# Patient Record
Sex: FEMALE | Race: WHITE | NOT HISPANIC OR LATINO | Employment: OTHER | URBAN - NONMETROPOLITAN AREA
[De-identification: names, ages, dates, MRNs, and addresses within clinical notes are randomized per-mention and may not be internally consistent; named-entity substitution may affect disease eponyms.]

---

## 2017-01-25 DIAGNOSIS — E11.42 DIABETIC PERIPHERAL NEUROPATHY ASSOCIATED WITH TYPE 2 DIABETES MELLITUS (HCC): ICD-10-CM

## 2017-01-25 RX ORDER — GABAPENTIN 600 MG/1
600 TABLET ORAL 3 TIMES DAILY
Qty: 270 TABLET | Refills: 0 | Status: SHIPPED | OUTPATIENT
Start: 2017-01-25 | End: 2017-03-29 | Stop reason: SDUPTHER

## 2017-02-28 DIAGNOSIS — Z79.4 DIABETES MELLITUS TYPE 2, INSULIN DEPENDENT (HCC): Chronic | ICD-10-CM

## 2017-02-28 DIAGNOSIS — E11.9 DIABETES MELLITUS TYPE 2, INSULIN DEPENDENT (HCC): Chronic | ICD-10-CM

## 2017-03-01 RX ORDER — INSULIN GLARGINE 100 [IU]/ML
INJECTION, SOLUTION SUBCUTANEOUS
Qty: 1 PEN | Refills: 3 | Status: SHIPPED | OUTPATIENT
Start: 2017-03-01 | End: 2017-03-29 | Stop reason: SDUPTHER

## 2017-03-16 ENCOUNTER — PROCEDURE VISIT (OUTPATIENT)
Dept: UROLOGY | Facility: CLINIC | Age: 52
End: 2017-03-16

## 2017-03-16 DIAGNOSIS — Z85.51 HISTORY OF BLADDER CANCER: Primary | ICD-10-CM

## 2017-03-16 DIAGNOSIS — N76.2 ATROPHIC VULVITIS: ICD-10-CM

## 2017-03-16 LAB
BILIRUB BLD-MCNC: NEGATIVE MG/DL
CLARITY, POC: CLEAR
COLOR UR: YELLOW
GLUCOSE UR STRIP-MCNC: ABNORMAL MG/DL
KETONES UR QL: NEGATIVE
LEUKOCYTE EST, POC: NEGATIVE
NITRITE UR-MCNC: NEGATIVE MG/ML
PH UR: 6 [PH] (ref 5–8)
PROT UR STRIP-MCNC: NEGATIVE MG/DL
RBC # UR STRIP: NEGATIVE /UL
SP GR UR: 1.01 (ref 1–1.03)
UROBILINOGEN UR QL: NORMAL

## 2017-03-16 PROCEDURE — 81003 URINALYSIS AUTO W/O SCOPE: CPT | Performed by: UROLOGY

## 2017-03-16 PROCEDURE — 52000 CYSTOURETHROSCOPY: CPT | Performed by: UROLOGY

## 2017-03-16 PROCEDURE — 99213 OFFICE O/P EST LOW 20 MIN: CPT | Performed by: UROLOGY

## 2017-03-16 RX ORDER — HYDROCODONE BITARTRATE AND ACETAMINOPHEN 5; 325 MG/1; MG/1
TABLET ORAL EVERY 6 HOURS
COMMUNITY
Start: 2015-12-24 | End: 2017-03-29

## 2017-03-16 RX ORDER — GABAPENTIN 600 MG/1
TABLET ORAL
COMMUNITY
End: 2017-03-29 | Stop reason: SDUPTHER

## 2017-03-16 RX ORDER — ASPIRIN 81 MG/1
TABLET ORAL
COMMUNITY
End: 2017-07-28 | Stop reason: SDUPTHER

## 2017-03-16 RX ORDER — IBUPROFEN 200 MG
TABLET ORAL
COMMUNITY
Start: 2011-04-06 | End: 2017-03-29

## 2017-03-16 RX ORDER — MELOXICAM 7.5 MG/1
TABLET ORAL
COMMUNITY
End: 2017-03-29 | Stop reason: SDUPTHER

## 2017-03-16 RX ORDER — INSULIN GLARGINE 100 [IU]/ML
INJECTION, SOLUTION SUBCUTANEOUS
COMMUNITY
End: 2017-03-29 | Stop reason: SDUPTHER

## 2017-03-16 RX ORDER — CLOPIDOGREL BISULFATE 75 MG/1
TABLET ORAL
COMMUNITY
End: 2017-03-29

## 2017-03-16 RX ORDER — ATORVASTATIN CALCIUM 10 MG/1
TABLET, FILM COATED ORAL
COMMUNITY
Start: 2011-04-06 | End: 2017-03-29 | Stop reason: SDUPTHER

## 2017-03-16 NOTE — PROGRESS NOTES
Chief Complaint  Bladder Cancer (CYSTO)      HPI  Kimberly Chun is a 51 y.o.female who is today for follow-up of her history of bladder cancer that was fortunately low grade and noninvasive.  She returns for her 3 month surveillance cystoscopy asymptomatic with no complaints.  I've Encouraged her to quit smoking and she states she has cut down.  At least she does drink some green tea.    There were no vitals filed for this visit.    Past Medical History  Past Medical History   Diagnosis Date   • Anxiety    • Arthritis    • Bladder cancer 2016   • Cataract    • COPD (chronic obstructive pulmonary disease)    • Fibromyalgia    • GERD (gastroesophageal reflux disease)    • Ovarian cyst    • Recurrent urinary tract infection    • Seasonal allergies    • Statin intolerance    • Stroke    • Type 2 diabetes mellitus        Past Surgical History  Past Surgical History   Procedure Laterality Date   • Hysterectomy     • Tonsillectomy     • Cystoscopy bladder biopsy  2016       Medications  has a current medication list which includes the following prescription(s): atorvastatin, hydrocodone-acetaminophen, ibuprofen, insulin aspart, albuterol, aspirin, clopidogrel, clopidogrel, gabapentin, gabapentin, glucagen hypokit, glucagon, ibuprofen, insulin aspart, insulin glargine, insulin pen needle, lantus solostar, lisinopril, lorazepam, meloxicam, meloxicam, metformin, nitrofurantoin (macrocrystal-monohydrate), omeprazole, oxycodone-acetaminophen, and trazodone.    Allergies  Allergies   Allergen Reactions   • Iodides    • Iodinated Diagnostic Agents        Social History  Social History     Social History   • Marital status: Single     Spouse name: N/A   • Number of children: N/A   • Years of education: N/A     Occupational History   •  Retired     Social History Main Topics   • Smoking status: Current Every Day Smoker     Types: Cigarettes   • Smokeless tobacco: Not on file   • Alcohol use No   • Drug use: No   • Sexual  activity: Defer     Other Topics Concern   • Not on file     Social History Narrative       Family History  Family History   Problem Relation Age of Onset   • Arthritis Mother    • Hypertension Mother    • Heart attack Mother    • Osteoporosis Mother    • Arthritis Father    • Cancer Father    • Diabetes Father    • Hypertension Father    • Stroke Other    • Drug abuse Daughter        Review of Systems  Review of Systems    Physical Exam  Physical Exam   Constitutional: She is oriented to person, place, and time. She appears well-developed and well-nourished.   HENT:   Head: Normocephalic.   Neck: Normal range of motion. Neck supple.   Pulmonary/Chest: Effort normal.   Abdominal: Soft. Bowel sounds are normal.   Genitourinary: Vagina normal.             Musculoskeletal: Normal range of motion.   Neurological: She is alert and oriented to person, place, and time.   Skin: Skin is warm and dry.   Psychiatric: She has a normal mood and affect.       Labs recent and today in the office:  Results for orders placed or performed in visit on 03/16/17   POC Urinalysis Dipstick, Automated   Result Value Ref Range    Color Yellow Yellow, Straw, Dark Yellow, Silke    Clarity, UA Clear Clear    Glucose, UA 2+ (A) Negative, 1000 mg/dL (3+) mg/dL    Bilirubin Negative Negative    Ketones, UA Negative Negative    Specific Gravity  1.015 1.005 - 1.030    Blood, UA Negative Negative    pH, Urine 6.0 5.0 - 8.0    Protein, POC Negative Negative mg/dL    Urobilinogen, UA Normal Normal    Leukocytes Negative Negative    Nitrite, UA Negative Negative        Female cystoscopy:     The patient is prepped and draped in the dorsal lithotomy position in a routine sterile fashion. The vulva and urethral meatus are inspected and found to be normal. The panendoscope is inserted in the bladder which is visualized with the Foroblique and 70 degree lenses and found to be free of foreign bodies and mucosal lesions. Ureteral orifices are normal  location and effluxing clear urine bilaterally.       Assessment & Plan  There are no suspicious changes on today's cystoscopy.  She is encouraged to quit smoking and return in 3 months for surveillance.  She get a yeast infection from Cipro last time so she is given Macrodantin today.

## 2017-03-27 DIAGNOSIS — K21.9 GASTROESOPHAGEAL REFLUX DISEASE WITHOUT ESOPHAGITIS: ICD-10-CM

## 2017-03-27 RX ORDER — OMEPRAZOLE 20 MG/1
20 CAPSULE, DELAYED RELEASE ORAL 2 TIMES DAILY
Qty: 60 CAPSULE | Refills: 0 | Status: SHIPPED | OUTPATIENT
Start: 2017-03-27 | End: 2017-03-29 | Stop reason: SDUPTHER

## 2017-03-29 ENCOUNTER — OFFICE VISIT (OUTPATIENT)
Dept: INTERNAL MEDICINE | Facility: CLINIC | Age: 52
End: 2017-03-29

## 2017-03-29 VITALS
TEMPERATURE: 97.8 F | OXYGEN SATURATION: 97 % | WEIGHT: 176.8 LBS | DIASTOLIC BLOOD PRESSURE: 70 MMHG | HEIGHT: 65 IN | BODY MASS INDEX: 29.46 KG/M2 | HEART RATE: 92 BPM | SYSTOLIC BLOOD PRESSURE: 140 MMHG

## 2017-03-29 DIAGNOSIS — Z79.4 DIABETES MELLITUS TYPE 2, INSULIN DEPENDENT (HCC): Primary | Chronic | ICD-10-CM

## 2017-03-29 DIAGNOSIS — E11.42 DIABETIC PERIPHERAL NEUROPATHY ASSOCIATED WITH TYPE 2 DIABETES MELLITUS (HCC): ICD-10-CM

## 2017-03-29 DIAGNOSIS — K21.9 GASTROESOPHAGEAL REFLUX DISEASE WITHOUT ESOPHAGITIS: ICD-10-CM

## 2017-03-29 DIAGNOSIS — I67.2 CEREBRAL ATHEROSCLEROSIS: ICD-10-CM

## 2017-03-29 DIAGNOSIS — E11.9 DIABETES MELLITUS TYPE 2, INSULIN DEPENDENT (HCC): Primary | Chronic | ICD-10-CM

## 2017-03-29 DIAGNOSIS — F41.9 ANXIETY: ICD-10-CM

## 2017-03-29 LAB — HBA1C MFR BLD: 8.5 %

## 2017-03-29 PROCEDURE — 83036 HEMOGLOBIN GLYCOSYLATED A1C: CPT | Performed by: FAMILY MEDICINE

## 2017-03-29 PROCEDURE — 99213 OFFICE O/P EST LOW 20 MIN: CPT | Performed by: FAMILY MEDICINE

## 2017-03-29 RX ORDER — GABAPENTIN 800 MG/1
800 TABLET ORAL 3 TIMES DAILY
Qty: 90 TABLET | Refills: 3 | Status: SHIPPED | OUTPATIENT
Start: 2017-03-29 | End: 2017-07-28 | Stop reason: SDUPTHER

## 2017-03-29 RX ORDER — GABAPENTIN 800 MG/1
800 TABLET ORAL 3 TIMES DAILY
Qty: 90 TABLET | Refills: 3 | Status: SHIPPED | OUTPATIENT
Start: 2017-03-29 | End: 2017-03-29 | Stop reason: SDUPTHER

## 2017-03-29 RX ORDER — LISINOPRIL 5 MG/1
5 TABLET ORAL DAILY
Qty: 90 TABLET | Refills: 3 | Status: SHIPPED | OUTPATIENT
Start: 2017-03-29 | End: 2017-07-28 | Stop reason: SDUPTHER

## 2017-03-29 RX ORDER — LORAZEPAM 0.5 MG/1
0.5 TABLET ORAL 2 TIMES DAILY PRN
Qty: 30 TABLET | Refills: 2 | Status: SHIPPED | OUTPATIENT
Start: 2017-03-29 | End: 2017-07-28 | Stop reason: SDUPTHER

## 2017-03-29 RX ORDER — MELOXICAM 7.5 MG/1
7.5 TABLET ORAL DAILY
Qty: 90 TABLET | Refills: 3 | Status: SHIPPED | OUTPATIENT
Start: 2017-03-29 | End: 2017-07-28 | Stop reason: SDUPTHER

## 2017-03-29 RX ORDER — CLOPIDOGREL BISULFATE 75 MG/1
75 TABLET ORAL DAILY
Qty: 90 TABLET | Refills: 3 | Status: SHIPPED | OUTPATIENT
Start: 2017-03-29 | End: 2017-07-28 | Stop reason: SDUPTHER

## 2017-03-29 RX ORDER — TRAZODONE HYDROCHLORIDE 50 MG/1
50 TABLET ORAL NIGHTLY PRN
Qty: 90 TABLET | Refills: 3 | Status: SHIPPED | OUTPATIENT
Start: 2017-03-29 | End: 2017-06-27

## 2017-03-29 RX ORDER — OMEPRAZOLE 20 MG/1
20 CAPSULE, DELAYED RELEASE ORAL 2 TIMES DAILY
Qty: 180 CAPSULE | Refills: 2 | Status: SHIPPED | OUTPATIENT
Start: 2017-03-29 | End: 2017-07-28 | Stop reason: SDUPTHER

## 2017-03-29 RX ORDER — ALBUTEROL SULFATE 90 UG/1
2 AEROSOL, METERED RESPIRATORY (INHALATION) EVERY 6 HOURS PRN
Qty: 3 INHALER | Refills: 6 | Status: SHIPPED | OUTPATIENT
Start: 2017-03-29 | End: 2017-11-15 | Stop reason: SDUPTHER

## 2017-03-29 RX ORDER — OMEPRAZOLE 20 MG/1
20 CAPSULE, DELAYED RELEASE ORAL 2 TIMES DAILY
Qty: 60 CAPSULE | Refills: 2 | Status: SHIPPED | OUTPATIENT
Start: 2017-03-29 | End: 2017-03-29 | Stop reason: SDUPTHER

## 2017-03-29 RX ORDER — ATORVASTATIN CALCIUM 10 MG/1
10 TABLET, FILM COATED ORAL NIGHTLY
Qty: 90 TABLET | Refills: 3 | Status: SHIPPED | OUTPATIENT
Start: 2017-03-29 | End: 2017-07-28 | Stop reason: SDUPTHER

## 2017-03-29 NOTE — PROGRESS NOTES
Subjective   Kimberly Chun is a 51 y.o. female.     History of Present Illness   Sugars running about 175. Using medicines as directed. Will be more active as it's warming up outside. Has been trying to eat healthier. Taking Lantus 52 units daily with 10 units of fast acting insulin with meals. Taking metformin bid as prescribed.     The following portions of the patient's history were reviewed and updated as appropriate: allergies, current medications, past family history, past medical history, past social history, past surgical history and problem list.    Review of Systems   Constitutional: Positive for activity change.   Gastrointestinal:        Heartburn   Neurological: Positive for numbness.       Objective   Physical Exam   Constitutional: She is oriented to person, place, and time. Vital signs are normal. She appears well-developed and well-nourished. She is active. She does not have a sickly appearance. She does not appear ill.   Appears stated age. Well groomed. Overweight     HENT:   Head: Normocephalic and atraumatic.   Right Ear: Hearing normal.   Left Ear: Hearing normal.   Nose: Nose normal.   Eyes: EOM and lids are normal. Pupils are equal, round, and reactive to light.   Neck: Neck supple.   Cardiovascular: Normal rate, regular rhythm and normal heart sounds.    Pulmonary/Chest: Effort normal and breath sounds normal. No stridor.   Neurological: She is alert and oriented to person, place, and time. No cranial nerve deficit. Gait normal.   Skin: She is not diaphoretic. No cyanosis. Nails show no clubbing.   Psychiatric: She has a normal mood and affect. Her behavior is normal. Judgment and thought content normal.   Nursing note and vitals reviewed.       Lab Results   Component Value Date    HGBA1C 8.5 03/29/2017     Last A1C 9.1 in December 2016.    Assessment/Plan   Kimberly was seen today for diabetes and med refill.    Diagnoses and all orders for this visit:    Diabetes mellitus type 2,  insulin dependent  -     POC Glycosylated Hemoglobin (Hb A1C)  -     atorvastatin (LIPITOR) 10 MG tablet; Take 1 tablet by mouth Every Night.  -     insulin aspart (NOVOLOG FLEXPEN) 100 UNIT/ML solution pen-injector sc pen; Inject 10 Units under the skin 3 (Three) Times a Day With Meals.  -     Insulin Glargine (LANTUS SOLOSTAR) 100 UNIT/ML injection pen; Inject 55 Units under the skin Every Night.  -     lisinopril (PRINIVIL,ZESTRIL) 5 MG tablet; Take 1 tablet by mouth Daily.  -     metFORMIN (GLUCOPHAGE) 1000 MG tablet; Take 1 tablet by mouth 2 (Two) Times a Day.    Gastroesophageal reflux disease without esophagitis  -     Discontinue: omeprazole (priLOSEC) 20 MG capsule; Take 1 capsule by mouth 2 (Two) Times a Day.  -     omeprazole (priLOSEC) 20 MG capsule; Take 1 capsule by mouth 2 (Two) Times a Day.    Anxiety  -     LORazepam (ATIVAN) 0.5 MG tablet; Take 1 tablet by mouth 2 (Two) Times a Day As Needed for Anxiety.    Diabetic peripheral neuropathy associated with type 2 diabetes mellitus  -     Discontinue: gabapentin (NEURONTIN) 800 MG tablet; Take 1 tablet by mouth 3 (Three) Times a Day.  -     gabapentin (NEURONTIN) 800 MG tablet; Take 1 tablet by mouth 3 (Three) Times a Day.    Cerebral atherosclerosis  -     atorvastatin (LIPITOR) 10 MG tablet; Take 1 tablet by mouth Every Night.  -     clopidogrel (PLAVIX) 75 MG tablet; Take 1 tablet by mouth Daily.    Other orders  -     albuterol (PROAIR HFA) 108 (90 BASE) MCG/ACT inhaler; Inhale 2 puffs Every 6 (Six) Hours As Needed for Wheezing.  -     meloxicam (MOBIC) 7.5 MG tablet; Take 1 tablet by mouth Daily.  -     traZODone (DESYREL) 50 MG tablet; Take 1 tablet by mouth At Night As Needed for Sleep.      Diabetes still not controlled but has shown improvement.  Discussed how to increase dose of insulins based on glucose checks.  Medicines refilled as needed.  Encouraged physical activity, healthy diet, slow weight loss.  Patient has fecal occult blood  testing ×3 at home and I ask her to return at next visit

## 2017-05-09 ENCOUNTER — OFFICE VISIT (OUTPATIENT)
Dept: INTERNAL MEDICINE | Facility: CLINIC | Age: 52
End: 2017-05-09

## 2017-05-09 VITALS
SYSTOLIC BLOOD PRESSURE: 138 MMHG | WEIGHT: 182.4 LBS | HEIGHT: 65 IN | DIASTOLIC BLOOD PRESSURE: 70 MMHG | OXYGEN SATURATION: 96 % | HEART RATE: 87 BPM | TEMPERATURE: 98.7 F | BODY MASS INDEX: 30.39 KG/M2

## 2017-05-09 DIAGNOSIS — R22.1 NECK SWELLING: ICD-10-CM

## 2017-05-09 DIAGNOSIS — R53.82 CHRONIC FATIGUE: Primary | ICD-10-CM

## 2017-05-09 DIAGNOSIS — Z79.4 DIABETES MELLITUS TYPE 2, INSULIN DEPENDENT (HCC): Chronic | ICD-10-CM

## 2017-05-09 DIAGNOSIS — E11.9 DIABETES MELLITUS TYPE 2, INSULIN DEPENDENT (HCC): Chronic | ICD-10-CM

## 2017-05-09 DIAGNOSIS — K21.9 GASTROESOPHAGEAL REFLUX DISEASE, ESOPHAGITIS PRESENCE NOT SPECIFIED: ICD-10-CM

## 2017-05-09 PROCEDURE — 99214 OFFICE O/P EST MOD 30 MIN: CPT | Performed by: FAMILY MEDICINE

## 2017-05-10 LAB
ALBUMIN SERPL-MCNC: 4.1 G/DL (ref 3.5–5)
ALBUMIN/GLOB SERPL: 1.5 G/DL (ref 1–2)
ALP SERPL-CCNC: 82 U/L (ref 38–126)
ALT SERPL-CCNC: 21 U/L (ref 13–69)
AST SERPL-CCNC: 16 U/L (ref 15–46)
BASOPHILS # BLD AUTO: 0.1 10*3/MM3 (ref 0–0.2)
BASOPHILS NFR BLD AUTO: 0.8 % (ref 0–2.5)
BILIRUB SERPL-MCNC: 0.3 MG/DL (ref 0.2–1.3)
BUN SERPL-MCNC: 8 MG/DL (ref 7–20)
BUN/CREAT SERPL: 8.9 (ref 7.1–23.5)
CALCIUM SERPL-MCNC: 10.1 MG/DL (ref 8.4–10.2)
CHLORIDE SERPL-SCNC: 110 MMOL/L (ref 98–107)
CO2 SERPL-SCNC: 23 MMOL/L (ref 26–30)
CREAT SERPL-MCNC: 0.9 MG/DL (ref 0.6–1.3)
EOSINOPHIL # BLD AUTO: 0.61 10*3/MM3 (ref 0–0.7)
EOSINOPHIL NFR BLD AUTO: 4.6 % (ref 0–7)
ERYTHROCYTE [DISTWIDTH] IN BLOOD BY AUTOMATED COUNT: 12.9 % (ref 11.5–14.5)
FOLATE SERPL-MCNC: 11.8 NG/ML
GLOBULIN SER CALC-MCNC: 2.7 GM/DL
GLUCOSE SERPL-MCNC: 53 MG/DL (ref 74–98)
HCT VFR BLD AUTO: 40.6 % (ref 37–47)
HGB BLD-MCNC: 13.3 G/DL (ref 12–16)
IMM GRANULOCYTES # BLD: 0.03 10*3/MM3 (ref 0–0.06)
IMM GRANULOCYTES NFR BLD: 0.2 % (ref 0–0.6)
LYMPHOCYTES # BLD AUTO: 7.1 10*3/MM3 (ref 0.6–3.4)
LYMPHOCYTES NFR BLD AUTO: 53.7 % (ref 10–50)
MCH RBC QN AUTO: 29.2 PG (ref 27–31)
MCHC RBC AUTO-ENTMCNC: 32.8 G/DL (ref 30–37)
MCV RBC AUTO: 89 FL (ref 81–99)
MONOCYTES # BLD AUTO: 0.68 10*3/MM3 (ref 0–0.9)
MONOCYTES NFR BLD AUTO: 5.1 % (ref 0–12)
NEUTROPHILS # BLD AUTO: 4.7 10*3/MM3 (ref 2–6.9)
NEUTROPHILS NFR BLD AUTO: 35.6 % (ref 37–80)
NRBC BLD AUTO-RTO: 0 /100 WBC (ref 0–0)
PLATELET # BLD AUTO: 397 10*3/MM3 (ref 130–400)
POTASSIUM SERPL-SCNC: 4.2 MMOL/L (ref 3.5–5.1)
PROT SERPL-MCNC: 6.8 G/DL (ref 6.3–8.2)
RBC # BLD AUTO: 4.56 10*6/MM3 (ref 4.2–5.4)
SODIUM SERPL-SCNC: 144 MMOL/L (ref 137–145)
T4 FREE SERPL-MCNC: 1.13 NG/DL (ref 0.78–2.19)
TSH SERPL DL<=0.005 MIU/L-ACNC: 1.64 MIU/ML (ref 0.47–4.68)
VIT B12 SERPL-MCNC: 560 PG/ML (ref 239–931)
WBC # BLD AUTO: 13.22 10*3/MM3 (ref 4.8–10.8)

## 2017-05-16 ENCOUNTER — OFFICE VISIT (OUTPATIENT)
Dept: INTERNAL MEDICINE | Facility: CLINIC | Age: 52
End: 2017-05-16

## 2017-05-16 VITALS
BODY MASS INDEX: 29.89 KG/M2 | HEIGHT: 65 IN | DIASTOLIC BLOOD PRESSURE: 82 MMHG | HEART RATE: 72 BPM | TEMPERATURE: 97.7 F | OXYGEN SATURATION: 98 % | WEIGHT: 179.4 LBS | SYSTOLIC BLOOD PRESSURE: 142 MMHG

## 2017-05-16 DIAGNOSIS — J02.9 SORE THROAT: Primary | ICD-10-CM

## 2017-05-16 DIAGNOSIS — J01.90 ACUTE RHINOSINUSITIS: ICD-10-CM

## 2017-05-16 LAB
EXPIRATION DATE: NORMAL
INTERNAL CONTROL: NORMAL
Lab: NORMAL
S PYO AG THROAT QL: NEGATIVE

## 2017-05-16 PROCEDURE — 87880 STREP A ASSAY W/OPTIC: CPT | Performed by: FAMILY MEDICINE

## 2017-05-16 PROCEDURE — 99213 OFFICE O/P EST LOW 20 MIN: CPT | Performed by: FAMILY MEDICINE

## 2017-05-16 RX ORDER — AMOXICILLIN 875 MG/1
875 TABLET, COATED ORAL 2 TIMES DAILY
Qty: 20 TABLET | Refills: 0 | Status: SHIPPED | OUTPATIENT
Start: 2017-05-16 | End: 2017-05-26

## 2017-05-16 RX ORDER — FLUCONAZOLE 150 MG/1
TABLET ORAL
Qty: 2 TABLET | Refills: 0 | Status: SHIPPED | OUTPATIENT
Start: 2017-05-16 | End: 2017-06-27

## 2017-05-19 ENCOUNTER — HOSPITAL ENCOUNTER (OUTPATIENT)
Dept: ULTRASOUND IMAGING | Facility: HOSPITAL | Age: 52
Discharge: HOME | End: 2017-05-19
Attending: FAMILY MEDICINE | Admitting: FAMILY MEDICINE

## 2017-05-19 DIAGNOSIS — R22.1 NECK SWELLING: ICD-10-CM

## 2017-05-19 PROCEDURE — 76536 US EXAM OF HEAD AND NECK: CPT

## 2017-06-13 ENCOUNTER — OFFICE VISIT (OUTPATIENT)
Dept: SURGERY | Facility: CLINIC | Age: 52
End: 2017-06-13

## 2017-06-13 VITALS
TEMPERATURE: 97.5 F | OXYGEN SATURATION: 97 % | WEIGHT: 185.6 LBS | RESPIRATION RATE: 16 BRPM | DIASTOLIC BLOOD PRESSURE: 80 MMHG | HEIGHT: 65 IN | HEART RATE: 87 BPM | BODY MASS INDEX: 30.92 KG/M2 | SYSTOLIC BLOOD PRESSURE: 120 MMHG

## 2017-06-13 DIAGNOSIS — R13.10 DYSPHAGIA, UNSPECIFIED TYPE: ICD-10-CM

## 2017-06-13 DIAGNOSIS — K21.9 GASTROESOPHAGEAL REFLUX DISEASE, ESOPHAGITIS PRESENCE NOT SPECIFIED: Primary | ICD-10-CM

## 2017-06-13 PROCEDURE — 99203 OFFICE O/P NEW LOW 30 MIN: CPT | Performed by: SURGERY

## 2017-06-13 RX ORDER — SODIUM CHLORIDE 0.9 % (FLUSH) 0.9 %
1-10 SYRINGE (ML) INJECTION AS NEEDED
Status: CANCELLED | OUTPATIENT
Start: 2017-06-13

## 2017-06-13 RX ORDER — SODIUM CHLORIDE 9 MG/ML
50 INJECTION, SOLUTION INTRAVENOUS CONTINUOUS
Status: CANCELLED | OUTPATIENT
Start: 2017-06-13

## 2017-06-13 NOTE — PROGRESS NOTES
Subjective   Nazia Chun is a 52 y.o. female.   Chief Complaint   Patient presents with   • Heartburn     History of Present Illness   Ms. Chun is a 53 yo female referred for evaluation of dysphagia and heartburn.  The dysphagia occurs with both solids and liquids Symptoms have been present for approximately a few months. The symptoms are gradually worsening. The dysphagia has been constant and has been progressive.  She complains of frequent heartburn, burning sensation in chest, acid or sour stomach and reflux.  She denies melena, hematochezia, hematemesis, and coffee ground emesis.  This has been associated with belching, chest pain, choking on food, cough, difficulty swallowing, early satiety, heartburn, need to clear throat frequently and smothering when lying flat.  She denies melena, midespigastric pain, nausea, unexpected weight loss and upper abdominal discomfort.  Prior workup has included an u/s of the thyroid gland which was normal.            Past Medical History:   Diagnosis Date   • Anxiety    • Arthritis    • Bladder cancer 2016   • Cataract    • COPD (chronic obstructive pulmonary disease)    • Fibromyalgia    • GERD (gastroesophageal reflux disease)    • Ovarian cyst    • Recurrent urinary tract infection    • Seasonal allergies    • Statin intolerance    • Stroke    • Type 2 diabetes mellitus        Past Surgical History:   Procedure Laterality Date   • CYSTOSCOPY BLADDER BIOPSY  2016   • HYSTERECTOMY     • TONSILLECTOMY             Current Outpatient Prescriptions:   •  albuterol (PROAIR HFA) 108 (90 BASE) MCG/ACT inhaler, Inhale 2 puffs Every 6 (Six) Hours As Needed for Wheezing., Disp: 3 inhaler, Rfl: 6  •  atorvastatin (LIPITOR) 10 MG tablet, Take 1 tablet by mouth Every Night., Disp: 90 tablet, Rfl: 3  •  clopidogrel (PLAVIX) 75 MG tablet, Take 1 tablet by mouth Daily., Disp: 90 tablet, Rfl: 3  •  gabapentin (NEURONTIN) 800 MG tablet, Take 1 tablet by mouth 3 (Three) Times a Day.,  Disp: 90 tablet, Rfl: 3  •  insulin aspart (NOVOLOG FLEXPEN) 100 UNIT/ML solution pen-injector sc pen, Inject 10 Units under the skin 3 (Three) Times a Day With Meals., Disp: 3 pen, Rfl: 5  •  Insulin Glargine (LANTUS SOLOSTAR) 100 UNIT/ML injection pen, Inject 55 Units under the skin Every Night., Disp: 3 pen, Rfl: 5  •  Insulin Pen Needle (BD PEN NEEDLE ANNETTA U/F) 32G X 4 MM misc, 1 Units 4 (Four) Times a Day., Disp: 120 each, Rfl: 11  •  lisinopril (PRINIVIL,ZESTRIL) 5 MG tablet, Take 1 tablet by mouth Daily., Disp: 90 tablet, Rfl: 3  •  LORazepam (ATIVAN) 0.5 MG tablet, Take 1 tablet by mouth 2 (Two) Times a Day As Needed for Anxiety., Disp: 30 tablet, Rfl: 2  •  meloxicam (MOBIC) 7.5 MG tablet, Take 1 tablet by mouth Daily., Disp: 90 tablet, Rfl: 3  •  metFORMIN (GLUCOPHAGE) 1000 MG tablet, Take 1 tablet by mouth 2 (Two) Times a Day., Disp: 180 tablet, Rfl: 3  •  omeprazole (priLOSEC) 20 MG capsule, Take 1 capsule by mouth 2 (Two) Times a Day., Disp: 180 capsule, Rfl: 2  •  traZODone (DESYREL) 50 MG tablet, Take 1 tablet by mouth At Night As Needed for Sleep., Disp: 90 tablet, Rfl: 3  •  aspirin 81 MG EC tablet, Take 81 mg by mouth daily., Disp: , Rfl:   •  fluconazole (DIFLUCAN) 150 MG tablet, TAKE ONE TAB PO X ONCE, CAN REPEAT IN 4 DAYS IF NEEDED, Disp: 2 tablet, Rfl: 0  •  glucagon (GLUCAGON EMERGENCY) 1 MG injection, Inject 1 mg under the skin 1 (one) time as needed for low blood sugar for up to 1 dose., Disp: 1 kit, Rfl: 5      Allergies   Allergen Reactions   • Iodides    • Iodinated Diagnostic Agents        Family History   Problem Relation Age of Onset   • Arthritis Mother    • Hypertension Mother    • Heart attack Mother    • Osteoporosis Mother    • Arthritis Father    • Cancer Father    • Diabetes Father    • Hypertension Father    • Stroke Other    • Drug abuse Daughter          Social History     Social History   • Marital status:      Spouse name: N/A   • Number of children: N/A   • Years  "of education: N/A     Occupational History   •  Retired     Social History Main Topics   • Smoking status: Current Every Day Smoker     Types: Cigarettes   • Smokeless tobacco: Not on file   • Alcohol use No   • Drug use: No   • Sexual activity: Defer     Other Topics Concern   • Not on file     Social History Narrative         Review of Systems   Constitutional: Negative for chills, fever and unexpected weight change.   HENT: Positive for trouble swallowing and voice change. Negative for hearing loss and sore throat.    Eyes: Negative for visual disturbance.   Respiratory: Positive for cough, chest tightness and shortness of breath. Negative for apnea and wheezing.    Cardiovascular: Negative for chest pain, palpitations and leg swelling.   Gastrointestinal: Negative for abdominal distention, abdominal pain, anal bleeding, blood in stool, constipation, diarrhea, nausea, rectal pain and vomiting.        Acid reflux   Endocrine: Positive for polydipsia. Negative for cold intolerance and heat intolerance.   Genitourinary: Negative for difficulty urinating, dysuria and flank pain.   Musculoskeletal: Negative for back pain and gait problem.   Skin: Negative for color change, rash and wound.   Neurological: Negative for dizziness, syncope, speech difficulty, weakness, light-headedness, numbness and headaches.   Hematological: Negative for adenopathy. Does not bruise/bleed easily.   Psychiatric/Behavioral: Negative for confusion. The patient is not nervous/anxious.        Objective    /80  Pulse 87  Temp 97.5 °F (36.4 °C) (Temporal Artery )   Resp 16  Ht 65\" (165.1 cm)  Wt 185 lb 9.6 oz (84.2 kg)  SpO2 97%  BMI 30.89 kg/m2    Physical Exam   Constitutional: She is oriented to person, place, and time. She appears well-developed and well-nourished.   HENT:   Head: Normocephalic and atraumatic.   Eyes: No scleral icterus.   Neck: Neck supple.   Cardiovascular: Regular rhythm.    Pulmonary/Chest: Effort normal. "   Abdominal: Soft. She exhibits no distension. There is no tenderness.   Neurological: She is alert and oriented to person, place, and time.   Skin: Skin is warm and dry.   Psychiatric: She has a normal mood and affect. Her behavior is normal.     Imaging :   Personally reviewed    FINDINGS: Patient history indicates difficulty swallowing, foreign body sensation in neck, fullness in lower neck.      The thyroid gland appears normal. Thyroid lobes are symmetric in size, 4.3 x 1.5 x 1.4 cm on the right and 4.3 x 1.4 x 1.6 cm on the left. Thyroid isthmus measures approximately 3 mm in width. There is no evidence of thyroid mass, cyst or calcification. No regional adenopathy is seen.      IMPRESSION:  Thyroid gland appears sonographically within normal limits.    Assessment/Plan   Nazia was seen today for heartburn.    Diagnoses and all orders for this visit:    Gastroesophageal reflux disease, esophagitis presence not specified  -     Case Request; Standing  -     sodium chloride 0.9 % flush 1-10 mL; Infuse 1-10 mL into a venous catheter As Needed for Line Care.  -     sodium chloride 0.9 % infusion; Infuse 50 mL/hr into a venous catheter Continuous.  -     Case Request    Dysphagia, unspecified type  -     Case Request; Standing  -     sodium chloride 0.9 % flush 1-10 mL; Infuse 1-10 mL into a venous catheter As Needed for Line Care.  -     sodium chloride 0.9 % infusion; Infuse 50 mL/hr into a venous catheter Continuous.  -     Case Request  -     FL Esophagram Complete; Future    Other orders  -     Follow Anesthesia Guidelines / Standing Orders; Future  -     Provide NPO Instructions to Patient  -     Follow Anesthesia Guidelines / Standing Orders; Standing  -     Verify NPO Status; Standing  -     Notify Physician (Specify Parameters); Standing  -     Insert Peripheral IV; Standing  -     Saline Lock & Maintain IV Access; Standing    I have recommended both EGD and barium esophagram for further evaluation of  dysphagia. We discussed EGD for diagnostic purposes..  We discussed the indications for upper endoscopy as well as the risks, benefits and alternatives to this procedure.   The patient was given an opportunity to ask questions.  The patient verbalized understanding of these recommendations and the plan of care. The patient is willing to proceed with endoscopy and has signed informed consent in the office today.  My office will arrange scheduling for the EGD procedure and pre-admission testing.      The patient's JACQUIE report was obtained, reviewed by me and scanned into the medical record.

## 2017-06-20 ENCOUNTER — APPOINTMENT (OUTPATIENT)
Dept: GENERAL RADIOLOGY | Facility: HOSPITAL | Age: 52
End: 2017-06-20
Attending: SURGERY

## 2017-06-20 ENCOUNTER — TELEPHONE (OUTPATIENT)
Dept: SURGERY | Facility: CLINIC | Age: 52
End: 2017-06-20

## 2017-06-26 ENCOUNTER — HOSPITAL ENCOUNTER (OUTPATIENT)
Dept: GENERAL RADIOLOGY | Facility: HOSPITAL | Age: 52
Discharge: HOME | End: 2017-06-26
Attending: SURGERY

## 2017-06-26 DIAGNOSIS — R13.10 DYSPHAGIA, UNSPECIFIED TYPE: ICD-10-CM

## 2017-06-26 PROCEDURE — 74220 X-RAY XM ESOPHAGUS 1CNTRST: CPT

## 2017-06-27 ENCOUNTER — TELEPHONE (OUTPATIENT)
Dept: SURGERY | Facility: CLINIC | Age: 52
End: 2017-06-27

## 2017-06-27 NOTE — TELEPHONE ENCOUNTER
Patient was informed, after I called Dr Lemos's office, that it was fine to have both procedures the same day. She will call Dr Longoria about dosing her insulin.

## 2017-06-27 NOTE — TELEPHONE ENCOUNTER
----- Message from Roseanne Gutierrez MA sent at 6/27/2017  3:34 PM EDT -----  Contact: [phone]  Pt has questions pertaining to diabetes medication. She is scheduled for EGD Monday July 3. Also states she is scheduled for a bladder scope the same afternoon with Dr Lemos at 1:30. Asking for Dr Bey's opinion as to whether she'd be ok to do both procedures same day? Please call. Thanks.

## 2017-07-03 ENCOUNTER — ANESTHESIA EVENT (OUTPATIENT)
Dept: GASTROENTEROLOGY | Facility: HOSPITAL | Age: 52
End: 2017-07-03

## 2017-07-03 ENCOUNTER — ANESTHESIA (OUTPATIENT)
Dept: GASTROENTEROLOGY | Facility: HOSPITAL | Age: 52
End: 2017-07-03

## 2017-07-03 ENCOUNTER — HOSPITAL ENCOUNTER (OUTPATIENT)
Facility: HOSPITAL | Age: 52
Setting detail: HOSPITAL OUTPATIENT SURGERY
Discharge: HOME | End: 2017-07-03
Attending: SURGERY | Admitting: SURGERY

## 2017-07-03 VITALS
BODY MASS INDEX: 29.99 KG/M2 | RESPIRATION RATE: 18 BRPM | HEART RATE: 76 BPM | DIASTOLIC BLOOD PRESSURE: 61 MMHG | WEIGHT: 180 LBS | TEMPERATURE: 97.8 F | SYSTOLIC BLOOD PRESSURE: 125 MMHG | OXYGEN SATURATION: 99 % | HEIGHT: 65 IN

## 2017-07-03 DIAGNOSIS — R13.10 DYSPHAGIA, UNSPECIFIED TYPE: ICD-10-CM

## 2017-07-03 DIAGNOSIS — K21.9 GASTROESOPHAGEAL REFLUX DISEASE, ESOPHAGITIS PRESENCE NOT SPECIFIED: ICD-10-CM

## 2017-07-03 LAB — GLUCOSE BLDC GLUCOMTR-MCNC: 326 MG/DL (ref 70–130)

## 2017-07-03 PROCEDURE — 82962 GLUCOSE BLOOD TEST: CPT

## 2017-07-03 PROCEDURE — 43239 EGD BIOPSY SINGLE/MULTIPLE: CPT | Performed by: SURGERY

## 2017-07-03 PROCEDURE — 88305 TISSUE EXAM BY PATHOLOGIST: CPT | Performed by: SURGERY

## 2017-07-03 PROCEDURE — 25010000002 PROPOFOL 200 MG/20ML EMULSION: Performed by: NURSE ANESTHETIST, CERTIFIED REGISTERED

## 2017-07-03 PROCEDURE — 25010000002 MIDAZOLAM PER 1 MG: Performed by: NURSE ANESTHETIST, CERTIFIED REGISTERED

## 2017-07-03 RX ORDER — MIDAZOLAM HYDROCHLORIDE 1 MG/ML
INJECTION INTRAMUSCULAR; INTRAVENOUS AS NEEDED
Status: DISCONTINUED | OUTPATIENT
Start: 2017-07-03 | End: 2017-07-03 | Stop reason: SURG

## 2017-07-03 RX ORDER — SODIUM CHLORIDE 0.9 % (FLUSH) 0.9 %
1-10 SYRINGE (ML) INJECTION AS NEEDED
Status: DISCONTINUED | OUTPATIENT
Start: 2017-07-03 | End: 2017-07-03 | Stop reason: HOSPADM

## 2017-07-03 RX ORDER — PROPOFOL 10 MG/ML
INJECTION, EMULSION INTRAVENOUS AS NEEDED
Status: DISCONTINUED | OUTPATIENT
Start: 2017-07-03 | End: 2017-07-03 | Stop reason: SURG

## 2017-07-03 RX ORDER — SODIUM CHLORIDE 9 MG/ML
50 INJECTION, SOLUTION INTRAVENOUS CONTINUOUS
Status: DISCONTINUED | OUTPATIENT
Start: 2017-07-03 | End: 2017-07-03 | Stop reason: HOSPADM

## 2017-07-03 RX ADMIN — MIDAZOLAM HYDROCHLORIDE 2 MG: 1 INJECTION, SOLUTION INTRAMUSCULAR; INTRAVENOUS at 09:17

## 2017-07-03 RX ADMIN — PROPOFOL 30 MG: 10 INJECTION, EMULSION INTRAVENOUS at 09:24

## 2017-07-03 RX ADMIN — LIDOCAINE HYDROCHLORIDE 80 MG: 20 INJECTION, SOLUTION INTRAVENOUS at 09:20

## 2017-07-03 RX ADMIN — SODIUM CHLORIDE 50 ML/HR: 9 INJECTION, SOLUTION INTRAVENOUS at 07:56

## 2017-07-03 RX ADMIN — PROPOFOL 50 MG: 10 INJECTION, EMULSION INTRAVENOUS at 09:20

## 2017-07-03 NOTE — ANESTHESIA POSTPROCEDURE EVALUATION
Patient: Nazia Chun    Procedure Summary     Date Anesthesia Start Anesthesia Stop Room / Location    07/03/17 0916 0933 [facility] ENDOSCOPY 1 / [facility] ENDOSCOPY       Procedure Diagnosis Surgeon Provider    ESOPHAGOGASTRODUODENOSCOPY WITH BIOPSY (N/A Esophagus) Gastroesophageal reflux disease, esophagitis presence not specified; Dysphagia, unspecified type  (Gastroesophageal reflux disease, esophagitis presence not specified [K21.9]; Dysphagia, unspecified type [R13.10]) MD Hernando Oseguera CRNA          Anesthesia Type: MAC  Last vitals  /71 (07/03/17 0944)    Temp 97.8 °F (36.6 °C) (07/03/17 0944)    Pulse 79 (07/03/17 0944)   Resp 16 (07/03/17 0944)    SpO2 97 % (07/03/17 0944)      Post Anesthesia Care and Evaluation    Patient location during evaluation: bedside  Patient participation: complete - patient participated  Level of consciousness: awake and alert  Pain management: satisfactory to patient  Airway patency: patent  Anesthetic complications: No anesthetic complications  PONV Status: controlled  Cardiovascular status: acceptable and stable  Respiratory status: acceptable  Hydration status: acceptable

## 2017-07-03 NOTE — ANESTHESIA PREPROCEDURE EVALUATION
Anesthesia Evaluation     Patient summary reviewed and Nursing notes reviewed   no history of anesthetic complications:  NPO Solid Status: > 8 hours  NPO Liquid Status: > 8 hours     Airway   Mallampati: I  TM distance: >3 FB  Neck ROM: full  no difficulty expected  Dental    (+) lower dentures and upper dentures    Pulmonary - normal exam    breath sounds clear to auscultation  (+) a smoker, COPD mild,     ROS comment: 1 ppd for 40 years  Did smoke today  Cardiovascular - normal exam    Rhythm: regular  Rate: normal    (+) hypertension well controlled, PVD,       Neuro/Psych  (+) CVA, numbness, psychiatric history Anxiety and Depression,      ROS Comment: 7 years ago, weakness and memory problems for a while. States no problems now  Diabetic neuropathy  GI/Hepatic/Renal/Endo    (+)  GERD, diabetes mellitus type 2 poorly controlled using insulin,     Musculoskeletal     (+) myalgias,       ROS comment: Fibromyalgia  Abdominal    Substance History - negative use     OB/GYN negative ob/gyn ROS         Other   (+) arthritis   history of cancer remission      Other Comment: Bladder cancer not active now  ROS/Med Hx Other: FSBS 326                                  Anesthesia Plan    ASA 3     MAC   (Pt told that intravenous sedation will be used as the primary anesthetic. Every effort will be made to make sure the patient is comfortable.     The patient was told they may or may not have recall for the procedure.  Will proceed with the plan of care.)  intravenous induction   Anesthetic plan and risks discussed with patient.

## 2017-07-03 NOTE — PLAN OF CARE
Problem: GI Endoscopy (Adult)  Goal: Signs and Symptoms of Listed Potential Problems Will be Absent or Manageable (GI Endoscopy)  Outcome: Ongoing (interventions implemented as appropriate)    07/03/17 6348   GI Endoscopy   Problems Assessed (GI Endoscopy) all   Problems Present (GI Endoscopy) none

## 2017-07-03 NOTE — BRIEF OP NOTE
ESOPHAGOGASTRODUODENOSCOPY WITH BIOPSY  Procedure Note    Nazia Adiel  7/3/2017    Pre-op Diagnosis:   Gastroesophageal reflux disease, esophagitis presence not specified [K21.9]  Dysphagia, unspecified type [R13.10]    Post-op Diagnosis:   Normal exam for dysphagia, mild gastritis, sliding hiatal hernia    Procedure/CPT® Codes: 20620      Procedure(s):  ESOPHAGOGASTRODUODENOSCOPY WITH BIOPSY    Surgeon(s):  Lindy Bey MD    Anesthesia: Monitor Anesthesia Care    Staff:   Circulator: Yuridia Putnam RN  Scrub Person: Anne Owen    Estimated Blood Loss: *No blood loss documented*  Urine Voided: * No values recorded between 7/3/2017  9:15 AM and 7/3/2017  9:27 AM *    Specimens:                  ID Type Source Tests Collected by Time Destination   A : duodenal biopsies Tissue Small Intestine, Duodenum TISSUE EXAM Yuridia Putnam RN 7/3/2017 0926    B : antrum biopsies Tissue Gastric, Antrum TISSUE EXAM Yuridia Putnam RN 7/3/2017 0928            Findings: see above    Complications: none      Lindy Bey MD     Date: 7/3/2017  Time: 9:30 AM

## 2017-07-07 LAB
LAB AP CASE REPORT: NORMAL
Lab: NORMAL
PATH REPORT.FINAL DX SPEC: NORMAL

## 2017-07-12 ENCOUNTER — PROCEDURE VISIT (OUTPATIENT)
Dept: UROLOGY | Facility: CLINIC | Age: 52
End: 2017-07-12

## 2017-07-12 VITALS
HEART RATE: 92 BPM | TEMPERATURE: 98.5 F | DIASTOLIC BLOOD PRESSURE: 85 MMHG | SYSTOLIC BLOOD PRESSURE: 128 MMHG | BODY MASS INDEX: 29.99 KG/M2 | OXYGEN SATURATION: 96 % | HEIGHT: 65 IN | WEIGHT: 180 LBS

## 2017-07-12 DIAGNOSIS — Z72.0 TOBACCO ABUSE: ICD-10-CM

## 2017-07-12 DIAGNOSIS — Z85.51 PERSONAL HISTORY OF BLADDER CANCER: Primary | ICD-10-CM

## 2017-07-12 LAB
BILIRUB BLD-MCNC: NEGATIVE MG/DL
CLARITY, POC: CLEAR
COLOR UR: YELLOW
GLUCOSE UR STRIP-MCNC: NEGATIVE MG/DL
KETONES UR QL: NEGATIVE
LEUKOCYTE EST, POC: NEGATIVE
NITRITE UR-MCNC: NEGATIVE MG/ML
PH UR: 6 [PH] (ref 5–8)
PROT UR STRIP-MCNC: NEGATIVE MG/DL
RBC # UR STRIP: NEGATIVE /UL
SP GR UR: 1.02 (ref 1–1.03)
UROBILINOGEN UR QL: NORMAL

## 2017-07-12 PROCEDURE — 52000 CYSTOURETHROSCOPY: CPT | Performed by: UROLOGY

## 2017-07-12 PROCEDURE — 81003 URINALYSIS AUTO W/O SCOPE: CPT | Performed by: UROLOGY

## 2017-07-12 PROCEDURE — 99213 OFFICE O/P EST LOW 20 MIN: CPT | Performed by: UROLOGY

## 2017-07-12 NOTE — PROGRESS NOTES
Chief Complaint  Bladder Cancer (patient is here for cysto.)      DAJUAN Chun is a 52 y.o.female who returns for follow-up with a history of low-grade noninvasive bladder cancer last seen 1 year ago.  She continues to use Plavix but has noticed no hematuria in her urine is negative today.  She continues to smoke cigarettes which obviously increases her risk for bladder cancer.  She denies other  signs and symptoms.  She is not currently using any estrogens but she is not sexually active and denies dysuria.    Vitals:    07/12/17 1334   BP: 128/85   Pulse: 92   Temp: 98.5 °F (36.9 °C)   SpO2: 96%       Past Medical History  Past Medical History:   Diagnosis Date   • Anxiety    • Anxiety    • Arthritis    • Bladder cancer 2016   • Cataract    • COPD (chronic obstructive pulmonary disease)    • Dysphagia    • Fibromyalgia    • Fracture     RIGHT LEG, RIGHT ANKLE, MULTIPLE TOES   • Full dentures    • GERD (gastroesophageal reflux disease)    • Ovarian cyst    • Recurrent urinary tract infection    • Seasonal allergies    • Statin intolerance    • Stroke     PATIENT REPORTS APPROXIMATELY 7 YEARS AGO. REPORTS HAS WEAKNESS AND MEMORY PROBLEMS INTERMITTENTLY AFTER THIS EPISODE.   • Type 2 diabetes mellitus    • Wears glasses        Past Surgical History  Past Surgical History:   Procedure Laterality Date   • CYSTOSCOPY BLADDER BIOPSY  2016   • ENDOSCOPY N/A 7/3/2017    Procedure: ESOPHAGOGASTRODUODENOSCOPY WITH BIOPSY;  Surgeon: Lindy Bey MD;  Location: [facility];  Service:    • HYSTERECTOMY     • TONSILLECTOMY         Medications  has a current medication list which includes the following prescription(s): albuterol, aspirin, atorvastatin, clopidogrel, gabapentin, glucagon, insulin aspart, insulin glargine, insulin pen needle, lisinopril, lorazepam, meloxicam, metformin, and omeprazole.    Allergies  Allergies   Allergen Reactions   • Iodides    • Iodinated Diagnostic Agents         Social History  Social History     Social History   • Marital status:      Spouse name: N/A   • Number of children: N/A   • Years of education: N/A     Occupational History   •  Retired     Social History Main Topics   • Smoking status: Current Every Day Smoker     Packs/day: 1.00     Years: 40.00     Types: Cigarettes   • Smokeless tobacco: Never Used   • Alcohol use No   • Drug use: No   • Sexual activity: Defer     Other Topics Concern   • Not on file     Social History Narrative       Family History  Family History   Problem Relation Age of Onset   • Arthritis Mother    • Hypertension Mother    • Heart attack Mother    • Osteoporosis Mother    • Arthritis Father    • Cancer Father    • Diabetes Father    • Hypertension Father    • Stroke Other    • Drug abuse Daughter        Review of Systems  Review of Systems    Physical Exam  Physical Exam   Constitutional: She is oriented to person, place, and time. She appears well-developed and well-nourished.   HENT:   Head: Normocephalic and atraumatic.   Eyes: EOM are normal. Pupils are equal, round, and reactive to light.   Neck: Normal range of motion. Neck supple.   Cardiovascular: Normal rate and regular rhythm.    Pulmonary/Chest: Effort normal. No respiratory distress.   Abdominal: Soft. Bowel sounds are normal. She exhibits no distension and no mass. There is no tenderness. No hernia.   Genitourinary: Vagina normal.   Musculoskeletal: Normal range of motion.   Lymphadenopathy:     She has no cervical adenopathy.   Neurological: She is alert and oriented to person, place, and time.   Skin: Skin is warm and dry.   Psychiatric: She has a normal mood and affect. Her behavior is normal.   Vitals reviewed.      Labs recent and today in the office:  Results for orders placed or performed in visit on 07/12/17   POC Urinalysis Dipstick, Automated   Result Value Ref Range    Color Yellow Yellow, Straw, Dark Yellow, Silke    Clarity, UA Clear Clear    Glucose,  UA Negative Negative, 1000 mg/dL (3+) mg/dL    Bilirubin Negative Negative    Ketones, UA Negative Negative    Specific Gravity  1.025 1.005 - 1.030    Blood, UA Negative Negative    pH, Urine 6.0 5.0 - 8.0    Protein, POC Negative Negative mg/dL    Urobilinogen, UA Normal Normal    Leukocytes Negative Negative    Nitrite, UA Negative Negative      Female cystoscopy:     The patient is prepped and draped in the dorsal lithotomy position in a routine sterile fashion. The vulva and urethral meatus are inspected and found to be normal. The panendoscope is inserted in the bladder which is visualized with the Foroblique and 70 degree lenses and found to be free of foreign bodies and mucosal lesions. Ureteral orifices are normal location and effluxing clear urine bilaterally.     Assessment & Plan  There are no signs of abnormality today so she can return in 6 months for routine follow-up.  She is counseled in smoking cessation.

## 2017-07-13 ENCOUNTER — OFFICE VISIT (OUTPATIENT)
Dept: SURGERY | Facility: CLINIC | Age: 52
End: 2017-07-13

## 2017-07-13 VITALS
HEART RATE: 97 BPM | TEMPERATURE: 97.2 F | SYSTOLIC BLOOD PRESSURE: 140 MMHG | WEIGHT: 180 LBS | HEIGHT: 65 IN | BODY MASS INDEX: 29.99 KG/M2 | OXYGEN SATURATION: 98 % | DIASTOLIC BLOOD PRESSURE: 80 MMHG

## 2017-07-13 DIAGNOSIS — K29.70 GASTRITIS DETERMINED BY BIOPSY: ICD-10-CM

## 2017-07-13 DIAGNOSIS — K21.9 GASTROESOPHAGEAL REFLUX DISEASE, ESOPHAGITIS PRESENCE NOT SPECIFIED: Primary | ICD-10-CM

## 2017-07-13 PROCEDURE — 99212 OFFICE O/P EST SF 10 MIN: CPT | Performed by: SURGERY

## 2017-07-28 ENCOUNTER — OFFICE VISIT (OUTPATIENT)
Dept: INTERNAL MEDICINE | Facility: CLINIC | Age: 52
End: 2017-07-28

## 2017-07-28 VITALS
HEART RATE: 80 BPM | DIASTOLIC BLOOD PRESSURE: 72 MMHG | SYSTOLIC BLOOD PRESSURE: 130 MMHG | OXYGEN SATURATION: 95 % | TEMPERATURE: 98.6 F | BODY MASS INDEX: 30.82 KG/M2 | HEIGHT: 65 IN | WEIGHT: 185 LBS

## 2017-07-28 DIAGNOSIS — F41.9 ANXIETY: ICD-10-CM

## 2017-07-28 DIAGNOSIS — K21.9 GASTROESOPHAGEAL REFLUX DISEASE WITHOUT ESOPHAGITIS: ICD-10-CM

## 2017-07-28 DIAGNOSIS — Z79.4 DIABETES MELLITUS TYPE 2, INSULIN DEPENDENT (HCC): Primary | Chronic | ICD-10-CM

## 2017-07-28 DIAGNOSIS — I67.2 CEREBRAL ATHEROSCLEROSIS: ICD-10-CM

## 2017-07-28 DIAGNOSIS — E11.42 DIABETIC PERIPHERAL NEUROPATHY ASSOCIATED WITH TYPE 2 DIABETES MELLITUS (HCC): ICD-10-CM

## 2017-07-28 DIAGNOSIS — E11.9 DIABETES MELLITUS TYPE 2, INSULIN DEPENDENT (HCC): Primary | Chronic | ICD-10-CM

## 2017-07-28 PROCEDURE — 83036 HEMOGLOBIN GLYCOSYLATED A1C: CPT | Performed by: FAMILY MEDICINE

## 2017-07-28 PROCEDURE — 99214 OFFICE O/P EST MOD 30 MIN: CPT | Performed by: FAMILY MEDICINE

## 2017-07-28 RX ORDER — LISINOPRIL 5 MG/1
5 TABLET ORAL DAILY
Qty: 90 TABLET | Refills: 3 | Status: SHIPPED | OUTPATIENT
Start: 2017-07-28 | End: 2017-11-15 | Stop reason: SDUPTHER

## 2017-07-28 RX ORDER — ATORVASTATIN CALCIUM 10 MG/1
10 TABLET, FILM COATED ORAL NIGHTLY
Qty: 90 TABLET | Refills: 3 | Status: SHIPPED | OUTPATIENT
Start: 2017-07-28 | End: 2017-11-15

## 2017-07-28 RX ORDER — MELOXICAM 7.5 MG/1
7.5 TABLET ORAL DAILY PRN
Qty: 90 TABLET | Refills: 3 | Status: SHIPPED | OUTPATIENT
Start: 2017-07-28 | End: 2017-11-15 | Stop reason: SDUPTHER

## 2017-07-28 RX ORDER — OMEPRAZOLE 20 MG/1
20 CAPSULE, DELAYED RELEASE ORAL 2 TIMES DAILY
Qty: 180 CAPSULE | Refills: 2 | Status: SHIPPED | OUTPATIENT
Start: 2017-07-28 | End: 2017-11-03 | Stop reason: SDUPTHER

## 2017-07-28 RX ORDER — LORAZEPAM 0.5 MG/1
0.5 TABLET ORAL 2 TIMES DAILY PRN
Qty: 30 TABLET | Refills: 2 | Status: SHIPPED | OUTPATIENT
Start: 2017-07-28 | End: 2017-11-15 | Stop reason: SDUPTHER

## 2017-07-28 RX ORDER — CLOPIDOGREL BISULFATE 75 MG/1
75 TABLET ORAL DAILY
Qty: 90 TABLET | Refills: 3 | Status: SHIPPED | OUTPATIENT
Start: 2017-07-28 | End: 2017-11-15 | Stop reason: SDUPTHER

## 2017-07-28 RX ORDER — GABAPENTIN 800 MG/1
800 TABLET ORAL 3 TIMES DAILY
Qty: 90 TABLET | Refills: 2 | Status: SHIPPED | OUTPATIENT
Start: 2017-07-28 | End: 2017-11-15 | Stop reason: SDUPTHER

## 2017-07-28 RX ORDER — ASPIRIN 81 MG/1
81 TABLET ORAL DAILY
Qty: 90 TABLET | Refills: 2 | Status: SHIPPED | OUTPATIENT
Start: 2017-07-28 | End: 2017-11-15

## 2017-07-28 NOTE — PROGRESS NOTES
Subjective    Nazia Chun is a 52 y.o. female here for:  Chief Complaint   Patient presents with   • Diabetes     3MO F/U; A1C   • Med Refill     PENDED     Diabetes   She presents for her follow-up diabetic visit. She has type 2 diabetes mellitus. Her disease course has been worsening. Hypoglycemia symptoms include nervousness/anxiousness. Associated symptoms include fatigue and foot paresthesias. There are no hypoglycemic complications. Diabetic complications include a CVA and peripheral neuropathy. Risk factors for coronary artery disease include diabetes mellitus, dyslipidemia, family history, hypertension, obesity, sedentary lifestyle, post-menopausal, stress and tobacco exposure. Current diabetic treatment includes insulin injections and oral agent (monotherapy). She is compliant with treatment most of the time. Her weight is stable. She is following a generally unhealthy diet. When asked about meal planning, she reported none. She has not had a previous visit with a dietitian. She rarely participates in exercise. Her overall blood glucose range is >200 mg/dl. An ACE inhibitor/angiotensin II receptor blocker is being taken. She does not see a podiatrist.Eye exam is current.   Anxiety   Presents for follow-up visit. Symptoms include excessive worry, irritability and nervous/anxious behavior. Symptoms occur most days. The severity of symptoms is causing significant distress.     Compliance with medications: Good, takes as needed. Continues to see benefits with benzo.      PVD: history CVA. On aspirin, statin, ACE-I. No history of MI. Continues to smoke.     The following portions of the patient's history were reviewed and updated as appropriate: allergies, current medications, past family history, past medical history, past social history, past surgical history and problem list.    Review of Systems   Constitutional: Positive for activity change (been busy with doctor visits, feels she's not been able to  eat right due to these), fatigue and irritability. Negative for unexpected weight change.   Respiratory: Positive for cough.    Musculoskeletal: Positive for arthralgias.   Psychiatric/Behavioral: Positive for agitation and dysphoric mood. The patient is nervous/anxious.        Vitals:    07/28/17 1154   BP: 130/72   Pulse: 80   Temp: 98.6 °F (37 °C)   SpO2: 95%       Objective   Physical Exam   Constitutional: She is oriented to person, place, and time. Vital signs are normal. She appears well-developed and well-nourished. She is active. She does not have a sickly appearance. She does not appear ill.   Appears stated age. Well groomed. Mildly obese.     HENT:   Head: Normocephalic and atraumatic. Hair is normal.   Right Ear: Hearing normal.   Left Ear: Hearing normal.   Nose: Nose normal.   Mouth/Throat: Mucous membranes are not dry. She has dentures.   Eyes: EOM and lids are normal. Pupils are equal, round, and reactive to light. No scleral icterus.   Neck: Neck supple.   Cardiovascular: Normal rate, regular rhythm and normal heart sounds.    Pulmonary/Chest: Effort normal and breath sounds normal. No stridor.   Musculoskeletal: She exhibits no deformity.   Neurological: She is alert and oriented to person, place, and time. No cranial nerve deficit. Gait normal.   Skin: Skin is warm. She is not diaphoretic. No cyanosis. No pallor. Nails show no clubbing.   Psychiatric: She has a normal mood and affect. Her speech is normal and behavior is normal. Judgment and thought content normal.   Nursing note and vitals reviewed.       Lab Results   Component Value Date    HGBA1C 8.5 03/29/2017     Lab Results   Component Value Date    HGBA1C 8.9 07/31/2017         Assessment/Plan   Nazia was seen today for diabetes and med refill.    Diagnoses and all orders for this visit:    Diabetes mellitus type 2, insulin dependent  -     POC Glycosylated Hemoglobin (Hb A1C)  -     atorvastatin (LIPITOR) 10 MG tablet; Take 1 tablet  by mouth Every Night.  -     aspirin 81 MG EC tablet; Take 1 tablet by mouth Daily.  -     insulin aspart (NOVOLOG FLEXPEN) 100 UNIT/ML solution pen-injector sc pen; Inject 10 Units under the skin 3 (Three) Times a Day With Meals.  -     Insulin Glargine (LANTUS SOLOSTAR) 100 UNIT/ML injection pen; Inject 55 Units under the skin Daily.  -     Insulin Pen Needle (BD PEN NEEDLE ANNETTA U/F) 32G X 4 MM misc; 1 Units 4 (Four) Times a Day.  -     lisinopril (PRINIVIL,ZESTRIL) 5 MG tablet; Take 1 tablet by mouth Daily.  -     metFORMIN (GLUCOPHAGE) 1000 MG tablet; Take 1 tablet by mouth 2 (Two) Times a Day.    Cerebral atherosclerosis  -     atorvastatin (LIPITOR) 10 MG tablet; Take 1 tablet by mouth Every Night.  -     aspirin 81 MG EC tablet; Take 1 tablet by mouth Daily.  -     clopidogrel (PLAVIX) 75 MG tablet; Take 1 tablet by mouth Daily.    Diabetic peripheral neuropathy associated with type 2 diabetes mellitus  -     gabapentin (NEURONTIN) 800 MG tablet; Take 1 tablet by mouth 3 (Three) Times a Day.    Anxiety  -     LORazepam (ATIVAN) 0.5 MG tablet; Take 1 tablet by mouth 2 (Two) Times a Day As Needed for Anxiety.    Gastroesophageal reflux disease without esophagitis  -     omeprazole (priLOSEC) 20 MG capsule; Take 1 capsule by mouth 2 (Two) Times a Day.    Other orders  -     meloxicam (MOBIC) 7.5 MG tablet; Take 1 tablet by mouth Daily As Needed for Moderate Pain (4-6).    Stressed need for better diabetes control. Provided log book and discussed how to use, bring back to next visit and will also need wellness visit. JACQUIE due in September.         Maria Luz Longoria MD

## 2017-07-31 LAB — HBA1C MFR BLD: 8.9 %

## 2017-11-03 ENCOUNTER — TELEPHONE (OUTPATIENT)
Dept: INTERNAL MEDICINE | Facility: CLINIC | Age: 52
End: 2017-11-03

## 2017-11-03 DIAGNOSIS — K21.9 GASTROESOPHAGEAL REFLUX DISEASE WITHOUT ESOPHAGITIS: ICD-10-CM

## 2017-11-03 RX ORDER — OMEPRAZOLE 20 MG/1
20 CAPSULE, DELAYED RELEASE ORAL 2 TIMES DAILY
Qty: 180 CAPSULE | Refills: 2 | Status: SHIPPED | OUTPATIENT
Start: 2017-11-03 | End: 2017-11-15 | Stop reason: SDUPTHER

## 2017-11-15 ENCOUNTER — OFFICE VISIT (OUTPATIENT)
Dept: INTERNAL MEDICINE | Facility: CLINIC | Age: 52
End: 2017-11-15

## 2017-11-15 ENCOUNTER — RESULTS ENCOUNTER (OUTPATIENT)
Dept: INTERNAL MEDICINE | Facility: CLINIC | Age: 52
End: 2017-11-15

## 2017-11-15 VITALS
OXYGEN SATURATION: 96 % | RESPIRATION RATE: 12 BRPM | HEIGHT: 65 IN | TEMPERATURE: 98.2 F | BODY MASS INDEX: 30.49 KG/M2 | HEART RATE: 83 BPM | DIASTOLIC BLOOD PRESSURE: 72 MMHG | SYSTOLIC BLOOD PRESSURE: 122 MMHG | WEIGHT: 183 LBS

## 2017-11-15 DIAGNOSIS — F41.9 ANXIETY: ICD-10-CM

## 2017-11-15 DIAGNOSIS — E11.9 DIABETES MELLITUS TYPE 2, INSULIN DEPENDENT (HCC): Primary | Chronic | ICD-10-CM

## 2017-11-15 DIAGNOSIS — Z23 NEED FOR VACCINATION: ICD-10-CM

## 2017-11-15 DIAGNOSIS — J44.9 CHRONIC OBSTRUCTIVE PULMONARY DISEASE, UNSPECIFIED COPD TYPE (HCC): ICD-10-CM

## 2017-11-15 DIAGNOSIS — Z12.11 COLON CANCER SCREENING: ICD-10-CM

## 2017-11-15 DIAGNOSIS — M19.91 PRIMARY OSTEOARTHRITIS, UNSPECIFIED SITE: ICD-10-CM

## 2017-11-15 DIAGNOSIS — K21.9 GASTROESOPHAGEAL REFLUX DISEASE WITHOUT ESOPHAGITIS: ICD-10-CM

## 2017-11-15 DIAGNOSIS — E11.42 DIABETIC PERIPHERAL NEUROPATHY ASSOCIATED WITH TYPE 2 DIABETES MELLITUS (HCC): ICD-10-CM

## 2017-11-15 DIAGNOSIS — Z79.4 DIABETES MELLITUS TYPE 2, INSULIN DEPENDENT (HCC): Primary | Chronic | ICD-10-CM

## 2017-11-15 DIAGNOSIS — I67.2 CEREBRAL ATHEROSCLEROSIS: ICD-10-CM

## 2017-11-15 PROCEDURE — 90686 IIV4 VACC NO PRSV 0.5 ML IM: CPT | Performed by: FAMILY MEDICINE

## 2017-11-15 PROCEDURE — 83036 HEMOGLOBIN GLYCOSYLATED A1C: CPT | Performed by: FAMILY MEDICINE

## 2017-11-15 PROCEDURE — 90471 IMMUNIZATION ADMIN: CPT | Performed by: FAMILY MEDICINE

## 2017-11-15 PROCEDURE — 99214 OFFICE O/P EST MOD 30 MIN: CPT | Performed by: FAMILY MEDICINE

## 2017-11-15 RX ORDER — ALBUTEROL SULFATE 90 UG/1
2 AEROSOL, METERED RESPIRATORY (INHALATION) EVERY 6 HOURS PRN
Qty: 3 INHALER | Refills: 6 | Status: SHIPPED | OUTPATIENT
Start: 2017-11-15 | End: 2018-04-05 | Stop reason: SDUPTHER

## 2017-11-15 RX ORDER — GABAPENTIN 800 MG/1
800 TABLET ORAL 3 TIMES DAILY
Qty: 90 TABLET | Refills: 2 | Status: SHIPPED | OUTPATIENT
Start: 2017-11-15 | End: 2018-03-21 | Stop reason: SDUPTHER

## 2017-11-15 RX ORDER — ROSUVASTATIN CALCIUM 10 MG/1
10 TABLET, COATED ORAL DAILY
Qty: 90 TABLET | Refills: 2 | Status: SHIPPED | OUTPATIENT
Start: 2017-11-15 | End: 2018-04-05

## 2017-11-15 RX ORDER — CLOPIDOGREL BISULFATE 75 MG/1
75 TABLET ORAL DAILY
Qty: 90 TABLET | Refills: 3 | Status: SHIPPED | OUTPATIENT
Start: 2017-11-15 | End: 2018-04-05 | Stop reason: SDUPTHER

## 2017-11-15 RX ORDER — LISINOPRIL 5 MG/1
5 TABLET ORAL DAILY
Qty: 90 TABLET | Refills: 3 | Status: SHIPPED | OUTPATIENT
Start: 2017-11-15 | End: 2018-04-05 | Stop reason: SDUPTHER

## 2017-11-15 RX ORDER — ASPIRIN 81 MG/1
81 TABLET ORAL DAILY
Qty: 90 TABLET | Refills: 3 | Status: SHIPPED | OUTPATIENT
Start: 2017-11-15 | End: 2018-04-05 | Stop reason: SDUPTHER

## 2017-11-15 RX ORDER — LORAZEPAM 0.5 MG/1
0.5 TABLET ORAL 2 TIMES DAILY PRN
Qty: 30 TABLET | Refills: 2 | Status: SHIPPED | OUTPATIENT
Start: 2017-11-15 | End: 2018-04-05 | Stop reason: SDUPTHER

## 2017-11-15 RX ORDER — OMEPRAZOLE 20 MG/1
20 CAPSULE, DELAYED RELEASE ORAL 2 TIMES DAILY
Qty: 180 CAPSULE | Refills: 2 | Status: SHIPPED | OUTPATIENT
Start: 2017-11-15 | End: 2018-04-05 | Stop reason: SDUPTHER

## 2017-11-15 RX ORDER — MELOXICAM 7.5 MG/1
7.5 TABLET ORAL DAILY PRN
Qty: 90 TABLET | Refills: 3 | Status: SHIPPED | OUTPATIENT
Start: 2017-11-15 | End: 2018-04-05 | Stop reason: SDUPTHER

## 2017-11-15 RX ORDER — ASPIRIN 81 MG/1
81 TABLET ORAL DAILY
COMMUNITY
End: 2017-11-15 | Stop reason: SDUPTHER

## 2017-11-15 NOTE — PROGRESS NOTES
Subjective    Nazia Chun is a 52 y.o. female here for:  Chief Complaint   Patient presents with   • Diabetes   • Anxiety     Diabetes   She presents for her follow-up diabetic visit. She has type 2 diabetes mellitus. Her disease course has been worsening. Hypoglycemia symptoms include nervousness/anxiousness. Associated symptoms include fatigue and foot paresthesias. Pertinent negatives for diabetes include no foot ulcerations and no weight loss. There are no hypoglycemic complications. Symptoms are stable. Diabetic complications include a CVA and peripheral neuropathy. Risk factors for coronary artery disease include diabetes mellitus, dyslipidemia, family history, hypertension, obesity, sedentary lifestyle, post-menopausal, stress and tobacco exposure. Current diabetic treatment includes insulin injections and oral agent (monotherapy). She is compliant with treatment most of the time. Her weight is stable. She is following a generally unhealthy diet. When asked about meal planning, she reported none. She has not had a previous visit with a dietitian. She rarely participates in exercise. Her overall blood glucose range is >200 mg/dl. An ACE inhibitor/angiotensin II receptor blocker is being taken. She does not see a podiatrist.Eye exam is current.   Anxiety   Presents for follow-up visit. Symptoms include excessive worry, irritability, nervous/anxious behavior and shortness of breath. Symptoms occur most days. The severity of symptoms is causing significant distress.     Compliance with medications: Good, takes as needed. Continues to see benefits with benzo.   COPD   This is a chronic problem. The current episode started more than 1 year ago. The problem occurs daily. The problem has been unchanged. Associated symptoms include fatigue. Treatments tried: Albuterol. The treatment provided significant relief.        The following portions of the patient's history were reviewed and updated as appropriate:  allergies, current medications, past family history, past medical history, past social history, past surgical history and problem list.    Review of Systems   Constitutional: Positive for fatigue and irritability. Negative for activity change and weight loss.   Respiratory: Positive for shortness of breath.    Psychiatric/Behavioral: The patient is nervous/anxious.        Vitals:    11/15/17 1003   BP: 122/72   Pulse: 83   Resp: 12   Temp: 98.2 °F (36.8 °C)   SpO2: 96%       Objective   Physical Exam   Constitutional: She is oriented to person, place, and time. Vital signs are normal. She appears well-developed and well-nourished. She is active. She does not have a sickly appearance. She does not appear ill.   Appears stated age. Well groomed. Mildly obese.   HENT:   Head: Normocephalic and atraumatic. Hair is normal.   Right Ear: Hearing normal.   Left Ear: Hearing normal.   Nose: Nose normal.   Mouth/Throat: Mucous membranes are not dry. She has dentures.   Eyes: EOM and lids are normal. Pupils are equal, round, and reactive to light. No scleral icterus.   Neck: Neck supple.   Cardiovascular: Normal rate, regular rhythm and normal heart sounds.    Pulmonary/Chest: Effort normal and breath sounds normal. No stridor.   Musculoskeletal: She exhibits no deformity.       Neurological Sensory Findings -  Altered sharp/dull right ankle/foot discrimination and altered sharp/dull left ankle/foot discrimination. No right ankle/foot altered proprioception and no left ankle/foot altered proprioception    Vascular Status -  Her exam exhibits right foot vasculature normal. Her exam exhibits no right foot edema. Her exam exhibits left foot vasculature normal. Her exam exhibits no left foot edema.   Skin Integrity  -  Her right foot skin is intact.     Nazia 's left foot skin is intact. .  Neurological: She is alert and oriented to person, place, and time. No cranial nerve deficit. Gait normal.   Skin: Skin is warm. She is not  diaphoretic. No cyanosis. No pallor. Nails show no clubbing.   Psychiatric: She has a normal mood and affect. Her speech is normal and behavior is normal. Judgment and thought content normal.   Nursing note and vitals reviewed.      Lab Results   Component Value Date    HGBA1C 8.9 07/31/2017     Lab Results   Component Value Date    HGBA1C 8.1 11/20/2017         Assessment/Plan   Nazia was seen today for diabetes and anxiety.    Diagnoses and all orders for this visit:    Diabetes mellitus type 2, insulin dependent  -     POC Glycosylated Hemoglobin (Hb A1C)  -     metFORMIN (GLUCOPHAGE) 1000 MG tablet; Take 1 tablet by mouth 2 (Two) Times a Day.  -     lisinopril (PRINIVIL,ZESTRIL) 5 MG tablet; Take 1 tablet by mouth Daily.  -     Insulin Glargine (LANTUS SOLOSTAR) 100 UNIT/ML injection pen; Inject 55 Units under the skin Daily.  -     insulin aspart (NOVOLOG FLEXPEN) 100 UNIT/ML solution pen-injector sc pen; Inject 10 Units under the skin 3 (Three) Times a Day With Meals.  -     aspirin 81 MG EC tablet; Take 1 tablet by mouth Daily.  -     rosuvastatin (CRESTOR) 10 MG tablet; Take 1 tablet by mouth Daily.    Diabetic peripheral neuropathy associated with type 2 diabetes mellitus  -     gabapentin (NEURONTIN) 800 MG tablet; Take 1 tablet by mouth 3 (Three) Times a Day.    Anxiety  -     LORazepam (ATIVAN) 0.5 MG tablet; Take 1 tablet by mouth 2 (Two) Times a Day As Needed for Anxiety.    Gastroesophageal reflux disease without esophagitis  -     omeprazole (priLOSEC) 20 MG capsule; Take 1 capsule by mouth 2 (Two) Times a Day.    Cerebral atherosclerosis  -     clopidogrel (PLAVIX) 75 MG tablet; Take 1 tablet by mouth Daily.  -     rosuvastatin (CRESTOR) 10 MG tablet; Take 1 tablet by mouth Daily.    Chronic obstructive pulmonary disease, unspecified COPD type  -     albuterol (PROAIR HFA) 108 (90 Base) MCG/ACT inhaler; Inhale 2 puffs Every 6 (Six) Hours As Needed for Wheezing.    Primary osteoarthritis,  unspecified site  -     meloxicam (MOBIC) 7.5 MG tablet; Take 1 tablet by mouth Daily As Needed for Moderate Pain .    Need for vaccination  -     Flu Vaccine Quad PF 3YR+ (FLUARIX 3272-1180)    Colon cancer screening  -     Cologuard - Stool, Per Rectum; Future               Maria Luz Longoria MD

## 2017-11-20 LAB — HBA1C MFR BLD: 8.1 %

## 2017-11-30 PROBLEM — K29.70 GASTRITIS DETERMINED BY BIOPSY: Status: ACTIVE | Noted: 2017-11-30

## 2018-03-21 DIAGNOSIS — E11.42 DIABETIC PERIPHERAL NEUROPATHY ASSOCIATED WITH TYPE 2 DIABETES MELLITUS (HCC): ICD-10-CM

## 2018-03-21 RX ORDER — GABAPENTIN 800 MG/1
800 TABLET ORAL 3 TIMES DAILY
Qty: 90 TABLET | Refills: 0 | OUTPATIENT
Start: 2018-03-21 | End: 2018-04-05 | Stop reason: SDUPTHER

## 2018-04-05 ENCOUNTER — OFFICE VISIT (OUTPATIENT)
Dept: INTERNAL MEDICINE | Facility: CLINIC | Age: 53
End: 2018-04-05

## 2018-04-05 VITALS
DIASTOLIC BLOOD PRESSURE: 80 MMHG | BODY MASS INDEX: 30.82 KG/M2 | RESPIRATION RATE: 12 BRPM | HEART RATE: 76 BPM | WEIGHT: 185 LBS | OXYGEN SATURATION: 99 % | TEMPERATURE: 98.7 F | SYSTOLIC BLOOD PRESSURE: 124 MMHG | HEIGHT: 65 IN

## 2018-04-05 DIAGNOSIS — K21.9 GASTROESOPHAGEAL REFLUX DISEASE WITHOUT ESOPHAGITIS: ICD-10-CM

## 2018-04-05 DIAGNOSIS — E11.9 DIABETES MELLITUS TYPE 2, INSULIN DEPENDENT (HCC): Chronic | ICD-10-CM

## 2018-04-05 DIAGNOSIS — Z79.4 DIABETES MELLITUS TYPE 2, INSULIN DEPENDENT (HCC): Chronic | ICD-10-CM

## 2018-04-05 DIAGNOSIS — E66.9 CLASS 1 OBESITY WITH SERIOUS COMORBIDITY AND BODY MASS INDEX (BMI) OF 30.0 TO 30.9 IN ADULT, UNSPECIFIED OBESITY TYPE: ICD-10-CM

## 2018-04-05 DIAGNOSIS — J44.9 CHRONIC OBSTRUCTIVE PULMONARY DISEASE, UNSPECIFIED COPD TYPE (HCC): ICD-10-CM

## 2018-04-05 DIAGNOSIS — E11.42 DIABETIC PERIPHERAL NEUROPATHY ASSOCIATED WITH TYPE 2 DIABETES MELLITUS (HCC): Primary | ICD-10-CM

## 2018-04-05 DIAGNOSIS — I67.2 CEREBRAL ATHEROSCLEROSIS: ICD-10-CM

## 2018-04-05 DIAGNOSIS — M19.91 PRIMARY OSTEOARTHRITIS, UNSPECIFIED SITE: ICD-10-CM

## 2018-04-05 DIAGNOSIS — F41.9 ANXIETY: ICD-10-CM

## 2018-04-05 DIAGNOSIS — C67.9 PRIMARY CANCER OF BLADDER (HCC): ICD-10-CM

## 2018-04-05 LAB
HBA1C MFR BLD: 8.5 %
POC CREATININE URINE: 100
POC MICROALBUMIN URINE: 10

## 2018-04-05 PROCEDURE — 82044 UR ALBUMIN SEMIQUANTITATIVE: CPT | Performed by: FAMILY MEDICINE

## 2018-04-05 PROCEDURE — 99214 OFFICE O/P EST MOD 30 MIN: CPT | Performed by: FAMILY MEDICINE

## 2018-04-05 PROCEDURE — 83036 HEMOGLOBIN GLYCOSYLATED A1C: CPT | Performed by: FAMILY MEDICINE

## 2018-04-05 RX ORDER — MELOXICAM 7.5 MG/1
7.5 TABLET ORAL DAILY PRN
Qty: 90 TABLET | Refills: 3 | Status: SHIPPED | OUTPATIENT
Start: 2018-04-05 | End: 2019-04-09 | Stop reason: SDUPTHER

## 2018-04-05 RX ORDER — LISINOPRIL 5 MG/1
5 TABLET ORAL DAILY
Qty: 90 TABLET | Refills: 3 | Status: SHIPPED | OUTPATIENT
Start: 2018-04-05 | End: 2019-04-09 | Stop reason: SDUPTHER

## 2018-04-05 RX ORDER — ASPIRIN 81 MG/1
81 TABLET ORAL DAILY
Qty: 90 TABLET | Refills: 3 | Status: SHIPPED | OUTPATIENT
Start: 2018-04-05 | End: 2019-04-09 | Stop reason: SDUPTHER

## 2018-04-05 RX ORDER — GABAPENTIN 800 MG/1
800 TABLET ORAL 3 TIMES DAILY
Qty: 90 TABLET | Refills: 2 | Status: SHIPPED | OUTPATIENT
Start: 2018-04-05 | End: 2018-08-07 | Stop reason: SDUPTHER

## 2018-04-05 RX ORDER — LORAZEPAM 0.5 MG/1
0.5 TABLET ORAL 2 TIMES DAILY PRN
Qty: 30 TABLET | Refills: 2 | Status: SHIPPED | OUTPATIENT
Start: 2018-04-05 | End: 2018-08-22 | Stop reason: DRUGHIGH

## 2018-04-05 RX ORDER — ALBUTEROL SULFATE 90 UG/1
2 AEROSOL, METERED RESPIRATORY (INHALATION) EVERY 6 HOURS PRN
Qty: 3 INHALER | Refills: 6 | Status: SHIPPED | OUTPATIENT
Start: 2018-04-05 | End: 2019-04-09 | Stop reason: SDUPTHER

## 2018-04-05 RX ORDER — CLOPIDOGREL BISULFATE 75 MG/1
75 TABLET ORAL DAILY
Qty: 90 TABLET | Refills: 3 | Status: SHIPPED | OUTPATIENT
Start: 2018-04-05 | End: 2019-04-09 | Stop reason: SDUPTHER

## 2018-04-05 RX ORDER — OMEPRAZOLE 20 MG/1
20 CAPSULE, DELAYED RELEASE ORAL 2 TIMES DAILY
Qty: 180 CAPSULE | Refills: 2 | Status: SHIPPED | OUTPATIENT
Start: 2018-04-05 | End: 2019-04-09 | Stop reason: SDUPTHER

## 2018-04-05 NOTE — PATIENT INSTRUCTIONS
Obesity, Adult  Obesity is the condition of having too much total body fat. Being overweight or obese means that your weight is greater than what is considered healthy for your body size. Obesity is determined by a measurement called BMI. BMI is an estimate of body fat and is calculated from height and weight. For adults, a BMI of 30 or higher is considered obese.  Obesity can eventually lead to other health concerns and major illnesses, including:  · Stroke.  · Coronary artery disease (CAD).  · Type 2 diabetes.  · Some types of cancer, including cancers of the colon, breast, uterus, and gallbladder.  · Osteoarthritis.  · High blood pressure (hypertension).  · High cholesterol.  · Sleep apnea.  · Gallbladder stones.  · Infertility problems.  What are the causes?  The main cause of obesity is taking in (consuming) more calories than your body uses for energy. Other factors that contribute to this condition may include:  · Being born with genes that make you more likely to become obese.  · Having a medical condition that causes obesity. These conditions include:  ¨ Hypothyroidism.  ¨ Polycystic ovarian syndrome (PCOS).  ¨ Binge-eating disorder.  ¨ Cushing syndrome.  · Taking certain medicines, such as steroids, antidepressants, and seizure medicines.  · Not being physically active (sedentary lifestyle).  · Living where there are limited places to exercise safely or buy healthy foods.  · Not getting enough sleep.  What increases the risk?  The following factors may increase your risk of this condition:  · Having a family history of obesity.  · Being a woman of -American descent.  · Being a man of  descent.  What are the signs or symptoms?  Having excessive body fat is the main symptom of this condition.  How is this diagnosed?  This condition may be diagnosed based on:  · Your symptoms.  · Your medical history.  · A physical exam. Your health care provider may measure:  ¨ Your BMI. If you are an  adult with a BMI between 25 and less than 30, you are considered overweight. If you are an adult with a BMI of 30 or higher, you are considered obese.  ¨ The distances around your hips and your waist (circumferences). These may be compared to each other to help diagnose your condition.  ¨ Your skinfold thickness. Your health care provider may gently pinch a fold of your skin and measure it.  How is this treated?  Treatment for this condition often includes changing your lifestyle. Treatment may include some or all of the following:  · Dietary changes. Work with your health care provider and a dietitian to set a weight-loss goal that is healthy and reasonable for you. Dietary changes may include eating:  ¨ Smaller portions. A portion size is the amount of a particular food that is healthy for you to eat at one time. This varies from person to person.  ¨ Low-calorie or low-fat options.  ¨ More whole grains, fruits, and vegetables.  · Regular physical activity. This may include aerobic activity (cardio) and strength training.  · Medicine to help you lose weight. Your health care provider may prescribe medicine if you are unable to lose 1 pound a week after 6 weeks of eating more healthily and doing more physical activity.  · Surgery. Surgical options may include gastric banding and gastric bypass. Surgery may be done if:  ¨ Other treatments have not helped to improve your condition.  ¨ You have a BMI of 40 or higher.  ¨ You have life-threatening health problems related to obesity.  Follow these instructions at home:     Eating and drinking     · Follow recommendations from your health care provider about what you eat and drink. Your health care provider may advise you to:  ¨ Limit fast foods, sweets, and processed snack foods.  ¨ Choose low-fat options, such as low-fat milk instead of whole milk.  ¨ Eat 5 or more servings of fruits or vegetables every day.  ¨ Eat at home more often. This gives you more control over  what you eat.  ¨ Choose healthy foods when you eat out.  ¨ Learn what a healthy portion size is.  ¨ Keep low-fat snacks on hand.  ¨ Avoid sugary drinks, such as soda, fruit juice, iced tea sweetened with sugar, and flavored milk.  ¨ Eat a healthy breakfast.  · Drink enough water to keep your urine clear or pale yellow.  · Do not go without eating for long periods of time (do not fast) or follow a fad diet. Fasting and fad diets can be unhealthy and even dangerous.  Physical Activity   · Exercise regularly, as told by your health care provider. Ask your health care provider what types of exercise are safe for you and how often you should exercise.  · Warm up and stretch before being active.  · Cool down and stretch after being active.  · Rest between periods of activity.  Lifestyle   · Limit the time that you spend in front of your TV, computer, or video game system.  · Find ways to reward yourself that do not involve food.  · Limit alcohol intake to no more than 1 drink a day for nonpregnant women and 2 drinks a day for men. One drink equals 12 oz of beer, 5 oz of wine, or 1½ oz of hard liquor.  General instructions   · Keep a weight loss journal to keep track of the food you eat and how much you exercise you get.  · Take over-the-counter and prescription medicines only as told by your health care provider.  · Take vitamins and supplements only as told by your health care provider.  · Consider joining a support group. Your health care provider may be able to recommend a support group.  · Keep all follow-up visits as told by your health care provider. This is important.  Contact a health care provider if:  · You are unable to meet your weight loss goal after 6 weeks of dietary and lifestyle changes.  This information is not intended to replace advice given to you by your health care provider. Make sure you discuss any questions you have with your health care provider.  Document Released: 01/25/2006 Document Revised:  05/22/2017 Document Reviewed: 10/05/2016  [employer] Interactive Patient Education © 2017 [employer]      Exercising to Lose Weight  Exercising can help you to lose weight. In order to lose weight through exercise, you need to do vigorous-intensity exercise. You can tell that you are exercising with vigorous intensity if you are breathing very hard and fast and cannot hold a conversation while exercising.  Moderate-intensity exercise helps to maintain your current weight. You can tell that you are exercising at a moderate level if you have a higher heart rate and faster breathing, but you are still able to hold a conversation.  How often should I exercise?  Choose an activity that you enjoy and set realistic goals. Your health care provider can help you to make an activity plan that works for you. Exercise regularly as directed by your health care provider. This may include:  · Doing resistance training twice each week, such as:  ¨ Push-ups.  ¨ Sit-ups.  ¨ Lifting weights.  ¨ Using resistance bands.  · Doing a given intensity of exercise for a given amount of time. Choose from these options:  ¨ 150 minutes of moderate-intensity exercise every week.  ¨ 75 minutes of vigorous-intensity exercise every week.  ¨ A mix of moderate-intensity and vigorous-intensity exercise every week.  Children, pregnant women, people who are out of shape, people who are overweight, and older adults may need to consult a health care provider for individual recommendations. If you have any sort of medical condition, be sure to consult your health care provider before starting a new exercise program.  What are some activities that can help me to lose weight?  · Walking at a rate of at least 4.5 miles an hour.  · Jogging or running at a rate of 5 miles per hour.  · Biking at a rate of at least 10 miles per hour.  · Lap swimming.  · Roller-skating or in-line skating.  · Cross-country skiing.  · Vigorous competitive sports, such as football,  basketball, and soccer.  · Jumping rope.  · Aerobic dancing.  How can I be more active in my day-to-day activities?  · Use the stairs instead of the elevator.  · Take a walk during your lunch break.  · If you drive, park your car farther away from work or school.  · If you take public transportation, get off one stop early and walk the rest of the way.  · Make all of your phone calls while standing up and walking around.  · Get up, stretch, and walk around every 30 minutes throughout the day.  What guidelines should I follow while exercising?  · Do not exercise so much that you hurt yourself, feel dizzy, or get very short of breath.  · Consult your health care provider prior to starting a new exercise program.  · Wear comfortable clothes and shoes with good support.  · Drink plenty of water while you exercise to prevent dehydration or heat stroke. Body water is lost during exercise and must be replaced.  · Work out until you breathe faster and your heart beats faster.  This information is not intended to replace advice given to you by your health care provider. Make sure you discuss any questions you have with your health care provider.  Document Released: 01/20/2012 Document Revised: 05/25/2017 Document Reviewed: 05/21/2015  [employer] Interactive Patient Education © 2017 [employer]    Serving Sizes  A serving size is a measured amount of food or drink, such as one slice of bread, that has an associated nutrient content. Knowing the serving size of a food or drink can help you determine how much of that food you should consume.  What is the size of one serving?  The size of one healthy serving depends on the food or drink. To determine a serving size, read the food label. If the food or drink does not have a food label, try to find serving size information online. Or, use the following to estimate the size of one adult serving:  Grain   1 slice bread. ½ bagel. ½ cup pasta.  Vegetable   ½ cup cooked or canned  vegetables. 1 cup raw, leafy greens.  Fruit   ½ cup canned fruit. 1 medium fruit. ¼ cup dried fruit.  Meat and Other Protein Sources   1 oz meat, poultry, or fish. ¼ cup cooked beans. 1 egg. ¼ cup nuts or seeds. 1 Tbsp nut butter. ¼ cup tofu or tempeh. 2 Tbsp hummus.  Dairy   An individual container of yogurt (6-8 oz). 1 piece of cheese the size of your thumb (1 oz). 1 cup (8 oz) milk or milk alternative.  Fat   A piece the size of one dice. 1 tsp soft margarine. 1 Tbsp mayonnaise. 1 tsp vegetable oil. 1 Tbsp regular salad dressing. 2 Tbsp low-fat salad dressing.  How many servings should I eat from each food group each day?  The following are the suggested number of servings to try and have every day from each food group. You can also look at your eating throughout the week and aim for meeting these requirements on most days for overall healthy eating.  Grain   6-8 servings. Try to have half of your grains from whole grains, such as whole wheat bread, corn tortillas, oatmeal, brown rice, whole wheat pasta, and bulgur.  Vegetable   At least 2½-3 servings.  Fruit   2 servings.  Meat and Other Protein Foods   5-6 servings. Aim to have lean proteins, such as chicken, turkey, fish, beans, or tofu.  Dairy   3 servings. Choose low-fat or nonfat if you are trying to control your weight.  Fat   2-3 servings.  Is a serving the same thing as a portion?  No. A portion is the actual amount you eat, which may be more than one serving. Knowing the specific serving size of a food and the nutritional information that goes with it can help you make a healthy decision on what size portion to eat.  What are some tips to help me learn healthy serving sizes?  · Check food labels for serving sizes. Many foods that come as a single portion actually contain multiple servings.  · Determine the serving size of foods you commonly eat and figure out how large a portion you usually eat.  · Measure the number of servings that can be held by the  bowls, glasses, cups, and plates you typically use. For example, pour your breakfast cereal into your regular bowl and then pour it into a measuring cup.  · For 1-2 days, measure the serving sizes of all the foods you eat.  · Practice estimating serving sizes and determining how big your portions should be.  This information is not intended to replace advice given to you by your health care provider. Make sure you discuss any questions you have with your health care provider.  Document Released: 09/15/2004 Document Revised: 08/12/2017 Document Reviewed: 03/17/2015  [employer] Interactive Patient Education © 2017 [employer]

## 2018-04-05 NOTE — PROGRESS NOTES
Subjective    Nazia Chun is a 53 y.o. female here for:  Chief Complaint   Patient presents with   • Diabetes   • Anxiety     Diabetes   She presents for her follow-up diabetic visit. She has type 2 diabetes mellitus. Her disease course has been fluctuating. Hypoglycemia symptoms include nervousness/anxiousness (Ativan continues to help, she takes as needed.). Associated symptoms include fatigue. Hypoglycemia complications include nocturnal hypoglycemia and required assistance. Diabetic complications include a CVA and peripheral neuropathy. Risk factors for coronary artery disease include diabetes mellitus, dyslipidemia, family history, obesity, hypertension, stress, tobacco exposure and post-menopausal. Current diabetic treatment includes oral agent (monotherapy) and insulin injections. She is compliant with treatment some of the time (Patient admits she's not taking metformin at night. She is fearful of nocturnal hypoglycemia, and she's the only adult in house with small child.). Her weight is stable. Blood glucose monitoring compliance is inadequate. An ACE inhibitor/angiotensin II receptor blocker is being taken. She does not see a podiatrist.Eye exam is not current (Patient feels vision is not as good as before. Due for eye exam.).   Anxiety   Presents for follow-up visit. Symptoms include nervous/anxious behavior (Ativan continues to help, she takes as needed.). The quality of sleep is good.     Treatment side effects: No known side effects.   COPD   This is a chronic problem. The current episode started more than 1 year ago. Associated symptoms include arthralgias and fatigue. The symptoms are aggravated by smoking. Treatments tried: Albuterol. The treatment provided significant relief.        Feels like there's something in her eyes all the time. There's a bubble in her vision to a degree on the left with certain ways she looks. They also water. History of cataract that has been monitored. Declines  "Angela, plans to call eye doctor after visit.    Due to follow up with urologist, was scheduled for cystoscopy to follow up on bladder cancer, but she cancelled. Continues to smoke.     The following portions of the patient's history were reviewed and updated as appropriate: allergies, current medications, past family history, past medical history, past social history, past surgical history and problem list.    Review of Systems   Constitutional: Positive for fatigue. Negative for activity change.   Musculoskeletal: Positive for arthralgias.   Psychiatric/Behavioral: Positive for stress. The patient is nervous/anxious (Ativan continues to help, she takes as needed.).        Vitals:    04/05/18 1109   BP: 124/80   Pulse: 76   Resp: 12   Temp: 98.7 °F (37.1 °C)   SpO2: 99%   Weight: 83.9 kg (185 lb)   Height: 165.1 cm (65\")         Objective   Physical Exam   Constitutional: She is oriented to person, place, and time. Vital signs are normal. She appears well-developed and well-nourished. She is active.  Non-toxic appearance. She does not appear ill. No distress. She is obese.  HENT:   Head: Normocephalic and atraumatic. Hair is normal.   Right Ear: Hearing and external ear normal.   Left Ear: Hearing and external ear normal.   Nose: Nose normal.   Mouth/Throat: Mucous membranes are not dry.   Eyes: EOM and lids are normal. Pupils are equal, round, and reactive to light. No scleral icterus.   Neck: Neck supple.   Cardiovascular: Normal rate, regular rhythm and normal heart sounds.    No murmur heard.  Pulmonary/Chest: Effort normal and breath sounds normal.   Musculoskeletal: She exhibits no edema or deformity.   Neurological: She is alert and oriented to person, place, and time. She displays no tremor. No cranial nerve deficit. Gait normal.   Skin: Skin is warm and dry. No rash noted. She is not diaphoretic. No cyanosis. Nails show no clubbing.   Psychiatric: Her speech is normal and behavior is normal. Judgment " and thought content normal. Her mood appears anxious. Cognition and memory are normal.   Very talkative, friendly.   Nursing note and vitals reviewed.      Lab Results   Component Value Date    HGBA1C 8.5 04/05/2018         Assessment/Plan     Problem List Items Addressed This Visit        Cardiovascular and Mediastinum    Cerebral atherosclerosis    Overview     History of stroke.         Current Assessment & Plan     Stable. Needs to quit smoking. Continue Plavix. She discontinued statin (did not like taking so many pills). Patient educated on safety statins provide.         Relevant Medications    clopidogrel (PLAVIX) 75 MG tablet       Respiratory    Chronic obstructive pulmonary disease    Current Assessment & Plan     COPD is unchanged.  Continue current medications.  Needs to quit smoking.             Relevant Medications    albuterol (PROAIR HFA) 108 (90 Base) MCG/ACT inhaler       Digestive    Gastroesophageal reflux disease    Current Assessment & Plan     Stable. Continue PPI.         Relevant Medications    omeprazole (priLOSEC) 20 MG capsule       Endocrine    Diabetes mellitus type 2, insulin dependent (Chronic)    Current Assessment & Plan     Diabetes is worsening.   Reminded to get yearly retinal exam.  Take metforimin BID, does not cause hypoglycemia. Monitor sugars, keep log. Bring to next visit.   Diabetes will be reassessed in 3 months.         Relevant Medications    metFORMIN (GLUCOPHAGE) 1000 MG tablet    lisinopril (PRINIVIL,ZESTRIL) 5 MG tablet    aspirin 81 MG EC tablet    Other Relevant Orders    POC Microalbumin (Completed)    POC Glycosylated Hemoglobin (Hb A1C) (Completed)    Diabetic peripheral neuropathy associated with type 2 diabetes mellitus - Primary    Current Assessment & Plan     Diabetes is worsening.   Regular aerobic exercise.  Diabetes will be reassessed in 3 months.         Relevant Medications    metFORMIN (GLUCOPHAGE) 1000 MG tablet    gabapentin (NEURONTIN) 800 MG  tablet    Other Relevant Orders    POC Microalbumin (Completed)    POC Glycosylated Hemoglobin (Hb A1C) (Completed)       Musculoskeletal and Integument    Osteoarthritis    Current Assessment & Plan     Stable. Continue Meloxicam with food, activity encouraged.         Relevant Medications    meloxicam (MOBIC) 7.5 MG tablet       Genitourinary    Primary cancer of bladder       Other    Anxiety    Current Assessment & Plan     · Stable. Continue Ativan.         Relevant Medications    LORazepam (ATIVAN) 0.5 MG tablet      Other Visit Diagnoses     Class 1 obesity with serious comorbidity and body mass index (BMI) of 30.0 to 30.9 in adult, unspecified obesity type              · Discussed the patient's BMI with her. BMI is above normal parameters. Follow-up plan includes:  educational material, exercise counseling and nutrition counseling.  · Stressed importance of urology follow up.  Ready to quit: No  Counseling given: Yes    ·     Return in about 3 months (around 7/5/2018) for Medicare Wellness, Diabetes follow up.    Maria Luz Longoria MD    Please note that portions of this note may have been completed with a voice recognition program. Efforts were made to edit dictation, but occasionally words are mistranscribed.

## 2018-04-08 PROBLEM — E66.9 OBESITY (BMI 30-39.9): Status: ACTIVE | Noted: 2018-04-08

## 2018-04-08 NOTE — ASSESSMENT & PLAN NOTE
Diabetes is worsening.   Reminded to get yearly retinal exam.  Take metforimin BID, does not cause hypoglycemia. Monitor sugars, keep log. Bring to next visit.   Diabetes will be reassessed in 3 months.

## 2018-04-08 NOTE — ASSESSMENT & PLAN NOTE
Stable. Needs to quit smoking. Continue Plavix. She discontinued statin (did not like taking so many pills). Patient educated on safety statins provide.

## 2018-04-08 NOTE — ASSESSMENT & PLAN NOTE
Discussed the patient's BMI with her. BMI is above normal parameters. Follow-up plan includes:  exercise counseling and nutrition counseling.

## 2018-04-18 ENCOUNTER — PROCEDURE VISIT (OUTPATIENT)
Dept: UROLOGY | Facility: CLINIC | Age: 53
End: 2018-04-18

## 2018-04-18 VITALS
BODY MASS INDEX: 30.82 KG/M2 | HEART RATE: 66 BPM | DIASTOLIC BLOOD PRESSURE: 68 MMHG | WEIGHT: 185 LBS | SYSTOLIC BLOOD PRESSURE: 124 MMHG | HEIGHT: 65 IN | TEMPERATURE: 98.4 F | OXYGEN SATURATION: 97 %

## 2018-04-18 DIAGNOSIS — N30.40 IRRADIATION CYSTITIS: Primary | ICD-10-CM

## 2018-04-18 DIAGNOSIS — N32.81 OVERACTIVE BLADDER: ICD-10-CM

## 2018-04-18 DIAGNOSIS — Z85.51 PERSONAL HISTORY OF BLADDER CANCER: Primary | ICD-10-CM

## 2018-04-18 DIAGNOSIS — N76.2 ATROPHIC VULVITIS: ICD-10-CM

## 2018-04-18 LAB
BILIRUB BLD-MCNC: NEGATIVE MG/DL
CLARITY, POC: CLEAR
COLOR UR: YELLOW
GLUCOSE UR STRIP-MCNC: NEGATIVE MG/DL
KETONES UR QL: NEGATIVE
LEUKOCYTE EST, POC: NEGATIVE
NITRITE UR-MCNC: NEGATIVE MG/ML
PH UR: 6 [PH] (ref 5–8)
PROT UR STRIP-MCNC: NEGATIVE MG/DL
RBC # UR STRIP: NEGATIVE /UL
SP GR UR: 1.01 (ref 1–1.03)
UROBILINOGEN UR QL: NORMAL

## 2018-04-18 PROCEDURE — 99213 OFFICE O/P EST LOW 20 MIN: CPT | Performed by: UROLOGY

## 2018-04-18 PROCEDURE — 81003 URINALYSIS AUTO W/O SCOPE: CPT | Performed by: UROLOGY

## 2018-04-18 PROCEDURE — 52000 CYSTOURETHROSCOPY: CPT | Performed by: UROLOGY

## 2018-04-18 RX ORDER — METHENAMINE, SODIUM PHOSPHATE, MONOBASIC, MONOHYDRATE, PHENYL SALICYLATE, METHYLENE BLUE, AND HYOSCYAMINE SULFATE 120; 40.8; 36; 10; .12 MG/1; MG/1; MG/1; MG/1; MG/1
1 CAPSULE ORAL 4 TIMES DAILY
Qty: 40 CAPSULE | Refills: 3 | Status: SHIPPED | OUTPATIENT
Start: 2018-04-18 | End: 2018-08-22

## 2018-04-18 NOTE — PROGRESS NOTES
Chief Complaint  Bladder Cancer (6 month fup history of bladder cancer.)      DAJUAN Chun is a 53 y.o.female who returns today for follow-up with a history of bladder cancer last seen on biopsy July 2016 and it was low-grade and noninvasive at that time.  Recently she has had symptoms of UTI but without signs of infection.  She is years menopausal after hysterectomy and has not taken estrogens.  She does have a history of tobacco use and significant caffeine ingestion.    Vitals:    04/18/18 1501   BP: 124/68   Pulse: 66   Temp: 98.4 °F (36.9 °C)   SpO2: 97%       Past Medical History  Past Medical History:   Diagnosis Date   • Anxiety    • Anxiety    • Arthritis    • Bladder cancer 2016   • Cataract    • COPD (chronic obstructive pulmonary disease)    • Dysphagia    • Fibromyalgia    • Fracture     RIGHT LEG, RIGHT ANKLE, MULTIPLE TOES   • Full dentures    • GERD (gastroesophageal reflux disease)    • Ovarian cyst    • Recurrent urinary tract infection    • Seasonal allergies    • Statin intolerance    • Stroke     PATIENT REPORTS APPROXIMATELY 7 YEARS AGO. REPORTS HAS WEAKNESS AND MEMORY PROBLEMS INTERMITTENTLY AFTER THIS EPISODE.   • Type 2 diabetes mellitus    • Wears glasses        Past Surgical History  Past Surgical History:   Procedure Laterality Date   • CYSTOSCOPY BLADDER BIOPSY  2016   • ENDOSCOPY N/A 7/3/2017    Procedure: ESOPHAGOGASTRODUODENOSCOPY WITH BIOPSY;  Surgeon: Lindy Bey MD;  Location: [facility] ENDOSCOPY;  Service:    • HYSTERECTOMY     • TONSILLECTOMY         Medications  has a current medication list which includes the following prescription(s): albuterol, aspirin, clopidogrel, gabapentin, insulin aspart, insulin glargine, insulin pen needle, lisinopril, lorazepam, meloxicam, metformin, and omeprazole.    Allergies  Allergies   Allergen Reactions   • Iodides    • Iodinated Diagnostic Agents        Social History  Social History     Social History   • Marital  status:      Spouse name: N/A   • Number of children: N/A   • Years of education: N/A     Occupational History   •  Retired     Social History Main Topics   • Smoking status: Current Every Day Smoker     Packs/day: 1.00     Years: 40.00     Types: Cigarettes   • Smokeless tobacco: Never Used   • Alcohol use No   • Drug use: No   • Sexual activity: Defer     Other Topics Concern   • Not on file     Social History Narrative   • No narrative on file       Family History  Family History   Problem Relation Age of Onset   • Arthritis Mother    • Hypertension Mother    • Heart attack Mother    • Osteoporosis Mother    • Arthritis Father    • Cancer Father    • Diabetes Father    • Hypertension Father    • Stroke Other    • Drug abuse Daughter        Review of Systems  Review of Systems    Physical Exam  Physical Exam   Constitutional: She is oriented to person, place, and time. She appears well-developed and well-nourished.   HENT:   Head: Normocephalic.   Eyes: EOM are normal. Pupils are equal, round, and reactive to light.   Neck: Normal range of motion. Neck supple.   Cardiovascular: Normal rate, regular rhythm and normal heart sounds.    Pulmonary/Chest: Effort normal.   Abdominal: Soft. Bowel sounds are normal.   Genitourinary:         Neurological: She is alert and oriented to person, place, and time.   Skin: Skin is warm and dry.   Psychiatric: She has a normal mood and affect.       Labs recent and today in the office:  Results for orders placed or performed in visit on 04/18/18   POC Urinalysis Dipstick, Automated   Result Value Ref Range    Color Yellow Yellow, Straw, Dark Yellow, Silke    Clarity, UA Clear Clear    Glucose, UA Negative Negative, 1000 mg/dL (3+) mg/dL    Bilirubin Negative Negative    Ketones, UA Negative Negative    Specific Gravity  1.010 1.005 - 1.030    Blood, UA Negative Negative    pH, Urine 6.0 5.0 - 8.0    Protein, POC Negative Negative mg/dL    Urobilinogen, UA Normal Normal     Leukocytes Negative Negative    Nitrite, UA Negative Negative      Female cystoscopy:     The patient is prepped and draped in the dorsal lithotomy position in a routine sterile fashion. The vulva and urethral meatus are inspected and found to be normal. The panendoscope is inserted in the bladder which is visualized with the Foroblique and 70 degree lenses and found to be free of foreign bodies and mucosal lesions. Ureteral orifices are normal location and effluxing clear urine bilaterally.  She had less than 2 ounces of postvoid residual but significant atrophic changes with some urethral stenosis.  The bladder is distended to 500 mL drained and on reinspection there are no signs of interstitial cystitis.  Epic exam is negative for masses tenderness and the uterus is surgically absent.      Assessment and plan:    #1 history of bladder cancer: No evidence of recurrence    #2 atrophic vaginitis/urethral stenosis: Premarin vaginal cream as prescribed.    #3 overactive bladder: He is encouraged to back on her caffeine usage but may benefit from Myrbetriq in the future 15 minutes is spent counseling the patient out of 30 minutes in the office regarding the above 3 factors.

## 2018-08-02 ENCOUNTER — TELEPHONE (OUTPATIENT)
Dept: INTERNAL MEDICINE | Facility: CLINIC | Age: 53
End: 2018-08-02

## 2018-08-02 DIAGNOSIS — E11.42 DIABETIC PERIPHERAL NEUROPATHY ASSOCIATED WITH TYPE 2 DIABETES MELLITUS (HCC): ICD-10-CM

## 2018-08-03 NOTE — TELEPHONE ENCOUNTER
Ok to fill?    Updated Colin has been printed and placed on your desk.    Patient's last fill date was 7/9/18

## 2018-08-07 RX ORDER — GABAPENTIN 800 MG/1
800 TABLET ORAL 3 TIMES DAILY
Qty: 90 TABLET | Refills: 1 | Status: SHIPPED | OUTPATIENT
Start: 2018-08-07 | End: 2018-08-22

## 2018-08-22 ENCOUNTER — RESULTS ENCOUNTER (OUTPATIENT)
Dept: INTERNAL MEDICINE | Facility: CLINIC | Age: 53
End: 2018-08-22

## 2018-08-22 ENCOUNTER — OFFICE VISIT (OUTPATIENT)
Dept: INTERNAL MEDICINE | Facility: CLINIC | Age: 53
End: 2018-08-22

## 2018-08-22 VITALS
HEIGHT: 65 IN | TEMPERATURE: 98.6 F | BODY MASS INDEX: 30.2 KG/M2 | HEART RATE: 86 BPM | DIASTOLIC BLOOD PRESSURE: 70 MMHG | WEIGHT: 181.25 LBS | OXYGEN SATURATION: 97 % | SYSTOLIC BLOOD PRESSURE: 125 MMHG

## 2018-08-22 DIAGNOSIS — J44.9 CHRONIC OBSTRUCTIVE PULMONARY DISEASE, UNSPECIFIED COPD TYPE (HCC): ICD-10-CM

## 2018-08-22 DIAGNOSIS — M15.9 PRIMARY OSTEOARTHRITIS INVOLVING MULTIPLE JOINTS: ICD-10-CM

## 2018-08-22 DIAGNOSIS — E11.9 DIABETES MELLITUS TYPE 2, INSULIN DEPENDENT (HCC): ICD-10-CM

## 2018-08-22 DIAGNOSIS — Z72.0 TOBACCO ABUSE: ICD-10-CM

## 2018-08-22 DIAGNOSIS — Z12.11 COLON CANCER SCREENING: ICD-10-CM

## 2018-08-22 DIAGNOSIS — F41.9 ANXIETY: ICD-10-CM

## 2018-08-22 DIAGNOSIS — M54.31 SCIATICA OF RIGHT SIDE: ICD-10-CM

## 2018-08-22 DIAGNOSIS — E11.42 DIABETIC PERIPHERAL NEUROPATHY ASSOCIATED WITH TYPE 2 DIABETES MELLITUS (HCC): ICD-10-CM

## 2018-08-22 DIAGNOSIS — Z79.4 DIABETES MELLITUS TYPE 2, INSULIN DEPENDENT (HCC): ICD-10-CM

## 2018-08-22 DIAGNOSIS — R17 SCLERAL ICTERUS: ICD-10-CM

## 2018-08-22 DIAGNOSIS — K21.9 GASTROESOPHAGEAL REFLUX DISEASE WITHOUT ESOPHAGITIS: ICD-10-CM

## 2018-08-22 DIAGNOSIS — Z00.00 MEDICARE ANNUAL WELLNESS VISIT, SUBSEQUENT: Primary | ICD-10-CM

## 2018-08-22 DIAGNOSIS — C67.9 PRIMARY CANCER OF BLADDER (HCC): ICD-10-CM

## 2018-08-22 LAB — HBA1C MFR BLD: 8.5 %

## 2018-08-22 PROCEDURE — 83036 HEMOGLOBIN GLYCOSYLATED A1C: CPT | Performed by: FAMILY MEDICINE

## 2018-08-22 PROCEDURE — G0439 PPPS, SUBSEQ VISIT: HCPCS | Performed by: FAMILY MEDICINE

## 2018-08-22 RX ORDER — LORAZEPAM 1 MG/1
TABLET ORAL
Refills: 0 | COMMUNITY
Start: 2018-08-11 | End: 2018-12-20 | Stop reason: SDUPTHER

## 2018-08-22 RX ORDER — PREGABALIN 200 MG/1
200 CAPSULE ORAL 2 TIMES DAILY
Qty: 60 CAPSULE | Refills: 1 | Status: SHIPPED | OUTPATIENT
Start: 2018-08-22 | End: 2018-12-20

## 2018-08-22 RX ORDER — PREDNISONE 20 MG/1
40 TABLET ORAL DAILY
Qty: 10 TABLET | Refills: 0 | Status: SHIPPED | OUTPATIENT
Start: 2018-08-22 | End: 2018-08-27

## 2018-08-22 NOTE — PROGRESS NOTES
QUICK REFERENCE INFORMATION:  The ABCs of the Annual Wellness Visit    Subsequent Medicare Wellness Visit    HEALTH RISK ASSESSMENT    1965    Recent Hospitalizations:  No hospitalization(s) within the last year..        Current Medical Providers:  Patient Care Team:  Maria Luz Longoria MD as PCP - General  Maria Luz Longoria MD as PCP - Claims Attributed  Shelley Curran MD as Consulting Physician (Ophthalmology)        Smoking Status:  History   Smoking Status   • Current Every Day Smoker   • Packs/day: 1.00   • Years: 40.00   • Types: Cigarettes   Smokeless Tobacco   • Never Used       Alcohol Consumption:  History   Alcohol Use No       Depression Screen:   PHQ-2/PHQ-9 Depression Screening 8/22/2018   Little interest or pleasure in doing things 0   Feeling down, depressed, or hopeless 0   Total Score 0       Health Habits and Functional and Cognitive Screening:  Functional & Cognitive Status 8/22/2018   Do you have difficulty preparing food and eating? No   Do you have difficulty bathing yourself, getting dressed or grooming yourself? No   Do you have difficulty using the toilet? No   Do you have difficulty moving around from place to place? No   Do you have trouble with steps or getting out of a bed or a chair? No   In the past year have you fallen or experienced a near fall? No   Current Diet Well Balanced Diet   Dental Exam Not up to date   Eye Exam Not up to date   Exercise (times per week) 7 times per week   Current Exercise Activities Include Housecleaning   Do you need help using the phone?  No   Are you deaf or do you have serious difficulty hearing?  No   Do you need help with transportation? No   Do you need help shopping? No   Do you need help preparing meals?  No   Do you need help with housework?  No   Do you need help with laundry? No   Do you need help taking your medications? No   Do you need help managing money? No   Do you ever drive or ride in a car without wearing a seat  belt? No   Have you felt unusual stress, anger or loneliness in the last month? No   Who do you live with? Alone   If you need help, do you have trouble finding someone available to you? No   Have you been bothered in the last four weeks by sexual problems? No   Do you have difficulty concentrating, remembering or making decisions? No           Does the patient have evidence of cognitive impairment? No    Aspirin use counseling: Taking ASA appropriately as indicated      Recent Lab Results:  CMP:  Lab Results   Component Value Date    GLU 53 (L) 05/09/2017    BUN 8 05/09/2017    CREATININE 0.90 05/09/2017    EGFRIFNONA 65 05/09/2017    EGFRIFAFRI 79 05/09/2017    BCR 8.9 05/09/2017     05/09/2017    K 4.2 05/09/2017    CO2 23.0 (L) 05/09/2017    CALCIUM 10.1 05/09/2017    PROTENTOTREF 6.8 05/09/2017    ALBUMIN 4.10 05/09/2017    LABGLOBREF 2.7 05/09/2017    LABIL2 1.5 05/09/2017    BILITOT 0.3 05/09/2017    ALKPHOS 82 05/09/2017    AST 16 05/09/2017    ALT 21 05/09/2017     Lipid Panel:  Lab Results   Component Value Date    CHOL 185 11/16/2016    TRIG 74 11/16/2016    HDL 49 11/16/2016     HbA1c:  Lab Results   Component Value Date    HGBA1C 8.5 08/22/2018       Visual Acuity:  No exam data present    Age-appropriate Screening Schedule:  Refer to the list below for future screening recommendations based on patient's age, sex and/or medical conditions. Orders for these recommended tests are listed in the plan section. The patient has been provided with a written plan.    Health Maintenance   Topic Date Due   • INFLUENZA VACCINE  08/01/2018   • ZOSTER VACCINE (1 of 2) 01/01/2019 (Originally 4/1/2014)   • TDAP/TD VACCINES (1 - Tdap) 01/01/2020 (Originally 4/1/1983)   • DIABETIC FOOT EXAM  11/15/2018   • DIABETIC EYE EXAM  11/15/2018   • FECAL OCCULT BLOOD TEST  11/15/2018   • HEMOGLOBIN A1C  02/22/2019   • URINE MICROALBUMIN  04/05/2019   • MAMMOGRAM  08/22/2020   • COLONOSCOPY  08/22/2028   • PNEUMOCOCCAL  VACCINE (19-64 MEDIUM RISK)  Completed   • PAP SMEAR  Excluded        Subjective   History of Present Illness    Nazia Chun is a 53 y.o. female who presents for an Subsequent Wellness Visit.    Sciatica of right side for almost a month. Has not been moving around as much due to pain. No injuries to lead to this pain. Burning pain. Gabapentin not helping much. She also has diabetic neuropathy which is not well controlled.     Type 2 diabetes mellitus insulin dependent associated with neuropathy. Complicated by tobacco abuse. Previously difficult to control, mostly due to diet compliance.     COPD also complicated by continued tobacco abuse. She is using inhalers as directed.     Anxiety improved with ativan as needed. She's tried coming off this in the past and she became too nervous. 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     History of bladder cancer, followed by urology.    Uses chronic PPI due to GERD.    The following portions of the patient's history were reviewed and updated as appropriate: allergies, current medications, past family history, past medical history, past social history, past surgical history and problem list.    Outpatient Medications Prior to Visit   Medication Sig Dispense Refill   • albuterol (PROAIR HFA) 108 (90 Base) MCG/ACT inhaler Inhale 2 puffs Every 6 (Six) Hours As Needed for Wheezing. 3 inhaler 6   • aspirin 81 MG EC tablet Take 1 tablet by mouth Daily. 90 tablet 3   • clopidogrel (PLAVIX) 75 MG tablet Take 1 tablet by mouth Daily. 90 tablet 3   • insulin aspart (NOVOLOG FLEXPEN) 100 UNIT/ML solution pen-injector sc pen Inject 10 Units under the skin 3 (Three) Times a Day With Meals. 3 pen 5   • Insulin Glargine (LANTUS SOLOSTAR) 100 UNIT/ML injection pen Inject 55 Units under the skin Daily. 9 mL 5   • Insulin Pen Needle (BD PEN NEEDLE ANNETTA U/F) 32G X 4 MM misc 1 Units 4 (Four) Times a Day. 120 each 11   • lisinopril (PRINIVIL,ZESTRIL) 5 MG tablet Take 1 tablet by  mouth Daily. 90 tablet 3   • meloxicam (MOBIC) 7.5 MG tablet Take 1 tablet by mouth Daily As Needed for Moderate Pain . 90 tablet 3   • metFORMIN (GLUCOPHAGE) 1000 MG tablet Take 1 tablet by mouth 2 (Two) Times a Day With Meals. 180 tablet 3   • omeprazole (priLOSEC) 20 MG capsule Take 1 capsule by mouth 2 (Two) Times a Day. 180 capsule 2   • gabapentin (NEURONTIN) 800 MG tablet Take 1 tablet by mouth 3 (Three) Times a Day. 90 tablet 1   • LORazepam (ATIVAN) 0.5 MG tablet Take 1 tablet by mouth 2 (Two) Times a Day As Needed for Anxiety. 30 tablet 2   • uribel (URO-MP) 118 MG capsule capsule Take 1 capsule by mouth 4 (Four) Times a Day. 40 capsule 3     No facility-administered medications prior to visit.        Patient Active Problem List   Diagnosis   • Anxiety   • Cerebral atherosclerosis   • Gastroesophageal reflux disease   • Chronic obstructive pulmonary disease (CMS/HCC)   • Osteoarthritis   • Dyssomnia   • Diabetes mellitus type 2, insulin dependent (CMS/HCC)   • Hematuria   • Primary cancer of bladder (CMS/HCC)   • Tobacco abuse   • Diabetic peripheral neuropathy associated with type 2 diabetes mellitus (CMS/HCC)   • Chronic fatigue   • Neck swelling   • Gastritis determined by biopsy   • Obesity (BMI 30-39.9)       Advance Care Planning:  has NO advance directive - not interested in additional information    Identification of Risk Factors:  Risk factors include: weight , unhealthy diet, cardiovascular risk, inactivity, tobacco use, chronic pain and caretaker stress.    Review of Systems   Constitutional: Positive for activity change.   Respiratory: Positive for cough and shortness of breath.    Gastrointestinal: Negative for abdominal pain.   Musculoskeletal: Positive for arthralgias and back pain.   Neurological: Positive for numbness.   Psychiatric/Behavioral:        Stress   All other systems reviewed and are negative.      Compared to one year ago, the patient feels her physical health is the  "same.  Compared to one year ago, the patient feels her mental health is the same.    Objective     Physical Exam   Constitutional: She is oriented to person, place, and time. Vital signs are normal. She appears well-developed and well-nourished. She is active. She does not have a sickly appearance. She does not appear ill. No distress.   Appears stated age. Well groomed.    HENT:   Head: Normocephalic and atraumatic. Hair is normal.   Right Ear: Hearing normal.   Left Ear: Hearing normal.   Nose: Nose normal.   Eyes: Pupils are equal, round, and reactive to light. EOM and lids are normal. Scleral icterus is present.   Neck: Phonation normal. Neck supple.   Cardiovascular: Normal rate, regular rhythm and normal heart sounds.  Exam reveals no gallop and no friction rub.    No murmur heard.  Pulmonary/Chest: Effort normal and breath sounds normal.   Abdominal: Soft. Bowel sounds are normal.   Musculoskeletal: She exhibits no edema or deformity.   Neurological: She is alert and oriented to person, place, and time. She displays no tremor. No cranial nerve deficit. Gait normal.   Skin: Skin is warm and dry. No rash noted. She is not diaphoretic. No cyanosis. No pallor. Nails show no clubbing.   Psychiatric: She has a normal mood and affect. Her speech is normal and behavior is normal. Judgment and thought content normal. Cognition and memory are normal.   Nursing note and vitals reviewed.      Vitals:    08/22/18 1057   BP: 125/70   Pulse: 86   Temp: 98.6 °F (37 °C)   SpO2: 97%   Weight: 82.2 kg (181 lb 4 oz)   Height: 165.1 cm (65\")   PainSc: 10-Worst pain ever  Comment: Complaints of Sciatic Nerve Pain.   PainLoc: Leg       Patient's Body mass index is 30.16 kg/m². BMI is above normal parameters. Recommendations include: exercise counseling and nutrition counseling.      Assessment/Plan   Patient Self-Management and Personalized Health Advice  The patient has been provided with information about: diet, exercise, weight " management, designing advance directives and mental health concerns and preventive services including:   · Advance directive, Diabetes screening, see lab orders, Nutrition counseling provided, Smoking cessation counseling completed, Shingrix.    Visit Diagnoses:    ICD-10-CM ICD-9-CM   1. Medicare annual wellness visit, subsequent Z00.00 V70.0   2. Scleral icterus R17 782.4   3. Sciatica of right side M54.31 724.3   4. Diabetic peripheral neuropathy associated with type 2 diabetes mellitus (CMS/HCC) E11.42 250.60     357.2   5. Diabetes mellitus type 2, insulin dependent (CMS/HCC) E11.9 250.00    Z79.4 V58.67   6. Chronic obstructive pulmonary disease, unspecified COPD type (CMS/HCC) J44.9 496   7. Anxiety F41.9 300.00   8. Gastroesophageal reflux disease without esophagitis K21.9 530.81   9. Primary osteoarthritis involving multiple joints M15.0 715.09   10. Primary cancer of bladder (CMS/HCC) C67.9 188.9   11. Tobacco abuse Z72.0 305.1   12. Colon cancer screening Z12.11 V76.51       Orders Placed This Encounter   Procedures   • Cologuard - Stool, Per Rectum     Standing Status:   Future     Number of Occurrences:   1     Standing Expiration Date:   8/22/2019   • Comprehensive Metabolic Panel   • Bilirubin, Total & Direct   • Lipid Panel   • Lipase   • POC Glycosylated Hemoglobin (Hb A1C)   • CBC & Differential     Order Specific Question:   Manual Differential     Answer:   No       Outpatient Encounter Prescriptions as of 8/22/2018   Medication Sig Dispense Refill   • albuterol (PROAIR HFA) 108 (90 Base) MCG/ACT inhaler Inhale 2 puffs Every 6 (Six) Hours As Needed for Wheezing. 3 inhaler 6   • aspirin 81 MG EC tablet Take 1 tablet by mouth Daily. 90 tablet 3   • clopidogrel (PLAVIX) 75 MG tablet Take 1 tablet by mouth Daily. 90 tablet 3   • insulin aspart (NOVOLOG FLEXPEN) 100 UNIT/ML solution pen-injector sc pen Inject 10 Units under the skin 3 (Three) Times a Day With Meals. 3 pen 5   • Insulin Glargine  (LANTUS SOLOSTAR) 100 UNIT/ML injection pen Inject 55 Units under the skin Daily. 9 mL 5   • Insulin Pen Needle (BD PEN NEEDLE ANNETTA U/F) 32G X 4 MM misc 1 Units 4 (Four) Times a Day. 120 each 11   • lisinopril (PRINIVIL,ZESTRIL) 5 MG tablet Take 1 tablet by mouth Daily. 90 tablet 3   • LORazepam (ATIVAN) 1 MG tablet TAKE 1/2 TABLET BY MOUTH TWICE DAILY AS NEEDED FOR ANXIETY  0   • meloxicam (MOBIC) 7.5 MG tablet Take 1 tablet by mouth Daily As Needed for Moderate Pain . 90 tablet 3   • metFORMIN (GLUCOPHAGE) 1000 MG tablet Take 1 tablet by mouth 2 (Two) Times a Day With Meals. 180 tablet 3   • omeprazole (priLOSEC) 20 MG capsule Take 1 capsule by mouth 2 (Two) Times a Day. 180 capsule 2   • [DISCONTINUED] gabapentin (NEURONTIN) 800 MG tablet Take 1 tablet by mouth 3 (Three) Times a Day. 90 tablet 1   • [DISCONTINUED] LORazepam (ATIVAN) 0.5 MG tablet Take 1 tablet by mouth 2 (Two) Times a Day As Needed for Anxiety. 30 tablet 2   • [DISCONTINUED] uribel (URO-MP) 118 MG capsule capsule Take 1 capsule by mouth 4 (Four) Times a Day. 40 capsule 3   • predniSONE (DELTASONE) 20 MG tablet Take 2 tablets by mouth Daily for 5 days. 10 tablet 0   • pregabalin (LYRICA) 200 MG capsule Take 1 capsule by mouth 2 (Two) Times a Day. 60 capsule 1     No facility-administered encounter medications on file as of 8/22/2018.        Reviewed use of high risk medication in the elderly: yes  Reviewed for potential of harmful drug interactions in the elderly: yes    Problem List Items Addressed This Visit        Respiratory    Chronic obstructive pulmonary disease (CMS/HCC)    Current Assessment & Plan     COPD is unchanged.  Continue current medications.  Needs to quit smoking, pre-contemplative at this time.             Relevant Medications    predniSONE (DELTASONE) 20 MG tablet       Digestive    Gastroesophageal reflux disease    Current Assessment & Plan     Unchanged, stable. Continue PPI.             Endocrine    Diabetes mellitus type  2, insulin dependent (CMS/[facility]) (Chronic)    Current Assessment & Plan     Diabetes is uncontrolled..   Continue current treatment regimen.  Regular aerobic exercise.  Reminded to get yearly retinal exam.  Diabetes will be reassessed in 3 months.         Relevant Orders    POC Glycosylated Hemoglobin (Hb A1C) (Completed)    Comprehensive Metabolic Panel    Lipid Panel    Diabetic peripheral neuropathy associated with type 2 diabetes mellitus (CMS/[facility])    Current Assessment & Plan     Diabetes is uncontrolled..   Medication changes per orders.  Diabetes will be reassessed in 3 months.            Musculoskeletal and Integument    Osteoarthritis    Current Assessment & Plan     NSAIDs as needed/tolerated. Activity encouraged.            Genitourinary    Primary cancer of bladder (CMS/[facility])    Current Assessment & Plan     Follow up with urology. Needs to stop smoking.            Other    Anxiety    Current Assessment & Plan     Unchanged. Continue ativan as needed.         Tobacco abuse    Current Assessment & Plan     Ready to quit: No  Counseling given: Yes               Other Visit Diagnoses     Medicare annual wellness visit, subsequent    -  Primary    Scleral icterus        Relevant Orders    CBC & Differential    Comprehensive Metabolic Panel    Bilirubin, Total & Direct    Lipase    Sciatica of right side        Relevant Medications    predniSONE (DELTASONE) 20 MG tablet    Colon cancer screening        Relevant Orders    Cologuard - Stool, Per Rectum            Follow Up:  Return in about 3 months (around 11/26/2018) for Diabetes follow up.     An After Visit Summary and PPPS with all of these plans were given to the patient.

## 2018-08-22 NOTE — PATIENT INSTRUCTIONS
Health Maintenance, Female  Adopting a healthy lifestyle and getting preventive care can go a long way to promote health and wellness. Talk with your health care provider about what schedule of regular examinations is right for you. This is a good chance for you to check in with your provider about disease prevention and staying healthy.  In between checkups, there are plenty of things you can do on your own. Experts have done a lot of research about which lifestyle changes and preventive measures are most likely to keep you healthy. Ask your health care provider for more information.  Weight and diet  Eat a healthy diet  · Be sure to include plenty of vegetables, fruits, low-fat dairy products, and lean protein.  · Do not eat a lot of foods high in solid fats, added sugars, or salt.  · Get regular exercise. This is one of the most important things you can do for your health.  ? Most adults should exercise for at least 150 minutes each week. The exercise should increase your heart rate and make you sweat (moderate-intensity exercise).  ? Most adults should also do strengthening exercises at least twice a week. This is in addition to the moderate-intensity exercise.    Maintain a healthy weight  · Body mass index (BMI) is a measurement that can be used to identify possible weight problems. It estimates body fat based on height and weight. Your health care provider can help determine your BMI and help you achieve or maintain a healthy weight.  · For females 20 years of age and older:  ? A BMI below 18.5 is considered underweight.  ? A BMI of 18.5 to 24.9 is normal.  ? A BMI of 25 to 29.9 is considered overweight.  ? A BMI of 30 and above is considered obese.    Watch levels of cholesterol and blood lipids  · You should start having your blood tested for lipids and cholesterol at 20 years of age, then have this test every 5 years.  · You may need to have your cholesterol levels checked more often if:  ? Your lipid or  cholesterol levels are high.  ? You are older than 50 years of age.  ? You are at high risk for heart disease.    Cancer screening  Lung Cancer  · Lung cancer screening is recommended for adults 55-80 years old who are at high risk for lung cancer because of a history of smoking.  · A yearly low-dose CT scan of the lungs is recommended for people who:  ? Currently smoke.  ? Have quit within the past 15 years.  ? Have at least a 30-pack-year history of smoking. A pack year is smoking an average of one pack of cigarettes a day for 1 year.  · Yearly screening should continue until it has been 15 years since you quit.  · Yearly screening should stop if you develop a health problem that would prevent you from having lung cancer treatment.    Breast Cancer  · Practice breast self-awareness. This means understanding how your breasts normally appear and feel.  · It also means doing regular breast self-exams. Let your health care provider know about any changes, no matter how small.  · If you are in your 20s or 30s, you should have a clinical breast exam (CBE) by a health care provider every 1-3 years as part of a regular health exam.  · If you are 40 or older, have a CBE every year. Also consider having a breast X-ray (mammogram) every year.  · If you have a family history of breast cancer, talk to your health care provider about genetic screening.  · If you are at high risk for breast cancer, talk to your health care provider about having an MRI and a mammogram every year.  · Breast cancer gene (BRCA) assessment is recommended for women who have family members with BRCA-related cancers. BRCA-related cancers include:  ? Breast.  ? Ovarian.  ? Tubal.  ? Peritoneal cancers.  · Results of the assessment will determine the need for genetic counseling and BRCA1 and BRCA2 testing.    Cervical Cancer  Your health care provider may recommend that you be screened regularly for cancer of the pelvic organs (ovaries, uterus, and  vagina). This screening involves a pelvic examination, including checking for microscopic changes to the surface of your cervix (Pap test). You may be encouraged to have this screening done every 3 years, beginning at age 21.  · For women ages 30-65, health care providers may recommend pelvic exams and Pap testing every 3 years, or they may recommend the Pap and pelvic exam, combined with testing for human papilloma virus (HPV), every 5 years. Some types of HPV increase your risk of cervical cancer. Testing for HPV may also be done on women of any age with unclear Pap test results.  · Other health care providers may not recommend any screening for nonpregnant women who are considered low risk for pelvic cancer and who do not have symptoms. Ask your health care provider if a screening pelvic exam is right for you.  · If you have had past treatment for cervical cancer or a condition that could lead to cancer, you need Pap tests and screening for cancer for at least 20 years after your treatment. If Pap tests have been discontinued, your risk factors (such as having a new sexual partner) need to be reassessed to determine if screening should resume. Some women have medical problems that increase the chance of getting cervical cancer. In these cases, your health care provider may recommend more frequent screening and Pap tests.    Colorectal Cancer  · This type of cancer can be detected and often prevented.  · Routine colorectal cancer screening usually begins at 50 years of age and continues through 75 years of age.  · Your health care provider may recommend screening at an earlier age if you have risk factors for colon cancer.  · Your health care provider may also recommend using home test kits to check for hidden blood in the stool.  · A small camera at the end of a tube can be used to examine your colon directly (sigmoidoscopy or colonoscopy). This is done to check for the earliest forms of colorectal  cancer.  · Routine screening usually begins at age 50.  · Direct examination of the colon should be repeated every 5-10 years through 75 years of age. However, you may need to be screened more often if early forms of precancerous polyps or small growths are found.    Skin Cancer  · Check your skin from head to toe regularly.  · Tell your health care provider about any new moles or changes in moles, especially if there is a change in a mole's shape or color.  · Also tell your health care provider if you have a mole that is larger than the size of a pencil eraser.  · Always use sunscreen. Apply sunscreen liberally and repeatedly throughout the day.  · Protect yourself by wearing long sleeves, pants, a wide-brimmed hat, and sunglasses whenever you are outside.    Heart disease, diabetes, and high blood pressure  · High blood pressure causes heart disease and increases the risk of stroke. High blood pressure is more likely to develop in:  ? People who have blood pressure in the high end of the normal range (130-139/85-89 mm Hg).  ? People who are overweight or obese.  ? People who are .  · If you are 18-39 years of age, have your blood pressure checked every 3-5 years. If you are 40 years of age or older, have your blood pressure checked every year. You should have your blood pressure measured twice--once when you are at a hospital or clinic, and once when you are not at a hospital or clinic. Record the average of the two measurements. To check your blood pressure when you are not at a hospital or clinic, you can use:  ? An automated blood pressure machine at a pharmacy.  ? A home blood pressure monitor.  · If you are between 55 years and 79 years old, ask your health care provider if you should take aspirin to prevent strokes.  · Have regular diabetes screenings. This involves taking a blood sample to check your fasting blood sugar level.  ? If you are at a normal weight and have a low risk for  diabetes, have this test once every three years after 45 years of age.  ? If you are overweight and have a high risk for diabetes, consider being tested at a younger age or more often.  Preventing infection  Hepatitis B  · If you have a higher risk for hepatitis B, you should be screened for this virus. You are considered at high risk for hepatitis B if:  ? You were born in a country where hepatitis B is common. Ask your health care provider which countries are considered high risk.  ? Your parents were born in a high-risk country, and you have not been immunized against hepatitis B (hepatitis B vaccine).  ? You have HIV or AIDS.  ? You use needles to inject street drugs.  ? You live with someone who has hepatitis B.  ? You have had sex with someone who has hepatitis B.  ? You get hemodialysis treatment.  ? You take certain medicines for conditions, including cancer, organ transplantation, and autoimmune conditions.    Hepatitis C  · Blood testing is recommended for:  ? Everyone born from 1945 through 1965.  ? Anyone with known risk factors for hepatitis C.    Sexually transmitted infections (STIs)  · You should be screened for sexually transmitted infections (STIs) including gonorrhea and chlamydia if:  ? You are sexually active and are younger than 24 years of age.  ? You are older than 24 years of age and your health care provider tells you that you are at risk for this type of infection.  ? Your sexual activity has changed since you were last screened and you are at an increased risk for chlamydia or gonorrhea. Ask your health care provider if you are at risk.  · If you do not have HIV, but are at risk, it may be recommended that you take a prescription medicine daily to prevent HIV infection. This is called pre-exposure prophylaxis (PrEP). You are considered at risk if:  ? You are sexually active and do not regularly use condoms or know the HIV status of your partner(s).  ? You take drugs by injection.  ? You  are sexually active with a partner who has HIV.    Talk with your health care provider about whether you are at high risk of being infected with HIV. If you choose to begin PrEP, you should first be tested for HIV. You should then be tested every 3 months for as long as you are taking PrEP.  Pregnancy  · If you are premenopausal and you may become pregnant, ask your health care provider about preconception counseling.  · If you may become pregnant, take 400 to 800 micrograms (mcg) of folic acid every day.  · If you want to prevent pregnancy, talk to your health care provider about birth control (contraception).  Osteoporosis and menopause  · Osteoporosis is a disease in which the bones lose minerals and strength with aging. This can result in serious bone fractures. Your risk for osteoporosis can be identified using a bone density scan.  · If you are 65 years of age or older, or if you are at risk for osteoporosis and fractures, ask your health care provider if you should be screened.  · Ask your health care provider whether you should take a calcium or vitamin D supplement to lower your risk for osteoporosis.  · Menopause may have certain physical symptoms and risks.  · Hormone replacement therapy may reduce some of these symptoms and risks.  Talk to your health care provider about whether hormone replacement therapy is right for you.  Follow these instructions at home:  · Schedule regular health, dental, and eye exams.  · Stay current with your immunizations.  · Do not use any tobacco products including cigarettes, chewing tobacco, or electronic cigarettes.  · If you are pregnant, do not drink alcohol.  · If you are breastfeeding, limit how much and how often you drink alcohol.  · Limit alcohol intake to no more than 1 drink per day for nonpregnant women. One drink equals 12 ounces of beer, 5 ounces of wine, or 1½ ounces of hard liquor.  · Do not use street drugs.  · Do not share needles.  · Ask your health care  provider for help if you need support or information about quitting drugs.  · Tell your health care provider if you often feel depressed.  · Tell your health care provider if you have ever been abused or do not feel safe at home.  This information is not intended to replace advice given to you by your health care provider. Make sure you discuss any questions you have with your health care provider.  Document Released: 2012 Document Revised: 2017 Document Reviewed: 2016  [employer] Interactive Patient Education © 2018 [employer]    Medicare Wellness  Personal Prevention Plan of Service     Date of Office Visit:  2018  Encounter Provider:  Maria Luz Longoria MD  Place of Service:  [facility]  Patient Name: Nazia Chun  :  1965    As part of the Medicare Wellness portion of your visit today, we are providing you with this personalized preventive plan of services (PPPS). This plan is based upon recommendations of the [place] Preventive Services Task Force (USPSTF) and the Advisory Committee on Immunization Practices (ACIP).    This lists the preventive care services that should be considered, and provides dates of when you are due. Items listed as completed are up-to-date and do not require any further intervention.    Health Maintenance   Topic Date Due   • INFLUENZA VACCINE  2018   • COLOGUARD  2019 (Originally 2016)   • ZOSTER VACCINE (1 of 2) 2019 (Originally 2014)   • TDAP/TD VACCINES (1 - Tdap) 2020 (Originally 1983)   • HEMOGLOBIN A1C  10/05/2018   • DIABETIC FOOT EXAM  11/15/2018   • DIABETIC EYE EXAM  11/15/2018   • FECAL OCCULT BLOOD TEST  11/15/2018   • URINE MICROALBUMIN  2019   • MEDICARE ANNUAL WELLNESS  2019   • MAMMOGRAM  2020   • COLONOSCOPY  2028   • PNEUMOCOCCAL VACCINE (19-64 MEDIUM RISK)  Completed   • HEPATITIS C SCREENING  Completed   • PAP SMEAR  Excluded        Orders Placed This Encounter   Procedures   • Cologuard - Stool, Per Rectum     Standing Status:   Future     Standing Expiration Date:   8/22/2019   • POC Glycosylated Hemoglobin (Hb A1C)       Return in about 3 months (around 11/26/2018) for Diabetes follow up.

## 2018-08-27 NOTE — ASSESSMENT & PLAN NOTE
Diabetes is uncontrolled..   Medication changes per orders.  Diabetes will be reassessed in 3 months.

## 2018-08-27 NOTE — ASSESSMENT & PLAN NOTE
COPD is unchanged.  Continue current medications.  Needs to quit smoking, pre-contemplative at this time.

## 2018-08-27 NOTE — ASSESSMENT & PLAN NOTE
Diabetes is uncontrolled..   Continue current treatment regimen.  Regular aerobic exercise.  Reminded to get yearly retinal exam.  Diabetes will be reassessed in 3 months.

## 2018-11-26 PROBLEM — Z79.4 TYPE 2 DIABETES MELLITUS WITH DIABETIC POLYNEUROPATHY, WITH LONG-TERM CURRENT USE OF INSULIN: Status: ACTIVE | Noted: 2018-11-26

## 2018-11-26 PROBLEM — E11.42 TYPE 2 DIABETES MELLITUS WITH DIABETIC POLYNEUROPATHY, WITH LONG-TERM CURRENT USE OF INSULIN: Status: ACTIVE | Noted: 2018-11-26

## 2018-11-27 ENCOUNTER — TELEPHONE (OUTPATIENT)
Dept: INTERNAL MEDICINE | Facility: CLINIC | Age: 53
End: 2018-11-27

## 2018-11-27 NOTE — TELEPHONE ENCOUNTER
Patient left vm at 9:53am. Returned patient call and left  for her to call back and reschedule appt. Cancelled appt for today since patient states that she could not make appointment due to road conditions.

## 2018-11-28 DIAGNOSIS — Z79.4 DIABETES MELLITUS TYPE 2, INSULIN DEPENDENT (HCC): Chronic | ICD-10-CM

## 2018-11-28 DIAGNOSIS — E11.9 DIABETES MELLITUS TYPE 2, INSULIN DEPENDENT (HCC): Chronic | ICD-10-CM

## 2018-12-11 DIAGNOSIS — E11.42 DIABETIC PERIPHERAL NEUROPATHY ASSOCIATED WITH TYPE 2 DIABETES MELLITUS (HCC): ICD-10-CM

## 2018-12-12 RX ORDER — GABAPENTIN 800 MG/1
TABLET ORAL
Qty: 90 TABLET | Refills: 1 | OUTPATIENT
Start: 2018-12-12

## 2018-12-14 DIAGNOSIS — E11.42 DIABETIC PERIPHERAL NEUROPATHY ASSOCIATED WITH TYPE 2 DIABETES MELLITUS (HCC): ICD-10-CM

## 2018-12-14 RX ORDER — GABAPENTIN 800 MG/1
TABLET ORAL
Qty: 90 TABLET | Refills: 1 | OUTPATIENT
Start: 2018-12-14

## 2018-12-20 ENCOUNTER — OFFICE VISIT (OUTPATIENT)
Dept: INTERNAL MEDICINE | Facility: CLINIC | Age: 53
End: 2018-12-20

## 2018-12-20 VITALS
OXYGEN SATURATION: 97 % | RESPIRATION RATE: 16 BRPM | HEIGHT: 65 IN | SYSTOLIC BLOOD PRESSURE: 140 MMHG | HEART RATE: 93 BPM | DIASTOLIC BLOOD PRESSURE: 88 MMHG | WEIGHT: 180 LBS | TEMPERATURE: 97.8 F | BODY MASS INDEX: 29.99 KG/M2

## 2018-12-20 DIAGNOSIS — E11.65 TYPE 2 DIABETES MELLITUS WITH HYPERGLYCEMIA, WITH LONG-TERM CURRENT USE OF INSULIN (HCC): ICD-10-CM

## 2018-12-20 DIAGNOSIS — E11.42 DIABETIC PERIPHERAL NEUROPATHY ASSOCIATED WITH TYPE 2 DIABETES MELLITUS (HCC): ICD-10-CM

## 2018-12-20 DIAGNOSIS — Z23 NEED FOR INFLUENZA VACCINATION: ICD-10-CM

## 2018-12-20 DIAGNOSIS — Z79.4 TYPE 2 DIABETES MELLITUS WITH HYPERGLYCEMIA, WITH LONG-TERM CURRENT USE OF INSULIN (HCC): ICD-10-CM

## 2018-12-20 DIAGNOSIS — J44.9 CHRONIC OBSTRUCTIVE PULMONARY DISEASE, UNSPECIFIED COPD TYPE (HCC): ICD-10-CM

## 2018-12-20 DIAGNOSIS — Z79.4 TYPE 2 DIABETES MELLITUS WITH DIABETIC POLYNEUROPATHY, WITH LONG-TERM CURRENT USE OF INSULIN (HCC): Primary | ICD-10-CM

## 2018-12-20 DIAGNOSIS — R17 YELLOW EYES: ICD-10-CM

## 2018-12-20 DIAGNOSIS — E11.42 TYPE 2 DIABETES MELLITUS WITH DIABETIC POLYNEUROPATHY, WITH LONG-TERM CURRENT USE OF INSULIN (HCC): Primary | ICD-10-CM

## 2018-12-20 DIAGNOSIS — E66.9 CLASS 1 OBESITY WITH SERIOUS COMORBIDITY AND BODY MASS INDEX (BMI) OF 30.0 TO 30.9 IN ADULT, UNSPECIFIED OBESITY TYPE: ICD-10-CM

## 2018-12-20 DIAGNOSIS — F41.9 ANXIETY: ICD-10-CM

## 2018-12-20 LAB
ALBUMIN SERPL-MCNC: 4.4 G/DL (ref 3.5–5)
ALBUMIN/GLOB SERPL: 1.8 G/DL (ref 1–2)
ALP SERPL-CCNC: 87 U/L (ref 38–126)
ALT SERPL-CCNC: 19 U/L (ref 13–69)
AST SERPL-CCNC: 15 U/L (ref 15–46)
BASOPHILS # BLD AUTO: 0.06 10*3/MM3 (ref 0–0.2)
BASOPHILS NFR BLD AUTO: 0.6 % (ref 0–2.5)
BILIRUB DIRECT SERPL-MCNC: 0.5 MG/DL (ref 0–0.4)
BILIRUB INDIRECT SERPL-MCNC: 0.1 MG/DL
BILIRUB SERPL-MCNC: 0.6 MG/DL (ref 0.2–1.3)
BUN SERPL-MCNC: 9 MG/DL (ref 7–20)
BUN/CREAT SERPL: 12.9 (ref 7.1–23.5)
CALCIUM SERPL-MCNC: 10 MG/DL (ref 8.4–10.2)
CHLORIDE SERPL-SCNC: 108 MMOL/L (ref 98–107)
CHOLEST SERPL-MCNC: 181 MG/DL (ref 0–199)
CO2 SERPL-SCNC: 24 MMOL/L (ref 26–30)
CREAT SERPL-MCNC: 0.7 MG/DL (ref 0.6–1.3)
EOSINOPHIL # BLD AUTO: 0.39 10*3/MM3 (ref 0–0.7)
EOSINOPHIL NFR BLD AUTO: 3.8 % (ref 0–7)
ERYTHROCYTE [DISTWIDTH] IN BLOOD BY AUTOMATED COUNT: 12.9 % (ref 11.5–14.5)
GLOBULIN SER CALC-MCNC: 2.5 GM/DL
GLUCOSE SERPL-MCNC: 127 MG/DL (ref 74–98)
HBA1C MFR BLD: 8.4 %
HCT VFR BLD AUTO: 43.8 % (ref 37–47)
HDLC SERPL-MCNC: 43 MG/DL (ref 40–60)
HGB BLD-MCNC: 14.3 G/DL (ref 12–16)
IMM GRANULOCYTES # BLD: 0.02 10*3/MM3 (ref 0–0.06)
IMM GRANULOCYTES NFR BLD: 0.2 % (ref 0–0.6)
LDLC SERPL CALC-MCNC: 123 MG/DL (ref 0–99)
LIPASE SERPL-CCNC: 23 U/L (ref 23–300)
LYMPHOCYTES # BLD AUTO: 4.12 10*3/MM3 (ref 0.6–3.4)
LYMPHOCYTES NFR BLD AUTO: 39.9 % (ref 10–50)
MCH RBC QN AUTO: 28.7 PG (ref 27–31)
MCHC RBC AUTO-ENTMCNC: 32.6 G/DL (ref 30–37)
MCV RBC AUTO: 88 FL (ref 81–99)
MONOCYTES # BLD AUTO: 0.58 10*3/MM3 (ref 0–0.9)
MONOCYTES NFR BLD AUTO: 5.6 % (ref 0–12)
NEUTROPHILS # BLD AUTO: 5.16 10*3/MM3 (ref 2–6.9)
NEUTROPHILS NFR BLD AUTO: 49.9 % (ref 37–80)
NRBC BLD AUTO-RTO: 0 /100 WBC (ref 0–0)
PLATELET # BLD AUTO: 397 10*3/MM3 (ref 130–400)
POTASSIUM SERPL-SCNC: 4.6 MMOL/L (ref 3.5–5.1)
PROT SERPL-MCNC: 6.9 G/DL (ref 6.3–8.2)
RBC # BLD AUTO: 4.98 10*6/MM3 (ref 4.2–5.4)
SODIUM SERPL-SCNC: 139 MMOL/L (ref 137–145)
TRIGL SERPL-MCNC: 75 MG/DL
VLDLC SERPL CALC-MCNC: 15 MG/DL
WBC # BLD AUTO: 10.33 10*3/MM3 (ref 4.8–10.8)

## 2018-12-20 PROCEDURE — 90674 CCIIV4 VAC NO PRSV 0.5 ML IM: CPT | Performed by: FAMILY MEDICINE

## 2018-12-20 PROCEDURE — 83036 HEMOGLOBIN GLYCOSYLATED A1C: CPT | Performed by: FAMILY MEDICINE

## 2018-12-20 PROCEDURE — 99214 OFFICE O/P EST MOD 30 MIN: CPT | Performed by: FAMILY MEDICINE

## 2018-12-20 PROCEDURE — G0008 ADMIN INFLUENZA VIRUS VAC: HCPCS | Performed by: FAMILY MEDICINE

## 2018-12-20 RX ORDER — LORAZEPAM 1 MG/1
TABLET ORAL
Qty: 60 TABLET | Refills: 2 | Status: SHIPPED | OUTPATIENT
Start: 2018-12-20 | End: 2019-04-09 | Stop reason: SDUPTHER

## 2018-12-20 RX ORDER — GABAPENTIN 800 MG/1
800 TABLET ORAL 3 TIMES DAILY
Qty: 90 TABLET | Refills: 2 | Status: SHIPPED | OUTPATIENT
Start: 2018-12-20 | End: 2019-04-09 | Stop reason: SDUPTHER

## 2018-12-20 RX ORDER — GABAPENTIN 800 MG/1
800 TABLET ORAL 3 TIMES DAILY
COMMUNITY
End: 2018-12-20 | Stop reason: SDUPTHER

## 2018-12-20 NOTE — PROGRESS NOTES
Subjective    Nazia Chun is a 53 y.o. female here for:  Chief Complaint   Patient presents with   • Diabetes     f/u patient states no problems or concerns today    • Heartburn     f/u    • Anxiety     f/u      Diabetes   She presents for her follow-up diabetic visit. She has type 2 diabetes mellitus. Her disease course has been fluctuating. Hypoglycemia symptoms include nervousness/anxiousness (Ativan continues to help, she takes as needed.). Associated symptoms include fatigue. Hypoglycemia complications include nocturnal hypoglycemia and required assistance. Diabetic complications include a CVA and peripheral neuropathy. Risk factors for coronary artery disease include diabetes mellitus, dyslipidemia, family history, obesity, hypertension, stress, tobacco exposure and post-menopausal. Current diabetic treatment includes oral agent (monotherapy) and insulin injections. She is compliant with treatment some of the time (non-compliant with diet). Her weight is stable. Blood glucose monitoring compliance is inadequate. An ACE inhibitor/angiotensin II receptor blocker is being taken. She does not see a podiatrist.Eye exam is not current (Patient feels vision is not as good as before. Due for eye exam.).   Anxiety   Presents for follow-up visit. Symptoms include irritability and nervous/anxious behavior (Ativan continues to help, she takes as needed.). The quality of sleep is good.     Treatment side effects: No known side effects.   COPD   This is a chronic problem. The current episode started more than 1 year ago. Associated symptoms include arthralgias and fatigue. The symptoms are aggravated by smoking. Treatments tried: Albuterol. The treatment provided significant relief.      No history of thyroid cancer in patient nor family.    The following portions of the patient's history were reviewed and updated as appropriate: allergies, current medications, past family history, past medical history, past social  "history, past surgical history and problem list.    Review of Systems   Constitutional: Positive for fatigue and irritability.   Musculoskeletal: Positive for arthralgias.   Psychiatric/Behavioral: The patient is nervous/anxious (Ativan continues to help, she takes as needed.).        Vitals:    12/20/18 1001   BP: 140/88   Pulse: 93   Resp: 16   Temp: 97.8 °F (36.6 °C)   TempSrc: Temporal   SpO2: 97%   Weight: 81.6 kg (180 lb)   Height: 165.1 cm (65\")         Objective   Physical Exam   Constitutional: She is oriented to person, place, and time. Vital signs are normal. She appears well-developed and well-nourished. She is active.  Non-toxic appearance. She does not appear ill. No distress. She is obese.  HENT:   Head: Normocephalic and atraumatic. Hair is normal.   Right Ear: Hearing and external ear normal.   Left Ear: Hearing and external ear normal.   Nose: Nose normal.   Mouth/Throat: Mucous membranes are not dry.   Eyes: EOM and lids are normal. Pupils are equal, round, and reactive to light. No scleral icterus.   Neck: Neck supple.   Cardiovascular: Normal rate, regular rhythm and normal heart sounds.   No murmur heard.  Pulmonary/Chest: Effort normal and breath sounds normal.   Musculoskeletal: She exhibits no edema or deformity.   Neurological: She is alert and oriented to person, place, and time. She displays no tremor. No cranial nerve deficit. Gait normal.   Skin: Skin is warm and dry. No rash noted. She is not diaphoretic. No cyanosis. Nails show no clubbing.   Psychiatric: Her speech is normal and behavior is normal. Judgment and thought content normal. Her mood appears anxious. Cognition and memory are normal.   Very talkative, friendly.   Nursing note and vitals reviewed.      Lab Results   Component Value Date    HGBA1C 8.4 12/20/2018     Last A1C was 8.5 in August 2018.    Assessment/Plan     Problem List Items Addressed This Visit        Respiratory    Chronic obstructive pulmonary disease " (CMS/[facility]ly [facility])    Overview     · Complicated by continued tobacco abuse  · Current therapy: albuterol rescue inhaler            Digestive    Class 1 obesity with serious comorbidity in adult       Endocrine    Diabetic peripheral neuropathy associated with type 2 diabetes mellitus (CMS/[facility])    Relevant Medications    gabapentin (NEURONTIN) 800 MG tablet    Type 2 diabetes mellitus with diabetic polyneuropathy, with long-term current use of insulin (CMS/[facility]ly [facility]) - Primary    Relevant Medications    gabapentin (NEURONTIN) 800 MG tablet    Other Relevant Orders    POC Glycosylated Hemoglobin (Hb A1C) (Completed)    Type 2 diabetes mellitus with hyperglycemia, with long-term current use of insulin (CMS/[facility])    Relevant Orders    POC Glycosylated Hemoglobin (Hb A1C) (Completed)       Other    Anxiety    Relevant Medications    LORazepam (ATIVAN) 1 MG tablet      Other Visit Diagnoses     Yellow eyes        Relevant Orders    Hepatitis Panel, Acute (Completed)    Need for influenza vaccination        Relevant Orders    Flucelvax Quad=>4Years ([id number]) (Completed)          · Patient's Body mass index is 29.95 kg/m². BMI is above normal parameters. Recommendations include: nutrition counseling and pharmacological intervention.  · Last visit she was supposed to have labs drawn due to questionable scleral icterus. She'll have drawn today.  · Sample Trulicity given. Patient to let us know if she tolerates and wishes to continue therapy so that Rx can be sent.    Return in about 3 months (around 3/20/2019) for Diabetes follow up.    Maria Luz Longoria MD

## 2018-12-21 LAB
HAV IGM SERPL QL IA: NEGATIVE
HBV CORE IGM SERPL QL IA: NEGATIVE
HBV SURFACE AG SERPL QL IA: NEGATIVE
HCV AB S/CO SERPL IA: 0.2 S/CO RATIO (ref 0–0.9)

## 2018-12-22 PROBLEM — E11.65 TYPE 2 DIABETES MELLITUS WITH HYPERGLYCEMIA, WITH LONG-TERM CURRENT USE OF INSULIN (HCC): Status: ACTIVE | Noted: 2018-12-22

## 2018-12-22 PROBLEM — Z79.4 TYPE 2 DIABETES MELLITUS WITH HYPERGLYCEMIA, WITH LONG-TERM CURRENT USE OF INSULIN: Status: ACTIVE | Noted: 2018-12-22

## 2018-12-22 PROBLEM — E66.811 CLASS 1 OBESITY WITH SERIOUS COMORBIDITY IN ADULT: Status: ACTIVE | Noted: 2018-04-08

## 2019-01-22 DIAGNOSIS — K21.9 GASTROESOPHAGEAL REFLUX DISEASE WITHOUT ESOPHAGITIS: ICD-10-CM

## 2019-01-22 RX ORDER — OMEPRAZOLE 20 MG/1
CAPSULE, DELAYED RELEASE ORAL
Qty: 180 CAPSULE | Refills: 3 | Status: SHIPPED | OUTPATIENT
Start: 2019-01-22 | End: 2019-04-09

## 2019-02-18 DIAGNOSIS — E11.9 DIABETES MELLITUS TYPE 2, INSULIN DEPENDENT (HCC): Chronic | ICD-10-CM

## 2019-02-18 DIAGNOSIS — Z79.4 DIABETES MELLITUS TYPE 2, INSULIN DEPENDENT (HCC): Chronic | ICD-10-CM

## 2019-02-27 DIAGNOSIS — E11.9 DIABETES MELLITUS TYPE 2, INSULIN DEPENDENT (HCC): Chronic | ICD-10-CM

## 2019-02-27 DIAGNOSIS — Z79.4 DIABETES MELLITUS TYPE 2, INSULIN DEPENDENT (HCC): Chronic | ICD-10-CM

## 2019-04-09 ENCOUNTER — OFFICE VISIT (OUTPATIENT)
Dept: INTERNAL MEDICINE | Facility: CLINIC | Age: 54
End: 2019-04-09

## 2019-04-09 VITALS
SYSTOLIC BLOOD PRESSURE: 124 MMHG | TEMPERATURE: 97.4 F | OXYGEN SATURATION: 97 % | WEIGHT: 183.13 LBS | DIASTOLIC BLOOD PRESSURE: 78 MMHG | BODY MASS INDEX: 30.51 KG/M2 | HEIGHT: 65 IN | RESPIRATION RATE: 16 BRPM | HEART RATE: 80 BPM

## 2019-04-09 DIAGNOSIS — E11.42 DIABETIC PERIPHERAL NEUROPATHY ASSOCIATED WITH TYPE 2 DIABETES MELLITUS (HCC): ICD-10-CM

## 2019-04-09 DIAGNOSIS — Z79.4 TYPE 2 DIABETES MELLITUS WITH DIABETIC POLYNEUROPATHY, WITH LONG-TERM CURRENT USE OF INSULIN (HCC): ICD-10-CM

## 2019-04-09 DIAGNOSIS — K21.9 GASTROESOPHAGEAL REFLUX DISEASE WITHOUT ESOPHAGITIS: ICD-10-CM

## 2019-04-09 DIAGNOSIS — E11.42 TYPE 2 DIABETES MELLITUS WITH DIABETIC POLYNEUROPATHY, WITH LONG-TERM CURRENT USE OF INSULIN (HCC): ICD-10-CM

## 2019-04-09 DIAGNOSIS — F41.9 ANXIETY: ICD-10-CM

## 2019-04-09 DIAGNOSIS — Z79.4 TYPE 2 DIABETES MELLITUS WITH HYPERGLYCEMIA, WITH LONG-TERM CURRENT USE OF INSULIN (HCC): Primary | ICD-10-CM

## 2019-04-09 DIAGNOSIS — E11.9 DIABETES MELLITUS TYPE 2, INSULIN DEPENDENT (HCC): Chronic | ICD-10-CM

## 2019-04-09 DIAGNOSIS — I67.2 CEREBRAL ATHEROSCLEROSIS: ICD-10-CM

## 2019-04-09 DIAGNOSIS — Z79.4 DIABETES MELLITUS TYPE 2, INSULIN DEPENDENT (HCC): Chronic | ICD-10-CM

## 2019-04-09 DIAGNOSIS — E11.65 TYPE 2 DIABETES MELLITUS WITH HYPERGLYCEMIA, WITH LONG-TERM CURRENT USE OF INSULIN (HCC): Primary | ICD-10-CM

## 2019-04-09 DIAGNOSIS — J44.9 CHRONIC OBSTRUCTIVE PULMONARY DISEASE, UNSPECIFIED COPD TYPE (HCC): ICD-10-CM

## 2019-04-09 DIAGNOSIS — M19.91 PRIMARY OSTEOARTHRITIS, UNSPECIFIED SITE: ICD-10-CM

## 2019-04-09 LAB
HBA1C MFR BLD: 8.3 %
POC CREATININE URINE: 100
POC MICROALBUMIN URINE: 10

## 2019-04-09 PROCEDURE — 82044 UR ALBUMIN SEMIQUANTITATIVE: CPT | Performed by: FAMILY MEDICINE

## 2019-04-09 PROCEDURE — 99214 OFFICE O/P EST MOD 30 MIN: CPT | Performed by: FAMILY MEDICINE

## 2019-04-09 PROCEDURE — 83036 HEMOGLOBIN GLYCOSYLATED A1C: CPT | Performed by: FAMILY MEDICINE

## 2019-04-09 RX ORDER — ALBUTEROL SULFATE 90 UG/1
2 AEROSOL, METERED RESPIRATORY (INHALATION) EVERY 6 HOURS PRN
Qty: 3 INHALER | Refills: 6 | Status: SHIPPED | OUTPATIENT
Start: 2019-04-09 | End: 2020-03-16 | Stop reason: SDUPTHER

## 2019-04-09 RX ORDER — OMEPRAZOLE 20 MG/1
20 CAPSULE, DELAYED RELEASE ORAL 2 TIMES DAILY
Qty: 180 CAPSULE | Refills: 3 | Status: SHIPPED | OUTPATIENT
Start: 2019-04-09 | End: 2019-10-09 | Stop reason: SDUPTHER

## 2019-04-09 RX ORDER — LORAZEPAM 1 MG/1
TABLET ORAL
Qty: 60 TABLET | Refills: 2 | Status: SHIPPED | OUTPATIENT
Start: 2019-04-09 | End: 2019-07-09 | Stop reason: SDUPTHER

## 2019-04-09 RX ORDER — ASPIRIN 81 MG/1
81 TABLET ORAL DAILY
Qty: 90 TABLET | Refills: 3 | Status: SHIPPED | OUTPATIENT
Start: 2019-04-09 | End: 2019-10-09 | Stop reason: SDUPTHER

## 2019-04-09 RX ORDER — LISINOPRIL 5 MG/1
5 TABLET ORAL DAILY
Qty: 90 TABLET | Refills: 3 | Status: SHIPPED | OUTPATIENT
Start: 2019-04-09 | End: 2019-10-09 | Stop reason: SDUPTHER

## 2019-04-09 RX ORDER — CETIRIZINE HYDROCHLORIDE 10 MG/1
10 TABLET ORAL DAILY
Qty: 90 TABLET | Refills: 3 | Status: SHIPPED | OUTPATIENT
Start: 2019-04-09 | End: 2019-10-09 | Stop reason: SDUPTHER

## 2019-04-09 RX ORDER — CLOPIDOGREL BISULFATE 75 MG/1
75 TABLET ORAL DAILY
Qty: 90 TABLET | Refills: 3 | Status: SHIPPED | OUTPATIENT
Start: 2019-04-09 | End: 2019-10-09 | Stop reason: SDUPTHER

## 2019-04-09 RX ORDER — GABAPENTIN 800 MG/1
800 TABLET ORAL 3 TIMES DAILY
Qty: 90 TABLET | Refills: 2 | Status: SHIPPED | OUTPATIENT
Start: 2019-04-09 | End: 2019-07-09 | Stop reason: SDUPTHER

## 2019-04-09 RX ORDER — MELOXICAM 7.5 MG/1
7.5 TABLET ORAL DAILY PRN
Qty: 90 TABLET | Refills: 3 | Status: SHIPPED | OUTPATIENT
Start: 2019-04-09 | End: 2019-10-09 | Stop reason: SDUPTHER

## 2019-04-09 NOTE — PROGRESS NOTES
Subjective    Nazia Chun is a 54 y.o. female here for:  Chief Complaint   Patient presents with   • Diabetes     f/u pt states she is doing well overall but is having allergy problems    • Anxiety     f/u        Seasonal allergies.  Not controlled.  Recurrent issue.  She prefers a pill of her nose spray.  Suffering from cough, nasal congestion.  Eyes are itchy.      Diabetes   She presents for her follow-up diabetic visit. She has type 2 diabetes mellitus. Her disease course has been fluctuating. Hypoglycemia symptoms include hunger and nervousness/anxiousness (Ativan continues to help, she takes as needed.). Associated symptoms include fatigue. Hypoglycemia complications include nocturnal hypoglycemia and required assistance. Diabetic complications include a CVA and peripheral neuropathy. Risk factors for coronary artery disease include diabetes mellitus, dyslipidemia, family history, obesity, hypertension, stress, tobacco exposure and post-menopausal. Current diabetic treatment includes oral agent (monotherapy) and insulin injections. She is compliant with treatment some of the time (non-compliant with diet). Her weight is stable. She is following a generally unhealthy diet. When asked about meal planning, she reported none. She participates in exercise intermittently. Blood glucose monitoring compliance is inadequate. An ACE inhibitor/angiotensin II receptor blocker is being taken. She does not see a podiatrist.Eye exam is not current (Patient feels vision is not as good as before. Due for eye exam.).   Anxiety   Presents for follow-up visit. Symptoms include irritability and nervous/anxious behavior (Ativan continues to help, she takes as needed.). The quality of sleep is good.     Treatment side effects: No known side effects.   COPD   This is a chronic problem. The current episode started more than 1 year ago. Associated symptoms include arthralgias and fatigue. The symptoms are aggravated by smoking.  "Treatments tried: Albuterol. The treatment provided significant relief.          The following portions of the patient's history were reviewed and updated as appropriate: allergies, current medications, past family history, past medical history, past social history, past surgical history and problem list.    Review of Systems   Constitutional: Positive for activity change (trying to get out more), fatigue and irritability.   Musculoskeletal: Positive for arthralgias.   Psychiatric/Behavioral: Positive for stress (daughter in assisted, drug use). The patient is nervous/anxious (Ativan continues to help, she takes as needed.).        Vitals:    04/09/19 1313   BP: 124/78   Pulse: 80   Resp: 16   Temp: 97.4 °F (36.3 °C)   TempSrc: Temporal   SpO2: 97%   Weight: 83.1 kg (183 lb 2 oz)   Height: 165.1 cm (65\")         Objective   Physical Exam   Constitutional: She is oriented to person, place, and time. Vital signs are normal. She appears well-developed and well-nourished. She is active.  Non-toxic appearance. She does not have a sickly appearance. She does not appear ill. No distress. She is obese.  HENT:   Head: Normocephalic and atraumatic. Hair is normal.   Right Ear: Hearing and external ear normal.   Left Ear: Hearing and external ear normal.   Nose: Nose normal.   Mouth/Throat: Mucous membranes are not dry. No trismus in the jaw.   Eyes: Conjunctivae, EOM and lids are normal. Pupils are equal, round, and reactive to light. No scleral icterus.   Neck: Phonation normal. Neck supple. No tracheal deviation present.   Cardiovascular: Normal rate, regular rhythm and normal heart sounds.   No murmur heard.  Pulmonary/Chest: Effort normal and breath sounds normal.   Musculoskeletal: She exhibits no edema or deformity.   Neurological: She is alert and oriented to person, place, and time. She displays no tremor. No cranial nerve deficit. She exhibits normal muscle tone. Gait normal.   Skin: Skin is warm. Turgor is normal. No " rash noted. She is not diaphoretic. No cyanosis. No pallor. Nails show no clubbing.   Psychiatric: She has a normal mood and affect. Her speech is normal and behavior is normal. Judgment and thought content normal. Cognition and memory are normal.   Nursing note and vitals reviewed.      Lab Results   Component Value Date    HGBA1C 8.3 04/09/2019    HGBA1C 8.4 12/20/2018    HGBA1C 8.5 08/22/2018       Assessment/Plan     Problem List Items Addressed This Visit        Cardiovascular and Mediastinum    Cerebral atherosclerosis    Overview     History of stroke.         Relevant Medications    clopidogrel (PLAVIX) 75 MG tablet       Respiratory    Chronic obstructive pulmonary disease (CMS/[facility])    Overview     · Complicated by continued tobacco abuse  · Current therapy: albuterol rescue inhaler         Relevant Medications    albuterol sulfate HFA (PROAIR HFA) 108 (90 Base) MCG/ACT inhaler    cetirizine (zyrTEC) 10 MG tablet       Digestive    Gastroesophageal reflux disease    Relevant Medications    omeprazole (priLOSEC) 20 MG capsule       Endocrine    Type 2 diabetes mellitus with hyperglycemia, with long-term current use of insulin (CMS/[facility]) - Primary    Relevant Medications    aspirin 81 MG EC tablet    Dulaglutide (TRULICITY) 0.75 MG/0.5ML solution pen-injector    Insulin Glargine (LANTUS SOLOSTAR) 100 UNIT/ML injection pen    Insulin Pen Needle (BD PEN NEEDLE ANNETTA U/F) 32G X 4 MM misc    lisinopril (PRINIVIL,ZESTRIL) 5 MG tablet    metFORMIN (GLUCOPHAGE) 1000 MG tablet    Dulaglutide (TRULICITY) 0.75 MG/0.5ML solution pen-injector    Other Relevant Orders    POC Glycosylated Hemoglobin (Hb A1C) (Completed)    POC Microalbumin (Completed)    Diabetic peripheral neuropathy associated with type 2 diabetes mellitus (CMS/[facility])    Relevant Medications    Dulaglutide (TRULICITY) 0.75 MG/0.5ML solution pen-injector    gabapentin (NEURONTIN) 800 MG tablet    Insulin Glargine (LANTUS SOLOSTAR) 100 UNIT/ML injection pen     metFORMIN (GLUCOPHAGE) 1000 MG tablet    Dulaglutide (TRULICITY) 0.75 MG/0.5ML solution pen-injector    Type 2 diabetes mellitus with diabetic polyneuropathy, with long-term current use of insulin (CMS/[facility])    Relevant Medications    Dulaglutide (TRULICITY) 0.75 MG/0.5ML solution pen-injector    gabapentin (NEURONTIN) 800 MG tablet    Insulin Glargine (LANTUS SOLOSTAR) 100 UNIT/ML injection pen    Insulin Pen Needle (BD PEN NEEDLE ANNETTA U/F) 32G X 4 MM misc    lisinopril (PRINIVIL,ZESTRIL) 5 MG tablet    metFORMIN (GLUCOPHAGE) 1000 MG tablet    Dulaglutide (TRULICITY) 0.75 MG/0.5ML solution pen-injector    Other Relevant Orders    POC Glycosylated Hemoglobin (Hb A1C) (Completed)    POC Microalbumin (Completed)       Musculoskeletal and Integument    Osteoarthritis    Relevant Medications    meloxicam (MOBIC) 7.5 MG tablet       Other    Anxiety    Relevant Medications    LORazepam (ATIVAN) 1 MG tablet      Other Visit Diagnoses     Diabetes mellitus type 2, insulin dependent (CMS/[facility])  (Chronic)       Relevant Medications    aspirin 81 MG EC tablet    Dulaglutide (TRULICITY) 0.75 MG/0.5ML solution pen-injector    Insulin Glargine (LANTUS SOLOSTAR) 100 UNIT/ML injection pen    metFORMIN (GLUCOPHAGE) 1000 MG tablet    Dulaglutide (TRULICITY) 0.75 MG/0.5ML solution pen-injector        CONTROLLED SUBSTANCE TRACKING 4/5/2018 8/7/2018 8/23/2018 4/9/2019   Blayne Shaw 4/4/2018 8/3/2018 7/5/2018 4/9/2019   Report Number [id number]       · She is going to hold NovoLog and continue long-acting insulin.  She will continue metformin and I have encouraged her to continue Trulicity.  I suspect her hypoglycemic spells are due to not eating and fast acting insulin use. Diabetes mellitus not controlled.    Return in about 3 months (around 7/22/2019) for Diabetes follow up.    Maria Luz Longoria MD

## 2019-05-13 ENCOUNTER — PROCEDURE VISIT (OUTPATIENT)
Dept: UROLOGY | Facility: CLINIC | Age: 54
End: 2019-05-13

## 2019-05-13 DIAGNOSIS — N76.2 ATROPHIC VULVITIS: ICD-10-CM

## 2019-05-13 DIAGNOSIS — Z72.0 TOBACCO ABUSE: ICD-10-CM

## 2019-05-13 DIAGNOSIS — M54.40 LOW BACK PAIN WITH SCIATICA, SCIATICA LATERALITY UNSPECIFIED, UNSPECIFIED BACK PAIN LATERALITY, UNSPECIFIED CHRONICITY: ICD-10-CM

## 2019-05-13 DIAGNOSIS — Z85.51 PERSONAL HISTORY OF BLADDER CANCER: Primary | ICD-10-CM

## 2019-05-13 LAB
BILIRUB BLD-MCNC: NEGATIVE MG/DL
CLARITY, POC: CLEAR
COLOR UR: YELLOW
GLUCOSE UR STRIP-MCNC: NEGATIVE MG/DL
KETONES UR QL: NEGATIVE
LEUKOCYTE EST, POC: NEGATIVE
NITRITE UR-MCNC: NEGATIVE MG/ML
PH UR: 6.5 [PH] (ref 5–8)
PROT UR STRIP-MCNC: NEGATIVE MG/DL
RBC # UR STRIP: NEGATIVE /UL
SP GR UR: 1 (ref 1–1.03)
UROBILINOGEN UR QL: NORMAL

## 2019-05-13 PROCEDURE — 81003 URINALYSIS AUTO W/O SCOPE: CPT | Performed by: UROLOGY

## 2019-05-13 PROCEDURE — 99214 OFFICE O/P EST MOD 30 MIN: CPT | Performed by: UROLOGY

## 2019-05-13 PROCEDURE — 52000 CYSTOURETHROSCOPY: CPT | Performed by: UROLOGY

## 2019-05-13 NOTE — PROGRESS NOTES
Chief Complaint  History of Bladder Cancer/Cysto      HPI  Nazia Chun is a 54 y.o.female who returns today for follow-up with a distant history of bladder cancer in the last known tumor I think in 2016.  This was fortunately low-grade and noninvasive.  She continues to use tobacco occasionally.  She has derived significant relief from Estrace vaginal cream applied topically and is asking for refill.  She is not sexually active.  Her voided urine today is clear and negative for blood.    Totally unrelated she complains of low back pain with sciatica with numbness down her leg since her weed eating [place].  She states she likes to keep it clean for her grandchildren who fish there after she saw some snakes.  She is currently going to a chiropractor and I recommended she try physical therapy.    There were no vitals filed for this visit.    Past Medical History  Past Medical History:   Diagnosis Date   • Anxiety    • Anxiety    • Arthritis    • Bladder cancer (CMS/[facility]) 2016   • Cataract    • COPD (chronic obstructive pulmonary disease) (CMS/[facility])    • Dysphagia    • Fibromyalgia    • Fracture     RIGHT LEG, RIGHT ANKLE, MULTIPLE TOES   • Full dentures    • GERD (gastroesophageal reflux disease)    • Ovarian cyst    • Recurrent urinary tract infection    • Sciatica    • Seasonal allergies    • Statin intolerance    • Stroke (CMS/[facility])     PATIENT REPORTS APPROXIMATELY 7 YEARS AGO. REPORTS HAS WEAKNESS AND MEMORY PROBLEMS INTERMITTENTLY AFTER THIS EPISODE.   • Type 2 diabetes mellitus (CMS/[facility])    • Wears glasses        Past Surgical History  Past Surgical History:   Procedure Laterality Date   • CYSTOSCOPY BLADDER BIOPSY  2016   • ENDOSCOPY N/A 7/3/2017    Procedure: ESOPHAGOGASTRODUODENOSCOPY WITH BIOPSY;  Surgeon: Lindy Bey MD;  Location: [facility] ENDOSCOPY;  Service:    • HYSTERECTOMY     • TONSILLECTOMY         Medications  has a current medication list which includes  the following prescription(s): albuterol sulfate hfa, aspirin, cetirizine, clopidogrel, dulaglutide, dulaglutide, gabapentin, insulin glargine, insulin pen needle, lisinopril, lorazepam, meloxicam, metformin, and omeprazole.    Allergies  Allergies   Allergen Reactions   • Iodides    • Iodinated Diagnostic Agents        Social History  Social History     Socioeconomic History   • Marital status:      Spouse name: Not on file   • Number of children: Not on file   • Years of education: Not on file   • Highest education level: Not on file   Occupational History     Employer: RETIRED   Tobacco Use   • Smoking status: Current Every Day Smoker     Packs/day: 1.00     Years: 40.00     Pack years: 40.00     Types: Cigarettes   • Smokeless tobacco: Never Used   Substance and Sexual Activity   • Alcohol use: No   • Drug use: No   • Sexual activity: Defer       Family History  Family History   Problem Relation Age of Onset   • Arthritis Mother    • Hypertension Mother    • Heart attack Mother    • Osteoporosis Mother    • Arthritis Father    • Cancer Father    • Diabetes Father    • Hypertension Father    • Stroke Other    • Drug abuse Daughter        Review of Systems  Review of Systems    Physical Exam  Physical Exam   Constitutional: She is oriented to person, place, and time. She appears well-developed and well-nourished.   HENT:   Head: Normocephalic.   Eyes: EOM are normal. Pupils are equal, round, and reactive to light.   Neck: Normal range of motion. Neck supple.   Cardiovascular: Normal rate, regular rhythm and normal heart sounds.   Pulmonary/Chest: Effort normal.   Abdominal: Soft. Bowel sounds are normal.   Neurological: She is alert and oriented to person, place, and time.   Skin: Skin is warm and dry.   Psychiatric: She has a normal mood and affect.       Labs recent and today in the office:  Results for orders placed or performed in visit on 05/13/19   POC Urinalysis Dipstick, Automated   Result Value  Ref Range    Color Yellow Yellow, Straw, Dark Yellow, Silke    Clarity, UA Clear Clear    Specific Gravity  1.005 1.005 - 1.030    pH, Urine 6.5 5.0 - 8.0    Leukocytes Negative Negative    Nitrite, UA Negative Negative    Protein, POC Negative Negative mg/dL    Glucose, UA Negative Negative, 1000 mg/dL (3+) mg/dL    Ketones, UA Negative Negative    Urobilinogen, UA Normal Normal    Bilirubin Negative Negative    Blood, UA Negative Negative      Female cystoscopy:     The patient is prepped and draped in the dorsal lithotomy position in a routine sterile fashion. The vulva and urethral meatus are inspected and found to be normal. The panendoscope is inserted in the bladder which is visualized with the Foroblique and 70 degree lenses and found to be free of foreign bodies and mucosal lesions. Ureteral orifices are normal location and effluxing clear urine bilaterally.  She has less than 1 ounce of postvoid residual.  She has a dozen lesions scattered throughout the dome and sidewalls of the bladder that are approximately 5 mm and suggestive of recurrent low-grade bladder cancer.    Assessment & Plan  Bladder cancer: I does not lesions are recurrent in her bladder and with her history I would recommend biopsy and fulguration.  She will continue to withhold her Plavix until this is performed.    Atrophic vaginitis: She requests topical Estrace vaginal cream and this is provided.    Lumbago: Complains of sciatica and low back pain and she is referred to physical therapy.

## 2019-05-17 ENCOUNTER — PROCEDURE VISIT (OUTPATIENT)
Dept: UROLOGY | Facility: CLINIC | Age: 54
End: 2019-05-17

## 2019-05-17 VITALS
HEART RATE: 81 BPM | TEMPERATURE: 97.8 F | HEIGHT: 65 IN | DIASTOLIC BLOOD PRESSURE: 74 MMHG | BODY MASS INDEX: 30.49 KG/M2 | OXYGEN SATURATION: 97 % | SYSTOLIC BLOOD PRESSURE: 136 MMHG | WEIGHT: 183 LBS

## 2019-05-17 DIAGNOSIS — C67.9 MALIGNANT NEOPLASM OF URINARY BLADDER, UNSPECIFIED SITE (HCC): Primary | ICD-10-CM

## 2019-05-17 LAB
BILIRUB BLD-MCNC: NEGATIVE MG/DL
CLARITY, POC: CLEAR
COLOR UR: YELLOW
GLUCOSE UR STRIP-MCNC: NEGATIVE MG/DL
KETONES UR QL: NEGATIVE
LEUKOCYTE EST, POC: NEGATIVE
NITRITE UR-MCNC: NEGATIVE MG/ML
PH UR: 6.5 [PH] (ref 5–8)
PROT UR STRIP-MCNC: NEGATIVE MG/DL
RBC # UR STRIP: NEGATIVE /UL
SP GR UR: 1.01 (ref 1–1.03)
UROBILINOGEN UR QL: NORMAL

## 2019-05-17 PROCEDURE — 81003 URINALYSIS AUTO W/O SCOPE: CPT | Performed by: UROLOGY

## 2019-05-17 PROCEDURE — 52234 CYSTOSCOPY AND TREATMENT: CPT | Performed by: UROLOGY

## 2019-05-17 RX ORDER — NITROFURANTOIN 25; 75 MG/1; MG/1
100 CAPSULE ORAL 2 TIMES DAILY
Qty: 14 CAPSULE | Refills: 0 | Status: SHIPPED | OUTPATIENT
Start: 2019-05-17 | End: 2019-07-09

## 2019-05-17 NOTE — PROGRESS NOTES
Chief Complaint  Cysto/Turbt      HPI  Nazia Chun is a 54 y.o.female who returns today for treatment of dozens of small lesions in her bladder that are about 3 to 5 mm in diameter and appeared to be early recurrences of low-grade bladder cancer.  With her history of bladder cancer its imperative that these undergo biopsy and fulguration which is done today.    There were no vitals filed for this visit.    Past Medical History  Past Medical History:   Diagnosis Date   • Anxiety    • Anxiety    • Arthritis    • Bladder cancer (CMS/[facility]) 2016   • Cataract    • COPD (chronic obstructive pulmonary disease) (CMS/[facility])    • Dysphagia    • Fibromyalgia    • Fracture     RIGHT LEG, RIGHT ANKLE, MULTIPLE TOES   • Full dentures    • GERD (gastroesophageal reflux disease)    • Ovarian cyst    • Recurrent urinary tract infection    • Sciatica    • Seasonal allergies    • Statin intolerance    • Stroke (CMS/[facility])     PATIENT REPORTS APPROXIMATELY 7 YEARS AGO. REPORTS HAS WEAKNESS AND MEMORY PROBLEMS INTERMITTENTLY AFTER THIS EPISODE.   • Type 2 diabetes mellitus (CMS/[facility])    • Wears glasses        Past Surgical History  Past Surgical History:   Procedure Laterality Date   • CYSTOSCOPY BLADDER BIOPSY  2016   • ENDOSCOPY N/A 7/3/2017    Procedure: ESOPHAGOGASTRODUODENOSCOPY WITH BIOPSY;  Surgeon: Lindy Bey MD;  Location: [facility] ENDOSCOPY;  Service:    • HYSTERECTOMY     • TONSILLECTOMY         Medications  has a current medication list which includes the following prescription(s): albuterol sulfate hfa, aspirin, cetirizine, clopidogrel, dulaglutide, dulaglutide, gabapentin, insulin glargine, insulin pen needle, lisinopril, lorazepam, meloxicam, metformin, and omeprazole.    Allergies  Allergies   Allergen Reactions   • Iodides    • Iodinated Diagnostic Agents        Social History  Social History     Socioeconomic History   • Marital status:      Spouse name: Not on file   • Number of children: Not on  file   • Years of education: Not on file   • Highest education level: Not on file   Occupational History     Employer: RETIRED   Tobacco Use   • Smoking status: Current Every Day Smoker     Packs/day: 1.00     Years: 40.00     Pack years: 40.00     Types: Cigarettes   • Smokeless tobacco: Never Used   Substance and Sexual Activity   • Alcohol use: No   • Drug use: No   • Sexual activity: Defer       Family History  Family History   Problem Relation Age of Onset   • Arthritis Mother    • Hypertension Mother    • Heart attack Mother    • Osteoporosis Mother    • Arthritis Father    • Cancer Father    • Diabetes Father    • Hypertension Father    • Stroke Other    • Drug abuse Daughter        Review of Systems  Review of Systems    Physical Exam  Physical Exam    Labs recent and today in the office:  Results for orders placed or performed in visit on 05/17/19   POC Urinalysis Dipstick, Automated   Result Value Ref Range    Color Yellow Yellow, Straw, Dark Yellow, Silke    Clarity, UA Clear Clear    Specific Gravity  1.010 1.005 - 1.030    pH, Urine 6.5 5.0 - 8.0    Leukocytes Negative Negative    Nitrite, UA Negative Negative    Protein, POC Negative Negative mg/dL    Glucose, UA Negative Negative, 1000 mg/dL (3+) mg/dL    Ketones, UA Negative Negative    Urobilinogen, UA Normal Normal    Bilirubin Negative Negative    Blood, UA Negative Negative     After informed consent was obtained the patient was given oral sedation with Halcion or Percocet.  There were placed in the dorsal lithotomy position and the bladder drained with a red rubber catheter.  50 mL of sterile Xylocaine were instilled into the bladder followed by instillation of Xylocaine jelly into the urethra.  After 15 minutes the panendoscope was used to visualize the interior the bladder.  The previously described lesions were noted and underwent biopsy with a grasping forceps.  Subsequently the Bugbee electrode was used to cauterize all visible  abnormalities in the bladder mucosa.  Bladder was drained and the patient was sent home on antibiotics.  She will return next week for path report and follow-up.    Assessment & Plan  History of bladder cancer

## 2019-05-22 DIAGNOSIS — B37.9 YEAST INFECTION: Primary | ICD-10-CM

## 2019-05-22 RX ORDER — FLUCONAZOLE 100 MG/1
200 TABLET ORAL ONCE
Qty: 2 TABLET | Refills: 0 | Status: SHIPPED | OUTPATIENT
Start: 2019-05-22 | End: 2019-05-22

## 2019-05-31 ENCOUNTER — OFFICE VISIT (OUTPATIENT)
Dept: UROLOGY | Facility: CLINIC | Age: 54
End: 2019-05-31

## 2019-05-31 DIAGNOSIS — C67.9 MALIGNANT NEOPLASM OF URINARY BLADDER, UNSPECIFIED SITE (HCC): Primary | ICD-10-CM

## 2019-05-31 LAB
BILIRUB BLD-MCNC: NEGATIVE MG/DL
CLARITY, POC: CLEAR
COLOR UR: YELLOW
GLUCOSE UR STRIP-MCNC: NEGATIVE MG/DL
KETONES UR QL: NEGATIVE
LEUKOCYTE EST, POC: NEGATIVE
NITRITE UR-MCNC: NEGATIVE MG/ML
PH UR: 7.5 [PH] (ref 5–8)
PROT UR STRIP-MCNC: NEGATIVE MG/DL
RBC # UR STRIP: NEGATIVE /UL
SP GR UR: 1.01 (ref 1–1.03)
UROBILINOGEN UR QL: NORMAL

## 2019-05-31 PROCEDURE — 81003 URINALYSIS AUTO W/O SCOPE: CPT | Performed by: UROLOGY

## 2019-05-31 PROCEDURE — 99213 OFFICE O/P EST LOW 20 MIN: CPT | Performed by: UROLOGY

## 2019-05-31 NOTE — PROGRESS NOTES
Chief Complaint  Bladder Cancer (Pathology results)      DAJUAN Chun is a 54 y.o.female who has a history of bladder cancer who was recently found to have a dozen abnormal lesions that underwent biopsy and fulguration.  Fortunately this returned lymphoid hyperplasia and there was no sign of malignancy.  We will therefore cancel my original intention of intravesical chemotherapy for immunoprophylaxis.  I have recommended that she have a repeat cystoscopy in 6 months and she will return at that time.    There were no vitals filed for this visit.    Past Medical History  Past Medical History:   Diagnosis Date   • Anxiety    • Anxiety    • Arthritis    • Bladder cancer (CMS/HCC) 2016   • Cataract    • COPD (chronic obstructive pulmonary disease) (CMS/[facility])    • Dysphagia    • Fibromyalgia    • Fracture     RIGHT LEG, RIGHT ANKLE, MULTIPLE TOES   • Full dentures    • GERD (gastroesophageal reflux disease)    • Ovarian cyst    • Recurrent urinary tract infection    • Sciatica    • Seasonal allergies    • Statin intolerance    • Stroke (CMS/[facility])     PATIENT REPORTS APPROXIMATELY 7 YEARS AGO. REPORTS HAS WEAKNESS AND MEMORY PROBLEMS INTERMITTENTLY AFTER THIS EPISODE.   • Type 2 diabetes mellitus (CMS/[facility])    • Wears glasses        Past Surgical History  Past Surgical History:   Procedure Laterality Date   • CYSTOSCOPY BLADDER BIOPSY  2016   • ENDOSCOPY N/A 7/3/2017    Procedure: ESOPHAGOGASTRODUODENOSCOPY WITH BIOPSY;  Surgeon: Lindy Bey MD;  Location: [facility] ENDOSCOPY;  Service:    • HYSTERECTOMY     • TONSILLECTOMY         Medications  has a current medication list which includes the following prescription(s): albuterol sulfate hfa, aspirin, cetirizine, clopidogrel, dulaglutide, dulaglutide, gabapentin, insulin glargine, insulin pen needle, lisinopril, lorazepam, meloxicam, metformin, nitrofurantoin (macrocrystal-monohydrate), and omeprazole.    Allergies  Allergies   Allergen Reactions   •  Iodides    • Iodinated Diagnostic Agents        Social History  Social History     Socioeconomic History   • Marital status:      Spouse name: Not on file   • Number of children: Not on file   • Years of education: Not on file   • Highest education level: Not on file   Occupational History     Employer: RETIRED   Tobacco Use   • Smoking status: Current Every Day Smoker     Packs/day: 1.00     Years: 40.00     Pack years: 40.00     Types: Cigarettes   • Smokeless tobacco: Never Used   Substance and Sexual Activity   • Alcohol use: No   • Drug use: No   • Sexual activity: Defer       Family History  Family History   Problem Relation Age of Onset   • Arthritis Mother    • Hypertension Mother    • Heart attack Mother    • Osteoporosis Mother    • Arthritis Father    • Cancer Father    • Diabetes Father    • Hypertension Father    • Stroke Other    • Drug abuse Daughter        Review of Systems  Review of Systems   Constitutional: Negative.    Genitourinary: Negative.    All other systems reviewed and are negative.      Physical Exam  Physical Exam    Labs recent and today in the office:  Results for orders placed or performed in visit on 05/31/19   POC Urinalysis Dipstick, Automated   Result Value Ref Range    Color Yellow Yellow, Straw, Dark Yellow, Silke    Clarity, UA Clear Clear    Specific Gravity  1.015 1.005 - 1.030    pH, Urine 7.5 5.0 - 8.0    Leukocytes Negative Negative    Nitrite, UA Negative Negative    Protein, POC Negative Negative mg/dL    Glucose, UA Negative Negative, 1000 mg/dL (3+) mg/dL    Ketones, UA Negative Negative    Urobilinogen, UA Normal Normal    Bilirubin Negative Negative    Blood, UA Negative Negative         Assessment & Plan  History of bladder cancer: Fortunately no evidence of recurrence with the recent lesions being lymphoid hyperplasia.  She will therefore return in 6 months for cystoscopy.

## 2019-07-09 ENCOUNTER — RESULTS ENCOUNTER (OUTPATIENT)
Dept: INTERNAL MEDICINE | Facility: CLINIC | Age: 54
End: 2019-07-09

## 2019-07-09 ENCOUNTER — OFFICE VISIT (OUTPATIENT)
Dept: INTERNAL MEDICINE | Facility: CLINIC | Age: 54
End: 2019-07-09

## 2019-07-09 VITALS
BODY MASS INDEX: 30.37 KG/M2 | DIASTOLIC BLOOD PRESSURE: 76 MMHG | RESPIRATION RATE: 16 BRPM | SYSTOLIC BLOOD PRESSURE: 124 MMHG | HEART RATE: 73 BPM | OXYGEN SATURATION: 98 % | HEIGHT: 65 IN | TEMPERATURE: 98.5 F | WEIGHT: 182.25 LBS

## 2019-07-09 DIAGNOSIS — G89.29 CHRONIC BILATERAL LOW BACK PAIN WITH RIGHT-SIDED SCIATICA: ICD-10-CM

## 2019-07-09 DIAGNOSIS — Z79.4 TYPE 2 DIABETES MELLITUS WITH DIABETIC POLYNEUROPATHY, WITH LONG-TERM CURRENT USE OF INSULIN (HCC): Primary | ICD-10-CM

## 2019-07-09 DIAGNOSIS — F41.9 ANXIETY: ICD-10-CM

## 2019-07-09 DIAGNOSIS — E11.65 TYPE 2 DIABETES MELLITUS WITH HYPERGLYCEMIA, WITH LONG-TERM CURRENT USE OF INSULIN (HCC): ICD-10-CM

## 2019-07-09 DIAGNOSIS — Z53.20 LUNG CANCER SCREENING DECLINED BY PATIENT: ICD-10-CM

## 2019-07-09 DIAGNOSIS — Z12.11 COLON CANCER SCREENING: ICD-10-CM

## 2019-07-09 DIAGNOSIS — M54.41 CHRONIC BILATERAL LOW BACK PAIN WITH RIGHT-SIDED SCIATICA: ICD-10-CM

## 2019-07-09 DIAGNOSIS — E11.42 TYPE 2 DIABETES MELLITUS WITH DIABETIC POLYNEUROPATHY, WITH LONG-TERM CURRENT USE OF INSULIN (HCC): Primary | ICD-10-CM

## 2019-07-09 DIAGNOSIS — Z79.4 TYPE 2 DIABETES MELLITUS WITH HYPERGLYCEMIA, WITH LONG-TERM CURRENT USE OF INSULIN (HCC): ICD-10-CM

## 2019-07-09 DIAGNOSIS — C67.9 PRIMARY CANCER OF BLADDER (HCC): ICD-10-CM

## 2019-07-09 DIAGNOSIS — E11.42 DIABETIC PERIPHERAL NEUROPATHY ASSOCIATED WITH TYPE 2 DIABETES MELLITUS (HCC): ICD-10-CM

## 2019-07-09 LAB — HBA1C MFR BLD: 8.2 %

## 2019-07-09 PROCEDURE — 99214 OFFICE O/P EST MOD 30 MIN: CPT | Performed by: FAMILY MEDICINE

## 2019-07-09 PROCEDURE — 83036 HEMOGLOBIN GLYCOSYLATED A1C: CPT | Performed by: FAMILY MEDICINE

## 2019-07-09 RX ORDER — GABAPENTIN 800 MG/1
800 TABLET ORAL 3 TIMES DAILY
Qty: 90 TABLET | Refills: 2 | Status: SHIPPED | OUTPATIENT
Start: 2019-07-09 | End: 2019-10-09 | Stop reason: SDUPTHER

## 2019-07-09 RX ORDER — PREDNISONE 20 MG/1
TABLET ORAL
Qty: 18 TABLET | Refills: 0 | Status: SHIPPED | OUTPATIENT
Start: 2019-07-09 | End: 2019-10-09

## 2019-07-09 RX ORDER — LORAZEPAM 1 MG/1
TABLET ORAL
Qty: 60 TABLET | Refills: 2 | Status: SHIPPED | OUTPATIENT
Start: 2019-07-09 | End: 2019-10-09 | Stop reason: SDUPTHER

## 2019-07-09 NOTE — PROGRESS NOTES
Subjective    Kimberly Chun is a 54 y.o. female here for:  Chief Complaint   Patient presents with   • Anxiety     3 mo f/u   • Diabetes     3 mo f/u    • Sciatica     pts states that recently she has been having a lot of pain dealing with her sciatic nerve        Low back pain with sciatica shooting down back of right leg. Bladder cancer, s/p treatment, followed by urology. Recent lesions removed were not malignant on pathology. Back pain is chronic and recurrent, seems worse at this time. Interested in PT but difficult with watching her grandchild. Asks for steroid for now to help with pain as it's similar to pain she's had in the past. No known injuries.      Diabetes   She presents for her follow-up diabetic visit. She has type 2 diabetes mellitus. Her disease course has been fluctuating. Hypoglycemia symptoms include hunger and nervousness/anxiousness (Ativan continues to help, she takes as needed.). Hypoglycemia complications include nocturnal hypoglycemia and required assistance. Diabetic complications include a CVA and peripheral neuropathy. Risk factors for coronary artery disease include diabetes mellitus, dyslipidemia, family history, obesity, hypertension, stress, tobacco exposure and post-menopausal. Current diabetic treatments: lantus, metformin, trulicity. She is compliant with treatment some of the time (non-compliant with diet). Her weight is stable. She is following a generally unhealthy diet. When asked about meal planning, she reported none. She participates in exercise intermittently. Blood glucose monitoring compliance is inadequate. An ACE inhibitor/angiotensin II receptor blocker is being taken. She does not see a podiatrist.Eye exam is not current (Patient feels vision is not as good as before. Due for eye exam.).   Anxiety   Presents for follow-up visit. Symptoms include irritability and nervous/anxious behavior (Ativan continues to help, she takes as needed.). The quality of sleep is  "good.     Treatment side effects: No known side effects.          The following portions of the patient's history were reviewed and updated as appropriate: allergies, current medications, past family history, past medical history, past social history, past surgical history and problem list.    Review of Systems   Constitutional: Positive for irritability.   Psychiatric/Behavioral: The patient is nervous/anxious (Ativan continues to help, she takes as needed.).        Vitals:    07/09/19 1311   BP: 124/76   Pulse: 73   Resp: 16   Temp: 98.5 °F (36.9 °C)   TempSrc: Temporal   SpO2: 98%   Weight: 82.7 kg (182 lb 4 oz)   Height: 165.1 cm (65\")         Objective   Physical Exam   Constitutional: She is oriented to person, place, and time. Vital signs are normal. She appears well-developed and well-nourished. She is active.  Non-toxic appearance. She does not have a sickly appearance. She does not appear ill. No distress. She is obese.  HENT:   Head: Normocephalic and atraumatic. Hair is normal.   Right Ear: Hearing and external ear normal.   Left Ear: Hearing and external ear normal.   Nose: Nose normal.   Mouth/Throat: Mucous membranes are not dry. No trismus in the jaw.   Eyes: Conjunctivae, EOM and lids are normal. Pupils are equal, round, and reactive to light. No scleral icterus.   Neck: Phonation normal. Neck supple. No tracheal deviation present.   Cardiovascular: Normal rate, regular rhythm and normal heart sounds.   No murmur heard.  Pulmonary/Chest: Effort normal and breath sounds normal.   Musculoskeletal: She exhibits no edema or deformity.   Neurological: She is alert and oriented to person, place, and time. She displays no tremor. No cranial nerve deficit. She exhibits normal muscle tone. Gait normal.   Skin: Skin is warm. Turgor is normal. No rash noted. She is not diaphoretic. No cyanosis. No pallor. Nails show no clubbing.   Psychiatric: She has a normal mood and affect. Her speech is normal and behavior " is normal. Judgment and thought content normal. Cognition and memory are normal.   Nursing note and vitals reviewed.      Lab Results   Component Value Date    HGBA1C 8.2 07/09/2019    HGBA1C 8.3 04/09/2019    HGBA1C 8.4 12/20/2018       Assessment/Plan     Problem List Items Addressed This Visit        Endocrine    Diabetic peripheral neuropathy associated with type 2 diabetes mellitus (CMS/[facility])    Relevant Medications    Insulin Glargine (LANTUS SOLOSTAR) 100 UNIT/ML injection pen    gabapentin (NEURONTIN) 800 MG tablet    Dulaglutide (TRULICITY) 1.5 MG/0.5ML solution pen-injector    Type 2 diabetes mellitus with diabetic polyneuropathy, with long-term current use of insulin (CMS/[facility]) - Primary    Relevant Medications    Insulin Glargine (LANTUS SOLOSTAR) 100 UNIT/ML injection pen    glucose blood test strip    gabapentin (NEURONTIN) 800 MG tablet    Dulaglutide (TRULICITY) 1.5 MG/0.5ML solution pen-injector    Type 2 diabetes mellitus with hyperglycemia, with long-term current use of insulin (CMS/[facility])    Relevant Medications    Insulin Glargine (LANTUS SOLOSTAR) 100 UNIT/ML injection pen    glucose blood test strip    Dulaglutide (TRULICITY) 1.5 MG/0.5ML solution pen-injector    Other Relevant Orders    POC Glycosylated Hemoglobin (Hb A1C) (Completed)       Genitourinary    Primary cancer of bladder (CMS/[facility])       Other    Anxiety    Relevant Medications    LORazepam (ATIVAN) 1 MG tablet      Other Visit Diagnoses     Chronic bilateral low back pain with right-sided sciatica        Relevant Medications    predniSONE (DELTASONE) 20 MG tablet    Colon cancer screening        Relevant Orders    Cologuard - Stool, Per Rectum    Lung cancer screening declined by patient            CONTROLLED SUBSTANCE TRACKING 4/5/2018 8/7/2018 8/23/2018 4/9/2019 7/9/2019   Blayne Shaw 4/4/2018 8/3/2018 7/5/2018 4/9/2019 7/9/2019   Report Number [id number]       · Monitor sugars given diabetes  mellitus and hyperglycemia but will tx flare of back pain as requested. If pain worsens/changes needs imaging due to cancer diagnosis. She does not wish to do imaging at this time. PT would be ideal due to recurrent issue but hard with her schedule, can discuss further when school resumes.  · Increased Trulicity, watch for nausea. Does not need to use medicines such as Invokana/farxiga/jardiance due to bladder cancer  · Follow up with urology for cancer.     Return in about 3 months (around 10/10/2019) for Medicare Wellness or sooner if needed, Diabetes follow up or sooner if needed.    Maria Luz Longoria MD

## 2019-09-03 DIAGNOSIS — E11.42 TYPE 2 DIABETES MELLITUS WITH DIABETIC POLYNEUROPATHY, WITH LONG-TERM CURRENT USE OF INSULIN (HCC): ICD-10-CM

## 2019-09-03 DIAGNOSIS — Z79.4 TYPE 2 DIABETES MELLITUS WITH DIABETIC POLYNEUROPATHY, WITH LONG-TERM CURRENT USE OF INSULIN (HCC): ICD-10-CM

## 2019-09-03 DIAGNOSIS — E11.65 TYPE 2 DIABETES MELLITUS WITH HYPERGLYCEMIA, WITH LONG-TERM CURRENT USE OF INSULIN (HCC): ICD-10-CM

## 2019-09-03 DIAGNOSIS — Z79.4 TYPE 2 DIABETES MELLITUS WITH HYPERGLYCEMIA, WITH LONG-TERM CURRENT USE OF INSULIN (HCC): ICD-10-CM

## 2019-09-03 NOTE — TELEPHONE ENCOUNTER
Has requested prescription be refilled today if possible, stated she will be completely out tomorrow.

## 2019-09-17 ENCOUNTER — HOSPITAL ENCOUNTER (EMERGENCY)
Facility: HOSPITAL | Age: 54
Discharge: HOME | End: 2019-09-17
Attending: FAMILY MEDICINE
Payer: MEDICARE

## 2019-09-17 VITALS
WEIGHT: 183 LBS | RESPIRATION RATE: 20 BRPM | HEIGHT: 65 IN | BODY MASS INDEX: 30.49 KG/M2 | TEMPERATURE: 98.2 F | DIASTOLIC BLOOD PRESSURE: 73 MMHG | HEART RATE: 79 BPM | SYSTOLIC BLOOD PRESSURE: 136 MMHG | OXYGEN SATURATION: 96 %

## 2019-09-17 DIAGNOSIS — S39.012A STRAIN OF LUMBAR REGION, INITIAL ENCOUNTER: Primary | ICD-10-CM

## 2019-09-17 PROCEDURE — 99282 EMERGENCY DEPT VISIT SF MDM: CPT

## 2019-09-17 RX ORDER — BACLOFEN 10 MG/1
10 TABLET ORAL 3 TIMES DAILY
Qty: 15 TABLET | Refills: 0 | Status: SHIPPED | OUTPATIENT
Start: 2019-09-17 | End: 2022-09-14

## 2019-09-17 RX ORDER — HYDROCODONE BITARTRATE AND ACETAMINOPHEN 10; 325 MG/1; MG/1
1 TABLET ORAL EVERY 6 HOURS PRN
Qty: 16 TABLET | Refills: 0 | Status: SHIPPED | OUTPATIENT
Start: 2019-09-17 | End: 2019-10-17

## 2019-09-17 ASSESSMENT — PAIN DESCRIPTION - PROGRESSION: CLINICAL_PROGRESSION: GRADUALLY WORSENING

## 2019-09-17 ASSESSMENT — PAIN DESCRIPTION - LOCATION: LOCATION: BACK;HIP

## 2019-09-17 ASSESSMENT — PAIN DESCRIPTION - FREQUENCY: FREQUENCY: CONTINUOUS

## 2019-09-17 ASSESSMENT — PAIN DESCRIPTION - DESCRIPTORS: DESCRIPTORS: ACHING;CONSTANT;SHARP

## 2019-09-17 ASSESSMENT — PAIN DESCRIPTION - PAIN TYPE: TYPE: ACUTE PAIN

## 2019-09-17 ASSESSMENT — ENCOUNTER SYMPTOMS: BACK PAIN: 1

## 2019-09-17 ASSESSMENT — PAIN SCALES - GENERAL: PAINLEVEL_OUTOF10: 7

## 2019-09-17 ASSESSMENT — PAIN DESCRIPTION - ORIENTATION: ORIENTATION: LEFT

## 2019-09-17 NOTE — ED PROVIDER NOTES
[address] [place] [address] Court  eMERGENCY dEPARTMENT eNCOUnter      Pt Name: Nalini Allen  MRN: [id number]  [place] 1965  Date of evaluation: 9/17/2019  Provider: Shivani Oneill MD    [address] [place] [address]       Chief Complaint   Patient presents with    Back Pain     pt states moved furniture 3 days ago, no c/o low back pain that radiates down left hip/leg         HISTORY OF PRESENT ILLNESS   (Location/Symptom, Timing/Onset, Context/Setting, Quality, Duration, Modifying Factors, Severity)  Note limiting factors. Nalini Allen is a 53 y.o. female who presents to the emergency department with a four-day history of lower back pain particularly on the left side. Patient states she had been doing a lot of moving furniture and cleaning Saturday morning and the pain has gotten progressively worse over the next 3 days. Has bladder or bowel dysfunction. Patient is diabetic       Nursing Notes were reviewed. REVIEW OF SYSTEMS    (2-9 systems for level 4, 10 or more forlevel 5)     Review of Systems   Genitourinary: Negative for enuresis. Musculoskeletal: Positive for arthralgias, back pain and myalgias. Neurological: Negative for weakness and numbness. Except as noted above the remainder of the review of systems was reviewed and negative.        PAST MEDICAL HISTORY     Past Medical History:   Diagnosis Date    CAD (coronary artery disease)     Cancer ([address]     bladder    Cerebral artery occlusion with [facility] infarction [facility]     Diabetes mellitus type 2     Hyperlipidemia     Hypotension     Neuropathy     TIA (transient ischemic attack)          SURGICAL HISTORY       Past Surgical History:   Procedure Laterality Date    CARPAL TUNNEL RELEASE      both     HYSTERECTOMY      total     TONSILLECTOMY      TUBAL LIGATION           CURRENT MEDICATIONS       Previous Medications    CLOPIDOGREL (PLAVIX) 75 MG TABLET    Take 75 mg by mouth daily    [facility]    Inject

## 2019-09-20 DIAGNOSIS — E11.42 TYPE 2 DIABETES MELLITUS WITH DIABETIC POLYNEUROPATHY, WITH LONG-TERM CURRENT USE OF INSULIN (HCC): ICD-10-CM

## 2019-09-20 DIAGNOSIS — E11.65 TYPE 2 DIABETES MELLITUS WITH HYPERGLYCEMIA, WITH LONG-TERM CURRENT USE OF INSULIN (HCC): ICD-10-CM

## 2019-09-20 DIAGNOSIS — Z79.4 TYPE 2 DIABETES MELLITUS WITH DIABETIC POLYNEUROPATHY, WITH LONG-TERM CURRENT USE OF INSULIN (HCC): ICD-10-CM

## 2019-09-20 DIAGNOSIS — Z79.4 TYPE 2 DIABETES MELLITUS WITH HYPERGLYCEMIA, WITH LONG-TERM CURRENT USE OF INSULIN (HCC): ICD-10-CM

## 2019-10-09 ENCOUNTER — OFFICE VISIT (OUTPATIENT)
Dept: INTERNAL MEDICINE | Facility: CLINIC | Age: 54
End: 2019-10-09

## 2019-10-09 VITALS
TEMPERATURE: 97.2 F | HEIGHT: 65 IN | WEIGHT: 178.13 LBS | SYSTOLIC BLOOD PRESSURE: 118 MMHG | HEART RATE: 84 BPM | OXYGEN SATURATION: 97 % | DIASTOLIC BLOOD PRESSURE: 72 MMHG | BODY MASS INDEX: 29.68 KG/M2 | RESPIRATION RATE: 16 BRPM

## 2019-10-09 DIAGNOSIS — R14.2 BURPING: ICD-10-CM

## 2019-10-09 DIAGNOSIS — Z00.00 MEDICARE ANNUAL WELLNESS VISIT, SUBSEQUENT: Primary | ICD-10-CM

## 2019-10-09 DIAGNOSIS — J44.9 CHRONIC OBSTRUCTIVE PULMONARY DISEASE, UNSPECIFIED COPD TYPE (HCC): ICD-10-CM

## 2019-10-09 DIAGNOSIS — J30.89 ENVIRONMENTAL AND SEASONAL ALLERGIES: ICD-10-CM

## 2019-10-09 DIAGNOSIS — M19.91 PRIMARY OSTEOARTHRITIS, UNSPECIFIED SITE: ICD-10-CM

## 2019-10-09 DIAGNOSIS — C67.9 PRIMARY CANCER OF BLADDER (HCC): ICD-10-CM

## 2019-10-09 DIAGNOSIS — Z23 NEED FOR INFLUENZA VACCINATION: ICD-10-CM

## 2019-10-09 DIAGNOSIS — E11.42 DIABETIC PERIPHERAL NEUROPATHY ASSOCIATED WITH TYPE 2 DIABETES MELLITUS (HCC): ICD-10-CM

## 2019-10-09 DIAGNOSIS — Z79.4 TYPE 2 DIABETES MELLITUS WITH DIABETIC POLYNEUROPATHY, WITH LONG-TERM CURRENT USE OF INSULIN (HCC): ICD-10-CM

## 2019-10-09 DIAGNOSIS — E11.65 TYPE 2 DIABETES MELLITUS WITH HYPERGLYCEMIA, WITH LONG-TERM CURRENT USE OF INSULIN (HCC): ICD-10-CM

## 2019-10-09 DIAGNOSIS — M54.40 CHRONIC MIDLINE LOW BACK PAIN WITH SCIATICA, SCIATICA LATERALITY UNSPECIFIED: ICD-10-CM

## 2019-10-09 DIAGNOSIS — Z72.0 TOBACCO ABUSE: ICD-10-CM

## 2019-10-09 DIAGNOSIS — I67.2 CEREBRAL ATHEROSCLEROSIS: ICD-10-CM

## 2019-10-09 DIAGNOSIS — R10.13 DYSPEPSIA: ICD-10-CM

## 2019-10-09 DIAGNOSIS — R14.0 ABDOMINAL BLOATING: ICD-10-CM

## 2019-10-09 DIAGNOSIS — Z79.4 TYPE 2 DIABETES MELLITUS WITH HYPERGLYCEMIA, WITH LONG-TERM CURRENT USE OF INSULIN (HCC): ICD-10-CM

## 2019-10-09 DIAGNOSIS — R63.0 DECREASE IN APPETITE: ICD-10-CM

## 2019-10-09 DIAGNOSIS — E66.3 OVERWEIGHT: ICD-10-CM

## 2019-10-09 DIAGNOSIS — E11.42 TYPE 2 DIABETES MELLITUS WITH DIABETIC POLYNEUROPATHY, WITH LONG-TERM CURRENT USE OF INSULIN (HCC): ICD-10-CM

## 2019-10-09 DIAGNOSIS — F41.9 ANXIETY: ICD-10-CM

## 2019-10-09 DIAGNOSIS — G89.29 CHRONIC MIDLINE LOW BACK PAIN WITH SCIATICA, SCIATICA LATERALITY UNSPECIFIED: ICD-10-CM

## 2019-10-09 DIAGNOSIS — K21.9 GASTROESOPHAGEAL REFLUX DISEASE WITHOUT ESOPHAGITIS: ICD-10-CM

## 2019-10-09 PROBLEM — R22.1 NECK SWELLING: Status: RESOLVED | Noted: 2017-05-09 | Resolved: 2019-10-09

## 2019-10-09 LAB — HBA1C MFR BLD: 7.2 %

## 2019-10-09 PROCEDURE — G0008 ADMIN INFLUENZA VIRUS VAC: HCPCS | Performed by: FAMILY MEDICINE

## 2019-10-09 PROCEDURE — 90674 CCIIV4 VAC NO PRSV 0.5 ML IM: CPT | Performed by: FAMILY MEDICINE

## 2019-10-09 PROCEDURE — 83036 HEMOGLOBIN GLYCOSYLATED A1C: CPT | Performed by: FAMILY MEDICINE

## 2019-10-09 PROCEDURE — G0439 PPPS, SUBSEQ VISIT: HCPCS | Performed by: FAMILY MEDICINE

## 2019-10-09 RX ORDER — CLOPIDOGREL BISULFATE 75 MG/1
75 TABLET ORAL DAILY
Qty: 90 TABLET | Refills: 3 | Status: SHIPPED | OUTPATIENT
Start: 2019-10-09 | End: 2020-03-16 | Stop reason: SDUPTHER

## 2019-10-09 RX ORDER — BACLOFEN 10 MG/1
10 TABLET ORAL
COMMUNITY
Start: 2019-09-17 | End: 2019-10-09 | Stop reason: SDUPTHER

## 2019-10-09 RX ORDER — BACLOFEN 10 MG/1
10 TABLET ORAL 3 TIMES DAILY
Qty: 270 TABLET | Refills: 3 | Status: SHIPPED | OUTPATIENT
Start: 2019-10-09 | End: 2020-03-16 | Stop reason: SDUPTHER

## 2019-10-09 RX ORDER — GABAPENTIN 800 MG/1
800 TABLET ORAL 3 TIMES DAILY
Qty: 270 TABLET | Refills: 2 | Status: SHIPPED | OUTPATIENT
Start: 2019-10-09 | End: 2020-03-16 | Stop reason: SDUPTHER

## 2019-10-09 RX ORDER — MELOXICAM 7.5 MG/1
7.5 TABLET ORAL DAILY PRN
Qty: 90 TABLET | Refills: 3 | Status: SHIPPED | OUTPATIENT
Start: 2019-10-09 | End: 2020-03-16 | Stop reason: SDUPTHER

## 2019-10-09 RX ORDER — LISINOPRIL 5 MG/1
5 TABLET ORAL DAILY
Qty: 90 TABLET | Refills: 3 | Status: SHIPPED | OUTPATIENT
Start: 2019-10-09 | End: 2020-03-16 | Stop reason: SDUPTHER

## 2019-10-09 RX ORDER — ASPIRIN 81 MG/1
81 TABLET ORAL DAILY
Qty: 90 TABLET | Refills: 3 | Status: SHIPPED | OUTPATIENT
Start: 2019-10-09 | End: 2021-06-19 | Stop reason: SDUPTHER

## 2019-10-09 RX ORDER — OMEPRAZOLE 20 MG/1
20 CAPSULE, DELAYED RELEASE ORAL 2 TIMES DAILY
Qty: 180 CAPSULE | Refills: 3 | Status: SHIPPED | OUTPATIENT
Start: 2019-10-09 | End: 2020-03-16 | Stop reason: SDUPTHER

## 2019-10-09 RX ORDER — LORAZEPAM 1 MG/1
TABLET ORAL
Qty: 180 TABLET | Refills: 1 | Status: SHIPPED | OUTPATIENT
Start: 2019-10-09 | End: 2020-03-16 | Stop reason: SDUPTHER

## 2019-10-09 RX ORDER — CETIRIZINE HYDROCHLORIDE 10 MG/1
10 TABLET ORAL DAILY
Qty: 90 TABLET | Refills: 3 | Status: SHIPPED | OUTPATIENT
Start: 2019-10-09 | End: 2020-03-16 | Stop reason: SDUPTHER

## 2019-10-09 NOTE — PATIENT INSTRUCTIONS
Chronic Obstructive Pulmonary Disease    Chronic obstructive pulmonary disease (COPD) is a long-term (chronic) condition that affects the lungs. COPD is a general term that can be used to describe many different lung problems that cause lung swelling (inflammation) and limit airflow, including chronic bronchitis and emphysema. If you have COPD, your lung function will probably never return to normal. In most cases, it gets worse over time. However, there are steps you can take to slow the progression of the disease and improve your quality of life.  What are the causes?  This condition may be caused by:  · Smoking. This is the most common cause.  · Certain genes passed down through families.  What increases the risk?  The following factors may make you more likely to develop this condition:  · Secondhand smoke from cigarettes, pipes, or cigars.  · Exposure to chemicals and other irritants such as fumes and dust in the work environment.  · Chronic lung conditions or infections.  What are the signs or symptoms?  Symptoms of this condition include:  · Shortness of breath, especially during physical activity.  · Chronic cough with a large amount of thick mucus. Sometimes the cough may not have any mucus (dry cough).  · Wheezing.  · Rapid breaths.  · Gray or bluish discoloration (cyanosis) of the skin, especially in your fingers, toes, or lips.  · Feeling tired (fatigue).  · Weight loss.  · Chest tightness.  · Frequent infections.  · Episodes when breathing symptoms become much worse (exacerbations).  · Swelling in the ankles, feet, or legs. This may occur in later stages of the disease.  How is this diagnosed?  This condition is diagnosed based on:  · Your medical history.  · A physical exam.  You may also have tests, including:  · Lung (pulmonary) function tests. This may include a spirometry test, which measures your ability to exhale properly.  · Chest X-ray.  · CT scan.  · Blood tests.  How is this  treated?  This condition may be treated with:  · Medicines. These may include inhaled rescue medicines to treat acute exacerbations as well as long-term, or maintenance, medicines to prevent flare-ups of COPD.  ? Bronchodilators help treat COPD by dilating the airways to allow increased airflow and make your breathing more comfortable.  ? Steroids can reduce airway inflammation and help prevent exacerbations.  · Smoking cessation. If you smoke, your health care provider may ask you to quit, and may also recommend therapy or replacement products to help you quit.  · Pulmonary rehabilitation. This may involve working with a team of health care providers and specialists, such as respiratory, occupational, and physical therapists.  · Exercise and physical activity. These are beneficial for nearly all people with COPD.  · Nutrition therapy to gain weight, if you are underweight.  · Oxygen. Supplemental oxygen therapy is only helpful if you have a low oxygen level in your blood (hypoxemia).  · Lung surgery or transplant.  · Palliative care. This is to help people with COPD feel comfortable when treatment is no longer working.  Follow these instructions at home:  Medicines  · Take over-the-counter and prescription medicines (inhaled or pills) only as told by your health care provider.  · Talk to your health care provider before taking any cough or allergy medicines. You may need to avoid certain medicines that dry out your airways.  Lifestyle  · If you are a smoker, the most important thing that you can do is to stop smoking. Do not use any products that contain nicotine or tobacco, such as cigarettes and e-cigarettes. If you need help quitting, ask your health care provider. Continuing to smoke will cause the disease to progress faster.  · Avoid exposure to things that irritate your lungs, such as smoke, chemicals, and fumes.  · Stay active, but balance activity with periods of rest. Exercise and physical activity will  help you maintain your ability to do things you want to do.  · Learn and use relaxation techniques to manage stress and to control your breathing.  · Get the right amount of sleep and get quality sleep. Most adults need 7 or more hours per night.  · Eat healthy foods. Eating smaller, more frequent meals and resting before meals may help you maintain your strength.  Controlled breathing  Learn and use controlled breathing techniques as directed by your health care provider. Controlled breathing techniques include:  · Pursed lip breathing. Start by breathing in (inhaling) through your nose for 1 second. Then, purse your lips as if you were going to whistle and breathe out (exhale) through the pursed lips for 2 seconds.  · Diaphragmatic breathing. Start by putting one hand on your abdomen just above your waist. Inhale slowly through your nose. The hand on your abdomen should move out. Then purse your lips and exhale slowly. You should be able to feel the hand on your abdomen moving in as you exhale.  Controlled coughing  Learn and use controlled coughing to clear mucus from your lungs. Controlled coughing is a series of short, progressive coughs. The steps of controlled coughing are:  1. Lean your head slightly forward.  2. Breathe in deeply using diaphragmatic breathing.  3. Try to hold your breath for 3 seconds.  4. Keep your mouth slightly open while coughing twice.  5. Spit any mucus out into a tissue.  6. Rest and repeat the steps once or twice as needed.  General instructions  · Make sure you receive all the vaccines that your health care provider recommends, especially the pneumococcal and influenza vaccines. Preventing infection and hospitalization is very important when you have COPD.  · Use oxygen therapy and pulmonary rehabilitation if directed to by your health care provider. If you require home oxygen therapy, ask your health care provider whether you should purchase a pulse oximeter to measure your oxygen  level at home.  · Work with your health care provider to develop a COPD action plan. This will help you know what steps to take if your condition gets worse.  · Keep other chronic health conditions under control as told by your health care provider.  · Avoid extreme temperature and humidity changes.  · Avoid contact with people who have an illness that spreads from person to person (is contagious), such as viral infections or pneumonia.  · Keep all follow-up visits as told by your health care provider. This is important.  Contact a health care provider if:  · You are coughing up more mucus than usual.  · There is a change in the color or thickness of your mucus.  · Your breathing is more labored than usual.  · Your breathing is faster than usual.  · You have difficulty sleeping.  · You need to use your rescue medicines or inhalers more often than expected.  · You have trouble doing routine activities such as getting dressed or walking around the house.  Get help right away if:  · You have shortness of breath while you are resting.  · You have shortness of breath that prevents you from:  ? Being able to talk.  ? Performing your usual physical activities.  · You have chest pain lasting longer than 5 minutes.  · Your skin color is more blue (cyanotic) than usual.  · You measure low oxygen saturations for longer than 5 minutes with a pulse oximeter.  · You have a fever.  · You feel too tired to breathe normally.  Summary  · Chronic obstructive pulmonary disease (COPD) is a long-term (chronic) condition that affects the lungs.  · Your lung function will probably never return to normal. In most cases, it gets worse over time. However, there are steps you can take to slow the progression of the disease and improve your quality of life.  · Treatment for COPD may include taking medicines, quitting smoking, pulmonary rehabilitation, and changes to diet and exercise. As the disease progresses, you may need oxygen therapy, a  lung transplant, or palliative care.  · To help manage your condition, do not smoke, avoid exposure to things that irritate your lungs, stay up to date on all vaccines, and follow your health care provider's instructions for taking medicines.  This information is not intended to replace advice given to you by your health care provider. Make sure you discuss any questions you have with your health care provider.  Document Released: 2006 Document Revised: 2018 Document Reviewed: 2018  [employer] Interactive Patient Education © 2019 [employer]    For more information:  Quit Now [place]  1-800-QUIT-NOW  https://[email or web address]/en-US/    Medicare Wellness  Personal Prevention Plan of Service     Date of Office Visit:  10/09/2019  Encounter Provider:  Maria Luz Longoria MD  Place of Service:  [facility]  Patient Name: Kimberly Chun  :  1965    As part of the Medicare Wellness portion of your visit today, we are providing you with this personalized preventive plan of services (PPPS). This plan is based upon recommendations of the [place] Preventive Services Task Force (USPSTF) and the Advisory Committee on Immunization Practices (ACIP).    This lists the preventive care services that should be considered, and provides dates of when you are due. Items listed as completed are up-to-date and do not require any further intervention.    Health Maintenance   Topic Date Due   • INFLUENZA VACCINE  2019   • DIABETIC EYE EXAM  2019 (Originally 2019)   • TDAP/TD VACCINES (1 - Tdap) 2020 (Originally 1983)   • ZOSTER VACCINE (1 of 2) 2020 (Originally 2014)   • LUNG CANCER SCREENING  2020 (Originally 2019)   • HEMOGLOBIN A1C  2020   • URINE MICROALBUMIN  2020   • DIABETIC FOOT EXAM  2020   • MAMMOGRAM  2020   • MEDICARE ANNUAL WELLNESS  10/09/2020   • COLOGUARD  2022   • PNEUMOCOCCAL  VACCINE (19-64 MEDIUM RISK)  Completed   • HEPATITIS C SCREENING  Completed   • PAP SMEAR  Discontinued       Orders Placed This Encounter   Procedures   • Flucelvax Quad=>4Years ([id number])   • POC Glycosylated Hemoglobin (Hb A1C)       Return in about 3 months (around 1/13/2020) for Diabetes follow up or sooner if needed.

## 2019-10-09 NOTE — PROGRESS NOTES
The ABCs of the Annual Wellness Visit  Subsequent Medicare Wellness Visit    Chief Complaint   Patient presents with   • Medicare Wellness-subsequent     f/u on DM pt states no matter what she eats she gets nauseous        Subjective   History of Present Illness:  Kimberly Chun is a 54 y.o. female who presents for a Subsequent Medicare Wellness Visit.    Has had for at least for weeks excessive bloating, belching, nausea, and has not been able to eat as much, decreased appetite. Has lost weight which she feels helps with diabetes mellitus. No known gastroparesis. She is on Trulicity, dose was increased prior to her symptoms starting.     Diabetes mellitus historically uncontrolled. She is insulin dependent.     Followed by urology due to bladder cancer, has to have cystoscopy every so often to remove lesions. Tobacco user.     COPD stable on current inhalers. Continues to smoke.     Cvd, history of stroke. On Plavix. Continues to smoke.     Anxiety is chronic and stable on benzo. Daughter has history of drug abuse, she is clean but patient was about to use [employer] law to have her jailed but she was arrested/jailed before she could do so. Patient continues to care for grandchild.    GERD is chronic, she takes chronic PPI and has not been able to go without.    She has chronic back pain associated with sciatica. Gabapentin has helped with this as well as her neuropathy related to diabetes mellitus.    HEALTH RISK ASSESSMENT    Recent Hospitalizations:  No hospitalization(s) within the last year.    Current Medical Providers:  Patient Care Team:  Maria Luz Longoria MD as PCP - General  Maria Luz Longoria MD as PCP - Claims Attributed  Shelley Curran MD as Consulting Physician (Ophthalmology)  Alvaro Lemos MD as Consulting Physician (Urology)    Smoking Status:  Social History     Tobacco Use   Smoking Status Current Every Day Smoker   • Packs/day: 1.00   • Years: 40.00   • Pack years: 40.00    • Types: Cigarettes   Smokeless Tobacco Never Used       Alcohol Consumption:  Social History     Substance and Sexual Activity   Alcohol Use No       Depression Screen:   PHQ-2/PHQ-9 Depression Screening 10/9/2019   Little interest or pleasure in doing things 0   Feeling down, depressed, or hopeless 0   Total Score 0       Fall Risk Screen:  PAULINA Fall Risk Assessment has not been completed.    Health Habits and Functional and Cognitive Screening:  Functional & Cognitive Status 10/9/2019   Do you have difficulty preparing food and eating? Yes   Do you have difficulty bathing yourself, getting dressed or grooming yourself? No   Do you have difficulty using the toilet? No   Do you have difficulty moving around from place to place? Yes   Do you have trouble with steps or getting out of a bed or a chair? Yes   Current Diet Unhealthy Diet   Dental Exam Not up to date   Eye Exam Up to date   Exercise (times per week) 0 times per week   Current Exercise Activities Include None   Do you need help using the phone?  No   Are you deaf or do you have serious difficulty hearing?  No   Do you need help with transportation? Yes   Do you need help shopping? No   Do you need help preparing meals?  No   Do you need help with housework?  No   Do you need help with laundry? No   Do you need help taking your medications? No   Do you need help managing money? No   Do you ever drive or ride in a car without wearing a seat belt? No   Have you felt unusual stress, anger or loneliness in the last month? -   Who do you live with? -   If you need help, do you have trouble finding someone available to you? -   Have you been bothered in the last four weeks by sexual problems? -   Do you have difficulty concentrating, remembering or making decisions? -         Does the patient have evidence of cognitive impairment? No    Asprin use counseling:need to resume plavix and aspirin    Age-appropriate Screening Schedule:  Refer to the list below for  future screening recommendations based on patient's age, sex and/or medical conditions. Orders for these recommended tests are listed in the plan section. The patient has been provided with a written plan.    Health Maintenance   Topic Date Due   • DIABETIC EYE EXAM  11/09/2019 (Originally 9/13/2019)   • TDAP/TD VACCINES (1 - Tdap) 01/01/2020 (Originally 4/1/1984)   • ZOSTER VACCINE (1 of 2) 04/17/2020 (Originally 4/1/2014)   • HEMOGLOBIN A1C  04/09/2020   • URINE MICROALBUMIN  04/09/2020   • DIABETIC FOOT EXAM  07/09/2020   • MAMMOGRAM  08/22/2020   • PNEUMOCOCCAL VACCINE (19-64 MEDIUM RISK)  Completed   • INFLUENZA VACCINE  Completed   • PAP SMEAR  Discontinued          The following portions of the patient's history were reviewed and updated as appropriate: allergies, current medications, past family history, past medical history, past social history, past surgical history and problem list.    Outpatient Medications Prior to Visit   Medication Sig Dispense Refill   • albuterol sulfate HFA (PROAIR HFA) 108 (90 Base) MCG/ACT inhaler Inhale 2 puffs Every 6 (Six) Hours As Needed for Wheezing. 3 inhaler 6   • glucose blood test strip Use to check blood glucose levels 3 times daily 100 each 4   • Insulin Pen Needle (BD PEN NEEDLE ANNETTA U/F) 32G X 4 MM misc 1 Units 4 (Four) Times a Day. 120 each 11   • aspirin 81 MG EC tablet Take 1 tablet by mouth Daily. 90 tablet 3   • baclofen (LIORESAL) 10 MG tablet Take 10 mg by mouth.     • cetirizine (zyrTEC) 10 MG tablet Take 1 tablet by mouth Daily. 90 tablet 3   • clopidogrel (PLAVIX) 75 MG tablet Take 1 tablet by mouth Daily. 90 tablet 3   • Dulaglutide (TRULICITY) 1.5 MG/0.5ML solution pen-injector Inject 1.5 mg under the skin into the appropriate area as directed 1 (One) Time Per Week. 12 pen 3   • gabapentin (NEURONTIN) 800 MG tablet Take 1 tablet by mouth 3 (Three) Times a Day. 90 tablet 2   • Insulin Glargine (LANTUS SOLOSTAR) 100 UNIT/ML injection pen Inject 55 Units  under the skin into the appropriate area as directed Daily. 17 pen 3   • lisinopril (PRINIVIL,ZESTRIL) 5 MG tablet Take 1 tablet by mouth Daily. 90 tablet 3   • LORazepam (ATIVAN) 1 MG tablet TAKE 1/2 TO 1 TAB PO BID PRN ANXIETY 60 tablet 2   • meloxicam (MOBIC) 7.5 MG tablet Take 1 tablet by mouth Daily As Needed for Moderate Pain . 90 tablet 3   • metFORMIN (GLUCOPHAGE) 1000 MG tablet Take 1 tablet by mouth 2 (Two) Times a Day With Meals. 180 tablet 3   • omeprazole (priLOSEC) 20 MG capsule Take 1 capsule by mouth 2 (Two) Times a Day. 180 capsule 3   • predniSONE (DELTASONE) 20 MG tablet 3 TAB PO QDAY X 3 DAYS, 2 TAB PO QDAY X 3 DAYS, 1 TAB PO QDAY X 2 DAYS, 1/2 TAB PO QDAY X 2 DAYS. 18 tablet 0     No facility-administered medications prior to visit.        Patient Active Problem List   Diagnosis   • Anxiety   • Cerebral atherosclerosis   • Gastroesophageal reflux disease   • Chronic obstructive pulmonary disease (CMS/HCC)   • Osteoarthritis   • Dyssomnia   • Type 2 diabetes mellitus with diabetic polyneuropathy, with long-term current use of insulin (CMS/[facility])   • Hematuria   • Primary cancer of bladder (CMS/[facility])   • Tobacco abuse   • Diabetic peripheral neuropathy associated with type 2 diabetes mellitus (CMS/HCC)   • Chronic fatigue   • Gastritis determined by biopsy   • Overweight   • Type 2 diabetes mellitus with diabetic polyneuropathy, with long-term current use of insulin (CMS/HCC)   • Type 2 diabetes mellitus with hyperglycemia, with long-term current use of insulin (CMS/[facility])       Advanced Care Planning:  Patient does not have an advance directive - not interested in additional information    Review of Systems   Constitutional: Positive for appetite change and unexpected weight change.   Respiratory: Negative for shortness of breath.    Cardiovascular: Negative for chest pain.   Gastrointestinal: Positive for abdominal pain and nausea.   Musculoskeletal: Positive for arthralgias and back pain.  "  Psychiatric/Behavioral:        Stress       Compared to one year ago, the patient feels her physical health is better.  Compared to one year ago, the patient feels her mental health is better.    Reviewed chart for potential of high risk medication in the elderly: yes  Reviewed chart for potential of harmful drug interactions in the elderly:yes    Objective         Vitals:    10/09/19 1104   BP: 118/72   Pulse: 84   Resp: 16   Temp: 97.2 °F (36.2 °C)   TempSrc: Temporal   SpO2: 97%   Weight: 80.8 kg (178 lb 2 oz)   Height: 165.1 cm (65\")   PainSc:   7   PainLoc: Back       Body mass index is 29.64 kg/m².  Discussed the patient's BMI with her. The BMI is above average; BMI management plan is completed.    Physical Exam   Constitutional: She is oriented to person, place, and time. Vital signs are normal. She appears well-developed and well-nourished. She is active.  Non-toxic appearance. She does not have a sickly appearance. She does not appear ill. No distress.   Appears stated age. Well groomed.    HENT:   Head: Normocephalic and atraumatic. Hair is normal.   Right Ear: Hearing and external ear normal.   Left Ear: Hearing and external ear normal.   Nose: Nose normal.   Mouth/Throat: Uvula is midline and oropharynx is clear and moist.   Eyes: Conjunctivae, EOM and lids are normal. Pupils are equal, round, and reactive to light. No scleral icterus.   Neck: Phonation normal. Neck supple. No JVD present. No thyromegaly present.   Cardiovascular: Normal rate, regular rhythm and normal heart sounds. Exam reveals no gallop and no friction rub.   No murmur heard.  Pulmonary/Chest: Effort normal and breath sounds normal.   Abdominal: Soft. Bowel sounds are normal. She exhibits no distension and no mass. There is no tenderness. There is negative Juarez's sign.   Musculoskeletal: She exhibits no edema or deformity.        Lumbar back: She exhibits tenderness.   Neurological: She is alert and oriented to person, place, and " time. She displays no tremor. No cranial nerve deficit. She exhibits normal muscle tone. Gait normal.   Skin: Skin is warm. No rash noted. She is not diaphoretic. No cyanosis. Nails show no clubbing.   Psychiatric: She has a normal mood and affect. Her speech is normal and behavior is normal. Judgment and thought content normal. Cognition and memory are normal.   Nursing note and vitals reviewed.      Lab Results   Component Value Date    HGBA1C 7.2 10/09/2019    HGBA1C 8.2 07/09/2019    HGBA1C 8.3 04/09/2019       Lab Results   Component Value Date    HGBA1C 7.2 10/09/2019        Assessment/Plan   Medicare Risks and Personalized Health Plan  CMS Preventative Services Quick Reference  Cardiovascular risk  Diabetic Lab Screening   Immunizations Discussed/Encouraged (specific immunizations; Influenza )    The above risks/problems have been discussed with the patient.  Pertinent information has been shared with the patient in the After Visit Summary.  Follow up plans and orders are seen below in the Assessment/Plan Section.    Diagnoses and all orders for this visit:    1. Medicare annual wellness visit, subsequent (Primary)    2. Type 2 diabetes mellitus with diabetic polyneuropathy, with long-term current use of insulin (CMS/[facility])  -     gabapentin (NEURONTIN) 800 MG tablet; Take 1 tablet by mouth 3 (Three) Times a Day.  Dispense: 270 tablet; Refill: 2  -     Insulin Glargine (LANTUS SOLOSTAR) 100 UNIT/ML injection pen; Inject 55 Units under the skin into the appropriate area as directed Daily.  Dispense: 17 pen; Refill: 3  -     lisinopril (PRINIVIL,ZESTRIL) 5 MG tablet; Take 1 tablet by mouth Daily.  Dispense: 90 tablet; Refill: 3  -     metFORMIN (GLUCOPHAGE) 1000 MG tablet; Take 1 tablet by mouth 2 (Two) Times a Day With Meals.  Dispense: 180 tablet; Refill: 3  -     POC Glycosylated Hemoglobin (Hb A1C)  -     Dulaglutide (TRULICITY) 0.75 MG/0.5ML solution pen-injector; Inject 0.75 mg under the skin into the  appropriate area as directed 1 (One) Time Per Week.  Dispense: 12 pen; Refill: 3    3. Type 2 diabetes mellitus with hyperglycemia, with long-term current use of insulin (CMS/[facility])  -     Insulin Glargine (LANTUS SOLOSTAR) 100 UNIT/ML injection pen; Inject 55 Units under the skin into the appropriate area as directed Daily.  Dispense: 17 pen; Refill: 3  -     lisinopril (PRINIVIL,ZESTRIL) 5 MG tablet; Take 1 tablet by mouth Daily.  Dispense: 90 tablet; Refill: 3  -     metFORMIN (GLUCOPHAGE) 1000 MG tablet; Take 1 tablet by mouth 2 (Two) Times a Day With Meals.  Dispense: 180 tablet; Refill: 3  -     POC Glycosylated Hemoglobin (Hb A1C)  -     Dulaglutide (TRULICITY) 0.75 MG/0.5ML solution pen-injector; Inject 0.75 mg under the skin into the appropriate area as directed 1 (One) Time Per Week.  Dispense: 12 pen; Refill: 3    4. Diabetic peripheral neuropathy associated with type 2 diabetes mellitus (CMS/[facility])  -     gabapentin (NEURONTIN) 800 MG tablet; Take 1 tablet by mouth 3 (Three) Times a Day.  Dispense: 270 tablet; Refill: 2    5. Primary cancer of bladder (CMS/[facility])  Comments:  needs to quit smoking, follow up urology    6. Chronic obstructive pulmonary disease, unspecified COPD type (CMS/[facility])  Comments:  needs to quit smoking, continue inhalers.    7. Cerebral atherosclerosis  -     aspirin 81 MG EC tablet; Take 1 tablet by mouth Daily.  Dispense: 90 tablet; Refill: 3  -     clopidogrel (PLAVIX) 75 MG tablet; Take 1 tablet by mouth Daily.  Dispense: 90 tablet; Refill: 3    8. Anxiety  -     LORazepam (ATIVAN) 1 MG tablet; TAKE 1/2 TO 1 TAB PO BID PRN ANXIETY  Dispense: 180 tablet; Refill: 1    9. Chronic midline low back pain with sciatica, sciatica laterality unspecified  -     baclofen (LIORESAL) 10 MG tablet; Take 1 tablet by mouth 3 (Three) Times a Day.  Dispense: 270 tablet; Refill: 3  -     gabapentin (NEURONTIN) 800 MG tablet; Take 1 tablet by mouth 3 (Three) Times a Day.  Dispense: 270 tablet; Refill:  2  -     meloxicam (MOBIC) 7.5 MG tablet; Take 1 tablet by mouth Daily As Needed for Moderate Pain .  Dispense: 90 tablet; Refill: 3    10. Primary osteoarthritis, unspecified site  -     meloxicam (MOBIC) 7.5 MG tablet; Take 1 tablet by mouth Daily As Needed for Moderate Pain .  Dispense: 90 tablet; Refill: 3    11. Gastroesophageal reflux disease without esophagitis  -     omeprazole (priLOSEC) 20 MG capsule; Take 1 capsule by mouth 2 (Two) Times a Day.  Dispense: 180 capsule; Refill: 3    12. Abdominal bloating    13. Burping    14. Dyspepsia    15. Decrease in appetite    16. Overweight    17. Tobacco abuse    18. Environmental and seasonal allergies  -     cetirizine (zyrTEC) 10 MG tablet; Take 1 tablet by mouth Daily.  Dispense: 90 tablet; Refill: 3    19. Need for influenza vaccination  -     Flucelvax Quad=>4Years ([id number])    Other orders  -     Cancel: Dulaglutide (TRULICITY) 1.5 MG/0.5ML solution pen-injector; Inject 1.5 mg under the skin into the appropriate area as directed 1 (One) Time Per Week.  Dispense: 12 pen; Refill: 3      · Decreased dose on Trulicity as that is likely the cause of her dyspepsia, bloating, loss of appetite, etc. If this fails to resolve the issues need to consider gastric emptying study    CONTROLLED SUBSTANCE TRACKING 4/5/2018 8/7/2018 8/23/2018 4/9/2019 7/9/2019 10/9/2019   Blayne Shaw 4/4/2018 8/3/2018 7/5/2018 4/9/2019 7/9/2019 10/9/2019   Report Number [id number]     Follow Up:  Return in about 3 months (around 1/13/2020) for Diabetes follow up or sooner if needed.     An After Visit Summary and PPPS were given to the patient.

## 2019-12-13 ENCOUNTER — PROCEDURE VISIT (OUTPATIENT)
Dept: UROLOGY | Facility: CLINIC | Age: 54
End: 2019-12-13

## 2019-12-13 DIAGNOSIS — C67.9 MALIGNANT NEOPLASM OF URINARY BLADDER, UNSPECIFIED SITE (HCC): Primary | ICD-10-CM

## 2019-12-13 PROCEDURE — 52000 CYSTOURETHROSCOPY: CPT | Performed by: UROLOGY

## 2019-12-13 PROCEDURE — 81003 URINALYSIS AUTO W/O SCOPE: CPT | Performed by: UROLOGY

## 2019-12-13 RX ORDER — GABAPENTIN 600 MG/1
600 TABLET ORAL
COMMUNITY
End: 2020-05-19 | Stop reason: DRUGHIGH

## 2019-12-13 NOTE — PROGRESS NOTES
Chief Complaint  Bladder Cancer      HPI  Kimberly Chun is a 54 y.o.female who returns today for follow-up with a history of bladder cancer.  Numerous recurrent lesions were noted on cystoscopy 6 months ago but biopsy revealed only lymphoid hyperplasia.    There were no vitals filed for this visit.    Past Medical History  Past Medical History:   Diagnosis Date   • Anxiety    • Anxiety    • Arthritis    • Bladder cancer (CMS/HCC) 2016   • Cataract    • COPD (chronic obstructive pulmonary disease) (CMS/[facility])    • Dysphagia    • Fibromyalgia    • Fracture     RIGHT LEG, RIGHT ANKLE, MULTIPLE TOES   • Full dentures    • GERD (gastroesophageal reflux disease)    • Ovarian cyst    • Recurrent urinary tract infection    • Sciatica    • Seasonal allergies    • Statin intolerance    • Stroke (CMS/[facility])     PATIENT REPORTS APPROXIMATELY 7 YEARS AGO. REPORTS HAS WEAKNESS AND MEMORY PROBLEMS INTERMITTENTLY AFTER THIS EPISODE.   • Type 2 diabetes mellitus (CMS/[facility])    • Wears glasses        Past Surgical History  Past Surgical History:   Procedure Laterality Date   • CYSTOSCOPY BLADDER BIOPSY  2016   • ENDOSCOPY N/A 7/3/2017    Procedure: ESOPHAGOGASTRODUODENOSCOPY WITH BIOPSY;  Surgeon: Lindy Bey MD;  Location: [facility] ENDOSCOPY;  Service:    • HYSTERECTOMY     • TONSILLECTOMY         Medications  has a current medication list which includes the following prescription(s): albuterol sulfate hfa, aspirin, baclofen, cetirizine, clopidogrel, dulaglutide, gabapentin, gabapentin, glucose blood, insulin glargine, insulin pen needle, lisinopril, lorazepam, meloxicam, metformin, and omeprazole.    Allergies  Allergies   Allergen Reactions   • Iodides    • Iodinated Diagnostic Agents        Social History  Social History     Socioeconomic History   • Marital status:      Spouse name: Not on file   • Number of children: Not on file   • Years of education: Not on file   • Highest education level: Not on file    Occupational History     Employer: RETIRED   Tobacco Use   • Smoking status: Current Every Day Smoker     Packs/day: 1.00     Years: 40.00     Pack years: 40.00     Types: Cigarettes   • Smokeless tobacco: Never Used   Substance and Sexual Activity   • Alcohol use: No   • Drug use: No   • Sexual activity: Defer       Family History  Family History   Problem Relation Age of Onset   • Arthritis Mother    • Hypertension Mother    • Heart attack Mother    • Osteoporosis Mother    • Arthritis Father    • Cancer Father    • Diabetes Father    • Hypertension Father    • Stroke Other    • Drug abuse Daughter        Review of Systems  Review of Systems    Physical Exam  Physical Exam    Labs recent and today in the office:  Results for orders placed or performed in visit on 12/13/19   POC Urinalysis Dipstick, Automated   Result Value Ref Range    Color Yellow Yellow, Straw, Dark Yellow, Silke    Clarity, UA Clear Clear    Specific Gravity  1.010 1.005 - 1.030    pH, Urine 6.0 5.0 - 8.0    Leukocytes Negative Negative    Nitrite, UA Negative Negative    Protein, POC Negative Negative mg/dL    Glucose, UA Negative Negative, 1000 mg/dL (3+) mg/dL    Ketones, UA Negative Negative    Urobilinogen, UA Normal Normal    Bilirubin Negative Negative    Blood, UA Negative Negative      Female cystoscopy:     The patient is prepped and draped in the dorsal lithotomy position in a routine sterile fashion. The vulva and urethral meatus are inspected and found to be normal. The panendoscope is inserted in the bladder which is visualized with the Foroblique and 70 degree lenses and found to be free of foreign bodies and mucosal lesions. Ureteral orifices are normal location and effluxing clear urine bilaterally.  She carries about a 2 ounce postvoid residual today.  The bladder has a normal capacity with no signs of stones tumor or interstitial cystitis.    Assessment & Plan  She is encouraged to return on an annual basis.  She is covered  with antibiotics.

## 2020-01-29 DIAGNOSIS — Z79.4 TYPE 2 DIABETES MELLITUS WITH DIABETIC POLYNEUROPATHY, WITH LONG-TERM CURRENT USE OF INSULIN (HCC): ICD-10-CM

## 2020-01-29 DIAGNOSIS — Z79.4 TYPE 2 DIABETES MELLITUS WITH HYPERGLYCEMIA, WITH LONG-TERM CURRENT USE OF INSULIN (HCC): ICD-10-CM

## 2020-01-29 DIAGNOSIS — E11.42 TYPE 2 DIABETES MELLITUS WITH DIABETIC POLYNEUROPATHY, WITH LONG-TERM CURRENT USE OF INSULIN (HCC): ICD-10-CM

## 2020-01-29 DIAGNOSIS — E11.65 TYPE 2 DIABETES MELLITUS WITH HYPERGLYCEMIA, WITH LONG-TERM CURRENT USE OF INSULIN (HCC): ICD-10-CM

## 2020-01-29 RX ORDER — INSULIN GLARGINE 100 [IU]/ML
INJECTION, SOLUTION SUBCUTANEOUS
Qty: 66 ML | Refills: 3 | Status: SHIPPED | OUTPATIENT
Start: 2020-01-29 | End: 2020-03-16 | Stop reason: SDUPTHER

## 2020-03-15 ENCOUNTER — APPOINTMENT (OUTPATIENT)
Dept: GENERAL RADIOLOGY | Facility: HOSPITAL | Age: 55
End: 2020-03-15
Payer: MEDICARE

## 2020-03-15 ENCOUNTER — HOSPITAL ENCOUNTER (EMERGENCY)
Facility: HOSPITAL | Age: 55
Discharge: HOME | End: 2020-03-15
Attending: FAMILY MEDICINE
Payer: MEDICARE

## 2020-03-15 ENCOUNTER — APPOINTMENT (OUTPATIENT)
Dept: CT IMAGING | Facility: HOSPITAL | Age: 55
End: 2020-03-15
Payer: MEDICARE

## 2020-03-15 VITALS
WEIGHT: 180 LBS | HEIGHT: 64 IN | TEMPERATURE: 97.9 F | OXYGEN SATURATION: 95 % | HEART RATE: 91 BPM | BODY MASS INDEX: 30.73 KG/M2 | SYSTOLIC BLOOD PRESSURE: 141 MMHG | RESPIRATION RATE: 16 BRPM | DIASTOLIC BLOOD PRESSURE: 68 MMHG

## 2020-03-15 LAB
RAPID INFLUENZA  B AGN: NEGATIVE
RAPID INFLUENZA A AGN: NEGATIVE

## 2020-03-15 PROCEDURE — 99284 EMERGENCY DEPT VISIT MOD MDM: CPT

## 2020-03-15 PROCEDURE — 70450 CT HEAD/BRAIN W/O DYE: CPT

## 2020-03-15 PROCEDURE — 87804 INFLUENZA ASSAY W/OPTIC: CPT

## 2020-03-15 PROCEDURE — 71045 X-RAY EXAM CHEST 1 VIEW: CPT

## 2020-03-15 RX ORDER — LOPERAMIDE HYDROCHLORIDE 2 MG/1
2 CAPSULE ORAL 4 TIMES DAILY PRN
Qty: 12 CAPSULE | Refills: 0 | Status: SHIPPED | OUTPATIENT
Start: 2020-03-15 | End: 2020-03-25

## 2020-03-15 RX ORDER — FLUCONAZOLE 100 MG/1
100 TABLET ORAL DAILY
Qty: 5 TABLET | Refills: 0 | Status: SHIPPED | OUTPATIENT
Start: 2020-03-15 | End: 2020-03-20

## 2020-03-15 RX ORDER — PREDNISONE 20 MG/1
40 TABLET ORAL DAILY
Qty: 10 TABLET | Refills: 0 | Status: SHIPPED | OUTPATIENT
Start: 2020-03-15 | End: 2020-03-20

## 2020-03-15 RX ORDER — AMOXICILLIN AND CLAVULANATE POTASSIUM 875; 125 MG/1; MG/1
1 TABLET, FILM COATED ORAL 2 TIMES DAILY
Qty: 20 TABLET | Refills: 0 | Status: SHIPPED | OUTPATIENT
Start: 2020-03-15 | End: 2020-03-25

## 2020-03-15 ASSESSMENT — PAIN DESCRIPTION - ONSET: ONSET: GRADUAL

## 2020-03-15 ASSESSMENT — ENCOUNTER SYMPTOMS
SORE THROAT: 1
COUGH: 1
DIARRHEA: 1

## 2020-03-15 ASSESSMENT — PAIN DESCRIPTION - LOCATION: LOCATION: FACE;HEAD

## 2020-03-15 ASSESSMENT — PAIN DESCRIPTION - PROGRESSION: CLINICAL_PROGRESSION: GRADUALLY WORSENING

## 2020-03-15 ASSESSMENT — PAIN SCALES - GENERAL: PAINLEVEL_OUTOF10: 1

## 2020-03-15 ASSESSMENT — PAIN DESCRIPTION - DESCRIPTORS: DESCRIPTORS: ACHING

## 2020-03-15 ASSESSMENT — PAIN DESCRIPTION - FREQUENCY: FREQUENCY: CONTINUOUS

## 2020-03-15 NOTE — ED TRIAGE NOTES
Pt to ED with complaints of fever, cough (blood tinged at times) and sore throat. Pt reports symptoms since last Friday.

## 2020-03-15 NOTE — ED PROVIDER NOTES
[address] [place] [address] Court  eMERGENCY dEPARTMENT eNCOUnter      Pt Name: Lynn Xiao  MRN: [id number]  [place] 1965  Date of evaluation: 3/15/2020  Provider: Kayla Gabriel MD    [address]       Chief Complaint   Patient presents with    Fever    Cough    Pharyngitis    Diarrhea         HISTORY OF PRESENT ILLNESS   (Location/Symptom, Timing/Onset, Context/Setting, Quality, Duration, Modifying Factors, Severity)  Note limiting factors. Lynn Xiao is a 53 y.o. female who presents to the emergency department with a 9 day history of flu-type symptoms including fever, nonproductive cough, sore throat, headache which is worse when she bends over and diarrhea. No vomiting. Nursing Notes were reviewed. REVIEW OF SYSTEMS    (2-9 systems for level 4, 10 or more forlevel 5)     Review of Systems   Constitutional: Positive for fever. HENT: Positive for congestion and sore throat. Respiratory: Positive for cough. Gastrointestinal: Positive for diarrhea. Neurological: Positive for headaches. Except as noted above the remainder of the review of systems was reviewed and negative.        PAST MEDICAL HISTORY     Past Medical History:   Diagnosis Date    CAD (coronary artery disease)     Cancer ([facility]     bladder    Cerebral artery occlusion with cerebral infarction ([facility]     COPD (chronic obstructive pulmonary disease) ([facility]     Diabetes mellitus type 2     Hyperlipidemia     Hypotension     Neuropathy     TIA (transient ischemic attack)          SURGICAL HISTORY       Past Surgical History:   Procedure Laterality Date    CARPAL TUNNEL RELEASE      both     HYSTERECTOMY      total     TONSILLECTOMY      TUBAL LIGATION           CURRENT MEDICATIONS       Previous Medications    BACLOFEN (LIORESAL) 10 MG TABLET    Take 1 tablet by mouth 3 times daily    CLOPIDOGREL (PLAVIX) 75 MG TABLET    Take 75 mg by mouth daily    DULAGLUTIDE ([facility] [place]    Inject BP: (!) 150/76   Pulse: 93   Resp: 16   Temp: 97.9 °F (36.6 °C)   TempSrc: Oral   SpO2: 98%   Weight: 180 lb (81.6 kg)   Height: 5' 4\" (1.626 m)           CRITICALCARE TIME   Total Critical Care time was 0 minutes, excludingseparately reportable procedures. There was a high probabilityof clinically significant/life threatening deterioration in the patient's condition which required my urgent intervention. CONSULTS:  None    PROCEDURES:  None    FINAL IMPRESSION      1. Acute maxillary sinusitis, recurrence not specified    2.  Diarrhea, unspecified type        DISPOSITION/PLAN   DISPOSITION        PATIENT REFERRED TO:  Stefani Rocha MD  [address] [address]  [phone]      As needed      DISCHARGE MEDICATIONS:  New Prescriptions    AMOXICILLIN-CLAVULANATE (AUGMENTIN) 875-125 MG PER TABLET    Take 1 tablet by mouth 2 times daily for 10 days    FLUCONAZOLE (DIFLUCAN) 100 MG TABLET    Take 1 tablet by mouth daily for 5 days    LOPERAMIDE (IMODIUM) 2 MG CAPSULE    Take 1 capsule by mouth 4 times daily as needed for Diarrhea    PREDNISONE (DELTASONE) 20 MG TABLET    Take 2 tablets by mouth daily for 5 days       (Please note that portions ofthis note were completed with a voice recognition program.  Efforts were made to edit the dictations but occasionally words are mis-transcribed.)    Deanna Mike MD(electronically signed)  Attending Emergency Physician          Deanna Mike MD  03/15/20 [id number]

## 2020-03-16 ENCOUNTER — TELEPHONE (OUTPATIENT)
Dept: INTERNAL MEDICINE | Facility: CLINIC | Age: 55
End: 2020-03-16

## 2020-03-16 DIAGNOSIS — I67.2 CEREBRAL ATHEROSCLEROSIS: ICD-10-CM

## 2020-03-16 DIAGNOSIS — Z79.4 TYPE 2 DIABETES MELLITUS WITH HYPERGLYCEMIA, WITH LONG-TERM CURRENT USE OF INSULIN (HCC): ICD-10-CM

## 2020-03-16 DIAGNOSIS — E11.42 TYPE 2 DIABETES MELLITUS WITH DIABETIC POLYNEUROPATHY, WITH LONG-TERM CURRENT USE OF INSULIN (HCC): ICD-10-CM

## 2020-03-16 DIAGNOSIS — E11.65 TYPE 2 DIABETES MELLITUS WITH HYPERGLYCEMIA, WITH LONG-TERM CURRENT USE OF INSULIN (HCC): ICD-10-CM

## 2020-03-16 DIAGNOSIS — J30.89 ENVIRONMENTAL AND SEASONAL ALLERGIES: ICD-10-CM

## 2020-03-16 DIAGNOSIS — K21.9 GASTROESOPHAGEAL REFLUX DISEASE WITHOUT ESOPHAGITIS: ICD-10-CM

## 2020-03-16 DIAGNOSIS — M54.40 CHRONIC MIDLINE LOW BACK PAIN WITH SCIATICA, SCIATICA LATERALITY UNSPECIFIED: ICD-10-CM

## 2020-03-16 DIAGNOSIS — M19.91 PRIMARY OSTEOARTHRITIS, UNSPECIFIED SITE: ICD-10-CM

## 2020-03-16 DIAGNOSIS — G89.29 CHRONIC MIDLINE LOW BACK PAIN WITH SCIATICA, SCIATICA LATERALITY UNSPECIFIED: ICD-10-CM

## 2020-03-16 DIAGNOSIS — F41.9 ANXIETY: ICD-10-CM

## 2020-03-16 DIAGNOSIS — J44.9 CHRONIC OBSTRUCTIVE PULMONARY DISEASE, UNSPECIFIED COPD TYPE (HCC): ICD-10-CM

## 2020-03-16 DIAGNOSIS — E11.42 DIABETIC PERIPHERAL NEUROPATHY ASSOCIATED WITH TYPE 2 DIABETES MELLITUS (HCC): ICD-10-CM

## 2020-03-16 DIAGNOSIS — Z79.4 TYPE 2 DIABETES MELLITUS WITH DIABETIC POLYNEUROPATHY, WITH LONG-TERM CURRENT USE OF INSULIN (HCC): ICD-10-CM

## 2020-03-16 RX ORDER — GABAPENTIN 800 MG/1
800 TABLET ORAL 3 TIMES DAILY
Qty: 270 TABLET | Refills: 2 | Status: SHIPPED | OUTPATIENT
Start: 2020-03-16 | End: 2020-07-21

## 2020-03-16 RX ORDER — ALBUTEROL SULFATE 2.5 MG/3ML
2.5 SOLUTION RESPIRATORY (INHALATION) EVERY 6 HOURS PRN
Qty: 60 VIAL | Refills: 12 | Status: SHIPPED | OUTPATIENT
Start: 2020-03-16 | End: 2021-06-19 | Stop reason: SDUPTHER

## 2020-03-16 RX ORDER — CLOPIDOGREL BISULFATE 75 MG/1
75 TABLET ORAL DAILY
Qty: 90 TABLET | Refills: 3 | Status: SHIPPED | OUTPATIENT
Start: 2020-03-16 | End: 2021-05-11 | Stop reason: SDUPTHER

## 2020-03-16 RX ORDER — ALBUTEROL SULFATE 90 UG/1
2 AEROSOL, METERED RESPIRATORY (INHALATION) EVERY 6 HOURS PRN
Qty: 3 INHALER | Refills: 6 | Status: SHIPPED | OUTPATIENT
Start: 2020-03-16 | End: 2021-04-08 | Stop reason: SDUPTHER

## 2020-03-16 RX ORDER — LORAZEPAM 1 MG/1
TABLET ORAL
Qty: 180 TABLET | Refills: 1 | Status: SHIPPED | OUTPATIENT
Start: 2020-03-16 | End: 2021-08-23 | Stop reason: SDUPTHER

## 2020-03-16 RX ORDER — MELOXICAM 7.5 MG/1
7.5 TABLET ORAL DAILY PRN
Qty: 90 TABLET | Refills: 3 | Status: SHIPPED | OUTPATIENT
Start: 2020-03-16 | End: 2021-05-11

## 2020-03-16 RX ORDER — CETIRIZINE HYDROCHLORIDE 10 MG/1
10 TABLET ORAL DAILY
Qty: 90 TABLET | Refills: 3 | Status: SHIPPED | OUTPATIENT
Start: 2020-03-16 | End: 2021-01-27 | Stop reason: SDUPTHER

## 2020-03-16 RX ORDER — LISINOPRIL 5 MG/1
5 TABLET ORAL DAILY
Qty: 90 TABLET | Refills: 3 | Status: SHIPPED | OUTPATIENT
Start: 2020-03-16 | End: 2021-04-08 | Stop reason: SDUPTHER

## 2020-03-16 RX ORDER — OMEPRAZOLE 20 MG/1
20 CAPSULE, DELAYED RELEASE ORAL 2 TIMES DAILY
Qty: 180 CAPSULE | Refills: 3 | Status: SHIPPED | OUTPATIENT
Start: 2020-03-16 | End: 2020-12-29 | Stop reason: SDUPTHER

## 2020-03-16 RX ORDER — BACLOFEN 10 MG/1
10 TABLET ORAL 3 TIMES DAILY
Qty: 270 TABLET | Refills: 3 | Status: SHIPPED | OUTPATIENT
Start: 2020-03-16 | End: 2020-06-19

## 2020-03-16 NOTE — TELEPHONE ENCOUNTER
"Received notice from care everywhere that patient was seen at [facility] over the weekend.  Reviewed chart, she was complaining of fever, cough, body aches, sore throat, headache.  Per care everywhere had negative flu a and flu B test.  Diagnosed with sinusitis and discharged with antibiotic and steroid.  Chest x-ray did not show any acute changes and CT scan did suggest some sinusitis.    Patient is a high risk patient with COPD, uncontrolled diabetes.  Has a young child at home that she has custody of, approximately 6 years old, she is at home with her now as school is out.  Patient has not had foreign travel and has only been out to local stores but is trying to self isolate.    Discussed symptoms with patient.  She is not feeling better and reports that her breathing is still labored mildly.  She does not want to go back to that ER.  She is using albuterol often along with Robitussin.  Temperature has been 101 with a very severe sore throat and \"extreme headache\".  She has had body aches for which muscle relaxers have helped some.  She admits she has spit up some blood but feels it is coming from her sinuses and from coughing.  Her breathing is \"not better\".  She reports that [facility] told her they had no ability to test her for coronavirus.    At this time I am waiting word on getting patient tested and I will call the patient back.  She reports she does need more albuterol she is running out and she also needs her other medicines refilled.  "

## 2020-03-17 ENCOUNTER — TELEPHONE (OUTPATIENT)
Dept: INTERNAL MEDICINE | Facility: CLINIC | Age: 55
End: 2020-03-17

## 2020-03-17 NOTE — TELEPHONE ENCOUNTER
Spoke with Dawit at [facility] and was told the inhaler is approved for $3.16 but the neb solution needs a PA and he is working on it now and will fax me a paper later today or tomorrow to complete.     Spoke with patient and advised of above from Dawit. She will go  the inhaler now and has some neb solution from before and will use that until the new rx is approved. Apologized for Venessa that the patient was not called back yesterday. Advised the office has been overloaded with calls and problems from the COVID-19 problems. She completely understands and knows we were not ignoring her. Pt states she does feel some better today, no fever, slept better last night, increased energy today, and less headaches. Advised to keep up updated on her condition, finish all antibiotics and we will re-evaluate her once she is finished. Pt understands and will call back if she needs anything.

## 2020-03-17 NOTE — TELEPHONE ENCOUNTER
PT CALLED TO RETURN A CALL TO DR MANCILLA. PT ALSO ASKED ABOUT HER albuterol sulfate HFA (PROAIR HFA) 108 (90 Base) MCG/ACT inhaler.  [facility] WILL NOT REFILL IT. THEY NEED A MEDICAL NECESSARY FORM TO GET IT REFILL.     PT REQUEST A CALL BACK [phone]     PLEASE ADVISE     PHARMACY VERIFIED TIM

## 2020-03-17 NOTE — TELEPHONE ENCOUNTER
Per our discussion, may be an insurance issue only covering one form of albuterol. If she got nebs those are fine.

## 2020-03-27 ENCOUNTER — TELEPHONE (OUTPATIENT)
Dept: INTERNAL MEDICINE | Facility: CLINIC | Age: 55
End: 2020-03-27

## 2020-03-27 NOTE — TELEPHONE ENCOUNTER
She was not treated with us initially. If having shortness of breath as before suggest ER (she was worrisome for coronavirus per previous messages). I am not comfortable putting on an antibiotic without evaluation and per protocol would need to go to either [facility] or ER.

## 2020-03-27 NOTE — TELEPHONE ENCOUNTER
Spoke with patient and advised to go to the ER or [facility] since she is not better. Advised since Dr. Longoria did not originally treat her, she is not comfortable with giving her more antibiotics. Pt states she is better, but not completely. She will go to the ER or [facility].

## 2020-03-27 NOTE — TELEPHONE ENCOUNTER
PT CALLED AND SAID SHE NEEDS ANOTHER ROUND OF ANTIBOTICS AS SHE IS NOT FEELING ANY BETTER; PLEASE ADVISE    TIM: CHERYL LE: [phone]

## 2020-05-19 ENCOUNTER — OFFICE VISIT (OUTPATIENT)
Dept: INTERNAL MEDICINE | Facility: CLINIC | Age: 55
End: 2020-05-19

## 2020-05-19 ENCOUNTER — TELEPHONE (OUTPATIENT)
Dept: INTERNAL MEDICINE | Facility: CLINIC | Age: 55
End: 2020-05-19

## 2020-05-19 DIAGNOSIS — Z79.4 TYPE 2 DIABETES MELLITUS WITH HYPERGLYCEMIA, WITH LONG-TERM CURRENT USE OF INSULIN (HCC): ICD-10-CM

## 2020-05-19 DIAGNOSIS — E11.65 TYPE 2 DIABETES MELLITUS WITH HYPERGLYCEMIA, WITH LONG-TERM CURRENT USE OF INSULIN (HCC): ICD-10-CM

## 2020-05-19 DIAGNOSIS — M54.41 ACUTE BACK PAIN WITH SCIATICA, RIGHT: Primary | ICD-10-CM

## 2020-05-19 PROCEDURE — G2025 DIS SITE TELE SVCS RHC/FQHC: HCPCS | Performed by: FAMILY MEDICINE

## 2020-05-19 RX ORDER — PREDNISONE 20 MG/1
TABLET ORAL
Qty: 18 TABLET | Refills: 0 | Status: SHIPPED | OUTPATIENT
Start: 2020-05-19 | End: 2020-06-03

## 2020-05-19 NOTE — PROGRESS NOTES
"Subjective    Kimberly Chun is a 55 y.o. female with:  Chief Complaint   Patient presents with   • Back Pain     Back pain started about a week ago and has gotten worse. Pain shoots down her right leg now. Trying exercises and stretches but nothing is helping. She had prednisone at her last flare and that is the only thing that will take her pain away.        History per MA reviewed.    History of Present Illness   \"I have messed up my sciatic nerve again.\" Having pain down right buttock, leg, to calf and toes similar to past bouts. Steroid has helped previously and she asks if she cna do this again. Aware it will raise sugars but has Novolog to use for hyperglycemia. Diabetes mellitus improving, notes sugars a lot of times 125-150 range, though has had few 300s and some 50-60s.     The following portions of the patient's history were reviewed and updated as appropriate: allergies, current medications, past family history, past medical history, past social history, past surgical history and problem list.    Review of Systems   Constitutional: Positive for activity change.   Musculoskeletal: Positive for arthralgias.   Psychiatric/Behavioral: Positive for stress.       There were no vitals taken for this visit.      Objective   Neck: Voice is within normal limits  Pulm: Normal work of breathing  Neuro: A&O x 3  Psych: Mood and affect appropriate. Memory within normal limits. Thought processes within normal limits.      Assessment/Plan     Problem List Items Addressed This Visit        Endocrine    Type 2 diabetes mellitus with hyperglycemia, with long-term current use of insulin (CMS/[facility])      Other Visit Diagnoses     Acute back pain with sciatica, right    -  Primary    Relevant Medications    predniSONE (DELTASONE) 20 MG tablet          · This visit has been rescheduled as a phone visit to comply with patient safety concerns in accordance with CDC recommendations. Total time of discussion was 10 " minutes.  · Follow up in office June 19th for diabetes mellitus, A1C.  Monitor sugars, use fast acting insulin as needed for hyperglycemia which may occur due to steroid use.     Maria Luz Longoria MD

## 2020-05-19 NOTE — PROGRESS NOTES
You have chosen to receive care through a telephone visit. Do you consent to use a telephone visit for your medical care today? Yes    On this telephone visit is Dalia NAILS CMA, Dr. Longoria, and Kimberly Chun.

## 2020-05-29 ENCOUNTER — HOSPITAL ENCOUNTER (EMERGENCY)
Facility: HOSPITAL | Age: 55
Discharge: HOME | End: 2020-05-29
Attending: EMERGENCY MEDICINE
Payer: MEDICARE

## 2020-05-29 VITALS
OXYGEN SATURATION: 98 % | WEIGHT: 180 LBS | RESPIRATION RATE: 16 BRPM | DIASTOLIC BLOOD PRESSURE: 69 MMHG | BODY MASS INDEX: 29.99 KG/M2 | TEMPERATURE: 97.7 F | SYSTOLIC BLOOD PRESSURE: 140 MMHG | HEART RATE: 69 BPM | HEIGHT: 65 IN

## 2020-05-29 PROCEDURE — 99282 EMERGENCY DEPT VISIT SF MDM: CPT

## 2020-05-29 PROCEDURE — 6360000002 HC RX W HCPCS: Performed by: EMERGENCY MEDICINE

## 2020-05-29 PROCEDURE — 96372 THER/PROPH/DIAG INJ SC/IM: CPT

## 2020-05-29 RX ORDER — DEXAMETHASONE SODIUM PHOSPHATE 10 MG/ML
8 INJECTION INTRAMUSCULAR; INTRAVENOUS ONCE
Status: COMPLETED | OUTPATIENT
Start: 2020-05-29 | End: 2020-05-29

## 2020-05-29 RX ORDER — DIAZEPAM 5 MG/1
5 TABLET ORAL EVERY 6 HOURS PRN
Qty: 20 TABLET | Refills: 0 | Status: SHIPPED | OUTPATIENT
Start: 2020-05-29 | End: 2020-06-03

## 2020-05-29 RX ORDER — OXYCODONE HYDROCHLORIDE AND ACETAMINOPHEN 5; 325 MG/1; MG/1
1 TABLET ORAL EVERY 6 HOURS PRN
Qty: 12 TABLET | Refills: 0 | Status: SHIPPED | OUTPATIENT
Start: 2020-05-29 | End: 2020-06-01

## 2020-05-29 RX ADMIN — DEXAMETHASONE SODIUM PHOSPHATE 8 MG: 10 INJECTION INTRAMUSCULAR; INTRAVENOUS at 14:00

## 2020-05-29 ASSESSMENT — PAIN DESCRIPTION - FREQUENCY: FREQUENCY: CONTINUOUS

## 2020-05-29 ASSESSMENT — PAIN DESCRIPTION - PROGRESSION: CLINICAL_PROGRESSION: GRADUALLY WORSENING

## 2020-05-29 ASSESSMENT — PAIN SCALES - GENERAL: PAINLEVEL_OUTOF10: 10

## 2020-05-29 ASSESSMENT — PAIN DESCRIPTION - ONSET: ONSET: SUDDEN

## 2020-05-29 ASSESSMENT — PAIN DESCRIPTION - LOCATION: LOCATION: BACK

## 2020-05-29 NOTE — ED NOTES
Discharge instructions reviewed with pt and understanding verbalized, no further needs or concerns at this time. Pt ambulated out of ED without difficulty.       Yanci Nogueira RN  05/29/20 6010

## 2020-05-29 NOTE — ED PROVIDER NOTES
HYSTERECTOMY      total     TONSILLECTOMY      TUBAL LIGATION           CURRENT MEDICATIONS       Previous Medications    BACLOFEN (LIORESAL) 10 MG TABLET    Take 1 tablet by mouth 3 times daily    CLOPIDOGREL (PLAVIX) 75 MG TABLET    Take 75 mg by mouth daily    DULAGLUTIDE (TRULICITY SC)    Inject into the skin once a week Indications: on Fridays    GABAPENTIN (NEURONTIN) 600 MG TABLET    Take 800 mg by mouth 3 times daily. INSULIN ASPART (NOVOLOG FLEXPEN) 100 UNIT/ML INJECTION    Inject  into the skin daily.     INSULIN GLARGINE (LANTUS) 100 UNIT/ML INJECTION    Inject 59 Units into the skin every morning Indications: 56 units     MELOXICAM (MOBIC) 7.5 MG TABLET    Take 7.5 mg by mouth as needed for Pain    METFORMIN (GLUCOPHAGE) 1000 MG TABLET    Take 1,000 mg by mouth 2 times daily (with meals)       ALLERGIES     Iodides    FAMILY HISTORY       Family History   Problem Relation Age of Onset    Coronary Art Dis Mother     Cancer Father         lung    Cancer Sister         ovarian     Stroke Maternal Grandmother     Cancer Paternal Grandmother         breast          SOCIAL HISTORY       Social History     Socioeconomic History    Marital status:      Spouse name: None    Number of children: None    Years of education: None    Highest education level: None   Occupational History    None   Social Needs    Financial resource strain: None    Food insecurity     Worry: None     Inability: None    Transportation needs     Medical: None     Non-medical: None   Tobacco Use    Smoking status: Current Every Day Smoker     Packs/day: 1.00     Years: 30.00     Pack years: 30.00    Smokeless tobacco: Never Used   Substance and Sexual Activity    Alcohol use: No    Drug use: No    Sexual activity: None   Lifestyle    Physical activity     Days per week: None     Minutes per session: None    Stress: None   Relationships    Social connections     Talks on phone: None     Gets together: None DIAGNOSIS/MDM:   Vitals:    Vitals:    05/29/20 1327   BP: (!) 150/73   Pulse: 69   Resp: 18   Temp: 97.7 °F (36.5 °C)   TempSrc: Oral   SpO2: 96%   Weight: 180 lb (81.6 kg)   Height: 5' 5\" (1.651 m)       She drove herself here. Will give steroid injection and prescriptions for home. The patient will follow-up with their PCP in 1-2 days for reevaluation. If the patient or family members have any further concerns or any worsening symptoms they will return to the ED for reevaluation. CONSULTS:  None    PROCEDURES:  Procedures    CRITICAL CARE TIME    Total Critical Care time was 0 minutes, excluding separately reportable procedures. There was a high probability of clinically significant/life threatening deterioration in the patient's condition which required my urgent intervention. FINAL IMPRESSION      1. Sciatica of right side          DISPOSITION/PLAN   DISPOSITION Discharge - Pending Orders Complete 05/29/2020 01:46:33 PM      PATIENT REFERRED TO:  Belinda Ramos MD  [facility] [address]  [phone]    Schedule an appointment as soon as possible for a visit in 2 days  As needed      DISCHARGE MEDICATIONS:  New Prescriptions    DIAZEPAM (VALIUM) 5 MG TABLET    Take 1 tablet by mouth every 6 hours as needed (muscle spasm) for up to 5 days. May take 1 or 2 tabs prn. OXYCODONE-ACETAMINOPHEN (PERCOCET) 5-325 MG PER TABLET    Take 1 tablet by mouth every 6 hours as needed for Pain for up to 3 days. Comment: Please note this report has been produced using speech recognition software and may contain errors related tothat system including errors in grammar, punctuation, and spelling, as well as words and phrases that may be inappropriate. If there are any questions or concerns please feel free to contact the dictating provider forclarification.     Madelaine Kincaid MD  Attending Emergency Physician                  Madelaine Kincaid MD  05/29/20 [phone]

## 2020-06-02 ENCOUNTER — TELEPHONE (OUTPATIENT)
Dept: INTERNAL MEDICINE | Facility: CLINIC | Age: 55
End: 2020-06-02

## 2020-06-02 NOTE — TELEPHONE ENCOUNTER
Dr. Longoria does not have any openings until 6/12/2020. Dr. Longoria suggest she have a visit with Ellyn. Pt is unable to come into the office because she can not put any weight on her legs right now and can not do a video visit.    Scheduled with Ellyn for a telephone visit Wednesday at 145.     If she is worse and feels she can not wait til 145 tomorrow, instructed her to go the ER.

## 2020-06-02 NOTE — TELEPHONE ENCOUNTER
PATIENT CALLED AND STATED SHE WENT TO THE ER FOR HER PAIN. SHE HAD A STEROID SHOT AND ITS NOT HELPING HER.     PATIENT IS WANTING TO KNOW WHAT SHE SHOULD DO AND IS REQUESTING A CALL FROM THE DOCTOR.    PATIENT CALL BACK [phone]      PLEASE ADVISE

## 2020-06-03 ENCOUNTER — OFFICE VISIT (OUTPATIENT)
Dept: INTERNAL MEDICINE | Facility: CLINIC | Age: 55
End: 2020-06-03

## 2020-06-03 DIAGNOSIS — M54.17 LUMBOSACRAL RADICULOPATHY: Primary | ICD-10-CM

## 2020-06-03 DIAGNOSIS — Z72.0 TOBACCO ABUSE: ICD-10-CM

## 2020-06-03 PROCEDURE — G2025 DIS SITE TELE SVCS RHC/FQHC: HCPCS | Performed by: NURSE PRACTITIONER

## 2020-06-03 RX ORDER — TIZANIDINE 2 MG/1
2 TABLET ORAL NIGHTLY PRN
Qty: 20 TABLET | Refills: 0 | Status: SHIPPED | OUTPATIENT
Start: 2020-06-03 | End: 2020-10-28 | Stop reason: ALTCHOICE

## 2020-06-03 RX ORDER — OXYCODONE HYDROCHLORIDE AND ACETAMINOPHEN 5; 325 MG/1; MG/1
TABLET ORAL
COMMUNITY
Start: 2020-05-29 | End: 2020-06-09 | Stop reason: SDUPTHER

## 2020-06-03 RX ORDER — DIAZEPAM 5 MG/1
TABLET ORAL
COMMUNITY
Start: 2020-05-29 | End: 2020-10-28

## 2020-06-03 NOTE — PROGRESS NOTES
You have chosen to receive care through a telephone visit. Do you consent to use a telephone visit for your medical care today? Yes    Active parties: Kobe Mckeon, Kimberly Chun, [facility]     Chief Complaint / Reason:      Chief Complaint   Patient presents with   • Hip Pain     right hip and leg pain x 3 weeks       Subjective     HPI   Unable to do video visit with patient for telephone visit was conducted and patient stated that she has been having worsening back pain after helping push a truck out of the driveway.  At first she states that he thought it was sciatica and did a round of steroids and it helped for the first few days but then progressively got worse and she went to the ER on 5/29/2020 with low back pain radiating down into her leg on the right side and indicated that her feet and toes felt numb. Patient has been taking muscle relaxer and Neurontin and steroids and states that she does not seem to be getting any relief along with rest. She denies chest pain, shortness of breath or heart palpitations.  She states that it is hard to move around or even get out of bed to do anything.  Denies any bladder or bowel incontinence but does feel some pressure even after voiding.  Denies fever or chills.  Denies any recent fall but does have history of arthritis cancer and back pain.  Patient is a current every day smoker.       History taken from: patient    PMH/FH/Social History were reviewed and updated appropriately in the electronic medical record.   Past Medical History:   Diagnosis Date   • Anxiety    • Anxiety    • Arthritis    • Bladder cancer (CMS/[facility]) 2016   • Cataract    • COPD (chronic obstructive pulmonary disease) (CMS/[facility])    • Dysphagia    • Fibromyalgia    • Fracture     RIGHT LEG, RIGHT ANKLE, MULTIPLE TOES   • Full dentures    • GERD (gastroesophageal reflux disease)    • Ovarian cyst    • Recurrent urinary tract infection    • Sciatica    • Seasonal allergies    • Statin  intolerance    • Stroke (CMS/HCC)     PATIENT REPORTS APPROXIMATELY 7 YEARS AGO. REPORTS HAS WEAKNESS AND MEMORY PROBLEMS INTERMITTENTLY AFTER THIS EPISODE.   • Type 2 diabetes mellitus (CMS/HCC)    • Wears glasses      Past Surgical History:   Procedure Laterality Date   • CYSTOSCOPY BLADDER BIOPSY  2016   • ENDOSCOPY N/A 7/3/2017    Procedure: ESOPHAGOGASTRODUODENOSCOPY WITH BIOPSY;  Surgeon: Lindy Bey MD;  [facility]COPY;  Service:    • HYSTERECTOMY     • TONSILLECTOMY       Social History     Socioeconomic History   • Marital status:      Spouse name: Not on file   • Number of children: Not on file   • Years of education: Not on file   • Highest education level: Not on file   Occupational History     Employer: RETIRED   Tobacco Use   • Smoking status: Current Every Day Smoker     Packs/day: 1.00     Years: 40.00     Pack years: 40.00     Types: Cigarettes   • Smokeless tobacco: Never Used   Substance and Sexual Activity   • Alcohol use: No   • Drug use: No   • Sexual activity: Defer     Family History   Problem Relation Age of Onset   • Arthritis Mother    • Hypertension Mother    • Heart attack Mother    • Osteoporosis Mother    • Arthritis Father    • Cancer Father    • Diabetes Father    • Hypertension Father    • Stroke Other    • Drug abuse Daughter        Review of Systems:   Review of Systems   Constitutional: Positive for activity change and fatigue.   Respiratory: Positive for cough.    Cardiovascular: Negative.    Musculoskeletal: Positive for arthralgias, back pain, gait problem and myalgias.   Psychiatric/Behavioral: Positive for sleep disturbance, depressed mood and stress.         All other systems were reviewed and are negative.  Exceptions are noted in the subjective or above.      Objective     Vital Signs  There were no vitals filed for this visit.    There is no height or weight on file to calculate BMI.    Physical Exam  Unable to perform physical exam  due to telephone visit       Results Review:    I reviewed the patient's previous clinical results.       Medication Review:     Current Outpatient Medications:   •  albuterol (PROVENTIL) (2.5 MG/3ML) 0.083% nebulizer solution, Take 2.5 mg by nebulization Every 6 (Six) Hours As Needed for Wheezing or Shortness of Air., Disp: 60 vial, Rfl: 12  •  albuterol sulfate HFA (PROAIR HFA) 108 (90 Base) MCG/ACT inhaler, Inhale 2 puffs Every 6 (Six) Hours As Needed for Wheezing., Disp: 3 inhaler, Rfl: 6  •  aspirin 81 MG EC tablet, Take 1 tablet by mouth Daily., Disp: 90 tablet, Rfl: 3  •  baclofen (LIORESAL) 10 MG tablet, Take 1 tablet by mouth 3 (Three) Times a Day., Disp: 270 tablet, Rfl: 3  •  cetirizine (zyrTEC) 10 MG tablet, Take 1 tablet by mouth Daily., Disp: 90 tablet, Rfl: 3  •  clopidogrel (PLAVIX) 75 MG tablet, Take 1 tablet by mouth Daily., Disp: 90 tablet, Rfl: 3  •  diazePAM (VALIUM) 5 MG tablet, TK 1 T PO Q 6 H PRN FOR MUSCLE SPASMS FOR UP TO 5 DAYS. MAY TK 1 TO 2 T PRN, Disp: , Rfl:   •  Dulaglutide (TRULICITY) 0.75 MG/0.5ML solution pen-injector, Inject 0.75 mg under the skin into the appropriate area as directed 1 (One) Time Per Week., Disp: 12 pen, Rfl: 3  •  gabapentin (NEURONTIN) 800 MG tablet, Take 1 tablet by mouth 3 (Three) Times a Day., Disp: 270 tablet, Rfl: 2  •  glucose blood test strip, Use to check blood glucose levels 3 times daily, Disp: 100 each, Rfl: 4  •  Insulin Glargine (LANTUS SOLOSTAR) 100 UNIT/ML injection pen, Inject 55 Units under the skin into the appropriate area as directed Every Night., Disp: 17 pen, Rfl: 3  •  Insulin Pen Needle (BD PEN NEEDLE ANNETTA U/F) 32G X 4 MM misc, USE TO INJECT INSULIN ONCE A DAY AND TRULICITY ONCE A WEEK, Disp: 100 each, Rfl: 3  •  lisinopril (PRINIVIL,ZESTRIL) 5 MG tablet, Take 1 tablet by mouth Daily., Disp: 90 tablet, Rfl: 3  •  LORazepam (ATIVAN) 1 MG tablet, TAKE 1/2 TO 1 TAB PO BID PRN ANXIETY, Disp: 180 tablet, Rfl: 1  •  meloxicam (MOBIC) 7.5 MG  tablet, Take 1 tablet by mouth Daily As Needed for Moderate Pain ., Disp: 90 tablet, Rfl: 3  •  metFORMIN (GLUCOPHAGE) 1000 MG tablet, Take 1 tablet by mouth 2 (Two) Times a Day With Meals., Disp: 180 tablet, Rfl: 3  •  omeprazole (priLOSEC) 20 MG capsule, Take 1 capsule by mouth 2 (Two) Times a Day., Disp: 180 capsule, Rfl: 3  •  oxyCODONE-acetaminophen (PERCOCET) 5-325 MG per tablet, TK 1 T PO Q 6 H PRN FOR PAIN FOR UP TO 3 DAYS, Disp: , Rfl:     Assessment/Plan   Kimberly was seen today for hip pain.    Diagnoses and all orders for this visit:    Lumbosacral radiculopathy  -     tiZANidine (ZANAFLEX) 2 MG tablet; Take 1 tablet by mouth At Night As Needed for Muscle Spasms.  -     MRI Lumbar Spine Without Contrast    Take medications as prescribed.  Discussed nonpharmacological interventions with patient and recommend placing pillow between legs at night and avoid sitting and standing for long periods of time.    Advised to stay as active as possible, within pain limits. Discussed Cauda equina syndrome.  Instructed to avoid twisting and bending, particularly when lifting.   Use proper body mechanics to prevent further complications.  Return to normal activities as tolerated.  May need physical therapy or neurosurgeon referral if no improvement  Tobacco abuse  Recommend smoking cessation  Return in about 2 weeks (around 6/17/2020), or if symptoms worsen or fail to improve, for Follow up with Dr. Longoria.    HECTOR Schultz  06/03/2020  Time documentation 18 minutes

## 2020-06-09 DIAGNOSIS — M54.17 LUMBOSACRAL RADICULOPATHY: ICD-10-CM

## 2020-06-09 DIAGNOSIS — M54.41 ACUTE BACK PAIN WITH SCIATICA, RIGHT: Primary | ICD-10-CM

## 2020-06-09 RX ORDER — DIAZEPAM 5 MG/1
TABLET ORAL
OUTPATIENT
Start: 2020-06-09

## 2020-06-09 RX ORDER — OXYCODONE HYDROCHLORIDE AND ACETAMINOPHEN 5; 325 MG/1; MG/1
1 TABLET ORAL EVERY 6 HOURS PRN
Qty: 18 TABLET | Refills: 0 | Status: SHIPPED | OUTPATIENT
Start: 2020-06-09 | End: 2020-06-19

## 2020-06-09 NOTE — TELEPHONE ENCOUNTER
PATIENT IS HAVING A LOT OF BACK PAIN, WANTED TO KNOW IF SHE CAN GET REFILLS ON MEDICATIONS SHE HAD GOT AT THE EMERGENCY ROOM:  SHE HAS MRI ON THE 15 TH. SHE SAID THAT SHE WILL NOT TAKE THE ZANAFLEX BECAUSE IT DOES NOT HELP.     OXYCODONE  VALIUM    [facility]

## 2020-06-09 NOTE — TELEPHONE ENCOUNTER
Not safe to be taking the two together, high risk for overdose especially with COPD underlying. I refilled the percocet, not the valium.

## 2020-06-13 ENCOUNTER — HOSPITAL ENCOUNTER (EMERGENCY)
Facility: HOSPITAL | Age: 55
Discharge: HOME | End: 2020-06-13
Attending: EMERGENCY MEDICINE
Payer: MEDICARE

## 2020-06-13 VITALS
HEIGHT: 64 IN | BODY MASS INDEX: 30.73 KG/M2 | OXYGEN SATURATION: 97 % | RESPIRATION RATE: 18 BRPM | WEIGHT: 180 LBS | TEMPERATURE: 98.2 F | HEART RATE: 91 BPM | DIASTOLIC BLOOD PRESSURE: 64 MMHG | SYSTOLIC BLOOD PRESSURE: 136 MMHG

## 2020-06-13 PROCEDURE — 99282 EMERGENCY DEPT VISIT SF MDM: CPT

## 2020-06-13 PROCEDURE — 6360000002 HC RX W HCPCS: Performed by: EMERGENCY MEDICINE

## 2020-06-13 PROCEDURE — 96372 THER/PROPH/DIAG INJ SC/IM: CPT

## 2020-06-13 RX ORDER — OXYCODONE HYDROCHLORIDE AND ACETAMINOPHEN 5; 325 MG/1; MG/1
1 TABLET ORAL EVERY 4 HOURS PRN
COMMUNITY
End: 2020-06-13 | Stop reason: SDUPTHER

## 2020-06-13 RX ORDER — TIZANIDINE 2 MG/1
2 TABLET ORAL EVERY 6 HOURS PRN
COMMUNITY
End: 2022-09-14

## 2020-06-13 RX ORDER — KETOROLAC TROMETHAMINE 30 MG/ML
60 INJECTION, SOLUTION INTRAMUSCULAR; INTRAVENOUS ONCE
Status: COMPLETED | OUTPATIENT
Start: 2020-06-13 | End: 2020-06-13

## 2020-06-13 RX ORDER — OXYCODONE HYDROCHLORIDE AND ACETAMINOPHEN 5; 325 MG/1; MG/1
1 TABLET ORAL EVERY 8 HOURS PRN
Qty: 6 TABLET | Refills: 0 | Status: SHIPPED | OUTPATIENT
Start: 2020-06-13 | End: 2020-06-15

## 2020-06-13 RX ADMIN — KETOROLAC TROMETHAMINE 60 MG: 30 INJECTION, SOLUTION INTRAMUSCULAR at 11:44

## 2020-06-13 ASSESSMENT — ENCOUNTER SYMPTOMS
TROUBLE SWALLOWING: 0
EYE DISCHARGE: 0
RHINORRHEA: 0
WHEEZING: 0
SORE THROAT: 0
BACK PAIN: 1
DIARRHEA: 0
EYE PAIN: 0
SINUS PRESSURE: 0
CHEST TIGHTNESS: 0
SHORTNESS OF BREATH: 0
EYE REDNESS: 0
ABDOMINAL PAIN: 0
NAUSEA: 0
VOMITING: 0
CONSTIPATION: 0
COUGH: 0

## 2020-06-13 ASSESSMENT — PAIN DESCRIPTION - DIRECTION: RADIATING_TOWARDS: RIGHT LEG

## 2020-06-13 ASSESSMENT — PAIN DESCRIPTION - ORIENTATION
ORIENTATION: LOWER
ORIENTATION: LOWER

## 2020-06-13 ASSESSMENT — PAIN DESCRIPTION - LOCATION
LOCATION: BACK
LOCATION: BACK

## 2020-06-13 ASSESSMENT — PAIN DESCRIPTION - DESCRIPTORS
DESCRIPTORS: ACHING
DESCRIPTORS: ACHING

## 2020-06-13 ASSESSMENT — PAIN SCALES - GENERAL
PAINLEVEL_OUTOF10: 10

## 2020-06-13 ASSESSMENT — PAIN DESCRIPTION - PAIN TYPE
TYPE: ACUTE PAIN
TYPE: ACUTE PAIN

## 2020-06-15 ENCOUNTER — HOSPITAL ENCOUNTER (OUTPATIENT)
Dept: MRI IMAGING | Facility: HOSPITAL | Age: 55
Discharge: HOME | End: 2020-06-15
Admitting: NURSE PRACTITIONER

## 2020-06-15 PROCEDURE — 72148 MRI LUMBAR SPINE W/O DYE: CPT

## 2020-06-15 NOTE — PROGRESS NOTES
Please contact patient and let her know that MRI does show broad-based disc bulge with facet arthropathy along with disc extrusion along with severe bilateral neural caruso narrowing.  I recommend patient see neurosurgery.  She does have an appointment with Dr. Longoria and she can discuss it with her if she would like at her appointment.

## 2020-06-19 ENCOUNTER — OFFICE VISIT (OUTPATIENT)
Dept: INTERNAL MEDICINE | Facility: CLINIC | Age: 55
End: 2020-06-19

## 2020-06-19 ENCOUNTER — HOSPITAL ENCOUNTER (OUTPATIENT)
Dept: ULTRASOUND IMAGING | Facility: HOSPITAL | Age: 55
Discharge: HOME | End: 2020-06-19
Admitting: FAMILY MEDICINE

## 2020-06-19 VITALS — SYSTOLIC BLOOD PRESSURE: 135 MMHG | DIASTOLIC BLOOD PRESSURE: 70 MMHG | HEART RATE: 102 BPM | OXYGEN SATURATION: 97 %

## 2020-06-19 DIAGNOSIS — M79.661 RIGHT CALF PAIN: ICD-10-CM

## 2020-06-19 DIAGNOSIS — M43.10 ANTEROLISTHESIS: Primary | ICD-10-CM

## 2020-06-19 DIAGNOSIS — G54.9: ICD-10-CM

## 2020-06-19 PROCEDURE — 99214 OFFICE O/P EST MOD 30 MIN: CPT | Performed by: FAMILY MEDICINE

## 2020-06-19 PROCEDURE — 93971 EXTREMITY STUDY: CPT

## 2020-06-19 RX ORDER — BACLOFEN 20 MG/1
20 TABLET ORAL 3 TIMES DAILY
Qty: 90 TABLET | Refills: 3 | Status: SHIPPED | OUTPATIENT
Start: 2020-06-19 | End: 2020-07-06

## 2020-06-19 RX ORDER — OXYCODONE AND ACETAMINOPHEN 10; 325 MG/1; MG/1
1 TABLET ORAL EVERY 6 HOURS PRN
Qty: 60 TABLET | Refills: 0 | Status: SHIPPED | OUTPATIENT
Start: 2020-06-19 | End: 2020-07-02 | Stop reason: SDUPTHER

## 2020-06-19 NOTE — PROGRESS NOTES
Patient was told Friday evening if she was allowed to go home that there was no clot. Please call to ensure she is aware of neg result. Thank you.

## 2020-06-22 ENCOUNTER — TELEPHONE (OUTPATIENT)
Dept: INTERNAL MEDICINE | Facility: CLINIC | Age: 55
End: 2020-06-22

## 2020-06-22 NOTE — TELEPHONE ENCOUNTER
Called patient to review US results, while on the phone, pt states she is hurting worse today than she was on Friday. She states the pain is so bad she can not stand or walk. Advised I will pass the message along to Dr. Longoria.

## 2020-06-23 NOTE — PROGRESS NOTES
Subjective    Kimberly Chun is a 55 y.o. female here for:  Chief Complaint   Patient presents with   • Back Pain     Pt states she has 3 buldging discs in her back. Pt had an MRI of her back on Monday and is still waiting for her neuosurgery consult.        History per MA reviewed.    History of Present Illness   Severe pain in back now unable to walk due to her pain. In wheelchair from downstairs, crying in room. Has been to ER for her severe pain in back but was not helpful. Taking percocet 5 mg, not helping. Steroids have not helped, ER recently gave a shot. Movement worsens her pain. 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 Also mentions her right leg feels cold, swollen. Has not been checked for clots. She is a smoker, history of bladder cancer. Recently underwent MRI of back and was told something was abnormal.    The following portions of the patient's history were reviewed and updated as appropriate: allergies, current medications, past family history, past medical history, past social history, past surgical history and problem list.    Review of Systems   Constitutional: Positive for activity change. Negative for fever.   HENT: Negative for sore throat.    Respiratory: Negative for shortness of breath.    Musculoskeletal: Positive for arthralgias and back pain.   Neurological: Positive for weakness and numbness.       Visit Vitals  /70   Pulse 102   SpO2 97%         Objective   Physical Exam   Constitutional: She is oriented to person, place, and time. Vital signs are normal. She appears well-developed and well-nourished. She is active.  Non-toxic appearance. She does not appear ill. No distress. Face mask in place.   Sitting in wheelchair, rigid, trying to not move. Crying.  She is obese.  HENT:   Head: Normocephalic and atraumatic.   Right Ear: Hearing normal.   Left Ear: Hearing normal.   Eyes: EOM  are normal. Right eye exhibits no discharge. Left eye exhibits no discharge. No scleral icterus.   Neck: Phonation normal. Neck supple.   Pulmonary/Chest: Effort normal.   Musculoskeletal:   Right calf wrapped in ACE bandage. Pain with palpation of calf. Positive Ruddy. Cool right foot but no cyanosis.   Neurological: She is alert and oriented to person, place, and time. She displays no tremor. No cranial nerve deficit.   Skin: Skin is warm. No rash noted. She is not diaphoretic. No pallor.   Psychiatric: Her speech is normal and behavior is normal. Judgment and thought content normal. Cognition and memory are normal. She exhibits a depressed mood.   Nursing note and vitals reviewed.      Results for orders placed in visit on 06/03/20   MRI Lumbar Spine Without Contrast    Narrative PROCEDURE: MRI LUMBAR SPINE WO CONTRAST-     HISTORY: lumbar pain with pain radiating in leg numbness and tingling hx  of sciatia; M54.17-Radiculopathy, lumbosacral region     TECHNIQUE: Multiplanar multisequence imaging of the lumbar spine was  performed without intravenous contrast.     FINDINGS: There is 6 mm of anterolisthesis of L5 on S1 secondary to a  bilateral pars defect. The remainder of the lumbar vertebral bodies  maintain a normal height, alignment and signal intensity.      At the L4/5 level there is a mild broad-based disc bulge and facet  arthropathy, however there is no significant spinal or neural foraminal  stenosis.     At the L5/S1 level there is a broad-based disc bulge with a right  paracentral disc extrusion which extends superiorly to the mid portions  of the L5 vertebral body.. This contacts and displaces the S1 nerve root  from the lateral recess. There is severe bilateral neural foraminal  narrowing at this level.     The spinal cord terminates at the L1 level. No conus lesions are  present. The paraspinal soft tissues are unremarkable.       Impression 1. Right paracentral disc extrusion at the L5/S1 level  which contacts  and displaces the right S1 nerve root from the lateral recess.  2. Grade 1 anterolisthesis of L5 on S1 secondary to bilateral pars  defect which produces severe bilateral neural foraminal narrowing.     This report was finalized on 6/15/2020 1:40 PM by Gabriela Sepulveda M.D..     Results for orders placed in visit on 06/19/20   US Venous Doppler Lower Extremity Right (duplex)    Narrative FINAL REPORT    TECHNIQUE:  Multiple transverse and longitudinal images were performed of  right the femoral-popliteal deep venous system with augmentation  and compression maneuvers.    CLINICAL HISTORY:  RLE PAIN    FINDINGS:  Right lower extremity duplex ultrasound demonstrates normal flow  in the deep venous system.  There is no abnormal echogenicity to  suggest thrombus.  There is normal compression and augmentation.      Impression No evidence of right DVT.    Authenticated by Edgar Barney MD on 06/19/2020 05:59:28 PM             Assessment/Plan     Problem List Items Addressed This Visit     None      Visit Diagnoses     Anterolisthesis    -  Primary    Relevant Medications    oxyCODONE-acetaminophen (Percocet)  MG per tablet    baclofen (LIORESAL) 20 MG tablet    Other Relevant Orders    Ambulatory Referral to Orthopedic Surgery    Nerve root compression syndrome        Relevant Medications    oxyCODONE-acetaminophen (Percocet)  MG per tablet    baclofen (LIORESAL) 20 MG tablet    Other Relevant Orders    Ambulatory Referral to Orthopedic Surgery    Right calf pain        Relevant Orders    US Venous Doppler Lower Extremity Right (duplex) (Completed)          ·     Maria Luz Longoria MD

## 2020-06-23 NOTE — TELEPHONE ENCOUNTER
No, I was going to send it yesterday, but I am holding for the office note to be completed. If you can get that done for me, I will send it.

## 2020-06-30 ENCOUNTER — TELEPHONE (OUTPATIENT)
Dept: INTERNAL MEDICINE | Facility: CLINIC | Age: 55
End: 2020-06-30

## 2020-07-01 ENCOUNTER — TELEPHONE (OUTPATIENT)
Dept: INTERNAL MEDICINE | Facility: CLINIC | Age: 55
End: 2020-07-01

## 2020-07-01 NOTE — TELEPHONE ENCOUNTER
Please call pt. She has to have clearance for surgery for Dr. Jones. I do not have any open spots for a pre-op visit but I will stay a bit later tomorrow to get this done for her. I put her on my schedule at 9 on Thursday but it's not a 30 min spot. Please have her come at [phone] as I will see her last.     Must have clearance as needs EKG, chest xray, and we have to check her A1C in office.

## 2020-07-01 NOTE — TELEPHONE ENCOUNTER
So this pt called yesterday and I talked to her about this and she is insistant that all that needs to be done is a signature from you. I explained that with pre-op clearance she will need an ekg, labs, chest xray, ect. She states Dr. Jones's office is taking care of that.

## 2020-07-01 NOTE — TELEPHONE ENCOUNTER
Will you call his office to confirm that? If that's the case then I should not have to sign this form that is on my desk. Clearance is me signing off saying I feel this patient is low risk for complications, death during surgery. Without the tests I cannot say that and I will not just sign the form.

## 2020-07-01 NOTE — TELEPHONE ENCOUNTER
Marisela Britt (surgery coordinator at Dr. Jones's office) and she states they will do an EKG (if pt has cardiac issues), labs, and other tests before the surgery. She states the form is just saying the patient is medically cleared for surgery. Advised typically the surgeon will send a form with the tests that need done, we will do them, and fax all the results back to the surgeon and that is how the patient is cleared (as long as everything comes back normal). She states Dr. Longoria can do those tests if she wants but she does not have to, just needs to say the patient is cleared. Advised that it would be hard for Dr. Longoria to sign a form stating the patient was cleared, if we have no proof she is. Advised I will talk to Dr. Longoria and get back to her.

## 2020-07-01 NOTE — TELEPHONE ENCOUNTER
This is not standard for pre-op.     Per surgery center they will do testing prior to surgery, just need me to sign the form.     I've marked cleared on the form (see scanned copy) but made notes that she needs some type of cardiac clearance and labs, especially for diabetes mellitus as it has been uncontrolled. I've also noted without a pre-op visit I cannot provde any further medical opinion regarding operative risks.     We do not have an EKG on file.  Lab Results   Component Value Date    HGBA1C 7.2 10/09/2019    HGBA1C 8.2 07/09/2019    HGBA1C 8.3 04/09/2019     Last A1C was October, was okay but she's been on steroids since that time, she's due for A1C.    I will cancel the appointment tomorrow for her.

## 2020-07-02 DIAGNOSIS — G54.9: ICD-10-CM

## 2020-07-02 DIAGNOSIS — M43.10 ANTEROLISTHESIS: ICD-10-CM

## 2020-07-02 RX ORDER — OXYCODONE AND ACETAMINOPHEN 10; 325 MG/1; MG/1
1 TABLET ORAL EVERY 6 HOURS PRN
Qty: 60 TABLET | Refills: 0 | Status: SHIPPED | OUTPATIENT
Start: 2020-07-02 | End: 2020-08-03 | Stop reason: SDUPTHER

## 2020-07-02 NOTE — TELEPHONE ENCOUNTER
Lorenza from Dr. Jones's office called back this morning and apologized for the information she gave me yesterday. Since the patient is an uncontrolled diabetic patient and has other health issues, a complete pre-op clearance will need to be completed. She apologized again saying she did not look at the patient's medical history before telling me no work up needed to be done. She asked if she should call the patient. Advised yes she needs to call the patient, explain the situation, then tell her to call me and I will get her on the schedule for a full work up.     A few minutes later, the patient called me, I explained the situation to the patient. The patient states she is scared she may be paralyzed from all this. She is in a lot of pain right now and just wants to have her back fixed. Discussed everything that needs to be tested including her A1C. Advised if her A1C is elevated, then Dr. Jones will not do her surgery. Pt is worried about her numbers. Advised to be mind full of what she eats, make sure she is taking her meds as directed, and check her sugars often. I will call her later today with an appt to come in our office and I will ask Dr. Longoria for a refill of her pain med.  (see other telephone note for refill).

## 2020-07-02 NOTE — TELEPHONE ENCOUNTER
Patient is asking for a refill off her pain medicine.  Pharmacy verified and phone number verified.  Thank you

## 2020-07-06 ENCOUNTER — OFFICE VISIT (OUTPATIENT)
Dept: INTERNAL MEDICINE | Facility: CLINIC | Age: 55
End: 2020-07-06

## 2020-07-06 ENCOUNTER — TELEPHONE (OUTPATIENT)
Dept: INTERNAL MEDICINE | Facility: CLINIC | Age: 55
End: 2020-07-06

## 2020-07-06 VITALS
HEIGHT: 65 IN | TEMPERATURE: 98.2 F | HEART RATE: 85 BPM | BODY MASS INDEX: 32.24 KG/M2 | SYSTOLIC BLOOD PRESSURE: 125 MMHG | OXYGEN SATURATION: 97 % | RESPIRATION RATE: 18 BRPM | WEIGHT: 193.5 LBS | DIASTOLIC BLOOD PRESSURE: 55 MMHG

## 2020-07-06 DIAGNOSIS — Z01.818 PRE-OP EVALUATION: Primary | ICD-10-CM

## 2020-07-06 DIAGNOSIS — E11.65 TYPE 2 DIABETES MELLITUS WITH HYPERGLYCEMIA, WITH LONG-TERM CURRENT USE OF INSULIN (HCC): ICD-10-CM

## 2020-07-06 DIAGNOSIS — G54.9: ICD-10-CM

## 2020-07-06 DIAGNOSIS — Z79.4 TYPE 2 DIABETES MELLITUS WITH HYPERGLYCEMIA, WITH LONG-TERM CURRENT USE OF INSULIN (HCC): ICD-10-CM

## 2020-07-06 LAB — HBA1C MFR BLD: 8.6 %

## 2020-07-06 PROCEDURE — 93005 ELECTROCARDIOGRAM TRACING: CPT | Performed by: FAMILY MEDICINE

## 2020-07-06 PROCEDURE — 83036 HEMOGLOBIN GLYCOSYLATED A1C: CPT | Performed by: FAMILY MEDICINE

## 2020-07-06 PROCEDURE — 99214 OFFICE O/P EST MOD 30 MIN: CPT | Performed by: FAMILY MEDICINE

## 2020-07-06 RX ORDER — BLOOD-GLUCOSE METER
1 KIT MISCELLANEOUS 4 TIMES DAILY
Qty: 100 EACH | Refills: 12 | Status: SHIPPED | OUTPATIENT
Start: 2020-07-06 | End: 2023-01-20

## 2020-07-06 RX ORDER — BLOOD SUGAR DIAGNOSTIC
1 STRIP MISCELLANEOUS 4 TIMES DAILY
Qty: 100 EACH | Refills: 12 | Status: SHIPPED | OUTPATIENT
Start: 2020-07-06 | End: 2023-01-20

## 2020-07-06 RX ORDER — BLOOD-GLUCOSE METER
1 KIT MISCELLANEOUS AS NEEDED
Qty: 1 EACH | Refills: 0 | Status: SHIPPED | OUTPATIENT
Start: 2020-07-06

## 2020-07-06 NOTE — PROGRESS NOTES
"Subjective   Kimberly Chun is a 55 y.o. female who presents to the office today for a preoperative consultation at the request of surgeon Dr. Jones who plans on performing discectomy as soon as possible pending clearance This consultation is requested for the specific conditions prompting preoperative evaluation (i.e. because of potential affect on operative risk): diabetes, hypertension. Planned anesthesia: general. The patient has the following known anesthesia issues: none. Patients bleeding risk: no recent abnormal bleeding and no remote history of abnormal bleeding. Patient does not have objections to receiving blood products if needed.    Patient is diabetic. She has been on several rounds of steroids with her back pain and these historically drive her sugars high. She is complaint with insulin use. She has not been using Trulicity .75 due to nausea but she plans to resume. She had tried a higher dose previously (1.5) but nausea and reflux were severe.     The following portions of the patient's history were reviewed and updated as appropriate: allergies, current medications, past family history, past medical history, past social history, past surgical history and problem list.    Review of Systems  A comprehensive review of systems was negative except for: Musculoskeletal: positive for back pain, muscle weakness and myalgias  Behavioral/Psych: positive for stress--taking care of granddaughter, patient's daughter recently  from overdose    Objective   /55   Pulse 85   Temp 98.2 °F (36.8 °C) (Temporal)   Resp 18   Ht 165.1 cm (65\")   Wt 87.8 kg (193 lb 8 oz)   SpO2 97%   BMI 32.20 kg/m²   General appearance: alert, appears stated age, cooperative, no distress and mildly obese  Head: Normocephalic, without obvious abnormality, atraumatic  Eyes: negative findings: lids and lashes normal, conjunctivae and sclerae normal and pupils equal, round, reactive to light and accomodation  Ears: external " ears normal and hearing normal  Nose: external nose normal, no rhinorrhea. wearing face mask most of encounter  Neck: no JVD, supple, symmetrical, trachea midline and thyroid not enlarged, symmetric, no tenderness/mass/nodules  Lungs: clear to auscultation bilaterally and normal work of breathing  Heart: regular rate and rhythm, S1, S2 normal, no murmur, click, rub or gallop  Extremities: extremities normal, atraumatic, no cyanosis or edema  Skin: Skin color, texture, turgor normal. No rashes or lesions  Neurologic: Mental status: Alert, oriented, thought content appropriate  Cranial nerves: normal  Gait: Antalgic    Predictors of intubation difficulty:   Morbid obesity? no   Anatomically abnormal facies? no   Prominent incisors? no   Receding mandible? no   Short, thick neck? no   Neck range of motion: normal    Cardiographics    ECG 12 Lead  Date/Time: 7/6/2020 10:09 AM  Performed by: Maria Luz Longoria MD  Authorized by: Maria Luz Longoria MD   Comparison: not compared with previous ECG   Previous ECG: no previous ECG available  Rhythm: sinus rhythm  Rate: normal  BPM: 80  Conduction: conduction normal  ST Segments: ST segments normal  T Waves: T waves normal  QRS axis: normal  Other: no other findings    Clinical impression: normal ECG              Imaging  Chest x-ray: not ordered--not required per surgery office they may order on their own     Lab Review   Lab Results   Component Value Date    HGBA1C 8.6 07/06/2020    HGBA1C 7.2 10/09/2019    HGBA1C 8.2 07/09/2019         Assessment/Plan   55 y.o. female with planned surgery as above.    Known risk factors for perioperative complications: Diabetes mellitus      Kimberly was seen today for pre-op exam.    Diagnoses and all orders for this visit:    Pre-op evaluation  -     ECG 12 Lead    Nerve root compression syndrome    Type 2 diabetes mellitus with hyperglycemia, with long-term current use of insulin (CMS/[facility])  -     POC Glycosylated Hemoglobin (Hb  A1C)  -     Alcohol Swabs (ALCOHOL PADS) 70 % pads; 1 pad 4 (Four) Times a Day. Check sugars fasting and 2 hours after meals.  -     glucose monitor monitoring kit; 1 each As Needed (diabetes). Check sugars fasting and 2 hours after meals.  -     B-D ULTRA-FINE 33 LANCETS misc; 1 Units 4 (Four) Times a Day. Check sugars fasting and 2 hours after meals.  -     glucose blood test strip; Check sugars fasting and 2 hours after meals (4x a day due to hyperglycemia)    elevated A1C likely due to her multiple rounds of steroids since back injury. Stressed glucose control in order to heal properly from surgery. Log book given, she'll record fasting and 2 hour postprandial glucose levels. She likely has levels under 200 consistently now she's off steroids. Take log to surgeon. Resume Trulicity low dose.    Difficulty with intubation is not anticipated.    Cardiac Risk Estimation: low    Current medications which may produce withdrawal symptoms if withheld perioperatively: opiates (have been using short term due to her severe pain--she was not taking prior to injury)    1. Preoperative workup as follows ECG, hemoglobin A1C per visit today, other studies per surgical team.  2. Change in medication regimen before surgery: none, continue medication regimen including morning of surgery, with sip of water.  3. Prophylaxis for cardiac events with perioperative beta-blockers: should be considered, specific regimen per anesthesia.  4. Invasive hemodynamic monitoring perioperatively: at the discretion of anesthesiologist.  5. Deep vein thrombosis prophylaxis postoperatively:regimen to be chosen by surgical team.  6. Surveillance for postoperative MI with ECG immediately postoperatively and on postoperative days 1 and 2 AND troponin levels 24 hours postoperatively and on day 4 or hospital discharge (whichever comes first): at the discretion of anesthesiologist.

## 2020-07-21 DIAGNOSIS — Z79.4 TYPE 2 DIABETES MELLITUS WITH DIABETIC POLYNEUROPATHY, WITH LONG-TERM CURRENT USE OF INSULIN (HCC): ICD-10-CM

## 2020-07-21 DIAGNOSIS — E11.42 TYPE 2 DIABETES MELLITUS WITH DIABETIC POLYNEUROPATHY, WITH LONG-TERM CURRENT USE OF INSULIN (HCC): ICD-10-CM

## 2020-07-21 DIAGNOSIS — G89.29 CHRONIC MIDLINE LOW BACK PAIN WITH SCIATICA, SCIATICA LATERALITY UNSPECIFIED: ICD-10-CM

## 2020-07-21 DIAGNOSIS — M54.40 CHRONIC MIDLINE LOW BACK PAIN WITH SCIATICA, SCIATICA LATERALITY UNSPECIFIED: ICD-10-CM

## 2020-07-21 DIAGNOSIS — E11.42 DIABETIC PERIPHERAL NEUROPATHY ASSOCIATED WITH TYPE 2 DIABETES MELLITUS (HCC): ICD-10-CM

## 2020-07-21 RX ORDER — GABAPENTIN 800 MG/1
TABLET ORAL
Qty: 270 TABLET | Refills: 2 | Status: SHIPPED | OUTPATIENT
Start: 2020-07-21 | End: 2021-01-28 | Stop reason: SDUPTHER

## 2020-08-03 DIAGNOSIS — M43.10 ANTEROLISTHESIS: ICD-10-CM

## 2020-08-03 DIAGNOSIS — G54.9: ICD-10-CM

## 2020-08-03 RX ORDER — OXYCODONE AND ACETAMINOPHEN 10; 325 MG/1; MG/1
1 TABLET ORAL EVERY 6 HOURS PRN
Qty: 90 TABLET | Refills: 0 | Status: SHIPPED | OUTPATIENT
Start: 2020-08-03 | End: 2020-10-28

## 2020-08-03 NOTE — TELEPHONE ENCOUNTER
DELETE AFTER REVIEWING: Telephone encounter to be sent to the clinical pool.  If patient has less than a 3 day supply left, send the encounter HIGH Priority.    Caller: ChunAlenaKimberly    Relationship: Self    Best call back number: [phone]     Medication needed:   oxyCODONE-acetaminophen (Percocet)  MG per tablet    When do you need the refill by: ASAP    What details did the patient provide when requesting the medication:  PT HAS SURGERY ON 8/25/2020 AND IS REQUEST RX BEFORE THAN.    Does the patient have less than a 3 day supply:  [x] Yes  [] No    What is the patient's preferred pharmacy:      "[id number] [place], [place] - [address] AT [facility] [address]         DELETE AFTER READING TO PATIENT: “Thank you for sharing this information with me. I will send a message to the clinical team. Please allow 48 hours for the clinical staff to follow up on this request.”

## 2020-08-26 ENCOUNTER — TELEPHONE (OUTPATIENT)
Dept: INTERNAL MEDICINE | Facility: CLINIC | Age: 55
End: 2020-08-26

## 2020-08-26 NOTE — TELEPHONE ENCOUNTER
Venessa with [facility] said Pt is being discharged from [facility] today.  Venessa is requesting a call back to confirm PCP and if Dr. Longoria will follow Pt for home health.

## 2020-08-27 ENCOUNTER — TELEPHONE (OUTPATIENT)
Dept: INTERNAL MEDICINE | Facility: CLINIC | Age: 55
End: 2020-08-27

## 2020-08-27 DIAGNOSIS — Z79.4 TYPE 2 DIABETES MELLITUS WITH HYPERGLYCEMIA, WITH LONG-TERM CURRENT USE OF INSULIN: ICD-10-CM

## 2020-08-27 DIAGNOSIS — E11.42 TYPE 2 DIABETES MELLITUS WITH DIABETIC POLYNEUROPATHY, WITH LONG-TERM CURRENT USE OF INSULIN: ICD-10-CM

## 2020-08-27 DIAGNOSIS — E11.65 TYPE 2 DIABETES MELLITUS WITH HYPERGLYCEMIA, WITH LONG-TERM CURRENT USE OF INSULIN: ICD-10-CM

## 2020-08-27 DIAGNOSIS — Z79.4 TYPE 2 DIABETES MELLITUS WITH DIABETIC POLYNEUROPATHY, WITH LONG-TERM CURRENT USE OF INSULIN: ICD-10-CM

## 2020-08-27 NOTE — TELEPHONE ENCOUNTER
PATIENT'S HOME HEALTH NURSE, FERNANDO, IS REQUESTING A CALL BACK TO DISCUSS PATIENT HIGH BLOOD SUGAR AND INSULIN DOSING    FERNANDO HAS REQUESTED A CALL BACK [phone]

## 2020-08-28 NOTE — TELEPHONE ENCOUNTER
Marisela Jiménez and she states Kimberly's BS was 411 yesterday. She states the patient told her she is on a sliding scale of novolog and takes 2 units if her BS is over 200 and can go up to 6 units. Jessy says she did take 6 units of novolog yesterday. Looking at the patient's meds, she is not on Novolog. Jessy did confirm the patient has Levemir, Novolog, and Trulicity at home. Advised I will send a message to Dr. Longoria and call her back.

## 2020-08-28 NOTE — TELEPHONE ENCOUNTER
"Spoke with patient and she states she has been in \"a haze\" since her surgery on Tuesday. She came home Wednesday and is still trying to comprehend what has gone on. She states she was told not to take her diabetes medications or shots on Tuesday, then in the hospital, they did not have any insulin to give her so she is not surprised her blood sugar yesterday was 411. Pt states her sugar level was normal by yesterday afternoon ( 6 units of Novolog given that morning). She takes Lantus 58units qam and has Novolog on hand to use as an emergency after meals if needed (left over from when she on was steroids). Pt states she is not on a sliding scale and not sure where the home health nurse got that from. She thinks her fasting sugar levels qam were either 60 or 98 today. Advised to keep a log of her fasting sugar levels everyday and I will call her early next week to go over those numbers. I will also send this message to Dr. Longoria for further review. Pt understands.   "

## 2020-08-28 NOTE — TELEPHONE ENCOUNTER
I am not sure where the sliding scale came from.     I show that she's on Trulicity, metformin, and Lantus.  Confirm on Lantus 55 units nightly.    Need to know what her sugars have been running fasting, as 55 may not be enough.     Can continue Novolog if her sugars after meals are running >200, prefer she go ahead and do 10 units with the meal to head off the hyperglycemia. I'll send rx in, it is not currently on her medicine list.

## 2020-09-01 ENCOUNTER — TELEPHONE (OUTPATIENT)
Dept: INTERNAL MEDICINE | Facility: CLINIC | Age: 55
End: 2020-09-01

## 2020-09-01 NOTE — TELEPHONE ENCOUNTER
Irma with [facility] requested verbal orders for occupational therapy for the patient. Irma would like to know if she could see the patient 1 time per week for 4 weeks.    Please call and advise. Irma's call back [phone]

## 2020-10-28 ENCOUNTER — OFFICE VISIT (OUTPATIENT)
Dept: INTERNAL MEDICINE | Facility: CLINIC | Age: 55
End: 2020-10-28

## 2020-10-28 VITALS
WEIGHT: 193.25 LBS | SYSTOLIC BLOOD PRESSURE: 125 MMHG | HEIGHT: 65 IN | BODY MASS INDEX: 32.2 KG/M2 | TEMPERATURE: 97.7 F | DIASTOLIC BLOOD PRESSURE: 65 MMHG

## 2020-10-28 DIAGNOSIS — Z79.4 TYPE 2 DIABETES MELLITUS WITH HYPERGLYCEMIA, WITH LONG-TERM CURRENT USE OF INSULIN (HCC): Primary | ICD-10-CM

## 2020-10-28 DIAGNOSIS — E11.40 DIABETIC NEUROPATHY, PAINFUL (HCC): ICD-10-CM

## 2020-10-28 DIAGNOSIS — E11.65 TYPE 2 DIABETES MELLITUS WITH HYPERGLYCEMIA, WITH LONG-TERM CURRENT USE OF INSULIN (HCC): Primary | ICD-10-CM

## 2020-10-28 DIAGNOSIS — E11.42 TYPE 2 DIABETES MELLITUS WITH DIABETIC POLYNEUROPATHY, WITH LONG-TERM CURRENT USE OF INSULIN (HCC): ICD-10-CM

## 2020-10-28 DIAGNOSIS — Z79.4 TYPE 2 DIABETES MELLITUS WITH DIABETIC POLYNEUROPATHY, WITH LONG-TERM CURRENT USE OF INSULIN (HCC): ICD-10-CM

## 2020-10-28 DIAGNOSIS — M43.10 ANTEROLISTHESIS: ICD-10-CM

## 2020-10-28 DIAGNOSIS — Z23 NEED FOR INFLUENZA VACCINATION: ICD-10-CM

## 2020-10-28 LAB — HBA1C MFR BLD: 8.1 %

## 2020-10-28 PROCEDURE — 90471 IMMUNIZATION ADMIN: CPT | Performed by: FAMILY MEDICINE

## 2020-10-28 PROCEDURE — 83036 HEMOGLOBIN GLYCOSYLATED A1C: CPT | Performed by: FAMILY MEDICINE

## 2020-10-28 PROCEDURE — 99214 OFFICE O/P EST MOD 30 MIN: CPT | Performed by: FAMILY MEDICINE

## 2020-10-28 PROCEDURE — 90686 IIV4 VACC NO PRSV 0.5 ML IM: CPT | Performed by: FAMILY MEDICINE

## 2020-10-28 RX ORDER — INSULIN GLARGINE 100 [IU]/ML
60 INJECTION, SOLUTION SUBCUTANEOUS NIGHTLY
Qty: 18 PEN | Refills: 3 | Status: SHIPPED | OUTPATIENT
Start: 2020-10-28 | End: 2020-12-29 | Stop reason: SDUPTHER

## 2020-10-28 RX ORDER — CYCLOBENZAPRINE HCL 10 MG
TABLET ORAL
COMMUNITY
Start: 2020-09-04 | End: 2021-05-11

## 2020-10-28 NOTE — PATIENT INSTRUCTIONS
Mediterranean Diet  A Mediterranean diet refers to food and lifestyle choices that are based on the traditions of countries located on the [place]. This way of eating has been shown to help prevent certain conditions and improve outcomes for people who have chronic diseases, like kidney disease and heart disease.  What are tips for following this plan?  Lifestyle  · Cook and eat meals together with your family, when possible.  · Drink enough fluid to keep your urine clear or pale yellow.  · Be physically active every day. This includes:  ? Aerobic exercise like running or swimming.  ? Leisure activities like gardening, walking, or housework.  · Get 7-8 hours of sleep each night.  · If recommended by your health care provider, drink red wine in moderation. This means 1 glass a day for nonpregnant women and 2 glasses a day for men. A glass of wine equals 5 oz (150 mL).  Reading food labels    · Check the serving size of packaged foods. For foods such as rice and pasta, the serving size refers to the amount of cooked product, not dry.  · Check the total fat in packaged foods. Avoid foods that have saturated fat or trans fats.  · Check the ingredients list for added sugars, such as corn syrup.  Shopping  · At the grocery store, buy most of your food from the areas near the walls of the store. This includes:  ? Fresh fruits and vegetables (produce).  ? Grains, beans, nuts, and seeds. Some of these may be available in unpackaged forms or large amounts (in bulk).  ? Fresh seafood.  ? Poultry and eggs.  ? Low-fat dairy products.  · Buy whole ingredients instead of prepackaged foods.  · Buy fresh fruits and vegetables in-season from local farmers markets.  · Buy frozen fruits and vegetables in resealable bags.  · If you do not have access to quality fresh seafood, buy precooked frozen shrimp or canned fish, such as tuna, salmon, or sardines.  · Buy small amounts of raw or cooked vegetables, salads, or olives from  the deli or salad bar at your store.  · Stock your pantry so you always have certain foods on hand, such as olive oil, canned tuna, canned tomatoes, rice, pasta, and beans.  Cooking  · Cook foods with extra-virgin olive oil instead of using butter or other vegetable oils.  · Have meat as a side dish, and have vegetables or grains as your main dish. This means having meat in small portions or adding small amounts of meat to foods like pasta or stew.  · Use beans or vegetables instead of meat in common dishes like chili or lasagna.  · [place] with different cooking methods. Try roasting or broiling vegetables instead of steaming or sautéeing them.  · Add frozen vegetables to soups, stews, pasta, or rice.  · Add nuts or seeds for added healthy fat at each meal. You can add these to yogurt, salads, or vegetable dishes.  · Marinate fish or vegetables using olive oil, lemon juice, garlic, and fresh herbs.  Meal planning    · Plan to eat 1 vegetarian meal one day each week. Try to work up to 2 vegetarian meals, if possible.  · Eat seafood 2 or more times a week.  · Have healthy snacks readily available, such as:  ? Vegetable sticks with hummus.  ? Greek yogurt.  ? Fruit and nut trail mix.  · Eat balanced meals throughout the week. This includes:  ? Fruit: 2-3 servings a day  ? Vegetables: 4-5 servings a day  ? Low-fat dairy: 2 servings a day  ? Fish, poultry, or lean meat: 1 serving a day  ? Beans and legumes: 2 or more servings a week  ? Nuts and seeds: 1-2 servings a day  ? Whole grains: 6-8 servings a day  ? Extra-virgin olive oil: 3-4 servings a day  · Limit red meat and sweets to only a few servings a month  What are my food choices?  · Mediterranean diet  ? Recommended  § Grains: Whole-grain pasta. Brown rice. Bulgar wheat. Polenta. Couscous. Whole-wheat bread. Oatmeal. Quinoa.  § Vegetables: Artichokes. Beets. Broccoli. Cabbage. Carrots. Eggplant. Green beans. Chard. Kale. Spinach. Onions. Leeks. Peas. Squash.  Tomatoes. Peppers. Radishes.  § Fruits: Apples. Apricots. Avocado. Berries. Bananas. Cherries. Dates. Figs. Grapes. Gary. Melon. Oranges. Peaches. Plums. Pomegranate.  § Meats and other protein foods: Beans. Almonds. Sunflower seeds. Pine nuts. Peanuts. Cod. [place]. Scallops. Shrimp. Tuna. Tilapia. Clams. Oysters. Eggs.  § Dairy: Low-fat milk. Cheese. Greek yogurt.  § Beverages: Water. Red wine. Herbal tea.  § Fats and oils: Extra virgin olive oil. Avocado oil. Grape seed oil.  § Sweets and desserts: Greek yogurt with honey. Baked apples. Poached pears. Trail mix.  § Seasoning and other foods: Basil. Cilantro. Coriander. Cumin. Mint. Parsley. Brandyn. Rosemary. Tarragon. Garlic. Oregano. Thyme. Pepper. Balsalmic vinegar. Tahini. Hummus. Tomato sauce. Olives. Mushrooms.  ? Limit these  § Grains: Prepackaged pasta or rice dishes. Prepackaged cereal with added sugar.  § Vegetables: Deep fried potatoes (french fries).  § Fruits: Fruit canned in syrup.  § Meats and other protein foods: Beef. Pork. Lamb. Poultry with skin. Hot dogs. Moore.  § Dairy: Ice cream. Sour cream. Whole milk.  § Beverages: Juice. Sugar-sweetened soft drinks. Beer. Liquor and spirits.  § Fats and oils: Butter. Canola oil. Vegetable oil. Beef fat (tallow). Lard.  § Sweets and desserts: Cookies. Cakes. Pies. Candy.  § Seasoning and other foods: Mayonnaise. Premade sauces and marinades.  The items listed may not be a complete list. Talk with your dietitian about what dietary choices are right for you.  Summary  · The Mediterranean diet includes both food and lifestyle choices.  · Eat a variety of fresh fruits and vegetables, beans, nuts, seeds, and whole grains.  · Limit the amount of red meat and sweets that you eat.  · Talk with your health care provider about whether it is safe for you to drink red wine in moderation. This means 1 glass a day for nonpregnant women and 2 glasses a day for men. A glass of wine equals 5 oz (150 mL).  This information  is not intended to replace advice given to you by your health care provider. Make sure you discuss any questions you have with your health care provider.  Document Released: 08/10/2017 Document Revised: 08/17/2017 Document Reviewed: 08/10/2017  Elsevier Patient Education © 2020 [employer]

## 2020-10-28 NOTE — PROGRESS NOTES
"Subjective    Kimberly Chun is a 55 y.o. female here for:  Chief Complaint   Patient presents with   • Diabetes       History per MA reviewed.     S/p back surgery  Left sided pain, leg  Physical therapy thinks she has tendonitis  Discussed with Dr. Jones who said to discuss with me  PT taught exercises and they help  Pain better but still there    Diabetes mellitus chronic insulin dependent  Intolerant of Trulicity due to nausea  No history of pancreatitis  Associated with neuropathy, on gabapentin  Fasting sugars are typically under 150 she reports           The following portions of the patient's history were reviewed and updated as appropriate: allergies, current medications, past family history, past medical history, past social history, past surgical history and problem list.    Review of Systems   Constitutional: Negative for fever.   Gastrointestinal: Negative for abdominal pain.   Musculoskeletal: Positive for arthralgias.   Psychiatric/Behavioral: Positive for stress.       Objective   Visit Vitals  /65   Temp 97.7 °F (36.5 °C) (Temporal)   Ht 165.1 cm (65\")   Wt 87.7 kg (193 lb 4 oz)   BMI 32.16 kg/m²       Physical Exam  Vitals signs and nursing note reviewed.   Constitutional:       General: She is not in acute distress.     Appearance: Normal appearance. She is well-developed and well-groomed. She is obese. She is not ill-appearing, toxic-appearing or diaphoretic.      Interventions: Face mask in place.   HENT:      Head: Normocephalic and atraumatic.      Right Ear: Hearing normal.      Left Ear: Hearing normal.   Eyes:      General: Lids are normal. No scleral icterus.     Extraocular Movements: Extraocular movements intact.      Conjunctiva/sclera: Conjunctivae normal.      Pupils: Pupils are equal, round, and reactive to light.   Neck:      Musculoskeletal: Neck supple.      Trachea: Phonation normal.   Pulmonary:      Effort: Pulmonary effort is normal.   Skin:     General: Skin is warm. "      Coloration: Skin is not ashen, cyanotic, jaundiced, pale or sallow.   Neurological:      General: No focal deficit present.      Mental Status: She is alert and oriented to person, place, and time.      Cranial Nerves: No dysarthria.      Motor: Motor function is intact.      Gait: Gait normal.   Psychiatric:         Attention and Perception: Attention and perception normal.         Mood and Affect: Mood and affect normal.         Speech: Speech normal.         Behavior: Behavior normal. Behavior is cooperative.         Thought Content: Thought content normal.         Cognition and Memory: Cognition and memory normal.         Judgment: Judgment normal.         Lab Results   Component Value Date    HGBA1C 8.1 10/28/2020    HGBA1C 8.6 07/06/2020    HGBA1C 7.2 10/09/2019       Assessment/Plan     Problem List Items Addressed This Visit        Endocrine    Diabetic peripheral neuropathy associated with type 2 diabetes mellitus (CMS/HCC)    Relevant Medications    Insulin Glargine (Lantus SoloStar) 100 UNIT/ML injection pen    SITagliptin (Januvia) 50 MG tablet    metFORMIN (GLUCOPHAGE) 1000 MG tablet    Type 2 diabetes mellitus with diabetic polyneuropathy, with long-term current use of insulin (CMS/HCC)    Relevant Medications    Insulin Glargine (Lantus SoloStar) 100 UNIT/ML injection pen    SITagliptin (Januvia) 50 MG tablet    metFORMIN (GLUCOPHAGE) 1000 MG tablet    Type 2 diabetes mellitus with hyperglycemia, with long-term current use of insulin (CMS/HCC) - Primary    Relevant Medications    Insulin Glargine (Lantus SoloStar) 100 UNIT/ML injection pen    SITagliptin (Januvia) 50 MG tablet    metFORMIN (GLUCOPHAGE) 1000 MG tablet    Other Relevant Orders    POC Glycosylated Hemoglobin (Hb A1C) (Completed)      Other Visit Diagnoses     Anterolisthesis        Need for influenza vaccination        Relevant Orders    FluLaval Quad >6 Months (3986-4090) (Completed)          · Follow up with orthopedics regarding  back issues  · Work on diabetes mellitus control--improve diet habits. Continue insulin, metformin, added Januvia.   Return in about 3 months (around 1/31/2021) for Medicare Wellness.  ·        Maria Luz Longoria MD

## 2020-12-28 ENCOUNTER — LAB (OUTPATIENT)
Dept: LAB | Facility: HOSPITAL | Age: 55
End: 2020-12-28

## 2020-12-28 ENCOUNTER — OFFICE VISIT (OUTPATIENT)
Dept: INTERNAL MEDICINE | Facility: CLINIC | Age: 55
End: 2020-12-28

## 2020-12-28 VITALS
HEART RATE: 106 BPM | DIASTOLIC BLOOD PRESSURE: 65 MMHG | SYSTOLIC BLOOD PRESSURE: 122 MMHG | OXYGEN SATURATION: 98 % | TEMPERATURE: 97.7 F

## 2020-12-28 DIAGNOSIS — E11.42 TYPE 2 DIABETES MELLITUS WITH DIABETIC POLYNEUROPATHY, WITH LONG-TERM CURRENT USE OF INSULIN (HCC): ICD-10-CM

## 2020-12-28 DIAGNOSIS — R05.9 COUGH: Primary | ICD-10-CM

## 2020-12-28 DIAGNOSIS — J40 BRONCHITIS: ICD-10-CM

## 2020-12-28 DIAGNOSIS — R68.83 CHILLS: ICD-10-CM

## 2020-12-28 DIAGNOSIS — J44.9 CHRONIC OBSTRUCTIVE PULMONARY DISEASE, UNSPECIFIED COPD TYPE (HCC): ICD-10-CM

## 2020-12-28 DIAGNOSIS — Z79.4 TYPE 2 DIABETES MELLITUS WITH DIABETIC POLYNEUROPATHY, WITH LONG-TERM CURRENT USE OF INSULIN (HCC): ICD-10-CM

## 2020-12-28 PROCEDURE — U0004 COV-19 TEST NON-CDC HGH THRU: HCPCS | Performed by: NURSE PRACTITIONER

## 2020-12-28 PROCEDURE — 99214 OFFICE O/P EST MOD 30 MIN: CPT | Performed by: NURSE PRACTITIONER

## 2020-12-28 RX ORDER — OXYCODONE HYDROCHLORIDE AND ACETAMINOPHEN 5; 325 MG/1; MG/1
1 TABLET ORAL DAILY PRN
COMMUNITY
Start: 2020-12-08 | End: 2021-01-28

## 2020-12-28 RX ORDER — DOXYCYCLINE HYCLATE 100 MG/1
100 CAPSULE ORAL 2 TIMES DAILY
Qty: 20 CAPSULE | Refills: 0 | Status: SHIPPED | OUTPATIENT
Start: 2020-12-28 | End: 2021-01-07

## 2020-12-28 RX ORDER — METHYLPREDNISOLONE 4 MG/1
TABLET ORAL
Qty: 21 EACH | Refills: 0 | Status: SHIPPED | OUTPATIENT
Start: 2020-12-28 | End: 2021-01-28

## 2020-12-29 DIAGNOSIS — E11.42 TYPE 2 DIABETES MELLITUS WITH DIABETIC POLYNEUROPATHY, WITH LONG-TERM CURRENT USE OF INSULIN (HCC): ICD-10-CM

## 2020-12-29 DIAGNOSIS — Z79.4 TYPE 2 DIABETES MELLITUS WITH DIABETIC POLYNEUROPATHY, WITH LONG-TERM CURRENT USE OF INSULIN (HCC): ICD-10-CM

## 2020-12-29 DIAGNOSIS — Z79.4 TYPE 2 DIABETES MELLITUS WITH HYPERGLYCEMIA, WITH LONG-TERM CURRENT USE OF INSULIN (HCC): ICD-10-CM

## 2020-12-29 DIAGNOSIS — E11.65 TYPE 2 DIABETES MELLITUS WITH HYPERGLYCEMIA, WITH LONG-TERM CURRENT USE OF INSULIN (HCC): ICD-10-CM

## 2020-12-29 DIAGNOSIS — K21.9 GASTROESOPHAGEAL REFLUX DISEASE WITHOUT ESOPHAGITIS: ICD-10-CM

## 2020-12-29 LAB — SARS-COV-2 RNA RESP QL NAA+PROBE: NOT DETECTED

## 2020-12-29 RX ORDER — INSULIN GLARGINE 100 [IU]/ML
60 INJECTION, SOLUTION SUBCUTANEOUS NIGHTLY
Qty: 18 PEN | Refills: 0 | Status: SHIPPED | OUTPATIENT
Start: 2020-12-29 | End: 2021-01-27 | Stop reason: SDUPTHER

## 2020-12-29 RX ORDER — OMEPRAZOLE 20 MG/1
20 CAPSULE, DELAYED RELEASE ORAL 2 TIMES DAILY
Qty: 180 CAPSULE | Refills: 0 | Status: SHIPPED | OUTPATIENT
Start: 2020-12-29 | End: 2021-04-08 | Stop reason: SDUPTHER

## 2020-12-29 NOTE — TELEPHONE ENCOUNTER
Caller: ChunKimberly    Relationship: Self    Best call back number: [phone]    Medication needed:   Requested Prescriptions     Pending Prescriptions Disp Refills   • omeprazole (priLOSEC) 20 MG capsule 180 capsule 3     Sig: Take 1 capsule by mouth 2 (Two) Times a Day.   • Insulin Glargine (Lantus SoloStar) 100 UNIT/ML injection pen 18 pen 3     Sig: Inject 60 Units under the skin into the appropriate area as directed Every Night.       When do you need the refill by: ASAP    What details did the patient provide when requesting the medication:     Does the patient have less than a 3 day supply:  [x] Yes  [] No    What is the patient's preferred pharmacy: [place] [address] - [phone] [facility] [phone]

## 2021-01-27 DIAGNOSIS — Z79.4 TYPE 2 DIABETES MELLITUS WITH HYPERGLYCEMIA, WITH LONG-TERM CURRENT USE OF INSULIN (HCC): ICD-10-CM

## 2021-01-27 DIAGNOSIS — Z79.4 TYPE 2 DIABETES MELLITUS WITH DIABETIC POLYNEUROPATHY, WITH LONG-TERM CURRENT USE OF INSULIN (HCC): ICD-10-CM

## 2021-01-27 DIAGNOSIS — E11.65 TYPE 2 DIABETES MELLITUS WITH HYPERGLYCEMIA, WITH LONG-TERM CURRENT USE OF INSULIN (HCC): ICD-10-CM

## 2021-01-27 DIAGNOSIS — J30.89 ENVIRONMENTAL AND SEASONAL ALLERGIES: ICD-10-CM

## 2021-01-27 DIAGNOSIS — E11.42 TYPE 2 DIABETES MELLITUS WITH DIABETIC POLYNEUROPATHY, WITH LONG-TERM CURRENT USE OF INSULIN (HCC): ICD-10-CM

## 2021-01-27 RX ORDER — INSULIN GLARGINE 100 [IU]/ML
60 INJECTION, SOLUTION SUBCUTANEOUS NIGHTLY
Qty: 18 PEN | Refills: 0 | Status: CANCELLED | OUTPATIENT
Start: 2021-01-27

## 2021-01-27 RX ORDER — CETIRIZINE HYDROCHLORIDE 10 MG/1
10 TABLET ORAL DAILY
Qty: 90 TABLET | Refills: 3 | Status: CANCELLED | OUTPATIENT
Start: 2021-01-27

## 2021-01-27 NOTE — TELEPHONE ENCOUNTER
Caller: Kimberly Chun    Relationship: Self    Best call back number: [phone]    Medication needed:   Requested Prescriptions     Pending Prescriptions Disp Refills   • Insulin Glargine (Lantus SoloStar) 100 UNIT/ML injection pen 18 pen 0     Sig: Inject 60 Units under the skin into the appropriate area as directed Every Night.   • cetirizine (zyrTEC) 10 MG tablet 90 tablet 3     Sig: Take 1 tablet by mouth Daily.       When do you need the refill by: ASAP    What details did the patient provide when requesting the medication: PATIENT STATES THAT SHE HAS LESS THAN A THREE DAY SUPPLY.    Does the patient have less than a 3 day supply:  [x] Yes  [] No    What is the patient's preferred pharmacy: [address] - [phone] [facility] [phone]

## 2021-01-28 ENCOUNTER — OFFICE VISIT (OUTPATIENT)
Dept: INTERNAL MEDICINE | Facility: CLINIC | Age: 56
End: 2021-01-28

## 2021-01-28 VITALS — BODY MASS INDEX: 32.82 KG/M2 | HEIGHT: 65 IN | WEIGHT: 197 LBS

## 2021-01-28 DIAGNOSIS — Z12.2 ENCOUNTER FOR SCREENING FOR LUNG CANCER: ICD-10-CM

## 2021-01-28 DIAGNOSIS — J44.9 CHRONIC OBSTRUCTIVE PULMONARY DISEASE, UNSPECIFIED COPD TYPE (HCC): ICD-10-CM

## 2021-01-28 DIAGNOSIS — Z79.4 TYPE 2 DIABETES MELLITUS WITH HYPERGLYCEMIA, WITH LONG-TERM CURRENT USE OF INSULIN (HCC): ICD-10-CM

## 2021-01-28 DIAGNOSIS — E11.65 TYPE 2 DIABETES MELLITUS WITH HYPERGLYCEMIA, WITH LONG-TERM CURRENT USE OF INSULIN (HCC): ICD-10-CM

## 2021-01-28 DIAGNOSIS — Z79.4 TYPE 2 DIABETES MELLITUS WITH DIABETIC POLYNEUROPATHY, WITH LONG-TERM CURRENT USE OF INSULIN (HCC): ICD-10-CM

## 2021-01-28 DIAGNOSIS — E11.42 DIABETIC PERIPHERAL NEUROPATHY ASSOCIATED WITH TYPE 2 DIABETES MELLITUS (HCC): ICD-10-CM

## 2021-01-28 DIAGNOSIS — R63.5 WEIGHT GAIN: ICD-10-CM

## 2021-01-28 DIAGNOSIS — K21.9 GASTROESOPHAGEAL REFLUX DISEASE WITHOUT ESOPHAGITIS: ICD-10-CM

## 2021-01-28 DIAGNOSIS — Z00.00 MEDICARE ANNUAL WELLNESS VISIT, SUBSEQUENT: Primary | ICD-10-CM

## 2021-01-28 DIAGNOSIS — C67.9 PRIMARY CANCER OF BLADDER (HCC): ICD-10-CM

## 2021-01-28 DIAGNOSIS — G89.29 CHRONIC MIDLINE LOW BACK PAIN WITH SCIATICA, SCIATICA LATERALITY UNSPECIFIED: ICD-10-CM

## 2021-01-28 DIAGNOSIS — E11.42 TYPE 2 DIABETES MELLITUS WITH DIABETIC POLYNEUROPATHY, WITH LONG-TERM CURRENT USE OF INSULIN (HCC): ICD-10-CM

## 2021-01-28 DIAGNOSIS — Z79.899 ENCOUNTER FOR MONITORING LONG-TERM PROTON PUMP INHIBITOR THERAPY: ICD-10-CM

## 2021-01-28 DIAGNOSIS — Z51.81 ENCOUNTER FOR MONITORING LONG-TERM PROTON PUMP INHIBITOR THERAPY: ICD-10-CM

## 2021-01-28 DIAGNOSIS — M54.40 CHRONIC MIDLINE LOW BACK PAIN WITH SCIATICA, SCIATICA LATERALITY UNSPECIFIED: ICD-10-CM

## 2021-01-28 DIAGNOSIS — Z87.891 PERSONAL HISTORY OF TOBACCO USE, PRESENTING HAZARDS TO HEALTH: ICD-10-CM

## 2021-01-28 PROCEDURE — G0439 PPPS, SUBSEQ VISIT: HCPCS | Performed by: FAMILY MEDICINE

## 2021-01-28 RX ORDER — GABAPENTIN 800 MG/1
800 TABLET ORAL 3 TIMES DAILY
Qty: 270 TABLET | Refills: 2 | Status: SHIPPED | OUTPATIENT
Start: 2021-01-28 | End: 2021-04-08 | Stop reason: SDUPTHER

## 2021-01-28 RX ORDER — INSULIN GLARGINE 100 [IU]/ML
60 INJECTION, SOLUTION SUBCUTANEOUS NIGHTLY
Qty: 18 PEN | Refills: 3 | Status: SHIPPED | OUTPATIENT
Start: 2021-01-28 | End: 2021-04-08

## 2021-01-28 NOTE — PROGRESS NOTES
The ABCs of the Annual Wellness Visit  Subsequent Medicare Wellness Visit    No chief complaint on file.      Subjective   History of Present Illness:  Kimberly Chun is a 55 y.o. female who presents for a Subsequent Medicare Wellness Visit.    HEALTH RISK ASSESSMENT    Recent Hospitalizations:  Recently treated at the following:  Other: [facility]    Current Medical Providers:  Patient Care Team:  Maria Luz Longoria MD as PCP - Shelley Tariq MD as Consulting Physician (Ophthalmology)  Alvaro Lemos MD as Consulting Physician (Urology)  Evert Jones MD (Orthopedic Surgery)    Smoking Status:  Social History     Tobacco Use   Smoking Status Current Every Day Smoker   • Packs/day: 1.00   • Years: 40.00   • Pack years: 40.00   • Types: Cigarettes   Smokeless Tobacco Never Used       Alcohol Consumption:  Social History     Substance and Sexual Activity   Alcohol Use No       Depression Screen:   PHQ-2/PHQ-9 Depression Screening 1/28/2021   Little interest or pleasure in doing things 0   Feeling down, depressed, or hopeless 1   Total Score 1       Fall Risk Screen:  ANNAADI Fall Risk Assessment has not been completed.    Health Habits and Functional and Cognitive Screening:  Functional & Cognitive Status 1/28/2021   Do you have difficulty preparing food and eating? No   Do you have difficulty bathing yourself, getting dressed or grooming yourself? No   Do you have difficulty using the toilet? No   Do you have difficulty moving around from place to place? Yes   Do you have trouble with steps or getting out of a bed or a chair? No   Current Diet Well Balanced Diet   Dental Exam Not up to date        Dental Exam Comment -   Eye Exam Not up to date        Eye Exam Comment -   Exercise (times per week) 0 times per week   Current Exercise Activities Include None   Do you need help using the phone?  No   Are you deaf or do you have serious difficulty hearing?  No   Do you need help with  "transportation? No   Do you need help shopping? No   Do you need help preparing meals?  No   Do you need help with housework?  Yes   Do you need help with laundry? Yes   Do you need help taking your medications? No   Do you need help managing money? No   Do you ever drive or ride in a car without wearing a seat belt? No   Have you felt unusual stress, anger or loneliness in the last month? Yes   Who do you live with? Alone   If you need help, do you have trouble finding someone available to you? No   Have you been bothered in the last four weeks by sexual problems? No   Do you have difficulty concentrating, remembering or making decisions? No         Does the patient have evidence of cognitive impairment? {Yes/No w/ pre-defaulted No:86704::\"No\"}    Asprin use counseling:{Aspirin :84311}    Age-appropriate Screening Schedule:  Refer to the list below for future screening recommendations based on patient's age, sex and/or medical conditions. Orders for these recommended tests are listed in the plan section. The patient has been provided with a written plan.    Health Maintenance   Topic Date Due   • ZOSTER VACCINE (1 of 2) 04/01/2014   • DIABETIC EYE EXAM  09/13/2019   • URINE MICROALBUMIN  04/09/2020   • DIABETIC FOOT EXAM  07/09/2020   • MAMMOGRAM  08/22/2020   • HEMOGLOBIN A1C  04/28/2021   • TDAP/TD VACCINES (2 - Td) 02/05/2025   • INFLUENZA VACCINE  Completed   • PAP SMEAR  Discontinued          The following portions of the patient's history were reviewed and updated as appropriate: {history reviewed:20406::\"allergies\",\"current medications\",\"past family history\",\"past medical history\",\"past social history\",\"past surgical history\",\"problem list\"}.    Outpatient Medications Prior to Visit   Medication Sig Dispense Refill   • albuterol (PROVENTIL) (2.5 MG/3ML) 0.083% nebulizer solution Take 2.5 mg by nebulization Every 6 (Six) Hours As Needed for Wheezing or Shortness of Air. 60 vial 12   • albuterol sulfate " HFA (PROAIR HFA) 108 (90 Base) MCG/ACT inhaler Inhale 2 puffs Every 6 (Six) Hours As Needed for Wheezing. 3 inhaler 6   • Alcohol Swabs (ALCOHOL PADS) 70 % pads 1 pad 4 (Four) Times a Day. Check sugars fasting and 2 hours after meals. 100 each 12   • aspirin 81 MG EC tablet Take 1 tablet by mouth Daily. 90 tablet 3   • B-D ULTRA-FINE 33 LANCETS misc 1 Units 4 (Four) Times a Day. Check sugars fasting and 2 hours after meals. 100 each 12   • Breo Ellipta 200-25 MCG/INH inhaler INL 1 PUFF PO QD     • cetirizine (zyrTEC) 10 MG tablet Take 1 tablet by mouth Daily. Indications: allergies 90 tablet 3   • clopidogrel (PLAVIX) 75 MG tablet Take 1 tablet by mouth Daily. 90 tablet 3   • cyclobenzaprine (FLEXERIL) 10 MG tablet TK 1 T PO Q 8 H PRF MSP     • gabapentin (NEURONTIN) 800 MG tablet TAKE 1 TABLET BY MOUTH THREE TIMES A DAY. 270 tablet 2   • glucose blood test strip Check sugars fasting and 2 hours after meals (4x a day due to hyperglycemia) 100 each 12   • glucose monitor monitoring kit 1 each As Needed (diabetes). Check sugars fasting and 2 hours after meals. 1 each 0   • Insulin Glargine (Lantus SoloStar) 100 UNIT/ML injection pen Inject 60 Units under the skin into the appropriate area as directed Every Night. 18 pen 3   • Insulin Lispro (HumaLOG KwikPen) 200 UNIT/ML solution pen-injector Inject 10 Units under the skin into the appropriate area as directed 3 (Three) Times a Day With Meals. 13.5 mL 3   • Insulin Pen Needle (BD Pen Needle Ursula U/F) 32G X 4 MM misc USE TO INJECT INSULIN 4x DAY AND TRULICITY ONCE A WEEK 200 each 3   • lisinopril (PRINIVIL,ZESTRIL) 5 MG tablet Take 1 tablet by mouth Daily. 90 tablet 3   • LORazepam (ATIVAN) 1 MG tablet TAKE 1/2 TO 1 TAB PO BID PRN ANXIETY 180 tablet 1   • metFORMIN (GLUCOPHAGE) 1000 MG tablet Take 1 tablet by mouth 2 (Two) Times a Day With Meals. 180 tablet 3   • omeprazole (priLOSEC) 20 MG capsule Take 1 capsule by mouth 2 (Two) Times a Day. 180 capsule 0   •  "SITagliptin (Januvia) 50 MG tablet Take 1 tablet by mouth Daily. Indications: Type 2 Diabetes 90 tablet 3   • meloxicam (MOBIC) 7.5 MG tablet Take 1 tablet by mouth Daily As Needed for Moderate Pain . 90 tablet 3   • methylPREDNISolone (MEDROL) 4 MG dose pack Take as directed on package instructions. 21 each 0   • oxyCODONE-acetaminophen (PERCOCET) 5-325 MG per tablet Take 1 tablet by mouth Daily As Needed.       No facility-administered medications prior to visit.        Patient Active Problem List   Diagnosis   • Anxiety   • Cerebral atherosclerosis   • Gastroesophageal reflux disease   • Chronic obstructive pulmonary disease (CMS/HCC)   • Osteoarthritis   • Dyssomnia   • Type 2 diabetes mellitus with diabetic polyneuropathy, with long-term current use of insulin (CMS/[facility])   • Hematuria   • Primary cancer of bladder (CMS/[facility])   • Tobacco abuse   • Diabetic peripheral neuropathy associated with type 2 diabetes mellitus (CMS/HCC)   • Chronic fatigue   • Gastritis determined by biopsy   • Overweight   • Type 2 diabetes mellitus with diabetic polyneuropathy, with long-term current use of insulin (CMS/[facility])   • Type 2 diabetes mellitus with hyperglycemia, with long-term current use of insulin (CMS/[facility])       Advanced Care Planning:  ACP discussion was declined by the patient. Patient does not have an advance directive, declines further assistance.    Review of Systems    Compared to one year ago, the patient feels her physical health is {better worse same:89509}.  Compared to one year ago, the patient feels her mental health is {better worse same:71075}.    Reviewed chart for potential of high risk medication in the elderly: {Response;Yes/No/NA:[id number]::\"yes\"}  Reviewed chart for potential of harmful drug interactions in the elderly:{Response;Yes/No/NA:[id number]::\"yes\"}    Objective       There were no vitals filed for this visit.    There is no height or weight on file to calculate BMI.  Discussed the patient's BMI " with her. The BMI {[facility]58903}.    Physical Exam          Assessment/Plan   Medicare Risks and Personalized Health Plan  CMS Preventative Services Quick Reference  {Medicare Wellness Risk Factors and Personalized Health Plan:65792}    The above risks/problems have been discussed with the patient.  Pertinent information has been shared with the patient in the After Visit Summary.  Follow up plans and orders are seen below in the Assessment/Plan Section.    Diagnoses and all orders for this visit:    1. Type 2 diabetes mellitus with hyperglycemia, with long-term current use of insulin (CMS/[facility])  -     Insulin Glargine (Lantus SoloStar) 100 UNIT/ML injection pen; Inject 60 Units under the skin into the appropriate area as directed Every Night.  Dispense: 18 pen; Refill: 3  -     SITagliptin (Januvia) 50 MG tablet; Take 1 tablet by mouth Daily. Indications: Type 2 Diabetes  Dispense: 90 tablet; Refill: 3  -     metFORMIN (GLUCOPHAGE) 1000 MG tablet; Take 1 tablet by mouth 2 (Two) Times a Day With Meals.  Dispense: 180 tablet; Refill: 3    2. Type 2 diabetes mellitus with diabetic polyneuropathy, with long-term current use of insulin (CMS/[facility])  -     Insulin Glargine (Lantus SoloStar) 100 UNIT/ML injection pen; Inject 60 Units under the skin into the appropriate area as directed Every Night.  Dispense: 18 pen; Refill: 3  -     SITagliptin (Januvia) 50 MG tablet; Take 1 tablet by mouth Daily. Indications: Type 2 Diabetes  Dispense: 90 tablet; Refill: 3  -     metFORMIN (GLUCOPHAGE) 1000 MG tablet; Take 1 tablet by mouth 2 (Two) Times a Day With Meals.  Dispense: 180 tablet; Refill: 3  -     gabapentin (NEURONTIN) 800 MG tablet; Take 1 tablet by mouth 3 (Three) Times a Day.  Dispense: 270 tablet; Refill: 2    3. Diabetic peripheral neuropathy associated with type 2 diabetes mellitus (CMS/[facility])  -     gabapentin (NEURONTIN) 800 MG tablet; Take 1 tablet by mouth 3 (Three) Times a Day.  Dispense: 270  tablet; Refill: 2    4. Chronic midline low back pain with sciatica, sciatica laterality unspecified  -     gabapentin (NEURONTIN) 800 MG tablet; Take 1 tablet by mouth 3 (Three) Times a Day.  Dispense: 270 tablet; Refill: 2      Follow Up:  No follow-ups on file.     An After Visit Summary and PPPS were given to the patient.

## 2021-01-28 NOTE — TELEPHONE ENCOUNTER
Caller: [facility] [address] [facility] - [phone] [place] [phone] FX    Relationship: Pharmacy    Best call back number: [phone]    Medication needed:   Requested Prescriptions     Pending Prescriptions Disp Refills   • Insulin Glargine (Lantus SoloStar) 100 UNIT/ML injection pen 18 pen 3     Sig: Inject 60 Units under the skin into the appropriate area as directed Every Night.       When do you need the refill by: 01/28/2021    What details did the patient provide when requesting the medication:PHARMACY CALLED AND STATED THE PATIENT IS COMPLETELY OUT OF MEDICATION     Does the patient have less than a 3 day supply:  [x] Yes  [] No    What is the patient's preferred pharmacy: [place], [place] - [address] [facility] - [phone] [place] [phone] FX

## 2021-01-28 NOTE — PROGRESS NOTES
The ABCs of the Annual Wellness Visit  Subsequent Medicare Wellness Visit    Chief Complaint   Patient presents with   • Medicare Wellness-subsequent       Subjective   History of Present Illness:  Kimberly Chun is a 55 y.o. female who presents for a Subsequent Medicare Wellness Visit.    HEALTH RISK ASSESSMENT    Recent Hospitalizations:  No hospitalization(s) within the last year.    Current Medical Providers:  Patient Care Team:  Maria Luz Longoria MD as PCP - General  Shelley Curran MD as Consulting Physician (Ophthalmology)  Alvaro Lemos MD as Consulting Physician (Urology)  Evert Jones MD (Orthopedic Surgery)    Smoking Status:  Social History     Tobacco Use   Smoking Status Current Every Day Smoker   • Packs/day: 1.00   • Years: 40.00   • Pack years: 40.00   • Types: Cigarettes   Smokeless Tobacco Never Used       Alcohol Consumption:  Social History     Substance and Sexual Activity   Alcohol Use No       Depression Screen:   PHQ-2/PHQ-9 Depression Screening 1/28/2021   Little interest or pleasure in doing things 0   Feeling down, depressed, or hopeless 1   Total Score 1       Fall Risk Screen:  ANNAADI Fall Risk Assessment has not been completed.    Health Habits and Functional and Cognitive Screening:  Functional & Cognitive Status 1/28/2021   Do you have difficulty preparing food and eating? No   Do you have difficulty bathing yourself, getting dressed or grooming yourself? No   Do you have difficulty using the toilet? No   Do you have difficulty moving around from place to place? Yes   Do you have trouble with steps or getting out of a bed or a chair? No   Current Diet Well Balanced Diet   Dental Exam Not up to date        Dental Exam Comment -   Eye Exam Not up to date        Eye Exam Comment -   Exercise (times per week) 0 times per week   Current Exercise Activities Include None   Do you need help using the phone?  No   Are you deaf or do you have serious difficulty  hearing?  No   Do you need help with transportation? No   Do you need help shopping? No   Do you need help preparing meals?  No   Do you need help with housework?  Yes   Do you need help with laundry? Yes   Do you need help taking your medications? No   Do you need help managing money? No   Do you ever drive or ride in a car without wearing a seat belt? No   Have you felt unusual stress, anger or loneliness in the last month? Yes   Who do you live with? Alone   If you need help, do you have trouble finding someone available to you? No   Have you been bothered in the last four weeks by sexual problems? No   Do you have difficulty concentrating, remembering or making decisions? No         Does the patient have evidence of cognitive impairment? No    Asprin use counseling:Taking ASA appropriately as indicated    Age-appropriate Screening Schedule:  Refer to the list below for future screening recommendations based on patient's age, sex and/or medical conditions. Orders for these recommended tests are listed in the plan section. The patient has been provided with a written plan.    Health Maintenance   Topic Date Due   • DIABETIC FOOT EXAM  02/12/2021 (Originally 7/9/2020)   • URINE MICROALBUMIN  02/15/2021 (Originally 4/9/2020)   • DIABETIC EYE EXAM  03/18/2021 (Originally 9/13/2019)   • MAMMOGRAM  01/28/2022 (Originally 8/22/2020)   • ZOSTER VACCINE (1 of 2) 02/06/2022 (Originally 4/1/2014)   • HEMOGLOBIN A1C  04/28/2021   • TDAP/TD VACCINES (2 - Td) 02/05/2025   • INFLUENZA VACCINE  Completed   • PAP SMEAR  Discontinued          The following portions of the patient's history were reviewed and updated as appropriate: allergies, current medications, past family history, past medical history, past social history, past surgical history and problem list.    Outpatient Medications Prior to Visit   Medication Sig Dispense Refill   • albuterol (PROVENTIL) (2.5 MG/3ML) 0.083% nebulizer solution Take 2.5 mg by nebulization  Every 6 (Six) Hours As Needed for Wheezing or Shortness of Air. 60 vial 12   • albuterol sulfate HFA (PROAIR HFA) 108 (90 Base) MCG/ACT inhaler Inhale 2 puffs Every 6 (Six) Hours As Needed for Wheezing. 3 inhaler 6   • Alcohol Swabs (ALCOHOL PADS) 70 % pads 1 pad 4 (Four) Times a Day. Check sugars fasting and 2 hours after meals. 100 each 12   • aspirin 81 MG EC tablet Take 1 tablet by mouth Daily. 90 tablet 3   • B-D ULTRA-FINE 33 LANCETS misc 1 Units 4 (Four) Times a Day. Check sugars fasting and 2 hours after meals. 100 each 12   • Breo Ellipta 200-25 MCG/INH inhaler INL 1 PUFF PO QD     • cetirizine (zyrTEC) 10 MG tablet Take 1 tablet by mouth Daily. Indications: allergies 90 tablet 3   • clopidogrel (PLAVIX) 75 MG tablet Take 1 tablet by mouth Daily. 90 tablet 3   • cyclobenzaprine (FLEXERIL) 10 MG tablet TK 1 T PO Q 8 H PRF MSP     • glucose blood test strip Check sugars fasting and 2 hours after meals (4x a day due to hyperglycemia) 100 each 12   • glucose monitor monitoring kit 1 each As Needed (diabetes). Check sugars fasting and 2 hours after meals. 1 each 0   • Insulin Lispro (HumaLOG KwikPen) 200 UNIT/ML solution pen-injector Inject 10 Units under the skin into the appropriate area as directed 3 (Three) Times a Day With Meals. 13.5 mL 3   • Insulin Pen Needle (BD Pen Needle Ursula U/F) 32G X 4 MM misc USE TO INJECT INSULIN 4x DAY AND TRULICITY ONCE A WEEK 200 each 3   • lisinopril (PRINIVIL,ZESTRIL) 5 MG tablet Take 1 tablet by mouth Daily. 90 tablet 3   • LORazepam (ATIVAN) 1 MG tablet TAKE 1/2 TO 1 TAB PO BID PRN ANXIETY 180 tablet 1   • omeprazole (priLOSEC) 20 MG capsule Take 1 capsule by mouth 2 (Two) Times a Day. 180 capsule 0   • gabapentin (NEURONTIN) 800 MG tablet TAKE 1 TABLET BY MOUTH THREE TIMES A DAY. 270 tablet 2   • Insulin Glargine (Lantus SoloStar) 100 UNIT/ML injection pen Inject 60 Units under the skin into the appropriate area as directed Every Night. 18 pen 3   • metFORMIN (GLUCOPHAGE)  1000 MG tablet Take 1 tablet by mouth 2 (Two) Times a Day With Meals. 180 tablet 3   • SITagliptin (Januvia) 50 MG tablet Take 1 tablet by mouth Daily. Indications: Type 2 Diabetes 90 tablet 3   • meloxicam (MOBIC) 7.5 MG tablet Take 1 tablet by mouth Daily As Needed for Moderate Pain . 90 tablet 3   • methylPREDNISolone (MEDROL) 4 MG dose pack Take as directed on package instructions. 21 each 0   • oxyCODONE-acetaminophen (PERCOCET) 5-325 MG per tablet Take 1 tablet by mouth Daily As Needed.       No facility-administered medications prior to visit.        Patient Active Problem List   Diagnosis   • Anxiety   • Cerebral atherosclerosis   • Gastroesophageal reflux disease   • Chronic obstructive pulmonary disease (CMS/HCC)   • Osteoarthritis   • Dyssomnia   • Type 2 diabetes mellitus with diabetic polyneuropathy, with long-term current use of insulin (CMS/HCC)   • Hematuria   • Primary cancer of bladder (CMS/HCC)   • Tobacco abuse   • Diabetic peripheral neuropathy associated with type 2 diabetes mellitus (CMS/HCC)   • Chronic fatigue   • Gastritis determined by biopsy   • Overweight   • Type 2 diabetes mellitus with diabetic polyneuropathy, with long-term current use of insulin (CMS/HCC)   • Type 2 diabetes mellitus with hyperglycemia, with long-term current use of insulin (CMS/[facility])       Advanced Care Planning:  ACP discussion was held with the patient during this visit. Patient does not have an advance directive, declines further assistance.    Review of Systems   Constitutional: Negative for fever.   Musculoskeletal: Positive for arthralgias.   Psychiatric/Behavioral:        Stress       Compared to one year ago, the patient feels her physical health is worse.  Compared to one year ago, the patient feels her mental health is the same.    Reviewed chart for potential of high risk medication in the elderly: yes  Reviewed chart for potential of harmful drug interactions in the elderly:yes    Objective        "  Vitals:    01/28/21 1107   Weight: 89.4 kg (197 lb)  Comment: per pt   Height: 165.1 cm (65\")   PainSc:   1   PainLoc: Head       Body mass index is 32.78 kg/m².  Discussed the patient's BMI with her. The BMI is above average; BMI management plan is completed.    Physical Exam  Nursing note reviewed.   HENT:      Head: Normocephalic and atraumatic.      Right Ear: Hearing normal.      Left Ear: Hearing normal.   Neurological:      Mental Status: She is alert and oriented to person, place, and time.   Psychiatric:         Attention and Perception: Attention and perception normal.         Mood and Affect: Mood and affect normal.               Assessment/Plan   Medicare Risks and Personalized Health Plan  CMS Preventative Services Quick Reference  Advance Directive Discussion  Cardiovascular risk  Dementia/Memory   Depression/Dysphoria  Diabetic Lab Screening   Fall Risk  Immunizations Discussed/Encouraged (specific immunizations; Shingrix and COVID-19 )  Inadequate Social Support, Isolation, Loneliness, Lack of Transportation, Financial Difficulties, or Caregiver Stress   Inactivity/Sedentary  Lung Cancer Risk  Obesity/Overweight   Polypharmacy    The above risks/problems have been discussed with the patient.  Pertinent information has been shared with the patient in the After Visit Summary.  Follow up plans and orders are seen below in the Assessment/Plan Section.    Diagnoses and all orders for this visit:    1. Medicare annual wellness visit, subsequent (Primary)    2. Type 2 diabetes mellitus with hyperglycemia, with long-term current use of insulin (CMS/[facility])  -     Insulin Glargine (Lantus SoloStar) 100 UNIT/ML injection pen; Inject 60 Units under the skin into the appropriate area as directed Every Night.  Dispense: 18 pen; Refill: 3  -     SITagliptin (Januvia) 50 MG tablet; Take 1 tablet by mouth Daily. Indications: Type 2 Diabetes  Dispense: 90 tablet; Refill: 3  -     metFORMIN (GLUCOPHAGE) 1000 MG tablet; " Take 1 tablet by mouth 2 (Two) Times a Day With Meals. Indications: Type 2 Diabetes  Dispense: 180 tablet; Refill: 3  -     Ambulatory Referral to Optometry  -     Lipid Panel  -     Hemoglobin A1c  -     Ambulatory Referral to Nutrition Services    3. Type 2 diabetes mellitus with diabetic polyneuropathy, with long-term current use of insulin (CMS/[facility])  -     Insulin Glargine (Lantus SoloStar) 100 UNIT/ML injection pen; Inject 60 Units under the skin into the appropriate area as directed Every Night.  Dispense: 18 pen; Refill: 3  -     SITagliptin (Januvia) 50 MG tablet; Take 1 tablet by mouth Daily. Indications: Type 2 Diabetes  Dispense: 90 tablet; Refill: 3  -     metFORMIN (GLUCOPHAGE) 1000 MG tablet; Take 1 tablet by mouth 2 (Two) Times a Day With Meals. Indications: Type 2 Diabetes  Dispense: 180 tablet; Refill: 3  -     gabapentin (NEURONTIN) 800 MG tablet; Take 1 tablet by mouth 3 (Three) Times a Day. Indications: Neuropathic Pain  Dispense: 270 tablet; Refill: 2  -     Ambulatory Referral to Optometry  -     Lipid Panel  -     Hemoglobin A1c  -     Ambulatory Referral to Nutrition Services    4. Diabetic peripheral neuropathy associated with type 2 diabetes mellitus (CMS/[facility])  -     gabapentin (NEURONTIN) 800 MG tablet; Take 1 tablet by mouth 3 (Three) Times a Day. Indications: Neuropathic Pain  Dispense: 270 tablet; Refill: 2  -     CBC (No Diff)  -     Comprehensive Metabolic Panel  -     Vitamin B12  -     Folate    5. Chronic obstructive pulmonary disease, unspecified COPD type (CMS/[facility])  Comments:  continue inhalers, smoking cessation    6. Primary cancer of bladder (CMS/[facility])  Comments:  smoking cessation, follow up with urology    7. Chronic midline low back pain with sciatica, sciatica laterality unspecified  -     gabapentin (NEURONTIN) 800 MG tablet; Take 1 tablet by mouth 3 (Three) Times a Day. Indications: Neuropathic Pain  Dispense: 270 tablet; Refill: 2    8. Gastroesophageal reflux disease  without esophagitis  -     CBC (No Diff)  -     Comprehensive Metabolic Panel  -     Vitamin B12  -     Folate  -     Magnesium    9. Encounter for monitoring long-term proton pump inhibitor therapy  -     CBC (No Diff)  -     Comprehensive Metabolic Panel  -     Vitamin B12  -     Folate  -     Magnesium    10. Weight gain  -     Comprehensive Metabolic Panel  -     TSH+Free T4    11. Personal history of tobacco use, presenting hazards to health  -     CT Chest Low Dose Wo Cancer Screening    12. Encounter for screening for lung cancer  -     CT Chest Low Dose Wo Cancer Screening      Follow Up:  Return in about 4 months (around 5/28/2021) for Diabetes follow up.     An After Visit Summary and PPPS were given to the patient.

## 2021-01-28 NOTE — PATIENT INSTRUCTIONS
Chronic Obstructive Pulmonary Disease    Chronic obstructive pulmonary disease (COPD) is a long-term (chronic) condition that affects the lungs. COPD is a general term that can be used to describe many different lung problems that cause lung swelling (inflammation) and limit airflow, including chronic bronchitis and emphysema. If you have COPD, your lung function will probably never return to normal. In most cases, it gets worse over time. However, there are steps you can take to slow the progression of the disease and improve your quality of life.  What are the causes?  This condition may be caused by:  · Smoking. This is the most common cause.  · Certain genes passed down through families.  What increases the risk?  The following factors may make you more likely to develop this condition:  · Secondhand smoke from cigarettes, pipes, or cigars.  · Exposure to chemicals and other irritants such as fumes and dust in the work environment.  · Chronic lung conditions or infections.  What are the signs or symptoms?  Symptoms of this condition include:  · Shortness of breath, especially during physical activity.  · Chronic cough with a large amount of thick mucus. Sometimes the cough may not have any mucus (dry cough).  · Wheezing.  · Rapid breaths.  · Gray or bluish discoloration (cyanosis) of the skin, especially in your fingers, toes, or lips.  · Feeling tired (fatigue).  · Weight loss.  · Chest tightness.  · Frequent infections.  · Episodes when breathing symptoms become much worse (exacerbations).  · Swelling in the ankles, feet, or legs. This may occur in later stages of the disease.  How is this diagnosed?  This condition is diagnosed based on:  · Your medical history.  · A physical exam.  You may also have tests, including:  · Lung (pulmonary) function tests. This may include a spirometry test, which measures your ability to exhale properly.  · Chest X-ray.  · CT scan.  · Blood tests.  How is this  treated?  This condition may be treated with:  · Medicines. These may include inhaled rescue medicines to treat acute exacerbations as well as long-term, or maintenance, medicines to prevent flare-ups of COPD.  ? Bronchodilators help treat COPD by dilating the airways to allow increased airflow and make your breathing more comfortable.  ? Steroids can reduce airway inflammation and help prevent exacerbations.  · Smoking cessation. If you smoke, your health care provider may ask you to quit, and may also recommend therapy or replacement products to help you quit.  · Pulmonary rehabilitation. This may involve working with a team of health care providers and specialists, such as respiratory, occupational, and physical therapists.  · Exercise and physical activity. These are beneficial for nearly all people with COPD.  · Nutrition therapy to gain weight, if you are underweight.  · Oxygen. Supplemental oxygen therapy is only helpful if you have a low oxygen level in your blood (hypoxemia).  · Lung surgery or transplant.  · Palliative care. This is to help people with COPD feel comfortable when treatment is no longer working.  Follow these instructions at home:  Medicines  · Take over-the-counter and prescription medicines (inhaled or pills) only as told by your health care provider.  · Talk to your health care provider before taking any cough or allergy medicines. You may need to avoid certain medicines that dry out your airways.  Lifestyle  · If you are a smoker, the most important thing that you can do is to stop smoking. Do not use any products that contain nicotine or tobacco, such as cigarettes and e-cigarettes. If you need help quitting, ask your health care provider. Continuing to smoke will cause the disease to progress faster.  · Avoid exposure to things that irritate your lungs, such as smoke, chemicals, and fumes.  · Stay active, but balance activity with periods of rest. Exercise and physical activity will  help you maintain your ability to do things you want to do.  · Learn and use relaxation techniques to manage stress and to control your breathing.  · Get the right amount of sleep and get quality sleep. Most adults need 7 or more hours per night.  · Eat healthy foods. Eating smaller, more frequent meals and resting before meals may help you maintain your strength.  Controlled breathing  Learn and use controlled breathing techniques as directed by your health care provider. Controlled breathing techniques include:  · Pursed lip breathing. Start by breathing in (inhaling) through your nose for 1 second. Then, purse your lips as if you were going to whistle and breathe out (exhale) through the pursed lips for 2 seconds.  · Diaphragmatic breathing. Start by putting one hand on your abdomen just above your waist. Inhale slowly through your nose. The hand on your abdomen should move out. Then purse your lips and exhale slowly. You should be able to feel the hand on your abdomen moving in as you exhale.  Controlled coughing  Learn and use controlled coughing to clear mucus from your lungs. Controlled coughing is a series of short, progressive coughs. The steps of controlled coughing are:  1. Lean your head slightly forward.  2. Breathe in deeply using diaphragmatic breathing.  3. Try to hold your breath for 3 seconds.  4. Keep your mouth slightly open while coughing twice.  5. Spit any mucus out into a tissue.  6. Rest and repeat the steps once or twice as needed.  General instructions  · Make sure you receive all the vaccines that your health care provider recommends, especially the pneumococcal and influenza vaccines. Preventing infection and hospitalization is very important when you have COPD.  · Use oxygen therapy and pulmonary rehabilitation if directed to by your health care provider. If you require home oxygen therapy, ask your health care provider whether you should purchase a pulse oximeter to measure your oxygen  level at home.  · Work with your health care provider to develop a COPD action plan. This will help you know what steps to take if your condition gets worse.  · Keep other chronic health conditions under control as told by your health care provider.  · Avoid extreme temperature and humidity changes.  · Avoid contact with people who have an illness that spreads from person to person (is contagious), such as viral infections or pneumonia.  · Keep all follow-up visits as told by your health care provider. This is important.  Contact a health care provider if:  · You are coughing up more mucus than usual.  · There is a change in the color or thickness of your mucus.  · Your breathing is more labored than usual.  · Your breathing is faster than usual.  · You have difficulty sleeping.  · You need to use your rescue medicines or inhalers more often than expected.  · You have trouble doing routine activities such as getting dressed or walking around the house.  Get help right away if:  · You have shortness of breath while you are resting.  · You have shortness of breath that prevents you from:  ? Being able to talk.  ? Performing your usual physical activities.  · You have chest pain lasting longer than 5 minutes.  · Your skin color is more blue (cyanotic) than usual.  · You measure low oxygen saturations for longer than 5 minutes with a pulse oximeter.  · You have a fever.  · You feel too tired to breathe normally.  Summary  · Chronic obstructive pulmonary disease (COPD) is a long-term (chronic) condition that affects the lungs.  · Your lung function will probably never return to normal. In most cases, it gets worse over time. However, there are steps you can take to slow the progression of the disease and improve your quality of life.  · Treatment for COPD may include taking medicines, quitting smoking, pulmonary rehabilitation, and changes to diet and exercise. As the disease progresses, you may need oxygen therapy, a  lung transplant, or palliative care.  · To help manage your condition, do not smoke, avoid exposure to things that irritate your lungs, stay up to date on all vaccines, and follow your health care provider's instructions for taking medicines.  This information is not intended to replace advice given to you by your health care provider. Make sure you discuss any questions you have with your health care provider.  Document Revised: 2018 Document Reviewed: 2018  [employer] Patient Education ©  [employer]    IF YOU SMOKE OR USE TOBACCO PLEASE READ THE FOLLOWING:  Why is smoking bad for me?  Smoking increases the risk of heart disease, lung disease, vascular disease, stroke, and cancer. If you smoke, STOP!    For more information:  Quit Now [facility]  1-800-QUIT-NOW  https://[email or web address]/en-US/      Medicare Wellness  Personal Prevention Plan of Service     Date of Office Visit:  2021  Encounter Provider:  Maria Luz Longoria MD  Place of Service:  [facility]  Patient Name: Kimberly Chun  :  1965    As part of the Medicare Wellness portion of your visit today, we are providing you with this personalized preventive plan of services (PPPS). This plan is based upon recommendations of the [place] Preventive Services Task Force (USPSTF) and the Advisory Committee on Immunization Practices (ACIP).    This lists the preventive care services that should be considered, and provides dates of when you are due. Items listed as completed are up-to-date and do not require any further intervention.    Health Maintenance   Topic Date Due   • LUNG CANCER SCREENING  2019   • DIABETIC FOOT EXAM  2021 (Originally 2020)   • URINE MICROALBUMIN  02/15/2021 (Originally 2020)   • DIABETIC EYE EXAM  2021 (Originally 2019)   • MAMMOGRAM  2022 (Originally 2020)   • ZOSTER VACCINE (1 of 2) 2022 (Originally 2014)   •  HEMOGLOBIN A1C  04/28/2021   • ANNUAL WELLNESS VISIT  01/28/2022   • COLOGUARD  09/04/2022   • TDAP/TD VACCINES (2 - Td) 02/05/2025   • HEPATITIS C SCREENING  Completed   • Pneumococcal Vaccine 0-64  Completed   • INFLUENZA VACCINE  Completed   • MENINGOCOCCAL VACCINE  Aged Out   • Hepatitis B  Discontinued   • PAP SMEAR  Discontinued       Orders Placed This Encounter   Procedures   • CT Chest Low Dose Wo Cancer Screening     Order Specific Question:   The patient is age 55-77:     Answer:   55     Order Specific Question:   The patient is a current smoker?     Answer:   Yes     Order Specific Question:   The patient has a smoking history of 30 pack-years or greater:     Answer:   Yes     Order Specific Question:   Actual pack - year smoking history (number):     Answer:   45     Order Specific Question:   Has the Patient had a Chest CT scan within the past 12 months?     Answer:   No     Order Specific Question:   Does the patient have any clinical signs/symptoms of lung cancer?     Answer:   No     Order Specific Question:   The patient was engaged in shared decision-making for this test:     Answer:   Yes   • CBC (No Diff)   • Comprehensive Metabolic Panel   • Lipid Panel     Order Specific Question:   [facility] Has the patient fasted?     Answer:   Yes   • Hemoglobin A1c   • Vitamin B12   • Folate   • TSH+Free T4   • Magnesium   • Ambulatory Referral to Optometry     Referral Priority:   Routine     Referral Type:   Consultation     Referral Reason:   Specialty Services Required     Requested Specialty:   Optometry     Number of Visits Requested:   1   • Ambulatory Referral to Nutrition Services     Referral Priority:   Routine     Referral Type:   Health Education     Referral Reason:   Specialty Services Required     Number of Visits Requested:   1       Return in about 4 months (around 5/28/2021) for Diabetes follow up.          Please review the decision aid used during our discussion regarding the Low dose  lung cancer screening visit today.

## 2021-02-04 ENCOUNTER — APPOINTMENT (OUTPATIENT)
Dept: LAB | Facility: HOSPITAL | Age: 56
End: 2021-02-04

## 2021-02-04 ENCOUNTER — HOSPITAL ENCOUNTER (OUTPATIENT)
Dept: CT IMAGING | Facility: HOSPITAL | Age: 56
Discharge: HOME | End: 2021-02-04

## 2021-02-04 ENCOUNTER — LAB (OUTPATIENT)
Dept: LAB | Facility: HOSPITAL | Age: 56
End: 2021-02-04

## 2021-02-04 LAB
ALBUMIN SERPL-MCNC: 4.1 G/DL (ref 3.5–5.2)
ALBUMIN/GLOB SERPL: 1.8 G/DL
ALP SERPL-CCNC: 95 U/L (ref 39–117)
ALT SERPL W P-5'-P-CCNC: 12 U/L (ref 1–33)
ANION GAP SERPL CALCULATED.3IONS-SCNC: 13.2 MMOL/L (ref 5–15)
AST SERPL-CCNC: 16 U/L (ref 1–32)
BILIRUB SERPL-MCNC: 0.2 MG/DL (ref 0–1.2)
BUN SERPL-MCNC: 10 MG/DL (ref 6–20)
BUN/CREAT SERPL: 12 (ref 7–25)
CALCIUM SPEC-SCNC: 9.6 MG/DL (ref 8.6–10.5)
CHLORIDE SERPL-SCNC: 103 MMOL/L (ref 98–107)
CHOLEST SERPL-MCNC: 175 MG/DL (ref 0–200)
CO2 SERPL-SCNC: 24.8 MMOL/L (ref 22–29)
CREAT SERPL-MCNC: 0.83 MG/DL (ref 0.57–1)
DEPRECATED RDW RBC AUTO: 41.2 FL (ref 37–54)
ERYTHROCYTE [DISTWIDTH] IN BLOOD BY AUTOMATED COUNT: 13.3 % (ref 12.3–15.4)
FOLATE SERPL-MCNC: 7.38 NG/ML (ref 4.78–24.2)
GFR SERPL CREATININE-BSD FRML MDRD: 71 ML/MIN/1.73
GLOBULIN UR ELPH-MCNC: 2.3 GM/DL
GLUCOSE SERPL-MCNC: 209 MG/DL (ref 65–99)
HBA1C MFR BLD: 9.6 % (ref 4.8–5.6)
HCT VFR BLD AUTO: 42.3 % (ref 34–46.6)
HDLC SERPL-MCNC: 46 MG/DL (ref 40–60)
HGB BLD-MCNC: 13.8 G/DL (ref 12–15.9)
LDLC SERPL CALC-MCNC: 112 MG/DL (ref 0–100)
LDLC/HDLC SERPL: 2.41 {RATIO}
MAGNESIUM SERPL-MCNC: 1.6 MG/DL (ref 1.6–2.6)
MCH RBC QN AUTO: 28.3 PG (ref 26.6–33)
MCHC RBC AUTO-ENTMCNC: 32.6 G/DL (ref 31.5–35.7)
MCV RBC AUTO: 86.7 FL (ref 79–97)
PLATELET # BLD AUTO: 425 10*3/MM3 (ref 140–450)
PMV BLD AUTO: 10.2 FL (ref 6–12)
POTASSIUM SERPL-SCNC: 4.7 MMOL/L (ref 3.5–5.2)
PROT SERPL-MCNC: 6.4 G/DL (ref 6–8.5)
RBC # BLD AUTO: 4.88 10*6/MM3 (ref 3.77–5.28)
SODIUM SERPL-SCNC: 141 MMOL/L (ref 136–145)
T4 FREE SERPL-MCNC: 1.19 NG/DL (ref 0.93–1.7)
TRIGL SERPL-MCNC: 91 MG/DL (ref 0–150)
TSH SERPL DL<=0.05 MIU/L-ACNC: 1.55 UIU/ML (ref 0.27–4.2)
VIT B12 BLD-MCNC: 541 PG/ML (ref 211–946)
VLDLC SERPL-MCNC: 17 MG/DL (ref 5–40)
WBC # BLD AUTO: 11.68 10*3/MM3 (ref 3.4–10.8)

## 2021-02-04 PROCEDURE — 84439 ASSAY OF FREE THYROXINE: CPT | Performed by: FAMILY MEDICINE

## 2021-02-04 PROCEDURE — 83735 ASSAY OF MAGNESIUM: CPT | Performed by: FAMILY MEDICINE

## 2021-02-04 PROCEDURE — 84443 ASSAY THYROID STIM HORMONE: CPT | Performed by: FAMILY MEDICINE

## 2021-02-04 PROCEDURE — 36415 COLL VENOUS BLD VENIPUNCTURE: CPT | Performed by: FAMILY MEDICINE

## 2021-02-04 PROCEDURE — 80053 COMPREHEN METABOLIC PANEL: CPT | Performed by: FAMILY MEDICINE

## 2021-02-04 PROCEDURE — 80061 LIPID PANEL: CPT | Performed by: FAMILY MEDICINE

## 2021-02-04 PROCEDURE — 83036 HEMOGLOBIN GLYCOSYLATED A1C: CPT | Performed by: FAMILY MEDICINE

## 2021-02-04 PROCEDURE — 82607 VITAMIN B-12: CPT | Performed by: FAMILY MEDICINE

## 2021-02-04 PROCEDURE — 85027 COMPLETE CBC AUTOMATED: CPT | Performed by: FAMILY MEDICINE

## 2021-02-04 PROCEDURE — 71271 CT THORAX LUNG CANCER SCR C-: CPT

## 2021-02-04 PROCEDURE — 82746 ASSAY OF FOLIC ACID SERUM: CPT | Performed by: FAMILY MEDICINE

## 2021-02-08 ENCOUNTER — TELEPHONE (OUTPATIENT)
Dept: INTERNAL MEDICINE | Facility: CLINIC | Age: 56
End: 2021-02-08

## 2021-02-08 DIAGNOSIS — Z79.4 TYPE 2 DIABETES MELLITUS WITH HYPERGLYCEMIA, WITH LONG-TERM CURRENT USE OF INSULIN (HCC): ICD-10-CM

## 2021-02-08 DIAGNOSIS — E11.65 TYPE 2 DIABETES MELLITUS WITH HYPERGLYCEMIA, WITH LONG-TERM CURRENT USE OF INSULIN (HCC): ICD-10-CM

## 2021-02-08 DIAGNOSIS — Z79.4 TYPE 2 DIABETES MELLITUS WITH DIABETIC POLYNEUROPATHY, WITH LONG-TERM CURRENT USE OF INSULIN (HCC): ICD-10-CM

## 2021-02-08 DIAGNOSIS — E11.42 TYPE 2 DIABETES MELLITUS WITH DIABETIC POLYNEUROPATHY, WITH LONG-TERM CURRENT USE OF INSULIN (HCC): ICD-10-CM

## 2021-02-08 NOTE — TELEPHONE ENCOUNTER
Caller: Kimberly Chun    Relationship: Self    Best call back number: [phone]    Caller requesting test results: SELF    What test was performed: LAB    When was the test performed: 02/04/21    Where was the test performed: HOSPITAL  Additional notes: WOULD LIKE TO GET RESULTS

## 2021-02-10 ENCOUNTER — TELEPHONE (OUTPATIENT)
Dept: INTERNAL MEDICINE | Facility: CLINIC | Age: 56
End: 2021-02-10

## 2021-02-10 NOTE — TELEPHONE ENCOUNTER
Followed up with patient on unread MyChart message. Information was given and understood. Two week follow up appointment was scheduled.

## 2021-02-24 ENCOUNTER — HOSPITAL ENCOUNTER (OUTPATIENT)
Dept: NUTRITION | Facility: HOSPITAL | Age: 56
Discharge: HOME | End: 2021-02-24

## 2021-02-24 VITALS — WEIGHT: 193 LBS | BODY MASS INDEX: 32.12 KG/M2

## 2021-02-24 NOTE — PROGRESS NOTES
"Adult Outpatient Nutrition  Assessment/PES    Patient Name:  Kimberly Chun  YOB: 1965  MRN: [id number]    Assessment Date:  2/24/2021    Comments:    RD spoke with pt over the phone for Telehealth appointment regarding Type 2 Diabetes. Due to contact limitations, unable to obtain signature. Pt given copies of forms, has acknowledged and agreed. Pt states she has had DM since she was 19. She states she has had a lot of diabetes education and would like a refresher course. Her current A1c is 9.6 and she checks her blood sugar \"anytime she feels weird\". She continues to gain weight instead of losing. She reports a back injury last year requiring surgery. Due to the injury, pt could not exercise. Pt states she wants to lose 50 pounds and lower her A1c. RD and pt discussed a consistent carbohydrate diet in order to stabilize her blood sugar. RD went over foods considered carbohydrates and how they raise blood glucose levels. RD encouraged pt to eat 3 meals per day to prevent hypoglycemia. RD encouraged pt to check her blood sugar more often, particular in the morning, before she eats, and after she eats. RD recommended pt increase her physical activity levels in order to improve glucose tolerance. RD and pt dicussed low-impact exercise ideas related to her back injury. 3 new goals were set:   1. Eat 3 times a day to prevent low blood sugar.   2. Exercise 10-15 minutes every day.   3. Drink water with every meal.     RD to mail educational materials, meal plan, snack ideas, and exercise examples. RD encouraged pt to reach out with any questions or concerns prior to her follow up appointment on March 30 at 2:30PM. RD available PRN.     General Info     Row Name 02/24/21 1419       Today's Session    Person(s) attending today's session  Patient       General Information    How Well Do You Speak English?  very well    Are you able to read and write English?  Yes          Anthropometrics     Row Name " 02/24/21 1419          Anthropometrics    Height  --     Weight  87.5 kg (193 lb)        Ideal Body Weight (IBW)    Ideal Body Weight (IBW) (kg)  --     % Ideal Body Weight  --        Body Mass Index (BMI)    BMI (kg/m2)  --         Nutritional Info/Activity     Row Name 02/24/21 1419       Physical Activity    Are you currently involved in an activity/exercise program?   No          Estimated/Assessed Needs     Row Name 02/24/21 1419          Calculation Measurements    Height  --        Estimated/Assessed Needs    Additional Documentation  Calorie Requirements (Group)        Calorie Requirements    Estimated Calorie Requirement Comment  [id number]                 Problem/Interventions:  Problem 1     Row Name 02/24/21 1420          Nutrition Diagnoses Problem 1    Problem 1  Impaired Nutrient Utilization     Etiology (related to)  Medical Diagnosis     Endocrine  DM2     Signs/Symptoms (evidenced by)  Report/Observation     Reported/Observed By  MD                 Intervention Goal     Row Name 02/24/21 1420          Intervention Goal    General  Meet nutritional needs for age/condition     Weight  Appropriate weight loss           Nutrition Prescription     Row Name 02/24/21 1421          Nutrition Prescription PO    PO Prescription  Begin/change diet     Begin/Change Diet to  Regular     Common Modifiers  Consistent Carbohydrate     New PO Prescription Ordered?  No, recommended         Education/Evaluation     Row Name 02/24/21 1421          Education    Education  Provided education regarding     Provided education regarding  Nutrition related factor;Medical diagnosis;Key food habit change;Nutrition for age;Diet rationale;Avoidance of associated complications           Electronically signed by:  Apoorva Ramachandran RD  02/24/21 14:38 EST

## 2021-03-16 ENCOUNTER — PROCEDURE VISIT (OUTPATIENT)
Dept: UROLOGY | Facility: CLINIC | Age: 56
End: 2021-03-16

## 2021-03-16 DIAGNOSIS — Z85.51 HISTORY OF BLADDER CANCER: Primary | ICD-10-CM

## 2021-03-16 LAB
BILIRUB BLD-MCNC: NEGATIVE MG/DL
CLARITY, POC: CLEAR
COLOR UR: YELLOW
GLUCOSE UR STRIP-MCNC: NEGATIVE MG/DL
KETONES UR QL: NEGATIVE
LEUKOCYTE EST, POC: NEGATIVE
NITRITE UR-MCNC: NEGATIVE MG/ML
PH UR: 6 [PH] (ref 5–8)
PROT UR STRIP-MCNC: NEGATIVE MG/DL
RBC # UR STRIP: NEGATIVE /UL
SP GR UR: 1 (ref 1–1.03)
UROBILINOGEN UR QL: NORMAL

## 2021-03-16 PROCEDURE — 52265 CYSTOSCOPY AND TREATMENT: CPT | Performed by: UROLOGY

## 2021-03-16 PROCEDURE — 81003 URINALYSIS AUTO W/O SCOPE: CPT | Performed by: UROLOGY

## 2021-03-16 NOTE — PROGRESS NOTES
Chief Complaint  Bladder Cancer (Cysto)      HPI  Kimberly Chun is a 55 y.o.female who returns today for an annual checkup with a past history of a low-grade noninvasive bladder tumor.  She has had no recurrences in 4 years although she did have some lesions biopsied that returned negative for malignancy.  She continues to do well and has had no recurrence of hematuria.  She does recount some problem with frequency and nocturia soft suggested checking her for interstitial cystitis.    There were no vitals filed for this visit.    Past Medical History  Past Medical History:   Diagnosis Date   • Anxiety    • Anxiety    • Arthritis    • Bladder cancer (CMS/[facility]) 2016   • Cataract    • COPD (chronic obstructive pulmonary disease) (CMS/[facility])    • Dysphagia    • Fibromyalgia    • Fracture     RIGHT LEG, RIGHT ANKLE, MULTIPLE TOES   • Full dentures    • GERD (gastroesophageal reflux disease)    • Ovarian cyst    • Recurrent urinary tract infection    • Sciatica    • Seasonal allergies    • Statin intolerance    • Stroke (CMS/[facility])     PATIENT REPORTS APPROXIMATELY 7 YEARS AGO. REPORTS HAS WEAKNESS AND MEMORY PROBLEMS INTERMITTENTLY AFTER THIS EPISODE.   • Type 2 diabetes mellitus (CMS/[facility])    • Wears glasses        Past Surgical History  Past Surgical History:   Procedure Laterality Date   • BACK SURGERY  08/25/2020   • CYSTOSCOPY BLADDER BIOPSY  2016   • ENDOSCOPY N/A 7/3/2017    Procedure: ESOPHAGOGASTRODUODENOSCOPY WITH BIOPSY;  Surgeon: Lindy Bey MD;  Location: [facility] ENDOSCOPY;  Service:    • HYSTERECTOMY     • TONSILLECTOMY         Medications  has a current medication list which includes the following prescription(s): albuterol, albuterol sulfate hfa, alcohol pads, aspirin, b-d ultra-fine 33 lancets, breo ellipta, cetirizine, clopidogrel, cyclobenzaprine, gabapentin, glucose blood, glucose monitor, lantus solostar, lantus solostar, insulin lispro, insulin pen needle, lisinopril, lorazepam,  meloxicam, metformin, omeprazole, and sitagliptin.    Allergies  Allergies   Allergen Reactions   • Iodides    • Iodinated Diagnostic Agents        Social History  Social History     Socioeconomic History   • Marital status:      Spouse name: Not on file   • Number of children: Not on file   • Years of education: Not on file   • Highest education level: Not on file   Tobacco Use   • Smoking status: Current Every Day Smoker     Packs/day: 1.00     Years: 40.00     Pack years: 40.00     Types: Cigarettes   • Smokeless tobacco: Never Used   Substance and Sexual Activity   • Alcohol use: No   • Drug use: No   • Sexual activity: Defer       Family History  Family History   Problem Relation Age of Onset   • Arthritis Mother    • Hypertension Mother    • Heart attack Mother    • Osteoporosis Mother    • Arthritis Father    • Cancer Father    • Diabetes Father    • Hypertension Father    • Stroke Other    • Drug abuse Daughter        Review of Systems  Review of Systems    Physical Exam  Physical Exam    Labs recent and today in the office:  Results for orders placed or performed in visit on 03/16/21   POC Urinalysis Dipstick, Automated    Specimen: Urine   Result Value Ref Range    Color Yellow Yellow, Straw, Dark Yellow, Silke    Clarity, UA Clear Clear    Specific Gravity  1.005 1.005 - 1.030    pH, Urine 6.0 5.0 - 8.0    Leukocytes Negative Negative    Nitrite, UA Negative Negative    Protein, POC Negative Negative mg/dL    Glucose, UA Negative Negative, 1000 mg/dL (3+) mg/dL    Ketones, UA Negative Negative    Urobilinogen, UA Normal Normal    Bilirubin Negative Negative    Blood, UA Negative Negative      Female cystoscopy:     The patient is prepped and draped in the dorsal lithotomy position in a routine sterile fashion. The vulva and urethral meatus are inspected and found to be normal. The panendoscope is inserted in the bladder which is visualized with the Foroblique and 70 degree lenses and found to be  free of foreign bodies and mucosal lesions. Ureteral orifices are normal location and effluxing clear urine bilaterally.  The bladder is distended to 500 mL drained and on reinspection there were no signs of interstitial cystitis.    Assessment & Plan  History of bladder cancer: Cystoscopy looks terrific today with no signs of interstitial cystitis or recurrent tumors.  It is been years since she had one so she probably does not need additional surveillance but she will return in a year at her request.

## 2021-03-30 ENCOUNTER — HOSPITAL ENCOUNTER (OUTPATIENT)
Dept: NUTRITION | Facility: HOSPITAL | Age: 56
Discharge: HOME | End: 2021-03-30

## 2021-03-30 NOTE — PROGRESS NOTES
Nutrition Services    Patient Name:  Kimberly Chun  YOB: 1965  MRN: [id number]  Admit Date:  3/30/2021    RD spoke with pt over the phone for Telehealth appointment regarding Type 2 Diabetes. Due to contact limitations, unable to obtain signature. Pt given copies of forms, has acknowledged and agreed. Pt states she has been moving more and her diet has been little better. She believes her blood sugars have been decent however she could not give specifics. She states she kept a food/blood sugar diary for few days but lost it. She reports an increase in energy and states she has been feeling overall better. Previously set goals were discussed:   1. Eat 3 times a day to prevent low blood sugar. Pt states this is getting better.   2. Exercise 10-15 minutes every day. Pt reports she does not do this everyday but she has increased her walking. She believes she exercises 3x weekly. She also recently bought an exercise ball and 5 pound weights.   3. Drink water with every meal. Pt states she drinks ~three 16.9 oz water bottles daily. She has been flavoring her water with SF packets. Goal is met.     RD reviewed a consistent carbohydrate diet in order to stabilize her blood sugar. RD went over foods considered carbohydrates and how they raise blood glucose levels. RD encouraged pt to eat 3 meals per day to prevent hypoglycemia and hyperglycemia. RD stressed the importance of physical activity in order to improve glucose tolerance and for weight management. RD encouraged eat a bedtime snack that consists of a carbohydrates and protein. Pt states she eats [employer] at night. 3 new goals were set:   1. Eat 3 times a day to prevent low blood sugar.   2. Exercise 10-15 minutes every day.  3. Eat a bedtime snack that consists of a carbohydrates and protein.    Pt with RD contact information. FRANTZ encouraged pt to reach out with any questions or concerns prior to her follow up appointment on April 29 at 10:30 AM. FRANTZ  available PRN.     Electronically signed by:  Apoorva Ramachandran RD  03/30/21 15:55 EDT

## 2021-04-08 ENCOUNTER — OFFICE VISIT (OUTPATIENT)
Dept: INTERNAL MEDICINE | Facility: CLINIC | Age: 56
End: 2021-04-08

## 2021-04-08 VITALS
HEART RATE: 85 BPM | HEIGHT: 65 IN | TEMPERATURE: 97.3 F | BODY MASS INDEX: 32.99 KG/M2 | SYSTOLIC BLOOD PRESSURE: 119 MMHG | WEIGHT: 198 LBS | DIASTOLIC BLOOD PRESSURE: 77 MMHG | OXYGEN SATURATION: 98 %

## 2021-04-08 DIAGNOSIS — J44.9 CHRONIC OBSTRUCTIVE PULMONARY DISEASE, UNSPECIFIED COPD TYPE (HCC): ICD-10-CM

## 2021-04-08 DIAGNOSIS — Z79.4 TYPE 2 DIABETES MELLITUS WITH HYPERGLYCEMIA, WITH LONG-TERM CURRENT USE OF INSULIN (HCC): Primary | ICD-10-CM

## 2021-04-08 DIAGNOSIS — Z79.4 TYPE 2 DIABETES MELLITUS WITH DIABETIC POLYNEUROPATHY, WITH LONG-TERM CURRENT USE OF INSULIN (HCC): ICD-10-CM

## 2021-04-08 DIAGNOSIS — E11.42 TYPE 2 DIABETES MELLITUS WITH DIABETIC POLYNEUROPATHY, WITH LONG-TERM CURRENT USE OF INSULIN (HCC): ICD-10-CM

## 2021-04-08 DIAGNOSIS — Z51.81 MEDICATION MONITORING ENCOUNTER: ICD-10-CM

## 2021-04-08 DIAGNOSIS — E66.09 CLASS 1 OBESITY DUE TO EXCESS CALORIES WITH SERIOUS COMORBIDITY AND BODY MASS INDEX (BMI) OF 32.0 TO 32.9 IN ADULT: ICD-10-CM

## 2021-04-08 DIAGNOSIS — M54.40 CHRONIC MIDLINE LOW BACK PAIN WITH SCIATICA, SCIATICA LATERALITY UNSPECIFIED: ICD-10-CM

## 2021-04-08 DIAGNOSIS — E11.42 DIABETIC PERIPHERAL NEUROPATHY ASSOCIATED WITH TYPE 2 DIABETES MELLITUS (HCC): ICD-10-CM

## 2021-04-08 DIAGNOSIS — G89.29 CHRONIC MIDLINE LOW BACK PAIN WITH SCIATICA, SCIATICA LATERALITY UNSPECIFIED: ICD-10-CM

## 2021-04-08 DIAGNOSIS — E11.65 TYPE 2 DIABETES MELLITUS WITH HYPERGLYCEMIA, WITH LONG-TERM CURRENT USE OF INSULIN (HCC): Primary | ICD-10-CM

## 2021-04-08 DIAGNOSIS — K21.9 GASTROESOPHAGEAL REFLUX DISEASE WITHOUT ESOPHAGITIS: ICD-10-CM

## 2021-04-08 LAB
POC CREATININE URINE: NORMAL
POC MICROALBUMIN URINE: NORMAL

## 2021-04-08 PROCEDURE — 82044 UR ALBUMIN SEMIQUANTITATIVE: CPT | Performed by: FAMILY MEDICINE

## 2021-04-08 PROCEDURE — 99214 OFFICE O/P EST MOD 30 MIN: CPT | Performed by: FAMILY MEDICINE

## 2021-04-08 RX ORDER — OMEPRAZOLE 20 MG/1
20 CAPSULE, DELAYED RELEASE ORAL 2 TIMES DAILY
Qty: 180 CAPSULE | Refills: 3 | Status: SHIPPED | OUTPATIENT
Start: 2021-04-08 | End: 2022-01-12 | Stop reason: SDUPTHER

## 2021-04-08 RX ORDER — LISINOPRIL 5 MG/1
5 TABLET ORAL DAILY
Qty: 90 TABLET | Refills: 3 | Status: SHIPPED | OUTPATIENT
Start: 2021-04-08 | End: 2022-01-12 | Stop reason: SDUPTHER

## 2021-04-08 RX ORDER — INSULIN LISPRO 200 [IU]/ML
15 INJECTION, SOLUTION SUBCUTANEOUS
Qty: 21 ML | Refills: 3 | Status: SHIPPED | OUTPATIENT
Start: 2021-04-08 | End: 2022-01-12 | Stop reason: SDUPTHER

## 2021-04-08 RX ORDER — INSULIN GLARGINE 100 [IU]/ML
65 INJECTION, SOLUTION SUBCUTANEOUS NIGHTLY
Qty: 20 PEN | Refills: 3 | Status: SHIPPED | OUTPATIENT
Start: 2021-04-08 | End: 2021-06-19

## 2021-04-08 RX ORDER — FLASH GLUCOSE SENSOR
1 KIT MISCELLANEOUS ONCE
Qty: 1 EACH | Refills: 0 | Status: SHIPPED | OUTPATIENT
Start: 2021-04-08 | End: 2021-04-08

## 2021-04-08 RX ORDER — ALBUTEROL SULFATE 90 UG/1
2 AEROSOL, METERED RESPIRATORY (INHALATION) EVERY 6 HOURS PRN
Qty: 54 G | Refills: 3 | Status: SHIPPED | OUTPATIENT
Start: 2021-04-08 | End: 2022-01-12 | Stop reason: SDUPTHER

## 2021-04-08 RX ORDER — GABAPENTIN 800 MG/1
800 TABLET ORAL 3 TIMES DAILY
Qty: 21 TABLET | Refills: 0 | Status: SHIPPED | OUTPATIENT
Start: 2021-04-08 | End: 2021-05-11

## 2021-04-08 RX ORDER — FLASH GLUCOSE SENSOR
1 KIT MISCELLANEOUS
Qty: 2 EACH | Refills: 6 | Status: SHIPPED | OUTPATIENT
Start: 2021-04-08 | End: 2021-05-20 | Stop reason: CLARIF

## 2021-04-08 RX ORDER — GABAPENTIN 800 MG/1
800 TABLET ORAL 3 TIMES DAILY
Qty: 270 TABLET | Refills: 2 | Status: SHIPPED | OUTPATIENT
Start: 2021-04-08 | End: 2021-05-20 | Stop reason: SDUPTHER

## 2021-04-08 NOTE — PROGRESS NOTES
"Subjective    Kimberly Chun is a 56 y.o. female here for:  Chief Complaint   Patient presents with   • Diabetes     gabapentin refill-nerve pain, diabetes       History per MA reviewed.    Patient in today to follow-up on diabetes.  She is going to start using [facility] for her medicines and needs some meds sent in.  She is worried she will have gabapentin in time and she would like a small supply sent to local pharmacy.    She admits to struggling with her diet.  She has met with dietitian.  She is trying to be more active.  She admits to snacks before bed but she has had some morning hypoglycemia which was very scary for her.  She eats partially due to this but also because she just wants to stop.  She thinks her long-acting insulin should go up to 62 units but she notes fasting sugars sometimes down to 100 when she gets up.  She feels Januvia has been helpful.  She is very frustrated with her weight.         The following portions of the patient's history were reviewed and updated as appropriate: allergies, current medications, past family history, past medical history, past social history, past surgical history and problem list.    Review of Systems   Constitutional: Positive for fatigue and unexpected weight gain. Negative for fever.   Musculoskeletal: Positive for arthralgias and back pain.   Psychiatric/Behavioral: Positive for stress.       Visit Vitals  /77   Pulse 85   Temp 97.3 °F (36.3 °C)   Ht 165.1 cm (65\")   Wt 89.8 kg (198 lb)   SpO2 98%   BMI 32.95 kg/m²         Objective   Physical Exam  Vitals and nursing note reviewed.   Constitutional:       General: She is not in acute distress.     Appearance: Normal appearance. She is well-developed and well-groomed. She is obese. She is not ill-appearing, toxic-appearing or diaphoretic.      Interventions: Face mask in place.   HENT:      Head: Normocephalic and atraumatic.      Right Ear: Hearing normal.      Left Ear: Hearing normal. "   Eyes:      General: Lids are normal. No scleral icterus.     Extraocular Movements: Extraocular movements intact.   Neck:      Trachea: Phonation normal.   Cardiovascular:      Rate and Rhythm: Normal rate and regular rhythm.   Pulmonary:      Effort: Pulmonary effort is normal.   Skin:     Coloration: Skin is not cyanotic, jaundiced or pale.   Neurological:      General: No focal deficit present.      Mental Status: She is alert and oriented to person, place, and time.      Motor: Motor function is intact.   Psychiatric:         Attention and Perception: Attention and perception normal.         Mood and Affect: Mood and affect normal.         Speech: Speech normal.         Behavior: Behavior normal. Behavior is cooperative.         Thought Content: Thought content normal.         Cognition and Memory: Cognition and memory normal.         Judgment: Judgment normal.         For medical decision making review of the following was required:  Lab Results   Component Value Date    HGBA1C 9.60 (H) 02/04/2021    HGBA1C 8.1 10/28/2020    HGBA1C 8.6 07/06/2020     Office Visit on 04/08/2021   Component Date Value Ref Range Status   • Microalbumin, Urine 04/08/2021 10 mg/L   Final   • Creatinine, Urine 04/08/2021 10 mg/dL   Final         Assessment/Plan     Problem List Items Addressed This Visit        Endocrine and Metabolic    Type 2 diabetes mellitus with diabetic polyneuropathy, with long-term current use of insulin (CMS/[facility])    Relevant Medications    gabapentin (NEURONTIN) 800 MG tablet    Insulin Glargine (Lantus SoloStar) 100 UNIT/ML injection pen    lisinopril (PRINIVIL,ZESTRIL) 5 MG tablet    Insulin Lispro (HumaLOG KwikPen) 200 UNIT/ML solution pen-injector    Continuous Blood Gluc  (FreeStyle Andree [place]) device    Continuous Blood Gluc Sensor (FreeStyle Andree Sensor System)    SITagliptin (JANUVIA) 100 MG tablet    Type 2 diabetes mellitus with hyperglycemia, with long-term current use of insulin  (CMS/[facility]) - Primary    Relevant Medications    Insulin Glargine (Lantus SoloStar) 100 UNIT/ML injection pen    lisinopril (PRINIVIL,ZESTRIL) 5 MG tablet    Insulin Lispro (HumaLOG KwikPen) 200 UNIT/ML solution pen-injector    Continuous Blood Gluc  (FreeStyle Andree [place]) device    Continuous Blood Gluc Sensor (FreeStyle Andree Sensor System)    SITagliptin (JANUVIA) 100 MG tablet    Other Relevant Orders    POCT microalbumin (Completed)       Gastrointestinal Abdominal     Gastroesophageal reflux disease    Relevant Medications    omeprazole (priLOSEC) 20 MG capsule       Musculoskeletal and Injuries    Chronic midline low back pain with sciatica    Relevant Medications    gabapentin (NEURONTIN) 800 MG tablet       Neuro    Diabetic peripheral neuropathy associated with type 2 diabetes mellitus (CMS/[facility])    Relevant Medications    gabapentin (NEURONTIN) 800 MG tablet    Insulin Glargine (Lantus SoloStar) 100 UNIT/ML injection pen    Insulin Lispro (HumaLOG KwikPen) 200 UNIT/ML solution pen-injector    SITagliptin (JANUVIA) 100 MG tablet    gabapentin (Neurontin) 800 MG tablet       Pulmonary and Pneumonias    Chronic obstructive pulmonary disease (CMS/[facility])    Overview     · Complicated by continued tobacco abuse  · Current therapy: albuterol rescue inhaler         Relevant Medications    albuterol sulfate HFA (ProAir HFA) 108 (90 Base) MCG/ACT inhaler      Other Visit Diagnoses     Class 1 obesity due to excess calories with serious comorbidity and body mass index (BMI) of 32.0 to 32.9 in adult        Medication monitoring encounter        Relevant Orders    Compliance Drug Analysis, Ur - Urine, Clean Catch          · JACQUIE reviewed and appropriate.    · Patient's Body mass index is 32.95 kg/m². BMI is above normal parameters. Recommendations include: exercise counseling and nutrition counseling.   · Discussed basal and bolus insulin.  If long-acting basal insulin is getting morning glucose levels  approximately 100 she does not need to go up further.  Continue to watch morning fasting sugars, goal is to be under 150, dose on prescription increased to allow for titration up if needed.  Discussed mealtime bolus insulin, to give dose with meal and check glucose level 2 hours postprandial.  Goal again to be 150 or under.  She needs to titrate up on these doses as discussed following 2 hour postprandial glucose trends.  Continue oral agents.  Work on exercise.  Improved diet, healthier choices, lower carb intake.    Return in about 4 weeks (around 5/6/2021) for Diabetes follow up.     Maria Luz Longoria MD

## 2021-04-08 NOTE — PATIENT INSTRUCTIONS
Mediterranean Diet  A Mediterranean diet refers to food and lifestyle choices that are based on the traditions of countries located on the [place]. This way of eating has been shown to help prevent certain conditions and improve outcomes for people who have chronic diseases, like kidney disease and heart disease.  What are tips for following this plan?  Lifestyle  · Cook and eat meals together with your family, when possible.  · Drink enough fluid to keep your urine clear or pale yellow.  · Be physically active every day. This includes:  ? Aerobic exercise like running or swimming.  ? Leisure activities like gardening, walking, or housework.  · Get 7-8 hours of sleep each night.  · If recommended by your health care provider, drink red wine in moderation. This means 1 glass a day for nonpregnant women and 2 glasses a day for men. A glass of wine equals 5 oz (150 mL).  Reading food labels    · Check the serving size of packaged foods. For foods such as rice and pasta, the serving size refers to the amount of cooked product, not dry.  · Check the total fat in packaged foods. Avoid foods that have saturated fat or trans fats.  · Check the ingredients list for added sugars, such as corn syrup.  Shopping  · At the grocery store, buy most of your food from the areas near the walls of the store. This includes:  ? Fresh fruits and vegetables (produce).  ? Grains, beans, nuts, and seeds. Some of these may be available in unpackaged forms or large amounts (in bulk).  ? Fresh seafood.  ? Poultry and eggs.  ? Low-fat dairy products.  · Buy whole ingredients instead of prepackaged foods.  · Buy fresh fruits and vegetables in-season from local farmers markets.  · Buy frozen fruits and vegetables in resealable bags.  · If you do not have access to quality fresh seafood, buy precooked frozen shrimp or canned fish, such as tuna, salmon, or sardines.  · Buy small amounts of raw or cooked vegetables, salads, or olives from  the deli or salad bar at your store.  · Stock your pantry so you always have certain foods on hand, such as olive oil, canned tuna, canned tomatoes, rice, pasta, and beans.  Cooking  · Cook foods with extra-virgin olive oil instead of using butter or other vegetable oils.  · Have meat as a side dish, and have vegetables or grains as your main dish. This means having meat in small portions or adding small amounts of meat to foods like pasta or stew.  · Use beans or vegetables instead of meat in common dishes like chili or lasagna.  · [place] with different cooking methods. Try roasting or broiling vegetables instead of steaming or sautéeing them.  · Add frozen vegetables to soups, stews, pasta, or rice.  · Add nuts or seeds for added healthy fat at each meal. You can add these to yogurt, salads, or vegetable dishes.  · Marinate fish or vegetables using olive oil, lemon juice, garlic, and fresh herbs.  Meal planning    · Plan to eat 1 vegetarian meal one day each week. Try to work up to 2 vegetarian meals, if possible.  · Eat seafood 2 or more times a week.  · Have healthy snacks readily available, such as:  ? Vegetable sticks with hummus.  ? Greek yogurt.  ? Fruit and nut trail mix.  · Eat balanced meals throughout the week. This includes:  ? Fruit: 2-3 servings a day  ? Vegetables: 4-5 servings a day  ? Low-fat dairy: 2 servings a day  ? Fish, poultry, or lean meat: 1 serving a day  ? Beans and legumes: 2 or more servings a week  ? Nuts and seeds: 1-2 servings a day  ? Whole grains: 6-8 servings a day  ? Extra-virgin olive oil: 3-4 servings a day  · Limit red meat and sweets to only a few servings a month  What are my food choices?  · Mediterranean diet  ? Recommended  § Grains: Whole-grain pasta. Brown rice. Bulgar wheat. Polenta. Couscous. Whole-wheat bread. Oatmeal. Quinoa.  § Vegetables: Artichokes. Beets. Broccoli. Cabbage. Carrots. Eggplant. Green beans. Chard. Kale. Spinach. Onions. Leeks. Peas. Squash.  Tomatoes. Peppers. Radishes.  § Fruits: Apples. Apricots. Avocado. Berries. Bananas. Cherries. Dates. Figs. Grapes. [place]. Melon. Oranges. Peaches. Plums. Pomegranate.  § Meats and other protein foods: Beans. Almonds. Sunflower seeds. Pine nuts. Peanuts. Cod. [place]. Scallops. Shrimp. Tuna. Tilapia. Clams. Oysters. Eggs.  § Dairy: Low-fat milk. Cheese. Greek yogurt.  § Beverages: Water. Red wine. Herbal tea.  § Fats and oils: Extra virgin olive oil. Avocado oil. Grape seed oil.  § Sweets and desserts: Greek yogurt with honey. Baked apples. Poached pears. Trail mix.  § Seasoning and other foods: Basil. Cilantro. Coriander. Cumin. Mint. Parsley. Brandyn. Rosemary. Tarragon. Garlic. Oregano. Thyme. Pepper. Balsalmic vinegar. Tahini. Hummus. Tomato sauce. Olives. Mushrooms.  ? Limit these  § Grains: Prepackaged pasta or rice dishes. Prepackaged cereal with added sugar.  § Vegetables: Deep fried potatoes (french fries).  § Fruits: Fruit canned in syrup.  § Meats and other protein foods: Beef. Pork. Lamb. Poultry with skin. Hot dogs. [place].  § Dairy: Ice cream. Sour cream. Whole milk.  § Beverages: Juice. Sugar-sweetened soft drinks. Beer. Liquor and spirits.  § Fats and oils: Butter. Canola oil. Vegetable oil. Beef fat (tallow). Lard.  § Sweets and desserts: Cookies. Cakes. Pies. Candy.  § Seasoning and other foods: Mayonnaise. Premade sauces and marinades.  The items listed may not be a complete list. Talk with your dietitian about what dietary choices are right for you.  Summary  · The Mediterranean diet includes both food and lifestyle choices.  · Eat a variety of fresh fruits and vegetables, beans, nuts, seeds, and whole grains.  · Limit the amount of red meat and sweets that you eat.  · Talk with your health care provider about whether it is safe for you to drink red wine in moderation. This means 1 glass a day for nonpregnant women and 2 glasses a day for men. A glass of wine equals 5 oz (150 mL).  This information  is not intended to replace advice given to you by your health care provider. Make sure you discuss any questions you have with your health care provider.  Document Revised: 08/17/2017 Document Reviewed: 08/10/2017  Elsevier Patient Education © 2020 Elsevier Inc.

## 2021-04-09 DIAGNOSIS — Z79.4 TYPE 2 DIABETES MELLITUS WITH DIABETIC POLYNEUROPATHY, WITH LONG-TERM CURRENT USE OF INSULIN (HCC): ICD-10-CM

## 2021-04-09 DIAGNOSIS — E11.42 TYPE 2 DIABETES MELLITUS WITH DIABETIC POLYNEUROPATHY, WITH LONG-TERM CURRENT USE OF INSULIN (HCC): ICD-10-CM

## 2021-04-09 DIAGNOSIS — Z79.4 TYPE 2 DIABETES MELLITUS WITH HYPERGLYCEMIA, WITH LONG-TERM CURRENT USE OF INSULIN (HCC): ICD-10-CM

## 2021-04-09 DIAGNOSIS — E11.65 TYPE 2 DIABETES MELLITUS WITH HYPERGLYCEMIA, WITH LONG-TERM CURRENT USE OF INSULIN (HCC): ICD-10-CM

## 2021-04-12 LAB — DRUGS UR: NORMAL

## 2021-04-29 ENCOUNTER — DOCUMENTATION (OUTPATIENT)
Dept: NUTRITION | Facility: HOSPITAL | Age: 56
End: 2021-04-29

## 2021-04-29 NOTE — PROGRESS NOTES
Nutrition Services    Patient Name:  Kimberly Chun  YOB: 1965  MRN: [id number]  Admit Date:  (Not on file)    Pt was no-show for follow up appointment on 4/29 at 10:30 AM. RD left VM for pt to reschedule. RD to mail follow up letter, will wait 2 weeks for response.     Electronically signed by:  Apoorva Ramachandran RD  04/29/21 11:58 EDT

## 2021-05-06 ENCOUNTER — HOSPITAL ENCOUNTER (EMERGENCY)
Facility: HOSPITAL | Age: 56
Discharge: HOME | End: 2021-05-06
Attending: EMERGENCY MEDICINE | Admitting: EMERGENCY MEDICINE

## 2021-05-06 ENCOUNTER — APPOINTMENT (OUTPATIENT)
Dept: CT IMAGING | Facility: HOSPITAL | Age: 56
End: 2021-05-06

## 2021-05-06 ENCOUNTER — APPOINTMENT (OUTPATIENT)
Dept: GENERAL RADIOLOGY | Facility: HOSPITAL | Age: 56
End: 2021-05-06

## 2021-05-06 VITALS
BODY MASS INDEX: 32.92 KG/M2 | OXYGEN SATURATION: 96 % | HEART RATE: 92 BPM | TEMPERATURE: 97.9 F | WEIGHT: 197.6 LBS | HEIGHT: 65 IN | DIASTOLIC BLOOD PRESSURE: 65 MMHG | RESPIRATION RATE: 16 BRPM | SYSTOLIC BLOOD PRESSURE: 131 MMHG

## 2021-05-06 DIAGNOSIS — R51.9 ACUTE NONINTRACTABLE HEADACHE, UNSPECIFIED HEADACHE TYPE: ICD-10-CM

## 2021-05-06 DIAGNOSIS — N30.90 CYSTITIS: Primary | ICD-10-CM

## 2021-05-06 DIAGNOSIS — R53.83 FATIGUE, UNSPECIFIED TYPE: ICD-10-CM

## 2021-05-06 LAB
ALBUMIN SERPL-MCNC: 3.5 G/DL (ref 3.5–5.2)
ALBUMIN/GLOB SERPL: 1.3 G/DL
ALP SERPL-CCNC: 134 U/L (ref 39–117)
ALT SERPL W P-5'-P-CCNC: 11 U/L (ref 1–33)
ANION GAP SERPL CALCULATED.3IONS-SCNC: 13.8 MMOL/L (ref 5–15)
AST SERPL-CCNC: 15 U/L (ref 1–32)
BACTERIA UR QL AUTO: ABNORMAL /HPF
BASOPHILS # BLD AUTO: 0.04 10*3/MM3 (ref 0–0.2)
BASOPHILS NFR BLD AUTO: 0.3 % (ref 0–1.5)
BILIRUB SERPL-MCNC: 0.4 MG/DL (ref 0–1.2)
BILIRUB UR QL STRIP: NEGATIVE
BUN SERPL-MCNC: 12 MG/DL (ref 6–20)
BUN/CREAT SERPL: 15 (ref 7–25)
CALCIUM SPEC-SCNC: 8.8 MG/DL (ref 8.6–10.5)
CHLORIDE SERPL-SCNC: 98 MMOL/L (ref 98–107)
CLARITY UR: ABNORMAL
CO2 SERPL-SCNC: 21.2 MMOL/L (ref 22–29)
COLOR UR: YELLOW
CREAT SERPL-MCNC: 0.8 MG/DL (ref 0.57–1)
DEPRECATED RDW RBC AUTO: 40.8 FL (ref 37–54)
EOSINOPHIL # BLD AUTO: 0.05 10*3/MM3 (ref 0–0.4)
EOSINOPHIL NFR BLD AUTO: 0.4 % (ref 0.3–6.2)
ERYTHROCYTE [DISTWIDTH] IN BLOOD BY AUTOMATED COUNT: 13.1 % (ref 12.3–15.4)
FLUAV RNA RESP QL NAA+PROBE: NOT DETECTED
FLUBV RNA RESP QL NAA+PROBE: NOT DETECTED
GFR SERPL CREATININE-BSD FRML MDRD: 74 ML/MIN/1.73
GLOBULIN UR ELPH-MCNC: 2.8 GM/DL
GLUCOSE SERPL-MCNC: 216 MG/DL (ref 65–99)
GLUCOSE UR STRIP-MCNC: ABNORMAL MG/DL
HCT VFR BLD AUTO: 37.3 % (ref 34–46.6)
HGB BLD-MCNC: 12.2 G/DL (ref 12–15.9)
HGB UR QL STRIP.AUTO: ABNORMAL
HYALINE CASTS UR QL AUTO: ABNORMAL /LPF
IMM GRANULOCYTES # BLD AUTO: 0.05 10*3/MM3 (ref 0–0.05)
IMM GRANULOCYTES NFR BLD AUTO: 0.4 % (ref 0–0.5)
KETONES UR QL STRIP: ABNORMAL
LEUKOCYTE ESTERASE UR QL STRIP.AUTO: ABNORMAL
LIPASE SERPL-CCNC: 9 U/L (ref 13–60)
LYMPHOCYTES # BLD AUTO: 2.85 10*3/MM3 (ref 0.7–3.1)
LYMPHOCYTES NFR BLD AUTO: 23.6 % (ref 19.6–45.3)
MCH RBC QN AUTO: 27.9 PG (ref 26.6–33)
MCHC RBC AUTO-ENTMCNC: 32.7 G/DL (ref 31.5–35.7)
MCV RBC AUTO: 85.4 FL (ref 79–97)
MONOCYTES # BLD AUTO: 1.23 10*3/MM3 (ref 0.1–0.9)
MONOCYTES NFR BLD AUTO: 10.2 % (ref 5–12)
NEUTROPHILS NFR BLD AUTO: 65.1 % (ref 42.7–76)
NEUTROPHILS NFR BLD AUTO: 7.88 10*3/MM3 (ref 1.7–7)
NITRITE UR QL STRIP: NEGATIVE
NRBC BLD AUTO-RTO: 0 /100 WBC (ref 0–0.2)
NT-PROBNP SERPL-MCNC: 1274 PG/ML (ref 0–900)
PH UR STRIP.AUTO: 5.5 [PH] (ref 5–8)
PLATELET # BLD AUTO: 216 10*3/MM3 (ref 140–450)
PMV BLD AUTO: 10.2 FL (ref 6–12)
POTASSIUM SERPL-SCNC: 3.6 MMOL/L (ref 3.5–5.2)
PROT SERPL-MCNC: 6.3 G/DL (ref 6–8.5)
PROT UR QL STRIP: ABNORMAL
RBC # BLD AUTO: 4.37 10*6/MM3 (ref 3.77–5.28)
RBC # UR: ABNORMAL /HPF
REF LAB TEST METHOD: ABNORMAL
SARS-COV-2 RNA RESP QL NAA+PROBE: NOT DETECTED
SODIUM SERPL-SCNC: 133 MMOL/L (ref 136–145)
SP GR UR STRIP: 1.01 (ref 1–1.03)
SQUAMOUS #/AREA URNS HPF: ABNORMAL /HPF
TRANS CELLS #/AREA URNS HPF: ABNORMAL /HPF
TROPONIN T SERPL-MCNC: <0.01 NG/ML (ref 0–0.03)
UROBILINOGEN UR QL STRIP: ABNORMAL
WBC # BLD AUTO: 12.1 10*3/MM3 (ref 3.4–10.8)
WBC UR QL AUTO: ABNORMAL /HPF

## 2021-05-06 PROCEDURE — 85025 COMPLETE CBC W/AUTO DIFF WBC: CPT | Performed by: PHYSICIAN ASSISTANT

## 2021-05-06 PROCEDURE — 87086 URINE CULTURE/COLONY COUNT: CPT | Performed by: PHYSICIAN ASSISTANT

## 2021-05-06 PROCEDURE — 70450 CT HEAD/BRAIN W/O DYE: CPT

## 2021-05-06 PROCEDURE — 83690 ASSAY OF LIPASE: CPT | Performed by: PHYSICIAN ASSISTANT

## 2021-05-06 PROCEDURE — 93005 ELECTROCARDIOGRAM TRACING: CPT | Performed by: PHYSICIAN ASSISTANT

## 2021-05-06 PROCEDURE — 87088 URINE BACTERIA CULTURE: CPT | Performed by: PHYSICIAN ASSISTANT

## 2021-05-06 PROCEDURE — 84484 ASSAY OF TROPONIN QUANT: CPT | Performed by: PHYSICIAN ASSISTANT

## 2021-05-06 PROCEDURE — 71045 X-RAY EXAM CHEST 1 VIEW: CPT

## 2021-05-06 PROCEDURE — 83880 ASSAY OF NATRIURETIC PEPTIDE: CPT | Performed by: PHYSICIAN ASSISTANT

## 2021-05-06 PROCEDURE — 80053 COMPREHEN METABOLIC PANEL: CPT | Performed by: PHYSICIAN ASSISTANT

## 2021-05-06 PROCEDURE — 87636 SARSCOV2 & INF A&B AMP PRB: CPT | Performed by: PHYSICIAN ASSISTANT

## 2021-05-06 PROCEDURE — 63710000001 ONDANSETRON ODT 4 MG TABLET DISPERSIBLE: Performed by: EMERGENCY MEDICINE

## 2021-05-06 PROCEDURE — 87186 SC STD MICRODIL/AGAR DIL: CPT | Performed by: PHYSICIAN ASSISTANT

## 2021-05-06 PROCEDURE — 81001 URINALYSIS AUTO W/SCOPE: CPT | Performed by: PHYSICIAN ASSISTANT

## 2021-05-06 PROCEDURE — 99283 EMERGENCY DEPT VISIT LOW MDM: CPT

## 2021-05-06 RX ORDER — SODIUM CHLORIDE 0.9 % (FLUSH) 0.9 %
10 SYRINGE (ML) INJECTION AS NEEDED
Status: DISCONTINUED | OUTPATIENT
Start: 2021-05-06 | End: 2021-05-06 | Stop reason: HOSPADM

## 2021-05-06 RX ORDER — ONDANSETRON 4 MG/1
4 TABLET, ORALLY DISINTEGRATING ORAL EVERY 6 HOURS PRN
Qty: 8 TABLET | Refills: 0 | Status: SHIPPED | OUTPATIENT
Start: 2021-05-06 | End: 2021-05-08

## 2021-05-06 RX ORDER — CEFUROXIME AXETIL 500 MG/1
500 TABLET ORAL 2 TIMES DAILY
Qty: 20 TABLET | Refills: 0 | Status: SHIPPED | OUTPATIENT
Start: 2021-05-06 | End: 2021-05-16

## 2021-05-06 RX ORDER — ONDANSETRON 4 MG/1
4 TABLET, ORALLY DISINTEGRATING ORAL ONCE
Status: COMPLETED | OUTPATIENT
Start: 2021-05-06 | End: 2021-05-06

## 2021-05-06 RX ORDER — CEFUROXIME AXETIL 250 MG/1
500 TABLET ORAL ONCE
Status: COMPLETED | OUTPATIENT
Start: 2021-05-06 | End: 2021-05-06

## 2021-05-06 RX ADMIN — ONDANSETRON 4 MG: 4 TABLET, ORALLY DISINTEGRATING ORAL at 17:42

## 2021-05-06 RX ADMIN — SODIUM CHLORIDE 1000 ML: 9 INJECTION, SOLUTION INTRAVENOUS at 15:49

## 2021-05-06 RX ADMIN — CEFUROXIME AXETIL 500 MG: 250 TABLET ORAL at 17:42

## 2021-05-07 ENCOUNTER — EPISODE CHANGES (OUTPATIENT)
Dept: CASE MANAGEMENT | Facility: OTHER | Age: 56
End: 2021-05-07

## 2021-05-08 LAB — BACTERIA SPEC AEROBE CULT: ABNORMAL

## 2021-05-10 ENCOUNTER — TELEPHONE (OUTPATIENT)
Dept: INTERNAL MEDICINE | Facility: CLINIC | Age: 56
End: 2021-05-10

## 2021-05-10 NOTE — TELEPHONE ENCOUNTER
Caller: Kimberly Chun    Relationship to patient: Self    Best call back number: [phone]    New or established patient?  [] New  [x] Established    Date of discharge: 05/06/2021    Facility discharged from:     Diagnosis/Symptoms: STOMACH VIRUS    Length of stay (If applicable): N/A    Specialty Only: Did you see a [facility] provider?    [] Yes  [x] No  If so, who?N/A    PATIENT PCP CHARO FAITH WAS NOT AVAILABLE WITHIN SEVEN DAY LIMIT, HAD TO SCHEDULE WITH BARRETT YOUNG.

## 2021-05-11 ENCOUNTER — OFFICE VISIT (OUTPATIENT)
Dept: INTERNAL MEDICINE | Facility: CLINIC | Age: 56
End: 2021-05-11

## 2021-05-11 VITALS
HEART RATE: 86 BPM | WEIGHT: 203.4 LBS | SYSTOLIC BLOOD PRESSURE: 140 MMHG | BODY MASS INDEX: 33.89 KG/M2 | DIASTOLIC BLOOD PRESSURE: 83 MMHG | HEIGHT: 65 IN | OXYGEN SATURATION: 98 % | TEMPERATURE: 98 F

## 2021-05-11 DIAGNOSIS — I67.2 CEREBRAL ATHEROSCLEROSIS: ICD-10-CM

## 2021-05-11 DIAGNOSIS — I10 ESSENTIAL HYPERTENSION: ICD-10-CM

## 2021-05-11 DIAGNOSIS — J30.89 ENVIRONMENTAL AND SEASONAL ALLERGIES: ICD-10-CM

## 2021-05-11 DIAGNOSIS — N39.0 URINARY TRACT INFECTION WITHOUT HEMATURIA, SITE UNSPECIFIED: ICD-10-CM

## 2021-05-11 DIAGNOSIS — E11.65 TYPE 2 DIABETES MELLITUS WITH HYPERGLYCEMIA, WITH LONG-TERM CURRENT USE OF INSULIN (HCC): Primary | ICD-10-CM

## 2021-05-11 DIAGNOSIS — Z79.4 TYPE 2 DIABETES MELLITUS WITH HYPERGLYCEMIA, WITH LONG-TERM CURRENT USE OF INSULIN (HCC): Primary | ICD-10-CM

## 2021-05-11 PROCEDURE — 99214 OFFICE O/P EST MOD 30 MIN: CPT | Performed by: NURSE PRACTITIONER

## 2021-05-11 RX ORDER — CLOPIDOGREL BISULFATE 75 MG/1
75 TABLET ORAL DAILY
Qty: 90 TABLET | Refills: 3 | Status: SHIPPED | OUTPATIENT
Start: 2021-05-11 | End: 2022-01-12 | Stop reason: SDUPTHER

## 2021-05-11 RX ORDER — LEVOCETIRIZINE DIHYDROCHLORIDE 5 MG/1
5 TABLET, FILM COATED ORAL EVERY EVENING
Qty: 90 TABLET | Refills: 3 | Status: SHIPPED | OUTPATIENT
Start: 2021-05-11 | End: 2022-07-20 | Stop reason: SDUPTHER

## 2021-05-13 ENCOUNTER — DOCUMENTATION (OUTPATIENT)
Dept: NUTRITION | Facility: HOSPITAL | Age: 56
End: 2021-05-13

## 2021-05-13 NOTE — PROGRESS NOTES
Nutrition Services    Patient Name:  Kimberly Chun  YOB: 1965  MRN: [id number]  Admit Date:  (Not on file)    No response from follow-up letter; RD to close.     Electronically signed by:  Apoorva Ramachandran RD  05/13/21 13:24 EDT

## 2021-05-20 ENCOUNTER — OFFICE VISIT (OUTPATIENT)
Dept: INTERNAL MEDICINE | Facility: CLINIC | Age: 56
End: 2021-05-20

## 2021-05-20 VITALS
BODY MASS INDEX: 33.36 KG/M2 | HEART RATE: 89 BPM | OXYGEN SATURATION: 97 % | TEMPERATURE: 97.5 F | RESPIRATION RATE: 18 BRPM | WEIGHT: 200.25 LBS | HEIGHT: 65 IN | SYSTOLIC BLOOD PRESSURE: 139 MMHG | DIASTOLIC BLOOD PRESSURE: 78 MMHG

## 2021-05-20 DIAGNOSIS — E11.42 DIABETIC PERIPHERAL NEUROPATHY ASSOCIATED WITH TYPE 2 DIABETES MELLITUS (HCC): ICD-10-CM

## 2021-05-20 DIAGNOSIS — Z79.4 TYPE 2 DIABETES MELLITUS WITH HYPERGLYCEMIA, WITH LONG-TERM CURRENT USE OF INSULIN (HCC): Primary | ICD-10-CM

## 2021-05-20 DIAGNOSIS — Z91.14 NONCOMPLIANCE WITH DIET AND MEDICATION REGIMEN: ICD-10-CM

## 2021-05-20 DIAGNOSIS — E11.42 TYPE 2 DIABETES MELLITUS WITH DIABETIC POLYNEUROPATHY, WITH LONG-TERM CURRENT USE OF INSULIN (HCC): ICD-10-CM

## 2021-05-20 DIAGNOSIS — M54.40 CHRONIC MIDLINE LOW BACK PAIN WITH SCIATICA, SCIATICA LATERALITY UNSPECIFIED: ICD-10-CM

## 2021-05-20 DIAGNOSIS — R40.4 TRANSIENT ALTERATION OF AWARENESS: ICD-10-CM

## 2021-05-20 DIAGNOSIS — Z79.4 TYPE 2 DIABETES MELLITUS WITH DIABETIC POLYNEUROPATHY, WITH LONG-TERM CURRENT USE OF INSULIN (HCC): ICD-10-CM

## 2021-05-20 DIAGNOSIS — N39.0 COMPLICATED UTI (URINARY TRACT INFECTION): ICD-10-CM

## 2021-05-20 DIAGNOSIS — G89.29 CHRONIC MIDLINE LOW BACK PAIN WITH SCIATICA, SCIATICA LATERALITY UNSPECIFIED: ICD-10-CM

## 2021-05-20 DIAGNOSIS — Z91.119 NONCOMPLIANCE WITH DIET AND MEDICATION REGIMEN: ICD-10-CM

## 2021-05-20 DIAGNOSIS — E11.65 TYPE 2 DIABETES MELLITUS WITH HYPERGLYCEMIA, WITH LONG-TERM CURRENT USE OF INSULIN (HCC): Primary | ICD-10-CM

## 2021-05-20 PROBLEM — E66.3 OVERWEIGHT: Status: RESOLVED | Noted: 2018-04-08 | Resolved: 2021-05-20

## 2021-05-20 LAB
BILIRUB BLD-MCNC: NEGATIVE MG/DL
CLARITY, POC: CLEAR
COLOR UR: YELLOW
GLUCOSE UR STRIP-MCNC: NEGATIVE MG/DL
HBA1C MFR BLD: 8 %
KETONES UR QL: NEGATIVE
LEUKOCYTE EST, POC: NEGATIVE
NITRITE UR-MCNC: NEGATIVE MG/ML
PH UR: 6 [PH] (ref 5–8)
PROT UR STRIP-MCNC: NEGATIVE MG/DL
RBC # UR STRIP: NEGATIVE /UL
SP GR UR: 1.01 (ref 1–1.03)
UROBILINOGEN UR QL: NORMAL

## 2021-05-20 PROCEDURE — 99214 OFFICE O/P EST MOD 30 MIN: CPT | Performed by: FAMILY MEDICINE

## 2021-05-20 PROCEDURE — 81003 URINALYSIS AUTO W/O SCOPE: CPT | Performed by: FAMILY MEDICINE

## 2021-05-20 PROCEDURE — 83036 HEMOGLOBIN GLYCOSYLATED A1C: CPT | Performed by: FAMILY MEDICINE

## 2021-05-20 RX ORDER — PROCHLORPERAZINE 25 MG/1
1 SUPPOSITORY RECTAL ONCE
Qty: 1 EACH | Refills: 0 | Status: SHIPPED | OUTPATIENT
Start: 2021-05-20 | End: 2021-05-20

## 2021-05-20 RX ORDER — PROCHLORPERAZINE 25 MG/1
1 SUPPOSITORY RECTAL CONTINUOUS
Qty: 1 EACH | Refills: 0 | Status: SHIPPED | OUTPATIENT
Start: 2021-05-20 | End: 2021-06-19 | Stop reason: CLARIF

## 2021-05-20 RX ORDER — GABAPENTIN 800 MG/1
800 TABLET ORAL 3 TIMES DAILY
Qty: 270 TABLET | Refills: 2 | Status: SHIPPED | OUTPATIENT
Start: 2021-05-20 | End: 2022-01-12 | Stop reason: SDUPTHER

## 2021-05-20 RX ORDER — PROCHLORPERAZINE 25 MG/1
SUPPOSITORY RECTAL
Qty: 3 EACH | Refills: 11 | Status: SHIPPED | OUTPATIENT
Start: 2021-05-20 | End: 2021-06-19 | Stop reason: CLARIF

## 2021-05-20 NOTE — PROGRESS NOTES
"Subjective    Kimberly Chun is a 56 y.o. female here for:  Chief Complaint   Patient presents with   • Diabetes     Admits her glucose readings have been really high lately. However, she was very sick with a bad UTI and stomach bug for a few weeks.        History per MA reviewed.    Sugars remain high, but \"in my defense\" she reports being sick in last month with urinary tract infection and stomach bug. At one point she felt body was shutting down, went to ER next day. At one point notes talking with sister on phone and wasn't making sense. Was so sick and weak one morning she couldn't get up to take Rosa to school.    Having stomach pain, back pain, not sure \"that was enough antibiotics.\"          The following portions of the patient's history were reviewed and updated as appropriate: allergies, current medications, past family history, past medical history, past social history, past surgical history and problem list.    Review of Systems   Constitutional: Positive for activity change and fatigue.   Musculoskeletal: Positive for arthralgias and back pain.   Neurological: Positive for weakness and confusion.       Visit Vitals  /78   Pulse 89   Temp 97.5 °F (36.4 °C) (Temporal)   Resp 18   Ht 165.1 cm (65\")   Wt 90.8 kg (200 lb 4 oz)   SpO2 97%   BMI 33.32 kg/m²         Objective   Physical Exam  Vitals and nursing note reviewed.   Constitutional:       General: She is not in acute distress.     Appearance: Normal appearance. She is well-developed and well-groomed. She is obese. She is not ill-appearing, toxic-appearing or diaphoretic.      Interventions: Face mask in place.   HENT:      Head: Normocephalic and atraumatic.      Right Ear: Hearing normal.      Left Ear: Hearing normal.   Eyes:      General: Lids are normal. No scleral icterus.     Extraocular Movements: Extraocular movements intact.   Neck:      Trachea: Phonation normal.   Pulmonary:      Effort: Pulmonary effort is normal. "   Musculoskeletal:      Cervical back: Neck supple.   Skin:     Coloration: Skin is not jaundiced or pale.   Neurological:      General: No focal deficit present.      Mental Status: She is alert and oriented to person, place, and time.   Psychiatric:         Attention and Perception: Attention and perception normal.         Mood and Affect: Mood and affect normal.         Speech: Speech normal.         Behavior: Behavior normal. Behavior is cooperative.         Thought Content: Thought content normal.         Cognition and Memory: Cognition and memory normal.         Judgment: Judgment normal.         For medical decision making review of the following was required:  Lab Results   Component Value Date    HGBA1C 8.0 05/20/2021    HGBA1C 9.60 (H) 02/04/2021    HGBA1C 8.1 10/28/2020   reviewed glucose levels back to fall 2016 with patient, in that time she's only had one A1C in 7 range. Rest have been 8 and above.    Component      Latest Ref Rng & Units 5/6/2021   Glucose      65 - 99 mg/dL 216 (H)   BUN      6 - 20 mg/dL 12   Creatinine      0.57 - 1.00 mg/dL 0.80   Sodium      136 - 145 mmol/L 133 (L)   Potassium      3.5 - 5.2 mmol/L 3.6   Chloride      98 - 107 mmol/L 98   CO2      22.0 - 29.0 mmol/L 21.2 (L)   Calcium      8.6 - 10.5 mg/dL 8.8   Total Protein      6.0 - 8.5 g/dL 6.3   Albumin      3.50 - 5.20 g/dL 3.50   ALT (SGPT)      1 - 33 U/L 11   AST (SGOT)      1 - 32 U/L 15   Alkaline Phosphatase      39 - 117 U/L 134 (H)   Total Bilirubin      0.0 - 1.2 mg/dL 0.4   eGFR Non African Am      >60 mL/min/1.73 74   Globulin      gm/dL 2.8   A/G Ratio      g/dL 1.3   BUN/Creatinine Ratio      7.0 - 25.0 15.0   Anion Gap      5.0 - 15.0 mmol/L 13.8     5/6/21:  ED Provider Notes by Dionne De Anda PA-C (05/06/2021 15:13)  New Prescriptions          cefuroxime 500 MG tablet  Commonly known as: CEFTIN  Take 1 tablet by mouth 2 (Two) Times a Day for 10 days.       Urine Culture >100,000 CFU/mL Escherichia  coliAbnormal           Resulting Agency: [facility]   Susceptibility     Escherichia coli     SUJEY     Ampicillin <=2 ug/ml Susceptible     Ampicillin + Sulbactam <=2 ug/ml Susceptible     Cefazolin <=4 ug/ml Susceptible     Cefepime <=1 ug/ml Susceptible     Ceftazidime <=1 ug/ml Susceptible     Ceftriaxone <=1 ug/ml Susceptible     Gentamicin <=1 ug/ml Susceptible     Levofloxacin <=0.12 ug/ml Susceptible     Nitrofurantoin 32 ug/ml Susceptible     Piperacillin + Tazobactam <=4 ug/ml Susceptible     Tetracycline <=1 ug/ml Susceptible     Trimethoprim + Sulfamethoxazole <=20 ug/ml Susceptible         Office Visit on 05/20/2021   Component Date Value Ref Range Status   • Hemoglobin A1C 05/20/2021 8.0  % Final   • Color 05/20/2021 Yellow  Yellow, Straw, Dark Yellow, Silke Final   • Clarity, UA 05/20/2021 Clear  Clear Final   • Specific Gravity  05/20/2021 1.010  1.005 - 1.030 Final   • pH, Urine 05/20/2021 6.0  5.0 - 8.0 Final   • Leukocytes 05/20/2021 Negative  Negative Final   • Nitrite, UA 05/20/2021 Negative  Negative Final   • Protein, POC 05/20/2021 Negative  Negative mg/dL Final   • Glucose, UA 05/20/2021 Negative  Negative, 1000 mg/dL (3+) mg/dL Final   • Ketones, UA 05/20/2021 Negative  Negative Final   • Urobilinogen, UA 05/20/2021 Normal  Normal Final   • Bilirubin 05/20/2021 Negative  Negative Final   • Blood, UA 05/20/2021 Negative  Negative Final         Assessment/Plan     Problem List Items Addressed This Visit        Endocrine and Metabolic    Type 2 diabetes mellitus with diabetic polyneuropathy, with long-term current use of insulin (CMS/[facility])    Relevant Medications    gabapentin (NEURONTIN) 800 MG tablet    Continuous Blood Gluc Transmit (Dexcom G6 Transmitter) misc    Continuous Blood Gluc Sensor (Dexcom G6 Sensor)    Continuous Blood Gluc  (Dexcom G6 ) device    Type 2 diabetes mellitus with hyperglycemia, with long-term current use of insulin (CMS/HCC) - Primary    Relevant  Medications    Continuous Blood Gluc Transmit (Dexcom G6 Transmitter) misc    Continuous Blood Gluc Sensor (Dexcom G6 Sensor)    Continuous Blood Gluc  (Dexcom G6 ) device    Other Relevant Orders    POC Glycosylated Hemoglobin (Hb A1C) (Completed)       Musculoskeletal and Injuries    Chronic midline low back pain with sciatica    Relevant Medications    gabapentin (NEURONTIN) 800 MG tablet       Neuro    Diabetic peripheral neuropathy associated with type 2 diabetes mellitus (CMS/HCC)    Relevant Medications    gabapentin (NEURONTIN) 800 MG tablet      Other Visit Diagnoses     Complicated UTI (urinary tract infection)        UA good today, no further abx at this time, sending for culture.    Relevant Orders    POC Urinalysis Dipstick, Automated (Completed)    Urine Culture - , Urine, Clean Catch    Transient alteration of awareness        if happens again needs to be evaluated in ER, ideally family member should call 911 if change noted    Noncompliance with diet and medication regimen        stressed need to get dm under control or possible discharge due to non-compliance          · Printed form for glucose log, bring back to next appointment. Discussed need to have fasting and 2 hour postprandial sugars under 150. Goal A1C under 7. Has only had one A1C in 7 range in last five years approx. Must work on compliance, life expectancy decreased for uncontrolled diabetic.   · JACQUIE reviewed and appropriate.      Return in about 1 week (around 5/27/2021) for Diabetes follow up.     Maria Luz Longoria MD

## 2021-05-23 LAB
BACTERIA UR CULT: ABNORMAL
OTHER ANTIBIOTIC SUSC ISLT: ABNORMAL

## 2021-05-24 ENCOUNTER — TELEPHONE (OUTPATIENT)
Dept: INTERNAL MEDICINE | Facility: CLINIC | Age: 56
End: 2021-05-24

## 2021-05-24 RX ORDER — CEFUROXIME AXETIL 500 MG/1
500 TABLET ORAL 2 TIMES DAILY
Qty: 14 TABLET | Refills: 0 | Status: SHIPPED | OUTPATIENT
Start: 2021-05-24 | End: 2021-05-31

## 2021-05-24 NOTE — TELEPHONE ENCOUNTER
Patient called back stating the last time she was on antibiotics she was advised to wait until she finished before getting her COVID vaccine. She is schedule to get her 2nd dose today and wants to know if she should wait.

## 2021-05-27 ENCOUNTER — OFFICE VISIT (OUTPATIENT)
Dept: INTERNAL MEDICINE | Facility: CLINIC | Age: 56
End: 2021-05-27

## 2021-05-27 VITALS
WEIGHT: 200.25 LBS | HEIGHT: 65 IN | HEART RATE: 77 BPM | OXYGEN SATURATION: 95 % | BODY MASS INDEX: 33.36 KG/M2 | SYSTOLIC BLOOD PRESSURE: 135 MMHG | TEMPERATURE: 97.5 F | RESPIRATION RATE: 18 BRPM | DIASTOLIC BLOOD PRESSURE: 65 MMHG

## 2021-05-27 DIAGNOSIS — E11.42 TYPE 2 DIABETES MELLITUS WITH DIABETIC POLYNEUROPATHY, WITH LONG-TERM CURRENT USE OF INSULIN (HCC): ICD-10-CM

## 2021-05-27 DIAGNOSIS — Z79.4 TYPE 2 DIABETES MELLITUS WITH DIABETIC POLYNEUROPATHY, WITH LONG-TERM CURRENT USE OF INSULIN (HCC): ICD-10-CM

## 2021-05-27 DIAGNOSIS — E11.65 TYPE 2 DIABETES MELLITUS WITH HYPERGLYCEMIA, WITH LONG-TERM CURRENT USE OF INSULIN (HCC): Primary | ICD-10-CM

## 2021-05-27 DIAGNOSIS — Z79.4 TYPE 2 DIABETES MELLITUS WITH HYPERGLYCEMIA, WITH LONG-TERM CURRENT USE OF INSULIN (HCC): Primary | ICD-10-CM

## 2021-05-27 PROCEDURE — 99213 OFFICE O/P EST LOW 20 MIN: CPT | Performed by: FAMILY MEDICINE

## 2021-05-27 NOTE — PROGRESS NOTES
"Subjective    Kimberly Chun is a 56 y.o. female here for:  Chief Complaint   Patient presents with   • Diabetes       History per MA reviewed.    Has log of sugars  Keeps drink or something sugary with her in case of hypoglycemia  Numbers trending down, she's not used to levels they're reaching           The following portions of the patient's history were reviewed and updated as appropriate: allergies, current medications, past family history, past medical history, past social history, past surgical history and problem list.    Review of Systems   Constitutional: Positive for fatigue. Negative for fever.   Musculoskeletal: Positive for arthralgias.   Psychiatric/Behavioral: Positive for stress.       Visit Vitals  /65   Pulse 77   Temp 97.5 °F (36.4 °C)   Resp 18   Ht 165.1 cm (65\")   Wt 90.8 kg (200 lb 4 oz)   SpO2 95%   BMI 33.32 kg/m²         Objective   Physical Exam  Vitals and nursing note reviewed.   Constitutional:       General: She is not in acute distress.     Appearance: Normal appearance. She is well-developed and well-groomed. She is obese. She is not ill-appearing, toxic-appearing or diaphoretic.      Interventions: Face mask in place.   HENT:      Head: Normocephalic and atraumatic.      Right Ear: Hearing normal.      Left Ear: Hearing normal.   Eyes:      General: Lids are normal. No scleral icterus.     Extraocular Movements: Extraocular movements intact.   Neck:      Trachea: Phonation normal.   Pulmonary:      Effort: Pulmonary effort is normal.   Musculoskeletal:      Cervical back: Neck supple.   Skin:     Coloration: Skin is not jaundiced or pale.   Neurological:      General: No focal deficit present.      Mental Status: She is alert and oriented to person, place, and time.      Motor: Motor function is intact.   Psychiatric:         Attention and Perception: Attention and perception normal.         Mood and Affect: Mood and affect normal.         Speech: Speech normal.         " Behavior: Behavior normal. Behavior is cooperative.         Thought Content: Thought content normal.         Cognition and Memory: Cognition and memory normal.         Judgment: Judgment normal.         For medical decision making review of the following was required:  Lab Results   Component Value Date    HGBA1C 8.0 05/20/2021    HGBA1C 9.60 (H) 02/04/2021    HGBA1C 8.1 10/28/2020         Assessment/Plan     Problem List Items Addressed This Visit        Endocrine and Metabolic    Type 2 diabetes mellitus with diabetic polyneuropathy, with long-term current use of insulin (CMS/[facility])    Type 2 diabetes mellitus with hyperglycemia, with long-term current use of insulin (CMS/[facility]) - Primary          · Worked with patient to titrate insulin doses. Written on log and educated further on how to titrate insulin herself. Work on diabetic diet. Encouragement given.     Return in about 3 weeks (around 6/17/2021) for Diabetes follow up.     Maria Luz Longoria MD

## 2021-06-18 ENCOUNTER — OFFICE VISIT (OUTPATIENT)
Dept: INTERNAL MEDICINE | Facility: CLINIC | Age: 56
End: 2021-06-18

## 2021-06-18 VITALS
BODY MASS INDEX: 33.32 KG/M2 | HEIGHT: 65 IN | DIASTOLIC BLOOD PRESSURE: 59 MMHG | HEART RATE: 82 BPM | WEIGHT: 200 LBS | RESPIRATION RATE: 18 BRPM | OXYGEN SATURATION: 99 % | SYSTOLIC BLOOD PRESSURE: 103 MMHG | TEMPERATURE: 97.8 F

## 2021-06-18 DIAGNOSIS — Z91.14 NONCOMPLIANCE WITH DIET AND MEDICATION REGIMEN: ICD-10-CM

## 2021-06-18 DIAGNOSIS — I67.2 CEREBRAL ATHEROSCLEROSIS: ICD-10-CM

## 2021-06-18 DIAGNOSIS — M25.511 CHRONIC RIGHT SHOULDER PAIN: ICD-10-CM

## 2021-06-18 DIAGNOSIS — E11.42 TYPE 2 DIABETES MELLITUS WITH DIABETIC POLYNEUROPATHY, WITH LONG-TERM CURRENT USE OF INSULIN (HCC): ICD-10-CM

## 2021-06-18 DIAGNOSIS — J44.9 CHRONIC OBSTRUCTIVE PULMONARY DISEASE, UNSPECIFIED COPD TYPE (HCC): ICD-10-CM

## 2021-06-18 DIAGNOSIS — E11.42 DIABETIC PERIPHERAL NEUROPATHY ASSOCIATED WITH TYPE 2 DIABETES MELLITUS (HCC): ICD-10-CM

## 2021-06-18 DIAGNOSIS — Z91.119 NONCOMPLIANCE WITH DIET AND MEDICATION REGIMEN: ICD-10-CM

## 2021-06-18 DIAGNOSIS — Z79.4 TYPE 2 DIABETES MELLITUS WITH HYPERGLYCEMIA, WITH LONG-TERM CURRENT USE OF INSULIN (HCC): Primary | ICD-10-CM

## 2021-06-18 DIAGNOSIS — E11.65 TYPE 2 DIABETES MELLITUS WITH HYPERGLYCEMIA, WITH LONG-TERM CURRENT USE OF INSULIN (HCC): Primary | ICD-10-CM

## 2021-06-18 DIAGNOSIS — G89.29 CHRONIC RIGHT SHOULDER PAIN: ICD-10-CM

## 2021-06-18 DIAGNOSIS — Z79.4 TYPE 2 DIABETES MELLITUS WITH DIABETIC POLYNEUROPATHY, WITH LONG-TERM CURRENT USE OF INSULIN (HCC): ICD-10-CM

## 2021-06-18 PROCEDURE — 99214 OFFICE O/P EST MOD 30 MIN: CPT | Performed by: FAMILY MEDICINE

## 2021-06-18 RX ORDER — OXYCODONE HYDROCHLORIDE AND ACETAMINOPHEN 5; 325 MG/1; MG/1
1 TABLET ORAL 2 TIMES DAILY PRN
Qty: 60 TABLET | Refills: 0 | Status: SHIPPED | OUTPATIENT
Start: 2021-06-18 | End: 2022-01-12 | Stop reason: SDUPTHER

## 2021-06-18 NOTE — PROGRESS NOTES
"Subjective    Kimberly Chun is a 56 y.o. female here for:  Chief Complaint   Patient presents with   • Diabetes     Pt has brought a log of her sugar readings with her today. She has a lot of lower readings and states she does not feel good when her sugar gets that low. When she know she is going to mow her yard, she will eat a lot of carbs to prepare for that, then her sugar goes up to 377 and she can not mow because she feels bad. Pt states it is too hard to regulate her diabetes at her age, she feels it was easier when she was younger. Pt also thinks her pancrease is not working at all.        History per MA reviewed.    Sugars improving but she's not used to some of the low levels. Has had some down to 50-60s, one or two down to 40s. Still having some over 200 on occasion. Checking sugars very often through day, usually 4x a day. Fingers sore from checking so much.          The following portions of the patient's history were reviewed and updated as appropriate: allergies, current medications, past family history, past medical history, past social history, past surgical history and problem list.    Review of Systems   Constitutional: Negative for fever.   Musculoskeletal: Positive for arthralgias (shoulder--very bothersome at times with working in yard).   Psychiatric/Behavioral: Positive for stress.       Visit Vitals  /59   Pulse 82   Temp 97.8 °F (36.6 °C) (Temporal)   Resp 18   Ht 165.1 cm (65\")   Wt 90.7 kg (200 lb)   SpO2 99%   BMI 33.28 kg/m²         Objective   Physical Exam  Vitals and nursing note reviewed.   Constitutional:       General: She is not in acute distress.     Appearance: Normal appearance. She is well-developed and well-groomed. She is not ill-appearing, toxic-appearing or diaphoretic.      Interventions: Face mask in place.   HENT:      Head: Normocephalic and atraumatic.      Right Ear: Hearing normal.      Left Ear: Hearing normal.   Eyes:      General: Lids are normal. No " scleral icterus.     Extraocular Movements: Extraocular movements intact.   Neck:      Trachea: Phonation normal.   Pulmonary:      Effort: Pulmonary effort is normal.   Musculoskeletal:      Cervical back: Neck supple.   Skin:     Coloration: Skin is not jaundiced or pale.   Neurological:      General: No focal deficit present.      Mental Status: She is alert and oriented to person, place, and time.      Motor: Motor function is intact.   Psychiatric:         Attention and Perception: Attention and perception normal.         Mood and Affect: Mood and affect normal.         Speech: Speech normal.         Behavior: Behavior normal. Behavior is cooperative.         Thought Content: Thought content normal.         Cognition and Memory: Cognition and memory normal.         Judgment: Judgment normal.         For medical decision making review of the following was required:  Lab Results   Component Value Date    HGBA1C 8.0 05/20/2021    HGBA1C 9.60 (H) 02/04/2021    HGBA1C 8.1 10/28/2020     Reviewed patient's glucose log.    Assessment/Plan     Problem List Items Addressed This Visit        Endocrine and Metabolic    Type 2 diabetes mellitus with diabetic polyneuropathy, with long-term current use of insulin (CMS/[facility])    Relevant Medications    Insulin Glargine (Lantus SoloStar) 100 UNIT/ML injection pen    Type 2 diabetes mellitus with hyperglycemia, with long-term current use of insulin (CMS/HCC) - Primary    Relevant Medications    Continuous Blood Gluc  (FreeStyle Andree [place]) device    Continuous Blood Gluc Sensor (FreeStyle Andree Sensor System)    Insulin Glargine (Lantus SoloStar) 100 UNIT/ML injection pen    Other Relevant Orders    Ambulatory Referral to Nutrition Services       Neuro    Cerebral atherosclerosis    Overview     History of stroke.         Relevant Medications    aspirin 81 MG EC tablet    Diabetic peripheral neuropathy associated with type 2 diabetes mellitus (CMS/HCC)    Relevant  Medications    Continuous Blood Gluc  (FreeStyle Andree [place]) device    Continuous Blood Gluc Sensor (FreeStyle Andree Sensor System)    Insulin Glargine (Lantus SoloStar) 100 UNIT/ML injection pen       Pulmonary and Pneumonias    Chronic obstructive pulmonary disease (CMS/HCC)    Overview     · Complicated by continued tobacco abuse  · Current therapy: albuterol rescue inhaler         Relevant Medications    albuterol (PROVENTIL) (2.5 MG/3ML) 0.083% nebulizer solution      Other Visit Diagnoses     Chronic right shoulder pain        use narcotic sparingly, deanna reviewed and appropriate    Relevant Medications    oxyCODONE-acetaminophen (Percocet) 5-325 MG per tablet    Noncompliance with diet and medication regimen        Patient could benefit from diabetes education on diet choices.     Relevant Orders    Ambulatory Referral to Nutrition Services          · Reviewed glucose log. Cut back on basal insulin from 62 units to 60. This should help with the hypoglycemia she has throughout the day. May need to cut back on mealtime insulin 1-2 units if continues to have hypoglycemia. I expect her next A1C, next visit, to be close to our 7 goal if not under.    Return in about 2 months (around 8/23/2021) for Diabetes follow up.     Maria Luz Longoria MD

## 2021-06-19 RX ORDER — INSULIN GLARGINE 100 [IU]/ML
60 INJECTION, SOLUTION SUBCUTANEOUS NIGHTLY
Qty: 20 PEN | Refills: 3 | Status: SHIPPED | OUTPATIENT
Start: 2021-06-19 | End: 2022-01-12 | Stop reason: SDUPTHER

## 2021-06-19 RX ORDER — FLASH GLUCOSE SENSOR
1 KIT MISCELLANEOUS ONCE
Qty: 1 EACH | Refills: 0 | Status: SHIPPED | OUTPATIENT
Start: 2021-06-19 | End: 2021-06-19

## 2021-06-19 RX ORDER — ALBUTEROL SULFATE 2.5 MG/3ML
2.5 SOLUTION RESPIRATORY (INHALATION) EVERY 6 HOURS PRN
Qty: 180 EACH | Refills: 3 | Status: SHIPPED | OUTPATIENT
Start: 2021-06-19 | End: 2022-01-12 | Stop reason: SDUPTHER

## 2021-06-19 RX ORDER — ASPIRIN 81 MG/1
81 TABLET ORAL DAILY
Qty: 90 TABLET | Refills: 3 | Status: SHIPPED | OUTPATIENT
Start: 2021-06-19

## 2021-06-19 RX ORDER — FLASH GLUCOSE SENSOR
1 KIT MISCELLANEOUS
Qty: 2 EACH | Refills: 6 | Status: SHIPPED | OUTPATIENT
Start: 2021-06-19 | End: 2021-06-22 | Stop reason: CLARIF

## 2021-06-22 ENCOUNTER — PRIOR AUTHORIZATION (OUTPATIENT)
Dept: INTERNAL MEDICINE | Facility: CLINIC | Age: 56
End: 2021-06-22

## 2021-06-22 DIAGNOSIS — E11.65 TYPE 2 DIABETES MELLITUS WITH HYPERGLYCEMIA, WITH LONG-TERM CURRENT USE OF INSULIN (HCC): Primary | ICD-10-CM

## 2021-06-22 DIAGNOSIS — E11.42 DIABETIC PERIPHERAL NEUROPATHY ASSOCIATED WITH TYPE 2 DIABETES MELLITUS (HCC): ICD-10-CM

## 2021-06-22 DIAGNOSIS — E11.42 TYPE 2 DIABETES MELLITUS WITH DIABETIC POLYNEUROPATHY, WITH LONG-TERM CURRENT USE OF INSULIN (HCC): ICD-10-CM

## 2021-06-22 DIAGNOSIS — Z79.4 TYPE 2 DIABETES MELLITUS WITH HYPERGLYCEMIA, WITH LONG-TERM CURRENT USE OF INSULIN (HCC): Primary | ICD-10-CM

## 2021-06-22 DIAGNOSIS — Z79.4 TYPE 2 DIABETES MELLITUS WITH DIABETIC POLYNEUROPATHY, WITH LONG-TERM CURRENT USE OF INSULIN (HCC): ICD-10-CM

## 2021-06-22 RX ORDER — PROCHLORPERAZINE 25 MG/1
SUPPOSITORY RECTAL
Qty: 3 EACH | Refills: 11 | Status: SHIPPED | OUTPATIENT
Start: 2021-06-22 | End: 2022-04-18

## 2021-06-22 RX ORDER — PROCHLORPERAZINE 25 MG/1
1 SUPPOSITORY RECTAL ONCE
Qty: 1 EACH | Refills: 0 | Status: SHIPPED | OUTPATIENT
Start: 2021-06-22 | End: 2021-06-22

## 2021-06-22 RX ORDER — PROCHLORPERAZINE 25 MG/1
1 SUPPOSITORY RECTAL CONTINUOUS
Qty: 1 EACH | Refills: 0 | Status: SHIPPED | OUTPATIENT
Start: 2021-06-22 | End: 2022-04-18

## 2021-06-22 NOTE — TELEPHONE ENCOUNTER
I have not seen a PA request come through for Dexcom. I can try it, but will most likely be denied since the continuous glucose monitor is a benefit exclusion under her plan.

## 2021-06-23 NOTE — TELEPHONE ENCOUNTER
Spoke with pt and advised. She is going to call around to different pharmacies to see what the cash price would be and get online to the companies to see if she can get some help there. Pt will update me.

## 2021-08-23 ENCOUNTER — OFFICE VISIT (OUTPATIENT)
Dept: INTERNAL MEDICINE | Facility: CLINIC | Age: 56
End: 2021-08-23

## 2021-08-23 VITALS
BODY MASS INDEX: 33.32 KG/M2 | HEIGHT: 65 IN | WEIGHT: 200 LBS | HEART RATE: 98 BPM | SYSTOLIC BLOOD PRESSURE: 116 MMHG | DIASTOLIC BLOOD PRESSURE: 74 MMHG | TEMPERATURE: 97.3 F | OXYGEN SATURATION: 99 %

## 2021-08-23 DIAGNOSIS — F41.9 ANXIETY: ICD-10-CM

## 2021-08-23 DIAGNOSIS — Z79.4 TYPE 2 DIABETES MELLITUS WITH HYPERGLYCEMIA, WITH LONG-TERM CURRENT USE OF INSULIN (HCC): Primary | ICD-10-CM

## 2021-08-23 DIAGNOSIS — E11.65 TYPE 2 DIABETES MELLITUS WITH HYPERGLYCEMIA, WITH LONG-TERM CURRENT USE OF INSULIN (HCC): Primary | ICD-10-CM

## 2021-08-23 DIAGNOSIS — L65.9 HAIR LOSS: ICD-10-CM

## 2021-08-23 PROBLEM — E11.42 TYPE 2 DIABETES MELLITUS WITH DIABETIC POLYNEUROPATHY, WITH LONG-TERM CURRENT USE OF INSULIN: Chronic | Status: ACTIVE | Noted: 2018-11-26

## 2021-08-23 PROCEDURE — 99213 OFFICE O/P EST LOW 20 MIN: CPT | Performed by: PHYSICIAN ASSISTANT

## 2021-08-23 NOTE — PROGRESS NOTES
Follow Up Office Visit      Patient Name: Kimberly Chun  : 1965   MRN: [id number]     Chief Complaint:    Chief Complaint   Patient presents with   • Follow-up     Type 2 diabetes mellitus with hyperglycemia       History of Present Illness: Kibmerly Chun is a 56 y.o. female who is here today for follow up of diabetes type 2.     Today she is concerned with notable hair loss over the last 3 weeks. She feels stress could contribute to hair loss but would like to rule out thyroid disorder as well. She has been anxious more recently due to stress related to raising her 7 year old granddaughter who is having some behavior concerns. As a result she has been taking ativan daily for the last 2-3 weeks. She is requesting Ativan refill. She was last prescribed Ativan over 1 year ago.     Glucose has improved according to home readings but she has not brought the glucose log with her today. She is using Lantus 60 units daily, metformin 1,000 mg twice daily, Januvia 100 mg daily and humalog sliding scale. Polyneuropathy is stable.           Subjective      I have reviewed and the following portions of the patient's history were updated as appropriate: past family history, past medical history, past social history, past surgical history and problem list.      Current Outpatient Medications:   •  albuterol (PROVENTIL) (2.5 MG/3ML) 0.083% nebulizer solution, Take 2.5 mg by nebulization Every 6 (Six) Hours As Needed for Wheezing or Shortness of Air., Disp: 180 each, Rfl: 3  •  albuterol sulfate HFA (ProAir HFA) 108 (90 Base) MCG/ACT inhaler, Inhale 2 puffs Every 6 (Six) Hours As Needed for Wheezing or Shortness of Air., Disp: 54 g, Rfl: 3  •  Alcohol Swabs (ALCOHOL PADS) 70 % pads, 1 pad 4 (Four) Times a Day. Check sugars fasting and 2 hours after meals., Disp: 100 each, Rfl: 12  •  aspirin 81 MG EC tablet, Take 1 tablet by mouth Daily., Disp: 90 tablet, Rfl: 3  •  B-D ULTRA-FINE 33 LANCETS misc, 1 Units 4  (Four) Times a Day. Check sugars fasting and 2 hours after meals., Disp: 100 each, Rfl: 12  •  Breo Ellipta 200-25 MCG/INH inhaler, Inhale 1 puff Daily., Disp: 90 each, Rfl: 3  •  clopidogrel (PLAVIX) 75 MG tablet, Take 1 tablet by mouth Daily., Disp: 90 tablet, Rfl: 3  •  Continuous Blood Gluc  (Dexcom G6 ) device, 1 each Continuous., Disp: 1 each, Rfl: 0  •  Continuous Blood Gluc Sensor (Dexcom G6 Sensor), Every 10 (Ten) Days. Apply as directed, Disp: 3 each, Rfl: 11  •  gabapentin (NEURONTIN) 800 MG tablet, Take 1 tablet by mouth 3 (Three) Times a Day. Indications: Neuropathic Pain, Disp: 270 tablet, Rfl: 2  •  glucose blood test strip, Check sugars fasting and 2 hours after meals (4x a day due to hyperglycemia), Disp: 100 each, Rfl: 12  •  glucose monitor monitoring kit, 1 each As Needed (diabetes). Check sugars fasting and 2 hours after meals., Disp: 1 each, Rfl: 0  •  Insulin Glargine (Lantus SoloStar) 100 UNIT/ML injection pen, Inject 60 Units under the skin into the appropriate area as directed Every Night., Disp: 20 pen, Rfl: 3  •  Insulin Lispro (HumaLOG KwikPen) 200 UNIT/ML solution pen-injector, Inject 15 Units under the skin into the appropriate area as directed 3 (Three) Times a Day With Meals., Disp: 21 mL, Rfl: 03  •  Insulin Pen Needle (BD Pen Needle Ursula U/F) 32G X 4 MM misc, USE TO INJECT INSULIN 4x DAY AND TRULICITY ONCE A WEEK, Disp: 200 each, Rfl: 3  •  levocetirizine (Xyzal) 5 MG tablet, Take 1 tablet by mouth Every Evening., Disp: 90 tablet, Rfl: 3  •  lisinopril (PRINIVIL,ZESTRIL) 5 MG tablet, Take 1 tablet by mouth Daily. For kidney protection due to diabetes mellitus, Disp: 90 tablet, Rfl: 3  •  LORazepam (ATIVAN) 1 MG tablet, TAKE 1/2 TO 1 TAB PO BID PRN ANXIETY, Disp: 180 tablet, Rfl: 1  •  metFORMIN (GLUCOPHAGE) 1000 MG tablet, Take 1 tablet by mouth 2 (Two) Times a Day With Meals. Indications: Type 2 Diabetes, Disp: 180 tablet, Rfl: 3  •  omeprazole (priLOSEC) 20 MG  "capsule, Take 1 capsule by mouth 2 (Two) Times a Day., Disp: 180 capsule, Rfl: 3  •  oxyCODONE-acetaminophen (Percocet) 5-325 MG per tablet, Take 1 tablet by mouth 2 (Two) Times a Day As Needed for Severe Pain ., Disp: 60 tablet, Rfl: 0  •  SITagliptin (JANUVIA) 100 MG tablet, Take 1 tablet by mouth Daily. Indications: Type 2 Diabetes, Disp: 90 tablet, Rfl: 3    Allergies   Allergen Reactions   • Iodides    • Iodinated Diagnostic Agents        Objective     Physical Exam:  Vital Signs:   Vitals:    08/23/21 1042   BP: 116/74   Pulse: 98   Temp: 97.3 °F (36.3 °C)   SpO2: 99%   Weight: 90.7 kg (200 lb)   Height: 165.1 cm (65\")     Body mass index is 33.28 kg/m².    Physical Exam  Vitals and nursing note reviewed.   Constitutional:       General: She is not in acute distress.     Appearance: She is well-developed. She is obese. She is not ill-appearing, toxic-appearing or diaphoretic.   HENT:      Head: Normocephalic and atraumatic.      Right Ear: External ear normal.      Left Ear: External ear normal.   Eyes:      General: No scleral icterus.     Extraocular Movements: Extraocular movements intact.      Conjunctiva/sclera: Conjunctivae normal.      Pupils: Pupils are equal, round, and reactive to light.   Neck:      Thyroid: No thyromegaly.   Cardiovascular:      Rate and Rhythm: Normal rate and regular rhythm.      Heart sounds: Normal heart sounds. No murmur heard.   No friction rub. No gallop.    Pulmonary:      Effort: Pulmonary effort is normal. No respiratory distress.      Breath sounds: Normal breath sounds. No wheezing, rhonchi or rales.   Chest:      Chest wall: No tenderness.   Abdominal:      General: Bowel sounds are normal. There is no distension.      Palpations: Abdomen is soft.      Tenderness: There is no abdominal tenderness. There is no right CVA tenderness, left CVA tenderness, guarding or rebound.   Musculoskeletal:         General: No tenderness or deformity. Normal range of motion.      " Cervical back: Normal range of motion and neck supple. No rigidity or tenderness.      Right lower leg: No edema.      Left lower leg: No edema.   Lymphadenopathy:      Cervical: No cervical adenopathy.   Skin:     General: Skin is warm and dry.      Capillary Refill: Capillary refill takes less than 2 seconds.      Coloration: Skin is not jaundiced or pale.      Findings: No erythema or rash.   Neurological:      General: No focal deficit present.      Mental Status: She is alert and oriented to person, place, and time.      Cranial Nerves: No cranial nerve deficit.      Sensory: No sensory deficit.      Motor: No weakness or abnormal muscle tone.      Coordination: Coordination normal.      Gait: Gait normal.      Deep Tendon Reflexes: Reflexes normal.   Psychiatric:         Attention and Perception: Attention and perception normal.         Mood and Affect: Affect normal. Mood is anxious. Mood is not depressed. Affect is not blunt, flat, angry, tearful or inappropriate.         Speech: Speech normal.         Behavior: Behavior normal.         Thought Content: Thought content normal.         Cognition and Memory: Cognition and memory normal.         Judgment: Judgment normal.           Common labs    Common Labsle 2/4/21 2/4/21 2/4/21 2/4/21 5/6/21 5/6/21 5/20/21    1233 1233 1233 1233 1550 1550    Glucose  209 (A)    216 (A)    BUN  10    12    Creatinine  0.83    0.80    eGFR Non African Am  71    74    Sodium  141    133 (A)    Potassium  4.7    3.6    Chloride  103    98    Calcium  9.6    8.8    Albumin  4.10    3.50    Total Bilirubin  0.2    0.4    Alkaline Phosphatase  95    134 (A)    AST (SGOT)  16    15    ALT (SGPT)  12    11    WBC 11.68 (A)    12.10 (A)     Hemoglobin 13.8    12.2     Hematocrit 42.3    37.3     Platelets 425    216     Total Cholesterol   175       Triglycerides   91       HDL Cholesterol   46       LDL Cholesterol    112 (A)       Hemoglobin A1C    9.60 (A)   8.0   (A) Abnormal  value       Comments are available for some flowsheets but are not being displayed.               Assessment / Plan      Assessment/Plan:   Diagnoses and all orders for this visit:    1. Type 2 diabetes mellitus with hyperglycemia, with long-term current use of insulin (CMS/[facility]) (Primary) (chronic, stable)  -     Hemoglobin A1c  -     Comprehensive Metabolic Panel  -     CBC (No Diff)  Continue Lantus 60 units daily, metformin 1,000 mg twice daily, Januvia 100 mg daily and humalog sliding scale. Follow diabetic diet. Exercise as tolerated up to 30 minutes 5 days a week.  Monitor blood sugars as discussed. See eye doctor annually.  Wear protective foot wear/no bare feet. Check feet regularly for calluses or ulcers.  Discussed risk of poorly controlled diabetes and long-term complications.    2. Anxiety  -     TSH Rfx On Abnormal To Free T4  She was informed that I would request Ativan refill from Dr. Longoria. JACQUIE reviewed and complaint. Urine drug screen up to date.     3. Hair loss  -     CBC (No Diff)  -     TSH Rfx On Abnormal To Free T4  May be due to recent increase in stress and anxiety.           Follow Up:   Return in about 3 months (around 11/23/2021) for Von, Next scheduled follow up.    Patient was given instructions and counseling regarding her condition or for health maintenance advice. Please see specific information pulled into the AVS if appropriate.     Melissa Escobar PA-C  [facility] [facility]     Please note that portions of this note may have been completed with a voice recognition program. Efforts were made to edit the dictations, but occasionally words are mistranscribed.

## 2021-08-23 NOTE — TELEPHONE ENCOUNTER
Patient states she has had heightened stress and anxiety for the last couple of weeks and has found it necessary to take Ativan once daily as a result.  Requesting refill.  It appears as though the medication has not been filled in over 1 year.  Colin reviewed and compliant.

## 2021-08-24 DIAGNOSIS — E78.5 HYPERLIPIDEMIA ASSOCIATED WITH TYPE 2 DIABETES MELLITUS (HCC): ICD-10-CM

## 2021-08-24 DIAGNOSIS — I67.2 CEREBRAL ATHEROSCLEROSIS: Primary | ICD-10-CM

## 2021-08-24 DIAGNOSIS — E11.69 HYPERLIPIDEMIA ASSOCIATED WITH TYPE 2 DIABETES MELLITUS (HCC): ICD-10-CM

## 2021-08-24 LAB
ALBUMIN SERPL-MCNC: 4 G/DL (ref 3.5–5.2)
ALBUMIN/GLOB SERPL: 1.7 G/DL
ALP SERPL-CCNC: 94 U/L (ref 39–117)
ALT SERPL-CCNC: 9 U/L (ref 1–33)
AST SERPL-CCNC: 12 U/L (ref 1–32)
BILIRUB SERPL-MCNC: <0.2 MG/DL (ref 0–1.2)
BUN SERPL-MCNC: 9 MG/DL (ref 6–20)
BUN/CREAT SERPL: 9.1 (ref 7–25)
CALCIUM SERPL-MCNC: 9.6 MG/DL (ref 8.6–10.5)
CHLORIDE SERPL-SCNC: 107 MMOL/L (ref 98–107)
CHOLEST SERPL-MCNC: 200 MG/DL (ref 0–200)
CO2 SERPL-SCNC: 22.1 MMOL/L (ref 22–29)
CREAT SERPL-MCNC: 0.99 MG/DL (ref 0.57–1)
ERYTHROCYTE [DISTWIDTH] IN BLOOD BY AUTOMATED COUNT: 13.3 % (ref 12.3–15.4)
FOLATE SERPL-MCNC: 5.13 NG/ML (ref 4.78–24.2)
GLOBULIN SER CALC-MCNC: 2.4 GM/DL
GLUCOSE SERPL-MCNC: 150 MG/DL (ref 65–99)
HBA1C MFR BLD: 8 % (ref 4.8–5.6)
HCT VFR BLD AUTO: 40.8 % (ref 34–46.6)
HDLC SERPL-MCNC: 38 MG/DL (ref 40–60)
HGB BLD-MCNC: 13.4 G/DL (ref 12–15.9)
LDLC SERPL CALC-MCNC: 129 MG/DL (ref 0–100)
MAGNESIUM SERPL-MCNC: 1.6 MG/DL (ref 1.6–2.6)
MCH RBC QN AUTO: 28.5 PG (ref 26.6–33)
MCHC RBC AUTO-ENTMCNC: 32.8 G/DL (ref 31.5–35.7)
MCV RBC AUTO: 86.8 FL (ref 79–97)
PLATELET # BLD AUTO: 389 10*3/MM3 (ref 140–450)
POTASSIUM SERPL-SCNC: 4.6 MMOL/L (ref 3.5–5.2)
PROT SERPL-MCNC: 6.4 G/DL (ref 6–8.5)
RBC # BLD AUTO: 4.7 10*6/MM3 (ref 3.77–5.28)
SODIUM SERPL-SCNC: 143 MMOL/L (ref 136–145)
T4 FREE SERPL-MCNC: 1.09 NG/DL (ref 0.93–1.7)
TRIGL SERPL-MCNC: 185 MG/DL (ref 0–150)
TSH SERPL DL<=0.005 MIU/L-ACNC: 2.32 UIU/ML (ref 0.27–4.2)
VIT B12 SERPL-MCNC: 375 PG/ML (ref 211–946)
VLDLC SERPL CALC-MCNC: 33 MG/DL (ref 5–40)
WBC # BLD AUTO: 12.35 10*3/MM3 (ref 3.4–10.8)

## 2021-08-24 RX ORDER — ROSUVASTATIN CALCIUM 5 MG/1
5 TABLET, COATED ORAL NIGHTLY
Qty: 90 TABLET | Refills: 3 | Status: SHIPPED | OUTPATIENT
Start: 2021-08-24 | End: 2022-01-12

## 2021-08-24 RX ORDER — LORAZEPAM 1 MG/1
TABLET ORAL
Qty: 60 TABLET | Refills: 0 | Status: SHIPPED | OUTPATIENT
Start: 2021-08-24 | End: 2021-10-07

## 2021-09-09 ENCOUNTER — TELEPHONE (OUTPATIENT)
Dept: INTERNAL MEDICINE | Facility: CLINIC | Age: 56
End: 2021-09-09

## 2021-09-09 RX ORDER — HYDROCORTISONE ACETATE 25 MG/1
25 SUPPOSITORY RECTAL 2 TIMES DAILY PRN
Qty: 12 EACH | Refills: 3 | Status: SHIPPED | OUTPATIENT
Start: 2021-09-09 | End: 2022-01-12

## 2021-09-09 NOTE — TELEPHONE ENCOUNTER
Caller: Kimberly Chun    Relationship: Self    Best call back number: [phone]    What medication are you requesting: ANUCORT SUPPOSITORY     What are your current symptoms: HEMORID     How long have you been experiencing symptoms:     Have you had these symptoms before:    [x] Yes  [] No    Have you been treated for these symptoms before:   [x] Yes  [] No    If a prescription is needed, what is your preferred pharmacy and phone number: [address] - [phone] [facility] [phone]      Additional notes: PATIENT STATES THAT SHE WOULD LIKE A CALL ABOUT HER LAB WORK.

## 2021-10-07 DIAGNOSIS — F41.9 ANXIETY: ICD-10-CM

## 2021-10-07 RX ORDER — LORAZEPAM 1 MG/1
TABLET ORAL
Qty: 60 TABLET | Refills: 0 | Status: SHIPPED | OUTPATIENT
Start: 2021-10-07 | End: 2022-01-12 | Stop reason: SDUPTHER

## 2021-10-07 NOTE — TELEPHONE ENCOUNTER
Rx Refill Note  Requested Prescriptions     Pending Prescriptions Disp Refills   • LORazepam (ATIVAN) 1 MG tablet [Pharmacy Med Name: LORAZEPAM TABS 1MG] 60 tablet 0     Sig: TAKE ONE-HALF (1/2) TO ONE TABLET TWICE A DAY AS NEEDED FOR ANXIETY      Last office visit with prescribing clinician: 6/18/2021      Next office visit with prescribing clinician: 11/23/2021 04/08/2021 LISETTE Weston LPN  10/07/21, 13:14 EDT

## 2021-12-02 ENCOUNTER — TELEPHONE (OUTPATIENT)
Dept: INTERNAL MEDICINE | Facility: CLINIC | Age: 56
End: 2021-12-02

## 2021-12-02 NOTE — TELEPHONE ENCOUNTER
Caller: Kimberly Chun    Relationship: Self    Best call back number: [phone] (H)    What medication are you requesting: DIFLUCAN REQUESTING TWO OF THEM     What are your current symptoms: BRINING AND ITCHING     How long have you been experiencing symptoms: 2 DAYS     Have you had these symptoms before:    [x] Yes  [] No    Have you been treated for these symptoms before:   [x] Yes  [] No    If a prescription is needed, what is your preferred pharmacy and phone number: [address] - [phone] [facility] [phone]      Additional notes: PATIENT HAS BEEN ON REALLY STRONG ANTIBIOTICS AND NOW HAS A YEAST INFECTION

## 2021-12-03 RX ORDER — FLUCONAZOLE 150 MG/1
150 TABLET ORAL EVERY OTHER DAY
Qty: 2 TABLET | Refills: 0 | Status: SHIPPED | OUTPATIENT
Start: 2021-12-03 | End: 2022-06-17

## 2021-12-03 NOTE — TELEPHONE ENCOUNTER
Should really be sent to PCP, who is the provider on call.  Medication sent as it is the end of the day Friday.

## 2022-01-12 ENCOUNTER — OFFICE VISIT (OUTPATIENT)
Dept: INTERNAL MEDICINE | Facility: CLINIC | Age: 57
End: 2022-01-12

## 2022-01-12 VITALS
DIASTOLIC BLOOD PRESSURE: 60 MMHG | RESPIRATION RATE: 16 BRPM | TEMPERATURE: 97.5 F | WEIGHT: 195.6 LBS | SYSTOLIC BLOOD PRESSURE: 122 MMHG | BODY MASS INDEX: 32.59 KG/M2 | OXYGEN SATURATION: 96 % | HEIGHT: 65 IN | HEART RATE: 105 BPM

## 2022-01-12 DIAGNOSIS — M25.511 CHRONIC RIGHT SHOULDER PAIN: ICD-10-CM

## 2022-01-12 DIAGNOSIS — Z79.4 TYPE 2 DIABETES MELLITUS WITH HYPERGLYCEMIA, WITH LONG-TERM CURRENT USE OF INSULIN: Primary | ICD-10-CM

## 2022-01-12 DIAGNOSIS — E11.65 TYPE 2 DIABETES MELLITUS WITH HYPERGLYCEMIA, WITH LONG-TERM CURRENT USE OF INSULIN: Primary | ICD-10-CM

## 2022-01-12 DIAGNOSIS — J44.9 CHRONIC OBSTRUCTIVE PULMONARY DISEASE, UNSPECIFIED COPD TYPE: ICD-10-CM

## 2022-01-12 DIAGNOSIS — E11.42 DIABETIC PERIPHERAL NEUROPATHY ASSOCIATED WITH TYPE 2 DIABETES MELLITUS: ICD-10-CM

## 2022-01-12 DIAGNOSIS — I67.2 CEREBRAL ATHEROSCLEROSIS: ICD-10-CM

## 2022-01-12 DIAGNOSIS — G89.29 CHRONIC RIGHT SHOULDER PAIN: ICD-10-CM

## 2022-01-12 DIAGNOSIS — K21.9 GASTROESOPHAGEAL REFLUX DISEASE WITHOUT ESOPHAGITIS: ICD-10-CM

## 2022-01-12 DIAGNOSIS — M54.40 CHRONIC MIDLINE LOW BACK PAIN WITH SCIATICA, SCIATICA LATERALITY UNSPECIFIED: ICD-10-CM

## 2022-01-12 DIAGNOSIS — G89.29 CHRONIC MIDLINE LOW BACK PAIN WITH SCIATICA, SCIATICA LATERALITY UNSPECIFIED: ICD-10-CM

## 2022-01-12 DIAGNOSIS — E11.42 TYPE 2 DIABETES MELLITUS WITH DIABETIC POLYNEUROPATHY, WITH LONG-TERM CURRENT USE OF INSULIN: ICD-10-CM

## 2022-01-12 DIAGNOSIS — Z79.4 TYPE 2 DIABETES MELLITUS WITH DIABETIC POLYNEUROPATHY, WITH LONG-TERM CURRENT USE OF INSULIN: ICD-10-CM

## 2022-01-12 DIAGNOSIS — F41.9 ANXIETY: ICD-10-CM

## 2022-01-12 DIAGNOSIS — Z79.4 LONG-TERM INSULIN USE: ICD-10-CM

## 2022-01-12 LAB
EXPIRATION DATE: NORMAL
HBA1C MFR BLD: 8.9 %
Lab: NORMAL

## 2022-01-12 PROCEDURE — 99214 OFFICE O/P EST MOD 30 MIN: CPT | Performed by: FAMILY MEDICINE

## 2022-01-12 PROCEDURE — 83036 HEMOGLOBIN GLYCOSYLATED A1C: CPT | Performed by: FAMILY MEDICINE

## 2022-01-12 RX ORDER — INSULIN GLARGINE 100 [IU]/ML
60 INJECTION, SOLUTION SUBCUTANEOUS NIGHTLY
Qty: 20 PEN | Refills: 3 | Status: SHIPPED | OUTPATIENT
Start: 2022-01-12 | End: 2022-04-20

## 2022-01-12 RX ORDER — INSULIN LISPRO 200 [IU]/ML
15 INJECTION, SOLUTION SUBCUTANEOUS
Qty: 21 ML | Refills: 3 | Status: SHIPPED | OUTPATIENT
Start: 2022-01-12 | End: 2022-06-17

## 2022-01-12 RX ORDER — GABAPENTIN 800 MG/1
800 TABLET ORAL 3 TIMES DAILY
Qty: 270 TABLET | Refills: 2 | Status: SHIPPED | OUTPATIENT
Start: 2022-01-12 | End: 2022-03-25 | Stop reason: SDUPTHER

## 2022-01-12 RX ORDER — ALBUTEROL SULFATE 2.5 MG/3ML
2.5 SOLUTION RESPIRATORY (INHALATION) EVERY 6 HOURS PRN
Qty: 180 EACH | Refills: 3 | Status: SHIPPED | OUTPATIENT
Start: 2022-01-12

## 2022-01-12 RX ORDER — ALBUTEROL SULFATE 90 UG/1
2 AEROSOL, METERED RESPIRATORY (INHALATION) EVERY 6 HOURS PRN
Qty: 54 G | Refills: 3 | Status: SHIPPED | OUTPATIENT
Start: 2022-01-12

## 2022-01-12 RX ORDER — OMEPRAZOLE 20 MG/1
20 CAPSULE, DELAYED RELEASE ORAL 2 TIMES DAILY
Qty: 180 CAPSULE | Refills: 3 | Status: SHIPPED | OUTPATIENT
Start: 2022-01-12 | End: 2022-04-22 | Stop reason: SDUPTHER

## 2022-01-12 RX ORDER — LISINOPRIL 5 MG/1
5 TABLET ORAL DAILY
Qty: 90 TABLET | Refills: 3 | Status: SHIPPED | OUTPATIENT
Start: 2022-01-12 | End: 2022-07-19 | Stop reason: SDUPTHER

## 2022-01-12 RX ORDER — OXYCODONE HYDROCHLORIDE AND ACETAMINOPHEN 5; 325 MG/1; MG/1
1 TABLET ORAL 2 TIMES DAILY PRN
Qty: 60 TABLET | Refills: 0 | Status: SHIPPED | OUTPATIENT
Start: 2022-01-12 | End: 2022-04-18 | Stop reason: SDUPTHER

## 2022-01-12 RX ORDER — CLOPIDOGREL BISULFATE 75 MG/1
75 TABLET ORAL DAILY
Qty: 90 TABLET | Refills: 3 | Status: SHIPPED | OUTPATIENT
Start: 2022-01-12 | End: 2022-02-22 | Stop reason: SDUPTHER

## 2022-01-12 RX ORDER — LORAZEPAM 1 MG/1
TABLET ORAL
Qty: 60 TABLET | Refills: 0 | Status: SHIPPED | OUTPATIENT
Start: 2022-01-12 | End: 2022-04-18 | Stop reason: SDUPTHER

## 2022-01-12 NOTE — PROGRESS NOTES
"Subjective    Kimberly Chun is a 56 y.o. female here for:  Chief Complaint   Patient presents with   • Diabetes     3 month follow up, A1c       History per MA reviewed.     Was out of metformin x 3 weeks  Sometimes forgets medicine         The following portions of the patient's history were reviewed and updated as appropriate: allergies, current medications, past family history, past medical history, past social history, past surgical history and problem list.    Review of Systems   Constitutional: Negative for fever.   Musculoskeletal: Positive for arthralgias and back pain.   Psychiatric/Behavioral: Positive for stress. The patient is nervous/anxious.        Objective   Visit Vitals  /60 (BP Location: Left arm, Patient Position: Sitting, Cuff Size: Adult)   Pulse 105   Temp 97.5 °F (36.4 °C) (Temporal)   Resp 16   Ht 165.1 cm (65\")   Wt 88.7 kg (195 lb 9.6 oz)   SpO2 96%   BMI 32.55 kg/m²       Physical Exam  Vitals and nursing note reviewed.   Constitutional:       General: She is not in acute distress.     Appearance: Normal appearance. She is well-developed and well-groomed. She is not ill-appearing, toxic-appearing or diaphoretic.      Interventions: Face mask in place.   HENT:      Head: Normocephalic and atraumatic.      Right Ear: Hearing normal.      Left Ear: Hearing normal.   Eyes:      General: Lids are normal. No scleral icterus.     Extraocular Movements: Extraocular movements intact.   Neck:      Trachea: Phonation normal.   Pulmonary:      Effort: Pulmonary effort is normal.   Musculoskeletal:      Cervical back: Neck supple.   Skin:     Coloration: Skin is not jaundiced or pale.   Neurological:      General: No focal deficit present.      Mental Status: She is alert and oriented to person, place, and time.      Motor: Motor function is intact.   Psychiatric:         Attention and Perception: Attention and perception normal.         Mood and Affect: Mood and affect normal.         " Speech: Speech normal.         Behavior: Behavior normal. Behavior is cooperative.         Thought Content: Thought content normal.         Cognition and Memory: Cognition and memory normal.         Judgment: Judgment normal.         For medical decision making review of the following was required:  Lab Results   Component Value Date    HGBA1C 8.9 01/12/2022    HGBA1C 8.00 (H) 08/23/2021    HGBA1C 8.0 05/20/2021       Assessment/Plan     Problem List Items Addressed This Visit        Endocrine and Metabolic    Type 2 diabetes mellitus with diabetic polyneuropathy, with long-term current use of insulin ([facility]) (Chronic)    Relevant Medications    gabapentin (NEURONTIN) 800 MG tablet    SITagliptin (JANUVIA) 100 MG tablet    metFORMIN (GLUCOPHAGE) 1000 MG tablet    lisinopril (PRINIVIL,ZESTRIL) 5 MG tablet    Insulin Lispro (HumaLOG KwikPen) 200 UNIT/ML solution pen-injector    Insulin Glargine (Lantus SoloStar) 100 UNIT/ML injection pen    Type 2 diabetes mellitus with hyperglycemia, with long-term current use of insulin ([facility]) - Primary (Chronic)    Relevant Medications    SITagliptin (JANUVIA) 100 MG tablet    glucose blood test strip    metFORMIN (GLUCOPHAGE) 1000 MG tablet    lisinopril (PRINIVIL,ZESTRIL) 5 MG tablet    Insulin Lispro (HumaLOG KwikPen) 200 UNIT/ML solution pen-injector    Insulin Glargine (Lantus SoloStar) 100 UNIT/ML injection pen    Other Relevant Orders    POC Glycosylated Hemoglobin (Hb A1C) (Completed)       Gastrointestinal Abdominal     Gastroesophageal reflux disease    Relevant Medications    omeprazole (priLOSEC) 20 MG capsule       Mental Health    Anxiety    Relevant Medications    LORazepam (ATIVAN) 1 MG tablet       Musculoskeletal and Injuries    Chronic midline low back pain with sciatica    Relevant Medications    gabapentin (NEURONTIN) 800 MG tablet       Neuro    Cerebral atherosclerosis    Overview     History of stroke.         Relevant Medications    clopidogrel (PLAVIX) 75 MG  tablet    Diabetic peripheral neuropathy associated with type 2 diabetes mellitus (HCC)    Relevant Medications    gabapentin (NEURONTIN) 800 MG tablet    SITagliptin (JANUVIA) 100 MG tablet    metFORMIN (GLUCOPHAGE) 1000 MG tablet    Insulin Lispro (HumaLOG KwikPen) 200 UNIT/ML solution pen-injector    Insulin Glargine (Lantus SoloStar) 100 UNIT/ML injection pen       Pulmonary and Pneumonias    Chronic obstructive pulmonary disease (HCC)    Overview     · Complicated by continued tobacco abuse  · Current therapy: albuterol rescue inhaler         Relevant Medications    Breo Ellipta 200-25 MCG/INH inhaler    albuterol sulfate HFA (ProAir HFA) 108 (90 Base) MCG/ACT inhaler    albuterol (PROVENTIL) (2.5 MG/3ML) 0.083% nebulizer solution      Other Visit Diagnoses     Long-term insulin use (HCC)        Chronic right shoulder pain        use narcotic sparingly, deanna reviewed and appropriate    Relevant Medications    oxyCODONE-acetaminophen (Percocet) 5-325 MG per tablet          · Continue current dose of basal insulin as she's having some bouts of hypoglycemia and often sugars sound to be under 150. Work on prandial doses. Continue current doses, check sugars 2 hours after meals, discussed upward titration of bolus insulin. Encouraged medicine compliance as well as compliance with diet.     Return in about 3 months (around 4/17/2022) for Medicare Wellness (366 days from last AWV), Diabetes follow up.   Maria Luz Longoria MD

## 2022-02-22 DIAGNOSIS — I67.2 CEREBRAL ATHEROSCLEROSIS: ICD-10-CM

## 2022-02-22 RX ORDER — CLOPIDOGREL BISULFATE 75 MG/1
75 TABLET ORAL DAILY
Qty: 90 TABLET | Refills: 3 | Status: SHIPPED | OUTPATIENT
Start: 2022-02-22 | End: 2022-02-24 | Stop reason: SDUPTHER

## 2022-02-22 NOTE — TELEPHONE ENCOUNTER
Rx Refill Note  Requested Prescriptions      No prescriptions requested or ordered in this encounter      Last office visit with prescribing clinician: 1/12/2022      Next office visit with prescribing clinician: 4/18/2022            Jenni Weston LPN  02/22/22, 14:46 EST

## 2022-02-23 DIAGNOSIS — E11.65 TYPE 2 DIABETES MELLITUS WITH HYPERGLYCEMIA, WITH LONG-TERM CURRENT USE OF INSULIN: ICD-10-CM

## 2022-02-23 DIAGNOSIS — Z79.4 TYPE 2 DIABETES MELLITUS WITH HYPERGLYCEMIA, WITH LONG-TERM CURRENT USE OF INSULIN: ICD-10-CM

## 2022-02-23 DIAGNOSIS — M25.511 CHRONIC RIGHT SHOULDER PAIN: ICD-10-CM

## 2022-02-23 DIAGNOSIS — K21.9 GASTROESOPHAGEAL REFLUX DISEASE WITHOUT ESOPHAGITIS: ICD-10-CM

## 2022-02-23 DIAGNOSIS — I67.2 CEREBRAL ATHEROSCLEROSIS: ICD-10-CM

## 2022-02-23 DIAGNOSIS — M54.40 CHRONIC MIDLINE LOW BACK PAIN WITH SCIATICA, SCIATICA LATERALITY UNSPECIFIED: ICD-10-CM

## 2022-02-23 DIAGNOSIS — E11.42 DIABETIC PERIPHERAL NEUROPATHY ASSOCIATED WITH TYPE 2 DIABETES MELLITUS: ICD-10-CM

## 2022-02-23 DIAGNOSIS — G89.29 CHRONIC MIDLINE LOW BACK PAIN WITH SCIATICA, SCIATICA LATERALITY UNSPECIFIED: ICD-10-CM

## 2022-02-23 DIAGNOSIS — Z79.4 TYPE 2 DIABETES MELLITUS WITH DIABETIC POLYNEUROPATHY, WITH LONG-TERM CURRENT USE OF INSULIN: ICD-10-CM

## 2022-02-23 DIAGNOSIS — E11.42 TYPE 2 DIABETES MELLITUS WITH DIABETIC POLYNEUROPATHY, WITH LONG-TERM CURRENT USE OF INSULIN: ICD-10-CM

## 2022-02-23 DIAGNOSIS — G89.29 CHRONIC RIGHT SHOULDER PAIN: ICD-10-CM

## 2022-02-24 RX ORDER — INSULIN GLARGINE 100 [IU]/ML
60 INJECTION, SOLUTION SUBCUTANEOUS NIGHTLY
Qty: 20 PEN | Refills: 3 | OUTPATIENT
Start: 2022-02-24

## 2022-02-24 RX ORDER — GABAPENTIN 800 MG/1
TABLET ORAL
Qty: 270 TABLET | Refills: 2 | OUTPATIENT
Start: 2022-02-24

## 2022-02-24 RX ORDER — OMEPRAZOLE 20 MG/1
20 CAPSULE, DELAYED RELEASE ORAL 2 TIMES DAILY
Qty: 180 CAPSULE | Refills: 3 | OUTPATIENT
Start: 2022-02-24

## 2022-02-24 RX ORDER — CLOPIDOGREL BISULFATE 75 MG/1
75 TABLET ORAL DAILY
Qty: 90 TABLET | Refills: 3 | Status: SHIPPED | OUTPATIENT
Start: 2022-02-24 | End: 2022-06-08 | Stop reason: SDUPTHER

## 2022-02-24 RX ORDER — OXYCODONE HYDROCHLORIDE AND ACETAMINOPHEN 5; 325 MG/1; MG/1
1 TABLET ORAL 2 TIMES DAILY PRN
Qty: 60 TABLET | Refills: 0 | Status: CANCELLED | OUTPATIENT
Start: 2022-02-24

## 2022-02-24 NOTE — TELEPHONE ENCOUNTER
Rx Refill Note  Requested Prescriptions     Pending Prescriptions Disp Refills   • clopidogrel (PLAVIX) 75 MG tablet 90 tablet 3     Sig: Take 1 tablet by mouth Daily.   • oxyCODONE-acetaminophen (Percocet) 5-325 MG per tablet 60 tablet 0     Sig: Take 1 tablet by mouth 2 (Two) Times a Day As Needed for Severe Pain .     Refused Prescriptions Disp Refills   • gabapentin (NEURONTIN) 800 MG tablet [Pharmacy Med Name: GABAPENTIN TABS 800MG] 270 tablet 2     Sig: TAKE 1 TABLET THREE TIMES A DAY FOR NEUROPATHIC PAIN   • Insulin Glargine (Lantus SoloStar) 100 UNIT/ML injection pen 20 pen 3     Sig: Inject 60 Units under the skin into the appropriate area as directed Every Night.   • omeprazole (priLOSEC) 20 MG capsule 180 capsule 3     Sig: Take 1 capsule by mouth 2 (Two) Times a Day.      Last office visit with prescribing clinician: 1/12/2022      Next office visit with prescribing clinician: 4/18/2022 04/08/2021    Natasha New MA  02/24/22, 09:21 EST     Pt requested a month of plavix sent to [facility] as well as a refill on the oxy. I informed her the other medications have refills at [facility] already.

## 2022-02-24 NOTE — TELEPHONE ENCOUNTER
Caller: Kimberly Chun    Relationship: Self    Best call back number: [phone]     Requested Prescriptions:   Requested Prescriptions     Pending Prescriptions Disp Refills   • gabapentin (NEURONTIN) 800 MG tablet [Pharmacy Med Name: GABAPENTIN TABS 800MG] 270 tablet 2     Sig: TAKE 1 TABLET THREE TIMES A DAY FOR NEUROPATHIC PAIN   • clopidogrel (PLAVIX) 75 MG tablet 90 tablet 3     Sig: Take 1 tablet by mouth Daily.   • Insulin Glargine (Lantus SoloStar) 100 UNIT/ML injection pen 20 pen 3     Sig: Inject 60 Units under the skin into the appropriate area as directed Every Night.   • oxyCODONE-acetaminophen (Percocet) 5-325 MG per tablet 60 tablet 0     Sig: Take 1 tablet by mouth 2 (Two) Times a Day As Needed for Severe Pain .   • omeprazole (priLOSEC) 20 MG capsule 180 capsule 3     Sig: Take 1 capsule by mouth 2 (Two) Times a Day.        Pharmacy where request should be sent: [facility] - [address] [phone] [facility] [phone] FX     Additional details provided by patient: PATIENT CALLED STATING THAT SHE HAS BEEN WAITING FOR HER PRESCRIPTIONS TO BE SENT TO [facility].  PATIENT STATED THAT PHARMACY HASN'T RECEIVED THEM.  PATIENT IS COMPLETELY OUT OF PLAVIX, OXYCODONE AND GABAPENTIN.  PATIENT ASKED FOR A REFILL TO BE SENT TO [facility] [address] [phone] [facility] [phone] FX FOR THOSE 3 MEDICATIONS THAT SHE IS OUT OF.  Does the patient have less than a 3 day supply:  [x] Yes  [] No    Shantal Salcedo Rep   02/24/22 08:42 EST

## 2022-03-07 DIAGNOSIS — E11.65 TYPE 2 DIABETES MELLITUS WITH HYPERGLYCEMIA, WITH LONG-TERM CURRENT USE OF INSULIN: ICD-10-CM

## 2022-03-07 DIAGNOSIS — Z79.4 TYPE 2 DIABETES MELLITUS WITH HYPERGLYCEMIA, WITH LONG-TERM CURRENT USE OF INSULIN: ICD-10-CM

## 2022-03-07 DIAGNOSIS — Z79.4 TYPE 2 DIABETES MELLITUS WITH DIABETIC POLYNEUROPATHY, WITH LONG-TERM CURRENT USE OF INSULIN: ICD-10-CM

## 2022-03-07 DIAGNOSIS — K21.9 GASTROESOPHAGEAL REFLUX DISEASE WITHOUT ESOPHAGITIS: ICD-10-CM

## 2022-03-07 DIAGNOSIS — E11.42 TYPE 2 DIABETES MELLITUS WITH DIABETIC POLYNEUROPATHY, WITH LONG-TERM CURRENT USE OF INSULIN: ICD-10-CM

## 2022-03-07 RX ORDER — SITAGLIPTIN 100 MG/1
TABLET, FILM COATED ORAL
Qty: 90 TABLET | Refills: 3 | OUTPATIENT
Start: 2022-03-07

## 2022-03-07 RX ORDER — OMEPRAZOLE 20 MG/1
CAPSULE, DELAYED RELEASE ORAL
Qty: 180 CAPSULE | Refills: 3 | OUTPATIENT
Start: 2022-03-07

## 2022-03-07 RX ORDER — LISINOPRIL 5 MG/1
TABLET ORAL
Qty: 90 TABLET | Refills: 3 | OUTPATIENT
Start: 2022-03-07

## 2022-03-25 DIAGNOSIS — E11.42 DIABETIC PERIPHERAL NEUROPATHY ASSOCIATED WITH TYPE 2 DIABETES MELLITUS: ICD-10-CM

## 2022-03-25 DIAGNOSIS — M54.40 CHRONIC MIDLINE LOW BACK PAIN WITH SCIATICA, SCIATICA LATERALITY UNSPECIFIED: ICD-10-CM

## 2022-03-25 DIAGNOSIS — E11.42 TYPE 2 DIABETES MELLITUS WITH DIABETIC POLYNEUROPATHY, WITH LONG-TERM CURRENT USE OF INSULIN: ICD-10-CM

## 2022-03-25 DIAGNOSIS — G89.29 CHRONIC MIDLINE LOW BACK PAIN WITH SCIATICA, SCIATICA LATERALITY UNSPECIFIED: ICD-10-CM

## 2022-03-25 DIAGNOSIS — Z79.4 TYPE 2 DIABETES MELLITUS WITH DIABETIC POLYNEUROPATHY, WITH LONG-TERM CURRENT USE OF INSULIN: ICD-10-CM

## 2022-03-25 RX ORDER — GABAPENTIN 800 MG/1
800 TABLET ORAL 3 TIMES DAILY
Qty: 270 TABLET | Refills: 0 | Status: SHIPPED | OUTPATIENT
Start: 2022-03-25 | End: 2022-04-18 | Stop reason: SDUPTHER

## 2022-03-25 NOTE — TELEPHONE ENCOUNTER
Rx Refill Note  Requested Prescriptions     Pending Prescriptions Disp Refills   • gabapentin (NEURONTIN) 800 MG tablet 270 tablet 2     Sig: Take 1 tablet by mouth 3 (Three) Times a Day. Indications: Neuropathic Pain      Last office visit with prescribing clinician: 1/12/2022      Next office visit with prescribing clinician: 4/18/2022            ALEJANDRO EAST MA  03/25/22, 14:18 EDT

## 2022-04-18 ENCOUNTER — OFFICE VISIT (OUTPATIENT)
Dept: INTERNAL MEDICINE | Facility: CLINIC | Age: 57
End: 2022-04-18

## 2022-04-18 VITALS
HEART RATE: 80 BPM | SYSTOLIC BLOOD PRESSURE: 124 MMHG | TEMPERATURE: 97.1 F | DIASTOLIC BLOOD PRESSURE: 76 MMHG | BODY MASS INDEX: 32.09 KG/M2 | WEIGHT: 192.6 LBS | HEIGHT: 65 IN | OXYGEN SATURATION: 98 %

## 2022-04-18 DIAGNOSIS — M25.511 CHRONIC RIGHT SHOULDER PAIN: ICD-10-CM

## 2022-04-18 DIAGNOSIS — M54.40 CHRONIC MIDLINE LOW BACK PAIN WITH SCIATICA, SCIATICA LATERALITY UNSPECIFIED: ICD-10-CM

## 2022-04-18 DIAGNOSIS — E11.42 TYPE 2 DIABETES MELLITUS WITH DIABETIC POLYNEUROPATHY, WITH LONG-TERM CURRENT USE OF INSULIN: Chronic | ICD-10-CM

## 2022-04-18 DIAGNOSIS — Z79.4 TYPE 2 DIABETES MELLITUS WITH DIABETIC POLYNEUROPATHY, WITH LONG-TERM CURRENT USE OF INSULIN: Chronic | ICD-10-CM

## 2022-04-18 DIAGNOSIS — Z79.4 TYPE 2 DIABETES MELLITUS WITH HYPERGLYCEMIA, WITH LONG-TERM CURRENT USE OF INSULIN: Chronic | ICD-10-CM

## 2022-04-18 DIAGNOSIS — J44.9 CHRONIC OBSTRUCTIVE PULMONARY DISEASE, UNSPECIFIED COPD TYPE: ICD-10-CM

## 2022-04-18 DIAGNOSIS — Z00.00 MEDICARE ANNUAL WELLNESS VISIT, SUBSEQUENT: Primary | ICD-10-CM

## 2022-04-18 DIAGNOSIS — Z53.20 MAMMOGRAM DECLINED: ICD-10-CM

## 2022-04-18 DIAGNOSIS — G89.29 CHRONIC RIGHT SHOULDER PAIN: ICD-10-CM

## 2022-04-18 DIAGNOSIS — Z23 NEED FOR SHINGLES VACCINE: ICD-10-CM

## 2022-04-18 DIAGNOSIS — F41.9 ANXIETY: ICD-10-CM

## 2022-04-18 DIAGNOSIS — G89.29 CHRONIC MIDLINE LOW BACK PAIN WITH SCIATICA, SCIATICA LATERALITY UNSPECIFIED: ICD-10-CM

## 2022-04-18 DIAGNOSIS — Z87.891 PERSONAL HISTORY OF TOBACCO USE, PRESENTING HAZARDS TO HEALTH: ICD-10-CM

## 2022-04-18 DIAGNOSIS — C67.9: ICD-10-CM

## 2022-04-18 DIAGNOSIS — E11.42 DIABETIC PERIPHERAL NEUROPATHY ASSOCIATED WITH TYPE 2 DIABETES MELLITUS: ICD-10-CM

## 2022-04-18 DIAGNOSIS — E11.65 TYPE 2 DIABETES MELLITUS WITH HYPERGLYCEMIA, WITH LONG-TERM CURRENT USE OF INSULIN: Chronic | ICD-10-CM

## 2022-04-18 LAB
EXPIRATION DATE: NORMAL
HBA1C MFR BLD: 8.1 %
Lab: NORMAL

## 2022-04-18 PROCEDURE — G0439 PPPS, SUBSEQ VISIT: HCPCS | Performed by: FAMILY MEDICINE

## 2022-04-18 PROCEDURE — 1170F FXNL STATUS ASSESSED: CPT | Performed by: FAMILY MEDICINE

## 2022-04-18 PROCEDURE — 99213 OFFICE O/P EST LOW 20 MIN: CPT | Performed by: FAMILY MEDICINE

## 2022-04-18 PROCEDURE — 1125F AMNT PAIN NOTED PAIN PRSNT: CPT | Performed by: FAMILY MEDICINE

## 2022-04-18 PROCEDURE — 3052F HG A1C>EQUAL 8.0%<EQUAL 9.0%: CPT | Performed by: FAMILY MEDICINE

## 2022-04-18 PROCEDURE — 83036 HEMOGLOBIN GLYCOSYLATED A1C: CPT | Performed by: FAMILY MEDICINE

## 2022-04-18 PROCEDURE — 1159F MED LIST DOCD IN RCRD: CPT | Performed by: FAMILY MEDICINE

## 2022-04-18 RX ORDER — OXYCODONE HYDROCHLORIDE AND ACETAMINOPHEN 5; 325 MG/1; MG/1
1 TABLET ORAL 2 TIMES DAILY PRN
Qty: 60 TABLET | Refills: 0 | Status: SHIPPED | OUTPATIENT
Start: 2022-04-18 | End: 2022-07-20 | Stop reason: SDUPTHER

## 2022-04-18 RX ORDER — FLASH GLUCOSE SENSOR
1 KIT MISCELLANEOUS
Qty: 2 EACH | Refills: 6 | Status: CANCELLED | OUTPATIENT
Start: 2022-04-18

## 2022-04-18 RX ORDER — CYCLOBENZAPRINE HCL 10 MG
10 TABLET ORAL AS NEEDED
COMMUNITY
End: 2022-04-18 | Stop reason: SDUPTHER

## 2022-04-18 RX ORDER — PROCHLORPERAZINE 25 MG/1
SUPPOSITORY RECTAL
Qty: 9 EACH | Refills: 4 | Status: SHIPPED | OUTPATIENT
Start: 2022-04-18 | End: 2022-10-20

## 2022-04-18 RX ORDER — LORAZEPAM 1 MG/1
TABLET ORAL
Qty: 60 TABLET | Refills: 0 | Status: SHIPPED | OUTPATIENT
Start: 2022-04-18 | End: 2022-10-20 | Stop reason: SDUPTHER

## 2022-04-18 RX ORDER — GABAPENTIN 800 MG/1
800 TABLET ORAL 3 TIMES DAILY
Qty: 270 TABLET | Refills: 0 | Status: SHIPPED | OUTPATIENT
Start: 2022-04-18 | End: 2022-05-19 | Stop reason: SDUPTHER

## 2022-04-18 RX ORDER — FLASH GLUCOSE SENSOR
1 KIT MISCELLANEOUS ONCE
Qty: 1 EACH | Refills: 0 | Status: CANCELLED | OUTPATIENT
Start: 2022-04-18 | End: 2022-04-18

## 2022-04-18 RX ORDER — PROCHLORPERAZINE 25 MG/1
SUPPOSITORY RECTAL
Qty: 3 EACH | Refills: 11 | Status: SHIPPED | OUTPATIENT
Start: 2022-04-18 | End: 2022-04-18 | Stop reason: SDUPTHER

## 2022-04-18 RX ORDER — ZOSTER VACCINE RECOMBINANT, ADJUVANTED 50 MCG/0.5
0.5 KIT INTRAMUSCULAR ONCE
Qty: 1 EACH | Refills: 1 | Status: SHIPPED | OUTPATIENT
Start: 2022-04-18 | End: 2022-04-18

## 2022-04-18 RX ORDER — CYCLOBENZAPRINE HCL 10 MG
10 TABLET ORAL 3 TIMES DAILY PRN
Qty: 270 TABLET | Refills: 3 | Status: SHIPPED | OUTPATIENT
Start: 2022-04-18 | End: 2022-07-20 | Stop reason: SDUPTHER

## 2022-04-18 NOTE — PATIENT INSTRUCTIONS
Chronic Obstructive Pulmonary Disease    Chronic obstructive pulmonary disease (COPD) is a long-term (chronic) condition that affects the lungs. COPD is a general term that can be used to describe many different lung problems that cause lung swelling (inflammation) and limit airflow, including chronic bronchitis and emphysema. If you have COPD, your lung function will probably never return to normal. In most cases, it gets worse over time. However, there are steps you can take to slow the progression of the disease and improve your quality of life.  What are the causes?  This condition may be caused by:  · Smoking. This is the most common cause.  · Certain genes passed down through families.  What increases the risk?  The following factors may make you more likely to develop this condition:  · Secondhand smoke from cigarettes, pipes, or cigars.  · Exposure to chemicals and other irritants such as fumes and dust in the work environment.  · Chronic lung conditions or infections.  What are the signs or symptoms?  Symptoms of this condition include:  · Shortness of breath, especially during physical activity.  · Chronic cough with a large amount of thick mucus. Sometimes the cough may not have any mucus (dry cough).  · Wheezing.  · Rapid breaths.  · Gray or bluish discoloration (cyanosis) of the skin, especially in your fingers, toes, or lips.  · Feeling tired (fatigue).  · Weight loss.  · Chest tightness.  · Frequent infections.  · Episodes when breathing symptoms become much worse (exacerbations).  · Swelling in the ankles, feet, or legs. This may occur in later stages of the disease.  How is this diagnosed?  This condition is diagnosed based on:  · Your medical history.  · A physical exam.  You may also have tests, including:  · Lung (pulmonary) function tests. This may include a spirometry test, which measures your ability to exhale properly.  · Chest X-ray.  · CT scan.  · Blood tests.  How is this  treated?  This condition may be treated with:  · Medicines. These may include inhaled rescue medicines to treat acute exacerbations as well as long-term, or maintenance, medicines to prevent flare-ups of COPD.  ? Bronchodilators help treat COPD by dilating the airways to allow increased airflow and make your breathing more comfortable.  ? Steroids can reduce airway inflammation and help prevent exacerbations.  · Smoking cessation. If you smoke, your health care provider may ask you to quit, and may also recommend therapy or replacement products to help you quit.  · Pulmonary rehabilitation. This may involve working with a team of health care providers and specialists, such as respiratory, occupational, and physical therapists.  · Exercise and physical activity. These are beneficial for nearly all people with COPD.  · Nutrition therapy to gain weight, if you are underweight.  · Oxygen. Supplemental oxygen therapy is only helpful if you have a low oxygen level in your blood (hypoxemia).  · Lung surgery or transplant.  · Palliative care. This is to help people with COPD feel comfortable when treatment is no longer working.  Follow these instructions at home:  Medicines  · Take over-the-counter and prescription medicines (inhaled or pills) only as told by your health care provider.  · Talk to your health care provider before taking any cough or allergy medicines. You may need to avoid certain medicines that dry out your airways.  Lifestyle  · If you are a smoker, the most important thing that you can do is to stop smoking. Do not use any products that contain nicotine or tobacco, such as cigarettes and e-cigarettes. If you need help quitting, ask your health care provider. Continuing to smoke will cause the disease to progress faster.  · Avoid exposure to things that irritate your lungs, such as smoke, chemicals, and fumes.  · Stay active, but balance activity with periods of rest. Exercise and physical activity will  help you maintain your ability to do things you want to do.  · Learn and use relaxation techniques to manage stress and to control your breathing.  · Get the right amount of sleep and get quality sleep. Most adults need 7 or more hours per night.  · Eat healthy foods. Eating smaller, more frequent meals and resting before meals may help you maintain your strength.  Controlled breathing  Learn and use controlled breathing techniques as directed by your health care provider. Controlled breathing techniques include:  · Pursed lip breathing. Start by breathing in (inhaling) through your nose for 1 second. Then, purse your lips as if you were going to whistle and breathe out (exhale) through the pursed lips for 2 seconds.  · Diaphragmatic breathing. Start by putting one hand on your abdomen just above your waist. Inhale slowly through your nose. The hand on your abdomen should move out. Then purse your lips and exhale slowly. You should be able to feel the hand on your abdomen moving in as you exhale.  Controlled coughing  Learn and use controlled coughing to clear mucus from your lungs. Controlled coughing is a series of short, progressive coughs. The steps of controlled coughing are:  1. Lean your head slightly forward.  2. Breathe in deeply using diaphragmatic breathing.  3. Try to hold your breath for 3 seconds.  4. Keep your mouth slightly open while coughing twice.  5. Spit any mucus out into a tissue.  6. Rest and repeat the steps once or twice as needed.  General instructions  · Make sure you receive all the vaccines that your health care provider recommends, especially the pneumococcal and influenza vaccines. Preventing infection and hospitalization is very important when you have COPD.  · Use oxygen therapy and pulmonary rehabilitation if directed to by your health care provider. If you require home oxygen therapy, ask your health care provider whether you should purchase a pulse oximeter to measure your oxygen  level at home.  · Work with your health care provider to develop a COPD action plan. This will help you know what steps to take if your condition gets worse.  · Keep other chronic health conditions under control as told by your health care provider.  · Avoid extreme temperature and humidity changes.  · Avoid contact with people who have an illness that spreads from person to person (is contagious), such as viral infections or pneumonia.  · Keep all follow-up visits as told by your health care provider. This is important.  Contact a health care provider if:  · You are coughing up more mucus than usual.  · There is a change in the color or thickness of your mucus.  · Your breathing is more labored than usual.  · Your breathing is faster than usual.  · You have difficulty sleeping.  · You need to use your rescue medicines or inhalers more often than expected.  · You have trouble doing routine activities such as getting dressed or walking around the house.  Get help right away if:  · You have shortness of breath while you are resting.  · You have shortness of breath that prevents you from:  ? Being able to talk.  ? Performing your usual physical activities.  · You have chest pain lasting longer than 5 minutes.  · Your skin color is more blue (cyanotic) than usual.  · You measure low oxygen saturations for longer than 5 minutes with a pulse oximeter.  · You have a fever.  · You feel too tired to breathe normally.  Summary  · Chronic obstructive pulmonary disease (COPD) is a long-term (chronic) condition that affects the lungs.  · Your lung function will probably never return to normal. In most cases, it gets worse over time. However, there are steps you can take to slow the progression of the disease and improve your quality of life.  · Treatment for COPD may include taking medicines, quitting smoking, pulmonary rehabilitation, and changes to diet and exercise. As the disease progresses, you may need oxygen therapy, a  lung transplant, or palliative care.  · To help manage your condition, do not smoke, avoid exposure to things that irritate your lungs, stay up to date on all vaccines, and follow your health care provider's instructions for taking medicines.  This information is not intended to replace advice given to you by your health care provider. Make sure you discuss any questions you have with your health care provider.  Document Revised: 11/30/2018 Document Reviewed: 01/22/2018  [employer] Patient Education © 2021 [employer]    IF YOU SMOKE OR USE TOBACCO PLEASE READ THE FOLLOWING:  Why is smoking bad for me?  Smoking increases the risk of heart disease, lung disease, vascular disease, stroke, and cancer. If you smoke, STOP!    For more information:  Quit Now [facility]  1-800-QUIT-NOW  [email or web address]://[email or web address]/en-[email or web address]/

## 2022-04-18 NOTE — PROGRESS NOTES
The ABCs of the Annual Wellness Visit  Subsequent Medicare Wellness Visit    Chief Complaint   Patient presents with   • Medicare Wellness-subsequent      Subjective    History of Present Illness:  Kimberly Chun is a 57 y.o. female who presents for a Subsequent Medicare Wellness Visit.    The following portions of the patient's history were reviewed and   updated as appropriate: allergies, current medications, past family history, past medical history, past social history, past surgical history and problem list.    Compared to one year ago, the patient feels her physical   health is better.    Compared to one year ago, the patient feels her mental   health is better.    Recent Hospitalizations:  She was not admitted to the hospital during the last year.       Current Medical Providers:  Patient Care Team:  Maria Luz Longoria MD as PCP - General (Family Medicine)  Shelley Curran MD as Consulting Physician (Ophthalmology)  Alvaro Lemos MD as Consulting Physician (Urology)  Evert Jones MD (Orthopedic Surgery)    Outpatient Medications Prior to Visit   Medication Sig Dispense Refill   • albuterol (PROVENTIL) (2.5 MG/3ML) 0.083% nebulizer solution Take 2.5 mg by nebulization Every 6 (Six) Hours As Needed for Wheezing or Shortness of Air. 180 each 3   • albuterol sulfate HFA (ProAir HFA) 108 (90 Base) MCG/ACT inhaler Inhale 2 puffs Every 6 (Six) Hours As Needed for Wheezing or Shortness of Air. 54 g 3   • Alcohol Swabs (ALCOHOL PADS) 70 % pads 1 pad 4 (Four) Times a Day. Check sugars fasting and 2 hours after meals. 100 each 12   • aspirin 81 MG EC tablet Take 1 tablet by mouth Daily. 90 tablet 3   • B-D ULTRA-FINE 33 LANCETS misc 1 Units 4 (Four) Times a Day. Check sugars fasting and 2 hours after meals. 100 each 12   • Breo Ellipta 200-25 MCG/INH inhaler Inhale 1 puff Daily. 90 each 3   • clopidogrel (PLAVIX) 75 MG tablet Take 1 tablet by mouth Daily. 90 tablet 3   • glucose blood test  strip Check sugars fasting and 2 hours after meals (4x a day due to hyperglycemia) 100 each 12   • glucose monitor monitoring kit 1 each As Needed (diabetes). Check sugars fasting and 2 hours after meals. 1 each 0   • Insulin Glargine (Lantus SoloStar) 100 UNIT/ML injection pen Inject 60 Units under the skin into the appropriate area as directed Every Night. 20 pen 3   • Insulin Lispro (HumaLOG KwikPen) 200 UNIT/ML solution pen-injector Inject 15 Units under the skin into the appropriate area as directed 3 (Three) Times a Day With Meals. 21 mL 03   • Insulin Pen Needle (BD Pen Needle Ursula U/F) 32G X 4 MM misc USE TO INJECT INSULIN 4x DAY AND TRULICITY ONCE A WEEK 200 each 3   • levocetirizine (Xyzal) 5 MG tablet Take 1 tablet by mouth Every Evening. 90 tablet 3   • lisinopril (PRINIVIL,ZESTRIL) 5 MG tablet Take 1 tablet by mouth Daily. For kidney protection due to diabetes mellitus 90 tablet 3   • metFORMIN (GLUCOPHAGE) 1000 MG tablet Take 1 tablet by mouth 2 (Two) Times a Day With Meals. Indications: Type 2 Diabetes 180 tablet 3   • omeprazole (priLOSEC) 20 MG capsule Take 1 capsule by mouth 2 (Two) Times a Day. 180 capsule 3   • SITagliptin (JANUVIA) 100 MG tablet Take 1 tablet by mouth Daily. Indications: Type 2 Diabetes 90 tablet 3   • Continuous Blood Gluc  (Dexcom G6 ) device 1 each Continuous. 1 each 0   • Continuous Blood Gluc Sensor (Dexcom G6 Sensor) Every 10 (Ten) Days. Apply as directed 3 each 11   • cyclobenzaprine (FLEXERIL) 10 MG tablet Take 10 mg by mouth As Needed for Muscle Spasms.     • gabapentin (NEURONTIN) 800 MG tablet Take 1 tablet by mouth 3 (Three) Times a Day. Indications: Neuropathic Pain 270 tablet 0   • LORazepam (ATIVAN) 1 MG tablet TAKE ONE-HALF (1/2) TO ONE TABLET TWICE A DAY AS NEEDED FOR ANXIETY 60 tablet 0   • oxyCODONE-acetaminophen (Percocet) 5-325 MG per tablet Take 1 tablet by mouth 2 (Two) Times a Day As Needed for Severe Pain . 60 tablet 0   • fluconazole  (Diflucan) 150 MG tablet Take 1 tablet by mouth Every Other Day. 2 tablet 0     No facility-administered medications prior to visit.       Opioid medication/s are on active medication list.  and I have evaluated her active treatment plan and pain score trends (see table).  Vitals:    04/18/22 1055   PainSc:   6   PainLoc: Shoulder  Comment: Back     I have reviewed the chart for potential of high risk medication and harmful drug interactions in the elderly.            Aspirin is on active medication list. Aspirin use is indicated based on review of current medical condition/s. Pros and cons of this therapy have been discussed today. Benefits of this medication outweigh potential harm.  Patient has been encouraged to continue taking this medication.  .      Patient Active Problem List   Diagnosis   • Anxiety   • Cerebral atherosclerosis   • Gastroesophageal reflux disease   • Chronic obstructive pulmonary disease (HCC)   • Osteoarthritis   • Dyssomnia   • Hematuria   • Primary cancer of bladder (HCC)   • Tobacco abuse   • Diabetic peripheral neuropathy associated with type 2 diabetes mellitus (HCC)   • Chronic fatigue   • Gastritis determined by biopsy   • Type 2 diabetes mellitus with diabetic polyneuropathy, with long-term current use of insulin (HCC)   • Type 2 diabetes mellitus with hyperglycemia, with long-term current use of insulin (HCC)   • Chronic midline low back pain with sciatica     Advance Care Planning  Advance Directive is not on file.  ACP discussion was held with the patient during this visit. Patient does not have an advance directive, declines further assistance.    Review of Systems   Constitutional: Negative for fever.   Musculoskeletal: Positive for arthralgias and back pain.        Objective    Vitals:    04/18/22 1055   BP: 124/76   BP Location: Left arm   Patient Position: Sitting   Cuff Size: Adult   Pulse: 80   Temp: 97.1 °F (36.2 °C)   TempSrc: Infrared   SpO2: 98%   Weight: 87.4 kg (192  "lb 9.6 oz)   Height: 165.1 cm (65\")   PainSc:   6   PainLoc: Shoulder  Comment: Back     BMI Readings from Last 1 Encounters:   04/18/22 32.05 kg/m²   BMI is above normal parameters. Recommendations include: educational material    Does the patient have evidence of cognitive impairment? No    Physical Exam  Vitals and nursing note reviewed.   Constitutional:       General: She is not in acute distress.     Appearance: Normal appearance. She is well-developed and well-groomed. She is obese. She is not ill-appearing, toxic-appearing or diaphoretic.      Interventions: Face mask in place.   HENT:      Head: Normocephalic and atraumatic.      Right Ear: Hearing normal.      Left Ear: Hearing normal.   Eyes:      General: Lids are normal. No scleral icterus.     Extraocular Movements: Extraocular movements intact.   Neck:      Trachea: Phonation normal.   Cardiovascular:      Rate and Rhythm: Normal rate and regular rhythm.      Pulses:           Posterior tibial pulses are 2+ on the right side and 2+ on the left side.      Heart sounds: Normal heart sounds.   Pulmonary:      Effort: Pulmonary effort is normal.   Musculoskeletal:      Cervical back: Neck supple.      Right foot: No deformity, Charcot foot or foot drop.      Left foot: No deformity, Charcot foot or foot drop.   Feet:      Right foot:      Protective Sensation: 1 site tested. 1 site sensed.     Skin integrity: Skin integrity normal.      Left foot:      Protective Sensation: 1 site tested. 1 site sensed.     Skin integrity: Skin integrity normal.      Comments: Diabetic Foot Exam Performed and Monofilament Test Performed    Skin:     Coloration: Skin is not jaundiced or pale.   Neurological:      General: No focal deficit present.      Mental Status: She is alert and oriented to person, place, and time.      Motor: Motor function is intact.   Psychiatric:         Attention and Perception: Attention and perception normal.         Mood and Affect: Mood and " affect normal.         Speech: Speech normal.         Behavior: Behavior normal. Behavior is cooperative.         Thought Content: Thought content normal.         Cognition and Memory: Cognition and memory normal.         Judgment: Judgment normal.       Lab Results   Component Value Date    HGBA1C 8.1 04/18/2022            HEALTH RISK ASSESSMENT    Smoking Status:  Social History     Tobacco Use   Smoking Status Current Every Day Smoker   • Packs/day: 1.00   • Years: 40.00   • Pack years: 40.00   • Types: Cigarettes   Smokeless Tobacco Never Used     Alcohol Consumption:  Social History     Substance and Sexual Activity   Alcohol Use No     Fall Risk Screen:    [facility]    Depression Screening:  PHQ-2/PHQ-9 Depression Screening 4/18/2022   Retired Total Score -   Little Interest or Pleasure in Doing Things 0-->not at all   Feeling Down, Depressed or Hopeless 0-->not at all   PHQ-9: Brief Depression Severity Measure Score 0       Health Habits and Functional and Cognitive Screening:  Functional & Cognitive Status 4/18/2022   Do you have difficulty preparing food and eating? No   Do you have difficulty bathing yourself, getting dressed or grooming yourself? No   Do you have difficulty using the toilet? No   Do you have difficulty moving around from place to place? No   Do you have trouble with steps or getting out of a bed or a chair? No   Current Diet Limited Junk Food   Dental Exam Up to date        Dental Exam Comment Has dentures   Eye Exam Up to date        Eye Exam Comment -   Exercise (times per week) 1 times per week   Current Exercises Include Walking   Current Exercise Activities Include -   Do you need help using the phone?  No   Are you deaf or do you have serious difficulty hearing?  No   Do you need help with transportation? No   Do you need help shopping? No   Do you need help preparing meals?  No   Do you need help with housework?  No   Do you need help with  laundry? No   Do you need help taking your medications? No   Do you need help managing money? No   Do you ever drive or ride in a car without wearing a seat belt? No   Have you felt unusual stress, anger or loneliness in the last month? No   Who do you live with? Child   If you need help, do you have trouble finding someone available to you? No   Have you been bothered in the last four weeks by sexual problems? No   Do you have difficulty concentrating, remembering or making decisions? No       Age-appropriate Screening Schedule:  Refer to the list below for future screening recommendations based on patient's age, sex and/or medical conditions. Orders for these recommended tests are listed in the plan section. The patient has been provided with a written plan.    Health Maintenance   Topic Date Due   • URINE MICROALBUMIN  04/08/2022   • MAMMOGRAM  06/01/2022 (Originally 8/22/2020)   • ZOSTER VACCINE (1 of 2) 01/01/2023 (Originally 4/1/2014)   • INFLUENZA VACCINE  08/01/2022   • HEMOGLOBIN A1C  10/18/2022   • DIABETIC EYE EXAM  03/30/2023   • DIABETIC FOOT EXAM  04/18/2023   • TDAP/TD VACCINES (2 - Td or Tdap) 02/05/2025   • PAP SMEAR  Discontinued              Assessment/Plan   CMS Preventative Services Quick Reference  Risk Factors Identified During Encounter  Cardiovascular Disease  Chronic Pain   Immunizations Discussed/Encouraged (specific Immunizations; Shingrix  Polypharmacy  The above risks/problems have been discussed with the patient.  Follow up actions/plans if indicated are seen below in the Assessment/Plan Section.  Pertinent information has been shared with the patient in the After Visit Summary.    Diagnoses and all orders for this visit:    1. Medicare annual wellness visit, subsequent (Primary)    2. Type 2 diabetes mellitus with hyperglycemia, with long-term current use of insulin (HCC)  Comments:  encouraged using dexcom to track sugars, work on basal insulin and once fasting sugars consistently  under 150 work on meal time insulin doses.  Orders:  -     POC Glycosylated Hemoglobin (Hb A1C)  -     Discontinue: Continuous Blood Gluc Sensor (Dexcom G6 Sensor); Every 10 (Ten) Days. Apply as directed  Dispense: 3 each; Refill: 11  -     Continuous Blood Gluc Sensor (Dexcom G6 Sensor); Every 10 (Ten) Days. Apply as directed  Dispense: 9 each; Refill: 4    3. Type 2 diabetes mellitus with diabetic polyneuropathy, with long-term current use of insulin (HCC)  -     POC Glycosylated Hemoglobin (Hb A1C)  -     Discontinue: Continuous Blood Gluc Sensor (Dexcom G6 Sensor); Every 10 (Ten) Days. Apply as directed  Dispense: 3 each; Refill: 11  -     Continuous Blood Gluc Sensor (Dexcom G6 Sensor); Every 10 (Ten) Days. Apply as directed  Dispense: 9 each; Refill: 4  -     gabapentin (NEURONTIN) 800 MG tablet; Take 1 tablet by mouth 3 (Three) Times a Day. Indications: Neuropathic Pain  Dispense: 270 tablet; Refill: 0    4. Chronic obstructive pulmonary disease, unspecified COPD type ([facility])  Comments:  smoking cessation ideal; encouraged lung cancer screening    5. Diabetic peripheral neuropathy associated with type 2 diabetes mellitus (HCC)  -     Discontinue: Continuous Blood Gluc Sensor (Dexcom G6 Sensor); Every 10 (Ten) Days. Apply as directed  Dispense: 3 each; Refill: 11  -     Continuous Blood Gluc Sensor (Dexcom G6 Sensor); Every 10 (Ten) Days. Apply as directed  Dispense: 9 each; Refill: 4  -     gabapentin (NEURONTIN) 800 MG tablet; Take 1 tablet by mouth 3 (Three) Times a Day. Indications: Neuropathic Pain  Dispense: 270 tablet; Refill: 0    6. Primary cancer of bladder (HCC)  Comments:  smoking cessation ideal; follow up with urology    7. Anxiety  -     LORazepam (ATIVAN) 1 MG tablet; TAKE ONE-HALF (1/2) TO ONE TABLET TWICE A DAY AS NEEDED FOR ANXIETY  Dispense: 60 tablet; Refill: 0    8. Chronic midline low back pain with sciatica, sciatica laterality unspecified  Comments:  opiate refilled in august then again  in february, uses sparingly; interested in injection therapies with pain management.  Orders:  -     gabapentin (NEURONTIN) 800 MG tablet; Take 1 tablet by mouth 3 (Three) Times a Day. Indications: Neuropathic Pain  Dispense: 270 tablet; Refill: 0  -     Ambulatory Referral to Pain Management  -     cyclobenzaprine (FLEXERIL) 10 MG tablet; Take 1 tablet by mouth 3 (Three) Times a Day As Needed for Muscle Spasms.  Dispense: 270 tablet; Refill: 3    9. Need for shingles vaccine  -     Zoster Vac Recomb Adjuvanted (Shingrix) 50 MCG/0.5ML reconstituted suspension; Inject 0.5 mL into the appropriate muscle as directed by prescriber 1 (One) Time for 1 dose.  Dispense: 1 each; Refill: 1    10. Personal history of tobacco use, presenting hazards to health  -      CT Chest Low Dose Cancer Screening WO; Future    11. Chronic right shoulder pain  Comments:  use narcotic sparingly, deanna reviewed and appropriate  Orders:  -     oxyCODONE-acetaminophen (Percocet) 5-325 MG per tablet; Take 1 tablet by mouth 2 (Two) Times a Day As Needed for Severe Pain .  Dispense: 60 tablet; Refill: 0    12. Mammogram declined  Comments:  discuss/encouraged. broach subject again next visit        Follow Up:   Return in about 3 months (around 7/22/2022) for Diabetes follow up.  Hopeful A1C will be in 7 range or under.    An After Visit Summary and PPPS were made available to the patient.        I spent 30 minutes caring for Kimberly on this date of service. This time includes time spent by me in the following activities:preparing for the visit, reviewing tests, performing a medically appropriate examination and/or evaluation , counseling and educating the patient/family/caregiver, ordering medications, tests, or procedures, referring and communicating with other health care professionals  and documenting information in the medical record    I spent 15 minutes on the separately reported service of [id number]. This time is not included in the time used  to support the E/M service also reported today.

## 2022-04-20 DIAGNOSIS — Z79.4 TYPE 2 DIABETES MELLITUS WITH HYPERGLYCEMIA, WITH LONG-TERM CURRENT USE OF INSULIN: ICD-10-CM

## 2022-04-20 DIAGNOSIS — E11.65 TYPE 2 DIABETES MELLITUS WITH HYPERGLYCEMIA, WITH LONG-TERM CURRENT USE OF INSULIN: ICD-10-CM

## 2022-04-20 DIAGNOSIS — Z79.4 TYPE 2 DIABETES MELLITUS WITH DIABETIC POLYNEUROPATHY, WITH LONG-TERM CURRENT USE OF INSULIN: ICD-10-CM

## 2022-04-20 DIAGNOSIS — E11.42 TYPE 2 DIABETES MELLITUS WITH DIABETIC POLYNEUROPATHY, WITH LONG-TERM CURRENT USE OF INSULIN: ICD-10-CM

## 2022-04-20 RX ORDER — INSULIN GLARGINE 100 [IU]/ML
INJECTION, SOLUTION SUBCUTANEOUS
Qty: 60 ML | Refills: 2 | Status: SHIPPED | OUTPATIENT
Start: 2022-04-20 | End: 2022-07-20 | Stop reason: SDUPTHER

## 2022-04-22 ENCOUNTER — PREP FOR SURGERY (OUTPATIENT)
Dept: OTHER | Facility: HOSPITAL | Age: 57
End: 2022-04-22

## 2022-04-22 DIAGNOSIS — H25.11 NUCLEAR SCLEROTIC CATARACT OF RIGHT EYE: Primary | ICD-10-CM

## 2022-04-22 DIAGNOSIS — K21.9 GASTROESOPHAGEAL REFLUX DISEASE WITHOUT ESOPHAGITIS: ICD-10-CM

## 2022-04-22 RX ORDER — CYCLOPENT/TROPIC/PHEN/KETR/WAT 1%-1%-2.5%
1 DROPS (EA) OPHTHALMIC (EYE)
Status: CANCELLED | OUTPATIENT
Start: 2022-04-22 | End: 2022-04-22

## 2022-04-22 RX ORDER — SODIUM CHLORIDE 0.9 % (FLUSH) 0.9 %
10 SYRINGE (ML) INJECTION EVERY 12 HOURS SCHEDULED
Status: CANCELLED | OUTPATIENT
Start: 2022-04-22

## 2022-04-22 RX ORDER — TETRACAINE HYDROCHLORIDE 5 MG/ML
1 SOLUTION OPHTHALMIC SEE ADMIN INSTRUCTIONS
Status: CANCELLED | OUTPATIENT
Start: 2022-04-22

## 2022-04-22 RX ORDER — DIFLUPREDNATE OPHTHALMIC 0.5 MG/ML
1 EMULSION OPHTHALMIC SEE ADMIN INSTRUCTIONS
Status: CANCELLED | OUTPATIENT
Start: 2022-04-22

## 2022-04-22 RX ORDER — SODIUM CHLORIDE 0.9 % (FLUSH) 0.9 %
10 SYRINGE (ML) INJECTION AS NEEDED
Status: CANCELLED | OUTPATIENT
Start: 2022-04-22

## 2022-04-22 RX ORDER — OMEPRAZOLE 20 MG/1
20 CAPSULE, DELAYED RELEASE ORAL 2 TIMES DAILY
Qty: 180 CAPSULE | Refills: 3 | Status: SHIPPED | OUTPATIENT
Start: 2022-04-22 | End: 2022-07-19 | Stop reason: SDUPTHER

## 2022-04-22 NOTE — TELEPHONE ENCOUNTER
Caller: AdielAlenaKimberly    Relationship: Self    Best call back number: [phone]     Requested Prescriptions:   Requested Prescriptions     Pending Prescriptions Disp Refills   • omeprazole (priLOSEC) 20 MG capsule 180 capsule 3     Sig: Take 1 capsule by mouth 2 (Two) Times a Day.        Pharmacy where request should be sent: [facility] - [address] [phone] [facility] [phone]      Additional details provided by patient: PATIENT ONLY HAS TWO DAYS LEFT    Does the patient have less than a 3 day supply:  [x] Yes  [] No    Shantal Salcedo Rep   04/22/22 14:48 EDT

## 2022-04-28 ENCOUNTER — APPOINTMENT (OUTPATIENT)
Dept: CT IMAGING | Facility: HOSPITAL | Age: 57
End: 2022-04-28

## 2022-04-29 PROBLEM — H25.11 NUCLEAR SCLEROTIC CATARACT OF RIGHT EYE: Status: ACTIVE | Noted: 2022-04-29

## 2022-05-03 ENCOUNTER — PREP FOR SURGERY (OUTPATIENT)
Dept: OTHER | Facility: HOSPITAL | Age: 57
End: 2022-05-03

## 2022-05-03 DIAGNOSIS — H25.812 COMBINED FORMS OF AGE-RELATED CATARACT OF LEFT EYE: Primary | ICD-10-CM

## 2022-05-03 RX ORDER — SODIUM CHLORIDE 0.9 % (FLUSH) 0.9 %
10 SYRINGE (ML) INJECTION AS NEEDED
Status: CANCELLED | OUTPATIENT
Start: 2022-05-03

## 2022-05-03 RX ORDER — SODIUM CHLORIDE 0.9 % (FLUSH) 0.9 %
10 SYRINGE (ML) INJECTION EVERY 12 HOURS SCHEDULED
Status: CANCELLED | OUTPATIENT
Start: 2022-05-03

## 2022-05-03 RX ORDER — DIFLUPREDNATE OPHTHALMIC 0.5 MG/ML
1 EMULSION OPHTHALMIC SEE ADMIN INSTRUCTIONS
Status: CANCELLED | OUTPATIENT
Start: 2022-05-03

## 2022-05-03 RX ORDER — CYCLOPENT/TROPIC/PHEN/KETR/WAT 1%-1%-2.5%
1 DROPS (EA) OPHTHALMIC (EYE)
Status: CANCELLED | OUTPATIENT
Start: 2022-05-03 | End: 2022-05-03

## 2022-05-03 RX ORDER — TETRACAINE HYDROCHLORIDE 5 MG/ML
1 SOLUTION OPHTHALMIC SEE ADMIN INSTRUCTIONS
Status: CANCELLED | OUTPATIENT
Start: 2022-05-03

## 2022-05-05 NOTE — PRE-PROCEDURE INSTRUCTIONS
PAT phone history completed with pt for upcoming procedure on 5/6/22, with Dr. Avilez.    PAT PASS GIVEN/REVIEWED WITH PT.  VERBALIZED UNDERSTANDING OF THE FOLLOWING:  DO NOT EAT, DRINK, SMOKE, USE SMOKELESS TOBACCO OR CHEW GUM AFTER MIDNIGHT THE NIGHT BEFORE SURGERY.  THIS ALSO INCLUDES HARD CANDIES AND MINTS.    DO NOT SHAVE THE AREA TO BE OPERATED ON AT LEAST 48 HOURS PRIOR TO THE PROCEDURE.  DO NOT WEAR MAKE UP OR NAIL POLISH.  DO NOT LEAVE IN ANY PIERCING OR WEAR JEWELRY THE DAY OF SURGERY.      DO NOT USE ADHESIVES IF YOU WEAR DENTURES.    DO NOT WEAR EYE CONTACTS; BRING IN YOUR GLASSES.    ONLY TAKE MEDICATION THE MORNING OF YOUR PROCEDURE IF INSTRUCTED BY YOUR SURGEON WITH ENOUGH WATER TO SWALLOW THE MEDICATION.  IF YOUR SURGEON DID NOT SPECIFY WHICH MEDICATIONS TO TAKE, YOU WILL NEED TO CALL THEIR OFFICE FOR FURTHER INSTRUCTIONS AND DO AS THEY INSTRUCT.    LEAVE ANYTHING YOU CONSIDER VALUABLE AT HOME.    YOU WILL NEED TO ARRANGE FOR SOMEONE TO DRIVE YOU HOME AFTER SURGERY.  IT IS RECOMMENDED THAT YOU DO NOT DRIVE, WORK, DRINK ALCOHOL OR MAKE MAJOR DECISIONS FOR AT LEAST 24 HOURS AFTER YOUR PROCEDURE IS COMPLETE.      THE DAY OF YOUR PROCEDURE, BRING IN THE FOLLOWING IF APPLICABLE:   PICTURE ID AND INSURANCE/MEDICARE OR MEDICAID CARDS/ANY CO-PAY THAT MAY BE DUE   COPY OF ADVANCED DIRECTIVE/LIVING WILL/POWER OR    CPAP/BIPAP/INHALERS   SKIN PREP SHEET   YOUR PREADMISSION TESTING PASS (IF NOT A PHONE HISTORY)           COVID self-quarantine instructions reviewed with the pt.  Verbalized understanding.

## 2022-05-06 ENCOUNTER — HOSPITAL ENCOUNTER (OUTPATIENT)
Facility: HOSPITAL | Age: 57
Setting detail: HOSPITAL OUTPATIENT SURGERY
Discharge: HOME | End: 2022-05-06
Attending: OPHTHALMOLOGY | Admitting: OPHTHALMOLOGY

## 2022-05-06 ENCOUNTER — ANESTHESIA EVENT (OUTPATIENT)
Dept: PERIOP | Facility: HOSPITAL | Age: 57
End: 2022-05-06

## 2022-05-06 ENCOUNTER — ANESTHESIA (OUTPATIENT)
Dept: PERIOP | Facility: HOSPITAL | Age: 57
End: 2022-05-06

## 2022-05-06 VITALS
SYSTOLIC BLOOD PRESSURE: 129 MMHG | OXYGEN SATURATION: 98 % | RESPIRATION RATE: 18 BRPM | HEART RATE: 89 BPM | BODY MASS INDEX: 34.15 KG/M2 | WEIGHT: 200 LBS | DIASTOLIC BLOOD PRESSURE: 87 MMHG | HEIGHT: 64 IN | TEMPERATURE: 97.5 F

## 2022-05-06 DIAGNOSIS — H25.11 NUCLEAR SCLEROTIC CATARACT OF RIGHT EYE: ICD-10-CM

## 2022-05-06 LAB — GLUCOSE BLDC GLUCOMTR-MCNC: 104 MG/DL (ref 70–130)

## 2022-05-06 PROCEDURE — 82962 GLUCOSE BLOOD TEST: CPT

## 2022-05-06 PROCEDURE — V2632 POST CHMBR INTRAOCULAR LENS: HCPCS | Performed by: OPHTHALMOLOGY

## 2022-05-06 PROCEDURE — 25010000002 EPINEPHRINE PER 0.1 MG: Performed by: OPHTHALMOLOGY

## 2022-05-06 PROCEDURE — 25010000002 MIDAZOLAM PER 1MG: Performed by: NURSE ANESTHETIST, CERTIFIED REGISTERED

## 2022-05-06 DEVICE — LENS ACRYSOF IQ 6X13MM SA60WF 23.0: Type: IMPLANTABLE DEVICE | Site: POSTERIOR CHAMBER | Status: FUNCTIONAL

## 2022-05-06 RX ORDER — DIFLUPREDNATE OPHTHALMIC 0.5 MG/ML
1 EMULSION OPHTHALMIC SEE ADMIN INSTRUCTIONS
Status: DISCONTINUED | OUTPATIENT
Start: 2022-05-06 | End: 2022-05-06 | Stop reason: HOSPADM

## 2022-05-06 RX ORDER — TETRACAINE HYDROCHLORIDE 5 MG/ML
SOLUTION OPHTHALMIC AS NEEDED
Status: DISCONTINUED | OUTPATIENT
Start: 2022-05-06 | End: 2022-05-06 | Stop reason: HOSPADM

## 2022-05-06 RX ORDER — SODIUM CHLORIDE 0.9 % (FLUSH) 0.9 %
10 SYRINGE (ML) INJECTION EVERY 12 HOURS SCHEDULED
Status: DISCONTINUED | OUTPATIENT
Start: 2022-05-06 | End: 2022-05-06 | Stop reason: HOSPADM

## 2022-05-06 RX ORDER — MIDAZOLAM HYDROCHLORIDE 2 MG/2ML
INJECTION, SOLUTION INTRAMUSCULAR; INTRAVENOUS AS NEEDED
Status: DISCONTINUED | OUTPATIENT
Start: 2022-05-06 | End: 2022-05-06 | Stop reason: SURG

## 2022-05-06 RX ORDER — DIFLUPREDNATE OPHTHALMIC 0.5 MG/ML
EMULSION OPHTHALMIC
Start: 2022-05-06 | End: 2022-06-17

## 2022-05-06 RX ORDER — SODIUM CHLORIDE 0.9 % (FLUSH) 0.9 %
10 SYRINGE (ML) INJECTION AS NEEDED
Status: DISCONTINUED | OUTPATIENT
Start: 2022-05-06 | End: 2022-05-06 | Stop reason: HOSPADM

## 2022-05-06 RX ORDER — DIFLUPREDNATE OPHTHALMIC 0.5 MG/ML
EMULSION OPHTHALMIC AS NEEDED
Status: DISCONTINUED | OUTPATIENT
Start: 2022-05-06 | End: 2022-05-06 | Stop reason: HOSPADM

## 2022-05-06 RX ORDER — SODIUM CHLORIDE, SODIUM LACTATE, POTASSIUM CHLORIDE, CALCIUM CHLORIDE 600; 310; 30; 20 MG/100ML; MG/100ML; MG/100ML; MG/100ML
1000 INJECTION, SOLUTION INTRAVENOUS CONTINUOUS
Status: DISCONTINUED | OUTPATIENT
Start: 2022-05-06 | End: 2022-05-06 | Stop reason: HOSPADM

## 2022-05-06 RX ORDER — LIDOCAINE HYDROCHLORIDE 40 MG/ML
INJECTION, SOLUTION RETROBULBAR; TOPICAL AS NEEDED
Status: DISCONTINUED | OUTPATIENT
Start: 2022-05-06 | End: 2022-05-06 | Stop reason: HOSPADM

## 2022-05-06 RX ORDER — BALANCED SALT SOLUTION 6.4; .75; .48; .3; 3.9; 1.7 MG/ML; MG/ML; MG/ML; MG/ML; MG/ML; MG/ML
SOLUTION OPHTHALMIC AS NEEDED
Status: DISCONTINUED | OUTPATIENT
Start: 2022-05-06 | End: 2022-05-06 | Stop reason: HOSPADM

## 2022-05-06 RX ORDER — TETRACAINE HYDROCHLORIDE 5 MG/ML
1 SOLUTION OPHTHALMIC SEE ADMIN INSTRUCTIONS
Status: DISCONTINUED | OUTPATIENT
Start: 2022-05-06 | End: 2022-05-06 | Stop reason: HOSPADM

## 2022-05-06 RX ORDER — CYCLOPENT/TROPIC/PHEN/KETR/WAT 1%-1%-2.5%
1 DROPS (EA) OPHTHALMIC (EYE)
Status: COMPLETED | OUTPATIENT
Start: 2022-05-06 | End: 2022-05-06

## 2022-05-06 RX ADMIN — TETRACAINE HYDROCHLORIDE 1 DROP: 5 SOLUTION OPHTHALMIC at 09:01

## 2022-05-06 RX ADMIN — Medication 1 DROP: at 09:07

## 2022-05-06 RX ADMIN — SODIUM CHLORIDE, POTASSIUM CHLORIDE, SODIUM LACTATE AND CALCIUM CHLORIDE 1000 ML: 600; 310; 30; 20 INJECTION, SOLUTION INTRAVENOUS at 09:15

## 2022-05-06 RX ADMIN — Medication 1 DROP: at 09:02

## 2022-05-06 RX ADMIN — TETRACAINE HYDROCHLORIDE 1 DROP: 5 SOLUTION OPHTHALMIC at 09:00

## 2022-05-06 RX ADMIN — MIDAZOLAM HYDROCHLORIDE 2 MG: 1 INJECTION, SOLUTION INTRAMUSCULAR; INTRAVENOUS at 09:48

## 2022-05-06 RX ADMIN — Medication 1 DROP: at 09:12

## 2022-05-06 NOTE — OP NOTE
OPERATIVE NOTE    Date of Procedure: 5/6/2022  Patient Name: Kimberly Chun  Patient MRN: [id number]  YOB: 1965     Preoperative Diagnosis: Right nuclear sclerotic cataract.     Postoperative Diagnosis: Right nuclear sclerotic cataract.     Procedure Performed: Phacoemulsification with implantation of a  foldable posterior chamber intraocular lens, Right eye.     Surgeon: Eugene Avilez MD     Anesthesia:  Monitored Anesthesia Care (MAC)      Brief History and Indication: The patient presents with a history of past progressive loss of vision.  Patient was diagnosed with cataract and requests removal for increased ability to read and see.     Operation Description: The patient was taken to the OR and prepped and draped in the usual sterile ophthalmic fashion. A lid speculum was placed in the eye.  A 0.8 mm blade was then used to make a stab incision two o’clock hours from the intended temporal clear cornea groove. The anterior chamber was then inflated with a Viscoelastic. A metal microkeratome blade was then used to enter the anterior chamber at the temporal clear cornea site. A three level tunnel incision was made. A curvilinear capsulorrhexis was then performed with a bent cystotome needle and capsulorrhexis forceps.  BSS on a 30 gauge bent cannula was used to hydro-dissect the lens. Good fluid waves were noted. Phacoemulsification was then used to remove nuclear material without complications. The residual cortical and lenticular material was then removed with irrigation and aspiration. Viscoelastics were then used to inflate the bag in a soft shell technique. A PCIOL was injected into the bag. Post-implantation, there were no rents or tears in the bag and the lens was noted to be stable and centered. The residual Viscoelastic was then removed with irrigation and aspiration.  The wound was checked and found to be without leaks. One drop of a Prednisilone was placed in the eye.     Implant  Information:   Implant Name Type Inv. Item Serial No.  Lot No. LRB No. Used Action   LENS ACRYSOF IQ [id number] 23.0 - [id number] - [id number] Implant LENS ACRYSOF IQ [id number] [id number] 23.0 [id number]  Right 1 Implanted       Complications: None    Estimated Blood Loss:  Less than 1 cc.      Discharge and Condition  The patient was transported to same day surgery in excellent condition and scheduled for follow-up appointment. The patient was given instructions on use of eye drops for the operative eye and was specifically instructed to call for any concerns.    Eugene Avilez MD  5/6/2022

## 2022-05-06 NOTE — DISCHARGE INSTRUCTIONS
Post Operative Cataract Instructions     Right Eye  POST OPERATIVE INSTRUCTIONS  You have received anesthesia today. DO NOT drive, drink alcohol, sign legal documents.   After surgery, your eye will not hurt. It may feel scratchy, sticky or uncomfortable. Your eye will be sensitive to light.  Most people see better 1-3 days after the procedure, but it could take 3 weeks to get the full benefits and reach your visual potential. If your vision is blurry for a few days it is normal, and means you may have swelling outside or inside the eye. For some patients, a bubble is placed and there will be blurriness.   You should receive a post-op kit with tape and an eye shield. Wear the shield for the first 3 nights after surgery to keep you from rubbing the eye.  Most people are able to return to their normal routine 1-3 days after surgery, however, due to the brain adjusting to your new vision you may have trouble judging distances and want to be careful when driving and going up and downstairs.   You can shower and wash your hair the day after surgery. Keep water, shampoo, hair spray and shaving lotion out of the eye, especially for the first week.  During the first week, you should AVOID:   Rubbing or putting pressure on your eye.  Eye make-up, face cream or lotion, hair coloring or perms  Strenuous activities, such as running or lifting weights, as to avoid sweat from getting in the eye. Avoid swimming, hot tubs, fumes or martha conditions.   Keep your head above your heart (no hanging the head down for periods of time).    Some discomfort and blurred vision after surgery are normal, but if you have any unusual pain, swelling, bleeding or sudden decrease in vision, contact our office immediately.     Emergency assistance is available at any time by calling:    Dr.Mark Micah Obrien  [phone]    If unable to reach call [facility] @  [phone]    You have been prescribed an eye drop to use after surgery, please follow these instructions:    Durezol Shake well before use    USE 1 DROP 4 (FOUR) TIMES A DAY FOR 1 WEEK, WEEK 2: USE 3 (THREE) TIMES A DAY FOR 1 WEEK, WEEK 3: USE 2 (TWO) TIMES A DAY FOR 1 WEEK, WEEK 4: USE 1 (ONE) TIME A DAY FOR 1 WEEK THEN STOP.    PLACE A ABIOLA IN THE DAY COLUMN EACH TIME YOU USE TO KEEP ON SCHEDULE    WEEK 1-USE 4  (FOUR) TIMES A DAY DAY 1   DAY 2 DAY 3 DAY 4 DAY 5 DAY 6 DAY 7     WEEK 2-USE 3 (THREE)  TIMES A DAY DAY 1 DAY 2 DAY 3 DAY 4 DAY 5 DAY 6 DAY 7     WEEK 3-USE 2 (TWO) TIMES A DAY DAY 1 DAY 2 DAY 3 DAY 4 DAY 5 DAY 6 DAY 7     WEEK 4-USE 1 (ONE)TIME A DAY DAY 1 DAY 2 DAY 3 DAY 4 DAY 5 DAY 6 DAY 7     Please follow all post op instructions and follow up appointment time from your physician's office included in your discharge packet.  .  No pushing, pulling, tugging,  heavy lifting, or strenuous activity.  No major decision making, driving, or drinking alcoholic beverages for 24 hours. ( due to the medications you have  received)  Always use good hand hygiene/washing techniques.  NO driving while taking pain medications.    * if you have an incision:  Check your incision area every day for signs of infection.   Check for:  * more redness, swelling, or pain  *more fluid or blood  *warmth  *pus or bad smell     To assist you in voiding:  Drink plenty of fluids  Listen to running water while attempting to void.    If you are unable to urinate and you have an uncomfortable urge to void or it has been   6 hours since you were discharged, return to the Emergency Room

## 2022-05-06 NOTE — ANESTHESIA POSTPROCEDURE EVALUATION
Patient: Kimberly Chun    Procedure Summary     Date: 05/06/22 Room / Location: [facility] OR 1 /  [facility]    Anesthesia Start: 0947 Anesthesia Stop: 1011    Procedure: CATARACT PHACO EXTRACTION WITH INTRAOCULAR LENS IMPLANT RIGHT (Right Eye) Diagnosis:       Nuclear sclerotic cataract of right eye      (Nuclear sclerotic cataract of right eye [H25.11])    Surgeons: Eugene Avilez MD Provider: Colt Trejo CRNA    Anesthesia Type: MAC ASA Status: 3          Anesthesia Type: MAC    Vitals  No vitals data found for the desired time range.        Vitals per nursing documentaion  Post Anesthesia Care and Evaluation    Patient location during evaluation: bedside  Patient participation: complete - patient participated  Level of consciousness: awake  Pain score: 0  Pain management: adequate  Airway patency: patent  Anesthetic complications: No anesthetic complications  PONV Status: controlled  Cardiovascular status: acceptable and stable  Respiratory status: acceptable and room air  Hydration status: acceptable

## 2022-05-06 NOTE — ANESTHESIA PREPROCEDURE EVALUATION
Anesthesia Evaluation     Patient summary reviewed and Nursing notes reviewed   NPO Solid Status: > 8 hours  NPO Liquid Status: > 8 hours           Airway   Mallampati: II  TM distance: >3 FB  Neck ROM: full  Possible difficult intubation  Dental      Pulmonary    (+) COPD,   Cardiovascular         Neuro/Psych  (+) CVA, numbness, psychiatric history,    GI/Hepatic/Renal/Endo    (+) obesity,  GERD,  diabetes mellitus,     Musculoskeletal     Abdominal    Substance History      OB/GYN          Other   arthritis,    history of cancer                    Anesthesia Plan    ASA 3     MAC     intravenous induction     Anesthetic plan, all risks, benefits, and alternatives have been provided, discussed and informed consent has been obtained with: patient.        CODE STATUS:

## 2022-05-06 NOTE — H&P
[facility]         History and Physical    Patient Name: Kimberly Chun  MRN: [id number]  : 1965  Gender: female     HPI: Patient complaint of PPLOV Right eye diagnosed with cataract. Patient requests PHACO PCIOL for Increase of VA/ADL.    History:    Past Medical History:   Diagnosis Date   • Anxiety    • Anxiety    • Arthritis    • Bladder cancer (HCC)    • Cataract    • COPD (chronic obstructive pulmonary disease) (Formerly [facility])    • Dysphagia    • Fibromyalgia    • Fracture     RIGHT LEG, RIGHT ANKLE, MULTIPLE TOES   • Full dentures    • GERD (gastroesophageal reflux disease)    • Ovarian cyst    • Recurrent urinary tract infection    • Sciatica    • Seasonal allergies    • Statin intolerance    • Stroke (Formerly [facility])     PATIENT REPORTS APPROXIMATELY 7 YEARS AGO. REPORTS HAS WEAKNESS AND MEMORY PROBLEMS INTERMITTENTLY AFTER THIS EPISODE.   • Type 2 diabetes mellitus (Formerly [facility])    • Wears glasses        Past Surgical History:   Procedure Laterality Date   • BACK SURGERY  2020   • CYSTOSCOPY BLADDER BIOPSY     • ENDOSCOPY N/A 7/3/2017    Procedure: ESOPHAGOGASTRODUODENOSCOPY WITH BIOPSY;  Surgeon: Lindy Bey MD;  Location: [facility] ENDOSCOPY;  Service:    • HYSTERECTOMY     • TONSILLECTOMY         Social History     Socioeconomic History   • Marital status:    Tobacco Use   • Smoking status: Current Every Day Smoker     Packs/day: 1.00     Years: 40.00     Pack years: 40.00     Types: Cigarettes   • Smokeless tobacco: Never Used   Substance and Sexual Activity   • Alcohol use: No   • Drug use: No   • Sexual activity: Defer       Family History   Problem Relation Age of Onset   • Arthritis Mother    • Hypertension Mother    • Heart attack Mother    • Osteoporosis Mother    • Arthritis Father    • Cancer Father    • Diabetes Father    • Hypertension Father    • Stroke Other    • Drug abuse Daughter        Prior to Admission Medications:  Medications Prior to  Admission   Medication Sig Dispense Refill Last Dose   • Alcohol Swabs (ALCOHOL PADS) 70 % pads 1 pad 4 (Four) Times a Day. Check sugars fasting and 2 hours after meals. 100 each 12 5/5/2022 at Unknown time   • aspirin 81 MG EC tablet Take 1 tablet by mouth Daily. 90 tablet 3 5/5/2022 at Unknown time   • B-D ULTRA-FINE 33 LANCETS misc 1 Units 4 (Four) Times a Day. Check sugars fasting and 2 hours after meals. 100 each 12 5/5/2022 at Unknown time   • Breo Ellipta 200-25 MCG/INH inhaler Inhale 1 puff Daily. 90 each 3 5/5/2022 at Unknown time   • clopidogrel (PLAVIX) 75 MG tablet Take 1 tablet by mouth Daily. 90 tablet 3 Past Week at Unknown time   • Continuous Blood Gluc Sensor (Dexcom G6 Sensor) Every 10 (Ten) Days. Apply as directed 9 each 4 Past Week at Unknown time   • gabapentin (NEURONTIN) 800 MG tablet Take 1 tablet by mouth 3 (Three) Times a Day. Indications: Neuropathic Pain 270 tablet 0 5/5/2022 at Unknown time   • glucose blood test strip Check sugars fasting and 2 hours after meals (4x a day due to hyperglycemia) 100 each 12 5/5/2022 at Unknown time   • glucose monitor monitoring kit 1 each As Needed (diabetes). Check sugars fasting and 2 hours after meals. 1 each 0 5/5/2022 at Unknown time   • Insulin Lispro (HumaLOG KwikPen) 200 UNIT/ML solution pen-injector Inject 15 Units under the skin into the appropriate area as directed 3 (Three) Times a Day With Meals. (Patient taking differently: Inject 15 Units under the skin into the appropriate area as directed 3 (Three) Times a Day With Meals. SS) 21 mL 03 5/5/2022 at Unknown time   • Insulin Pen Needle (BD Pen Needle Ursula U/F) 32G X 4 MM misc USE TO INJECT INSULIN 4x DAY AND TRULICITY ONCE A WEEK 200 each 3 5/5/2022 at Unknown time   • Lantus SoloStar 100 UNIT/ML injection pen INJECT 65 UNITS UNDER THE SKIN INTO THE APPROPRIATE AREA AS DIRECTED EVERY NIGHT (Patient taking differently: 63 Units Daily.) 60 mL 2 5/5/2022 at Unknown time   • levocetirizine  (Xyzal) 5 MG tablet Take 1 tablet by mouth Every Evening. 90 tablet 3 5/5/2022 at Unknown time   • lisinopril (PRINIVIL,ZESTRIL) 5 MG tablet Take 1 tablet by mouth Daily. For kidney protection due to diabetes mellitus 90 tablet 3 5/5/2022 at Unknown time   • LORazepam (ATIVAN) 1 MG tablet TAKE ONE-HALF (1/2) TO ONE TABLET TWICE A DAY AS NEEDED FOR ANXIETY (Patient taking differently: 1 mg Daily As Needed. TAKE ONE-HALF (1/2) TO ONE TABLET TWICE A DAY AS NEEDED FOR ANXIETY) 60 tablet 0 5/5/2022 at Unknown time   • metFORMIN (GLUCOPHAGE) 1000 MG tablet Take 1 tablet by mouth 2 (Two) Times a Day With Meals. Indications: Type 2 Diabetes 180 tablet 3 5/5/2022 at Unknown time   • omeprazole (priLOSEC) 20 MG capsule Take 1 capsule by mouth 2 (Two) Times a Day. 180 capsule 3 5/5/2022 at Unknown time   • oxyCODONE-acetaminophen (Percocet) 5-325 MG per tablet Take 1 tablet by mouth 2 (Two) Times a Day As Needed for Severe Pain . 60 tablet 0 5/5/2022 at Unknown time   • SITagliptin (JANUVIA) 100 MG tablet Take 1 tablet by mouth Daily. Indications: Type 2 Diabetes 90 tablet 3 5/5/2022 at Unknown time   • albuterol (PROVENTIL) (2.5 MG/3ML) 0.083% nebulizer solution Take 2.5 mg by nebulization Every 6 (Six) Hours As Needed for Wheezing or Shortness of Air. 180 each 3 More than a month at Unknown time   • albuterol sulfate HFA (ProAir HFA) 108 (90 Base) MCG/ACT inhaler Inhale 2 puffs Every 6 (Six) Hours As Needed for Wheezing or Shortness of Air. 54 g 3 More than a month at Unknown time   • cyclobenzaprine (FLEXERIL) 10 MG tablet Take 1 tablet by mouth 3 (Three) Times a Day As Needed for Muscle Spasms. 270 tablet 3    • fluconazole (Diflucan) 150 MG tablet Take 1 tablet by mouth Every Other Day. 2 tablet 0        Allergies:  Allergies   Allergen Reactions   • Iodides Hives   • Iodinated Diagnostic Agents Hives        Vitals: Temp:  [97.9 °F (36.6 °C)] 97.9 °F (36.6 °C)  Heart Rate:  [92] 92  Resp:  [20] 20  BP: (123)/(67)  123/67    Review of Systems:   Within Normal Limits Abnormal   HEENT [x]    []     Cardiovascular [x]   []     Gastrointestinal [x]   []     Genitourinary [x]   []     Neurologic [x]   []     Pulmonary [x]   []       Physical Exam:   Within Normal Limits Abnormal   HEENT [x]    []     Heart [x]   []     Lungs [x]   []     Abdomen [x]   []     Extremities [x]   []       Impression: Right nuclear sclerotic cataract.     Plan: CATARACT PHACO EXTRACTION WITH INTRAOCULAR LENS IMPLANT RIGHT (Right)     Eugene Avilez MD  5/6/2022

## 2022-05-09 LAB — GLUCOSE BLDC GLUCOMTR-MCNC: 88 MG/DL (ref 70–130)

## 2022-05-12 PROBLEM — H25.812 COMBINED FORMS OF AGE-RELATED CATARACT OF LEFT EYE: Status: ACTIVE | Noted: 2022-05-12

## 2022-05-19 DIAGNOSIS — G89.29 CHRONIC RIGHT SHOULDER PAIN: ICD-10-CM

## 2022-05-19 DIAGNOSIS — E11.42 TYPE 2 DIABETES MELLITUS WITH DIABETIC POLYNEUROPATHY, WITH LONG-TERM CURRENT USE OF INSULIN: Chronic | ICD-10-CM

## 2022-05-19 DIAGNOSIS — M54.40 CHRONIC MIDLINE LOW BACK PAIN WITH SCIATICA, SCIATICA LATERALITY UNSPECIFIED: ICD-10-CM

## 2022-05-19 DIAGNOSIS — Z79.4 TYPE 2 DIABETES MELLITUS WITH DIABETIC POLYNEUROPATHY, WITH LONG-TERM CURRENT USE OF INSULIN: Chronic | ICD-10-CM

## 2022-05-19 DIAGNOSIS — F41.9 ANXIETY: ICD-10-CM

## 2022-05-19 DIAGNOSIS — E11.42 DIABETIC PERIPHERAL NEUROPATHY ASSOCIATED WITH TYPE 2 DIABETES MELLITUS: ICD-10-CM

## 2022-05-19 DIAGNOSIS — G89.29 CHRONIC MIDLINE LOW BACK PAIN WITH SCIATICA, SCIATICA LATERALITY UNSPECIFIED: ICD-10-CM

## 2022-05-19 DIAGNOSIS — M25.511 CHRONIC RIGHT SHOULDER PAIN: ICD-10-CM

## 2022-05-19 NOTE — TELEPHONE ENCOUNTER
Caller: Kimberly Chun    Relationship: Self    Best call back number: [phone]    Requested Prescriptions:   Requested Prescriptions     Pending Prescriptions Disp Refills   • gabapentin (NEURONTIN) 800 MG tablet 270 tablet 0     Sig: Take 1 tablet by mouth 3 (Three) Times a Day. Indications: Neuropathic Pain        Pharmacy where request should be sent:  [facility]    Additional details provided by patient: PATIENT CHANGED PHARMACY AND WILL RUN OUT BEFORE HER APPOINTMENT. ALSO PATIENT NEEDS TO BE SWITCHED TO NOVOLOG INSTEAD OF HUMALOG SINCE IT IS COVERED BY INSURANCE AND HUMALOG WOULD BE $1000.    Does the patient have less than a 3 day supply:  [] Yes  [x] No    Shantal Redmond Rep   05/19/22 13:33 EDT

## 2022-05-19 NOTE — TELEPHONE ENCOUNTER
Rx Refill Note  Requested Prescriptions     Pending Prescriptions Disp Refills   • gabapentin (NEURONTIN) 800 MG tablet 270 tablet 0     Sig: Take 1 tablet by mouth 3 (Three) Times a Day. Indications: Neuropathic Pain      Last office visit with prescribing clinician: 4/18/2022      Next office visit with prescribing clinician: 7/20/2022            Indra Lopez MA  05/19/22, 16:01 EDT

## 2022-05-20 RX ORDER — INSULIN ASPART 100 [IU]/ML
15 INJECTION, SOLUTION INTRAVENOUS; SUBCUTANEOUS
Qty: 15 ML | Refills: 3 | Status: SHIPPED | OUTPATIENT
Start: 2022-05-20 | End: 2022-07-20 | Stop reason: SDUPTHER

## 2022-05-20 RX ORDER — GABAPENTIN 800 MG/1
800 TABLET ORAL 3 TIMES DAILY
Qty: 270 TABLET | Refills: 1 | Status: SHIPPED | OUTPATIENT
Start: 2022-05-20 | End: 2022-10-20 | Stop reason: SDUPTHER

## 2022-05-26 DIAGNOSIS — Z79.4 TYPE 2 DIABETES MELLITUS WITH DIABETIC POLYNEUROPATHY, WITH LONG-TERM CURRENT USE OF INSULIN: ICD-10-CM

## 2022-05-26 DIAGNOSIS — Z79.4 TYPE 2 DIABETES MELLITUS WITH HYPERGLYCEMIA, WITH LONG-TERM CURRENT USE OF INSULIN: ICD-10-CM

## 2022-05-26 DIAGNOSIS — E11.65 TYPE 2 DIABETES MELLITUS WITH HYPERGLYCEMIA, WITH LONG-TERM CURRENT USE OF INSULIN: ICD-10-CM

## 2022-05-26 DIAGNOSIS — E11.42 TYPE 2 DIABETES MELLITUS WITH DIABETIC POLYNEUROPATHY, WITH LONG-TERM CURRENT USE OF INSULIN: ICD-10-CM

## 2022-06-08 ENCOUNTER — TELEPHONE (OUTPATIENT)
Dept: INTERNAL MEDICINE | Facility: CLINIC | Age: 57
End: 2022-06-08

## 2022-06-08 DIAGNOSIS — I67.2 CEREBRAL ATHEROSCLEROSIS: ICD-10-CM

## 2022-06-08 DIAGNOSIS — E11.65 TYPE 2 DIABETES MELLITUS WITH HYPERGLYCEMIA, WITH LONG-TERM CURRENT USE OF INSULIN: ICD-10-CM

## 2022-06-08 DIAGNOSIS — Z79.4 TYPE 2 DIABETES MELLITUS WITH DIABETIC POLYNEUROPATHY, WITH LONG-TERM CURRENT USE OF INSULIN: ICD-10-CM

## 2022-06-08 DIAGNOSIS — E11.42 TYPE 2 DIABETES MELLITUS WITH DIABETIC POLYNEUROPATHY, WITH LONG-TERM CURRENT USE OF INSULIN: ICD-10-CM

## 2022-06-08 DIAGNOSIS — Z79.4 TYPE 2 DIABETES MELLITUS WITH HYPERGLYCEMIA, WITH LONG-TERM CURRENT USE OF INSULIN: ICD-10-CM

## 2022-06-08 RX ORDER — CLOPIDOGREL BISULFATE 75 MG/1
75 TABLET ORAL DAILY
Qty: 90 TABLET | Refills: 3 | Status: SHIPPED | OUTPATIENT
Start: 2022-06-08 | End: 2023-03-30

## 2022-06-08 RX ORDER — PEN NEEDLE, DIABETIC 32GX 5/32"
NEEDLE, DISPOSABLE MISCELLANEOUS
Qty: 200 EACH | Refills: 3 | Status: SHIPPED | OUTPATIENT
Start: 2022-06-08 | End: 2022-07-20 | Stop reason: SDUPTHER

## 2022-06-08 NOTE — TELEPHONE ENCOUNTER
Caller: ChunKimberly    Relationship: Self    Best call back number: [phone]    Requested Prescriptions:   Requested Prescriptions     Pending Prescriptions Disp Refills   • SITagliptin (JANUVIA) 100 MG tablet 90 tablet 3     Sig: Take 1 tablet by mouth Daily. Indications: Type 2 Diabetes   • Insulin Pen Needle (BD Pen Needle Ursula U/F) 32G X 4 MM misc 200 each 3     Sig: USE TO INJECT INSULIN 4x DAY AND TRULICITY ONCE A WEEK   • glucose blood test strip 100 each 12     Sig: Check sugars fasting and 2 hours after meals (4x a day due to hyperglycemia)   • clopidogrel (PLAVIX) 75 MG tablet 90 tablet 3     Sig: Take 1 tablet by mouth Daily.        Pharmacy where request should be sent: [facility] - [facility] [address] - [phone] [facility] [phone] FX     Additional details provided by patient: PATIENT STATES THAT SHE NEEDS THESE MEDICATIONS CALLED INTO HER NEW PHARMACY.    Does the patient have less than a 3 day supply:  [x] Yes  [] No    Shantal Caputo Rep   06/08/22 09:07 EDT

## 2022-06-17 NOTE — PRE-PROCEDURE INSTRUCTIONS
PAT phone history completed with pt for upcoming procedure on  6/20/22 with Dr. Obrien.    PAT PASS GIVEN/REVIEWED WITH PT.  VERBALIZED UNDERSTANDING OF THE FOLLOWING:  DO NOT EAT, DRINK, SMOKE, USE SMOKELESS TOBACCO OR CHEW GUM AFTER MIDNIGHT THE NIGHT BEFORE SURGERY.  THIS ALSO INCLUDES HARD CANDIES AND MINTS.    DO NOT SHAVE THE AREA TO BE OPERATED ON AT LEAST 48 HOURS PRIOR TO THE PROCEDURE.  DO NOT WEAR MAKE UP OR NAIL POLISH.  DO NOT LEAVE IN ANY PIERCING OR WEAR JEWELRY THE DAY OF SURGERY.      DO NOT USE ADHESIVES IF YOU WEAR DENTURES.    DO NOT WEAR EYE CONTACTS; BRING IN YOUR GLASSES.    ONLY TAKE MEDICATION THE MORNING OF YOUR PROCEDURE IF INSTRUCTED BY YOUR SURGEON WITH ENOUGH WATER TO SWALLOW THE MEDICATION.  IF YOUR SURGEON DID NOT SPECIFY WHICH MEDICATIONS TO TAKE, YOU WILL NEED TO CALL THEIR OFFICE FOR FURTHER INSTRUCTIONS AND DO AS THEY INSTRUCT.    LEAVE ANYTHING YOU CONSIDER VALUABLE AT HOME.    YOU WILL NEED TO ARRANGE FOR SOMEONE TO DRIVE YOU HOME AFTER SURGERY.  IT IS RECOMMENDED THAT YOU DO NOT DRIVE, WORK, DRINK ALCOHOL OR MAKE MAJOR DECISIONS FOR AT LEAST 24 HOURS AFTER YOUR PROCEDURE IS COMPLETE.      THE DAY OF YOUR PROCEDURE, BRING IN THE FOLLOWING IF APPLICABLE:   PICTURE ID AND INSURANCE/MEDICARE OR MEDICAID CARDS/ANY CO-PAY THAT MAY BE DUE   COPY OF ADVANCED DIRECTIVE/LIVING WILL/POWER OR    CPAP/BIPAP/INHALERS   SKIN PREP SHEET   YOUR PREADMISSION TESTING PASS (IF NOT A PHONE HISTORY)           COVID self-quarantine instructions reviewed with the pt.  Verbalized understanding.

## 2022-06-20 ENCOUNTER — ANESTHESIA (OUTPATIENT)
Dept: PERIOP | Facility: HOSPITAL | Age: 57
End: 2022-06-20

## 2022-06-20 ENCOUNTER — HOSPITAL ENCOUNTER (OUTPATIENT)
Facility: HOSPITAL | Age: 57
Setting detail: HOSPITAL OUTPATIENT SURGERY
Discharge: HOME | End: 2022-06-20
Attending: OPHTHALMOLOGY | Admitting: OPHTHALMOLOGY

## 2022-06-20 ENCOUNTER — ANESTHESIA EVENT (OUTPATIENT)
Dept: PERIOP | Facility: HOSPITAL | Age: 57
End: 2022-06-20

## 2022-06-20 VITALS
BODY MASS INDEX: 34.15 KG/M2 | WEIGHT: 200 LBS | DIASTOLIC BLOOD PRESSURE: 68 MMHG | SYSTOLIC BLOOD PRESSURE: 144 MMHG | RESPIRATION RATE: 16 BRPM | TEMPERATURE: 96.9 F | OXYGEN SATURATION: 92 % | HEIGHT: 64 IN | HEART RATE: 92 BPM

## 2022-06-20 DIAGNOSIS — H25.812 COMBINED FORMS OF AGE-RELATED CATARACT OF LEFT EYE: ICD-10-CM

## 2022-06-20 LAB — GLUCOSE BLDC GLUCOMTR-MCNC: 301 MG/DL (ref 70–130)

## 2022-06-20 PROCEDURE — 25010000002 PROPOFOL 10 MG/ML EMULSION: Performed by: NURSE ANESTHETIST, CERTIFIED REGISTERED

## 2022-06-20 PROCEDURE — 82962 GLUCOSE BLOOD TEST: CPT

## 2022-06-20 PROCEDURE — V2632 POST CHMBR INTRAOCULAR LENS: HCPCS | Performed by: OPHTHALMOLOGY

## 2022-06-20 DEVICE — LENS ACRYSOF IQ 6X13MM SA60WF 23.5: Type: IMPLANTABLE DEVICE | Site: POSTERIOR CHAMBER | Status: FUNCTIONAL

## 2022-06-20 RX ORDER — DIFLUPREDNATE OPHTHALMIC 0.5 MG/ML
EMULSION OPHTHALMIC AS NEEDED
Status: DISCONTINUED | OUTPATIENT
Start: 2022-06-20 | End: 2022-06-20 | Stop reason: HOSPADM

## 2022-06-20 RX ORDER — DIFLUPREDNATE OPHTHALMIC 0.5 MG/ML
1 EMULSION OPHTHALMIC SEE ADMIN INSTRUCTIONS
Status: DISCONTINUED | OUTPATIENT
Start: 2022-06-20 | End: 2022-06-20 | Stop reason: HOSPADM

## 2022-06-20 RX ORDER — KETAMINE HYDROCHLORIDE 50 MG/ML
INJECTION, SOLUTION, CONCENTRATE INTRAMUSCULAR; INTRAVENOUS AS NEEDED
Status: DISCONTINUED | OUTPATIENT
Start: 2022-06-20 | End: 2022-06-20 | Stop reason: SURG

## 2022-06-20 RX ORDER — NEOMYCIN SULFATE, POLYMYXIN B SULFATE, AND DEXAMETHASONE 3.5; 10000; 1 MG/G; [USP'U]/G; MG/G
OINTMENT OPHTHALMIC AS NEEDED
Status: DISCONTINUED | OUTPATIENT
Start: 2022-06-20 | End: 2022-06-20 | Stop reason: HOSPADM

## 2022-06-20 RX ORDER — TETRACAINE HYDROCHLORIDE 5 MG/ML
SOLUTION OPHTHALMIC AS NEEDED
Status: DISCONTINUED | OUTPATIENT
Start: 2022-06-20 | End: 2022-06-20 | Stop reason: HOSPADM

## 2022-06-20 RX ORDER — BALANCED SALT SOLUTION 6.4; .75; .48; .3; 3.9; 1.7 MG/ML; MG/ML; MG/ML; MG/ML; MG/ML; MG/ML
SOLUTION OPHTHALMIC AS NEEDED
Status: DISCONTINUED | OUTPATIENT
Start: 2022-06-20 | End: 2022-06-20 | Stop reason: HOSPADM

## 2022-06-20 RX ORDER — LIDOCAINE HYDROCHLORIDE 40 MG/ML
INJECTION, SOLUTION RETROBULBAR; TOPICAL AS NEEDED
Status: DISCONTINUED | OUTPATIENT
Start: 2022-06-20 | End: 2022-06-20 | Stop reason: HOSPADM

## 2022-06-20 RX ORDER — SODIUM CHLORIDE 0.9 % (FLUSH) 0.9 %
10 SYRINGE (ML) INJECTION AS NEEDED
Status: DISCONTINUED | OUTPATIENT
Start: 2022-06-20 | End: 2022-06-20 | Stop reason: HOSPADM

## 2022-06-20 RX ORDER — TETRACAINE HYDROCHLORIDE 5 MG/ML
1 SOLUTION OPHTHALMIC SEE ADMIN INSTRUCTIONS
Status: COMPLETED | OUTPATIENT
Start: 2022-06-20 | End: 2022-06-20

## 2022-06-20 RX ORDER — SODIUM CHLORIDE, SODIUM LACTATE, POTASSIUM CHLORIDE, CALCIUM CHLORIDE 600; 310; 30; 20 MG/100ML; MG/100ML; MG/100ML; MG/100ML
1000 INJECTION, SOLUTION INTRAVENOUS CONTINUOUS
Status: DISCONTINUED | OUTPATIENT
Start: 2022-06-20 | End: 2022-06-20 | Stop reason: HOSPADM

## 2022-06-20 RX ORDER — CYCLOPENT/TROPIC/PHEN/KETR/WAT 1%-1%-2.5%
1 DROPS (EA) OPHTHALMIC (EYE)
Status: COMPLETED | OUTPATIENT
Start: 2022-06-20 | End: 2022-06-20

## 2022-06-20 RX ORDER — ACETAZOLAMIDE 500 MG/1
500 CAPSULE, EXTENDED RELEASE ORAL ONCE
Status: COMPLETED | OUTPATIENT
Start: 2022-06-20 | End: 2022-06-20

## 2022-06-20 RX ORDER — SODIUM CHLORIDE 0.9 % (FLUSH) 0.9 %
10 SYRINGE (ML) INJECTION EVERY 12 HOURS SCHEDULED
Status: DISCONTINUED | OUTPATIENT
Start: 2022-06-20 | End: 2022-06-20 | Stop reason: HOSPADM

## 2022-06-20 RX ORDER — LIDOCAINE HYDROCHLORIDE 20 MG/ML
INJECTION, SOLUTION INTRAVENOUS AS NEEDED
Status: DISCONTINUED | OUTPATIENT
Start: 2022-06-20 | End: 2022-06-20 | Stop reason: SURG

## 2022-06-20 RX ADMIN — Medication 1 DROP: at 07:12

## 2022-06-20 RX ADMIN — SODIUM CHLORIDE, POTASSIUM CHLORIDE, SODIUM LACTATE AND CALCIUM CHLORIDE: 600; 310; 30; 20 INJECTION, SOLUTION INTRAVENOUS at 08:27

## 2022-06-20 RX ADMIN — ACETAZOLAMIDE 500 MG: 500 CAPSULE, EXTENDED RELEASE ORAL at 08:55

## 2022-06-20 RX ADMIN — TETRACAINE HYDROCHLORIDE 1 DROP: 5 SOLUTION OPHTHALMIC at 07:10

## 2022-06-20 RX ADMIN — TETRACAINE HYDROCHLORIDE 1 DROP: 5 SOLUTION OPHTHALMIC at 07:11

## 2022-06-20 RX ADMIN — PROPOFOL 160 MCG/KG/MIN: 10 INJECTION, EMULSION INTRAVENOUS at 08:31

## 2022-06-20 RX ADMIN — LIDOCAINE HYDROCHLORIDE 60 MG: 20 INJECTION, SOLUTION INTRAVENOUS at 08:31

## 2022-06-20 RX ADMIN — KETAMINE HYDROCHLORIDE 10 MG: 50 INJECTION, SOLUTION INTRAMUSCULAR; INTRAVENOUS at 08:31

## 2022-06-20 RX ADMIN — Medication 1 DROP: at 07:17

## 2022-06-20 RX ADMIN — Medication 1 DROP: at 07:22

## 2022-06-20 NOTE — OP NOTE
OPERATIVE NOTE    Date of Procedure: 6/20/2022  Patient Name: Kimberly Chun  Patient MRN: [id number]  YOB: 1965     Preoperative Diagnosis: Left nuclear sclerotic cataract.     Postoperative Diagnosis: Left nuclear sclerotic cataract.     Procedure Performed: Phacoemulsification with implantation of a  foldable posterior chamber intraocular lens, Left eye.     Surgeon: Ayden Obrien MD     Anesthesia:  Monitored Anesthesia Care (MAC)      Brief History and Indication: The patient presents with a history of past progressive loss of vision.  Patient was diagnosed with cataract and requests removal for increased ability to read and see.     Operation Description: The patient was taken to the OR and prepped and draped in the usual sterile ophthalmic fashion. A lid speculum was placed in the eye.  A #75 blade was then used to make a stab incision two o’clock hours from the intended temporal clear cornea groove. The anterior chamber was then inflated with a Viscoelastic. A metal microkeratome blade was then used to enter the anterior chamber at the temporal clear cornea site. A three level tunnel incision was made. A curvilinear capsulorrhexis was then performed with a bent cystotome needle and capsulorrhexis forceps.  BSS on a 30 gauge bent cannula was used to hydro-dissect, and hydro-delineate the lens. Good fluid waves and hydro-delineation were noted. Phacoemulsification was then used to remove nuclear material without complications. The residual cortical and lenticular material was then removed with irrigation and aspiration. Viscoelastics were then used to inflate the bag in a soft shell technique. A PCIOL was injected into the bag. Post-implantation, there were no rents or tears in the bag and the lens was noted to be stable and centered. The residual Viscoelastic was then removed with irrigation and aspiration.  The wound was checked and found to be without leaks. Therefore a Polydex  ointment and one drop of Durezol eye drop was placed in the eye.     Implant Information:   Implant Name Type Inv. Item Serial No.  Lot No. LRB No. Used Action   LENS ACRYSOF IQ [id number] 23.5 - [id number] - [id number] Implant LENS ACRYSOF IQ [id number] [id number] 23.5 [id number]  Left 1 Implanted       Complications: None    Estimated Blood Loss:  Less than 1 cc.       []   Changed to complex procedure due to: []  hypermature, white cataract. Blue dye was used. []   small iris. A Malyugin Ring was used.    Discharge and Condition  The patient was transported to same day surgery in excellent condition and scheduled for follow-up tomorrow morning. The patient was given instructions on use of eye drops for the operative eye and was specifically instructed to call Dr. Obrien at his office or home for any nausea, vomiting, headache, decreased visual acuity, or pain, or if the patient had any general concerns.    Ayden Obrien MD  6/20/2022

## 2022-06-20 NOTE — ANESTHESIA PREPROCEDURE EVALUATION
Anesthesia Evaluation     Patient summary reviewed and Nursing notes reviewed   no history of anesthetic complications:  NPO Solid Status: > 8 hours  NPO Liquid Status: > 8 hours           Airway   Mallampati: I  TM distance: >3 FB  Neck ROM: full  no difficulty expected  Dental    (+) lower dentures and upper dentures    Pulmonary     breath sounds clear to auscultation  (+) a smoker, COPD mild,     ROS comment: 1 ppd for 40 years  Did smoke today  Cardiovascular     Rhythm: regular  Rate: normal    (+) hypertension well controlled, PVD,       Neuro/Psych  (+) CVA, numbness, psychiatric history Anxiety and Depression,      ROS Comment: 7 years ago, weakness and memory problems for a while. States no problems now  Diabetic neuropathy  GI/Hepatic/Renal/Endo    (+)  GERD,  diabetes mellitus type 2 poorly controlled using insulin,     Musculoskeletal     (+) myalgias,       ROS comment: Fibromyalgia  Abdominal    Substance History - negative use     OB/GYN negative ob/gyn ROS         Other   arthritis,    history of cancer remission      Other Comment: Bladder cancer not active now                    Anesthesia Plan    ASA 3     MAC     intravenous induction     Anesthetic plan, risks, benefits, and alternatives have been provided, discussed and informed consent has been obtained with: patient.

## 2022-06-20 NOTE — DISCHARGE INSTRUCTIONS
Post Operative Cataract Instructions     Left Eye  POST OPERATIVE INSTRUCTIONS  You have received anesthesia today. DO NOT drive, drink alcohol, sign legal documents.   After surgery, your eye will not hurt. It may feel scratchy, sticky or uncomfortable. Your eye will be sensitive to light.  Most people see better 1-3 days after the procedure, but it could take 3 weeks to get the full benefits and reach your visual potential. If your vision is blurry for a few days it is normal, and means you may have swelling outside or inside the eye. For some patients, a bubble is placed and there will be blurriness.   You should receive a post-op kit with tape and an eye shield. Wear the shield for the first 3 nights after surgery to keep you from rubbing the eye.  You may wear glasses or sunglasses during the day.  You must keep eye protected at all times.  Most people are able to return to their normal routine 1-3 days after surgery, however, due to the brain adjusting to your new vision you may have trouble judging distances and want to be careful when driving and going up and downstairs.   You can shower and wash your hair the day after surgery. Keep water, shampoo, hair spray and shaving lotion out of the eye, especially for the first week.   If eyes become stuck together after surgery you may soak with warm cloth.  During the first week, you should AVOID:   Rubbing or putting pressure on your eye.  Eye make-up, face cream or lotion, hair coloring or perms  Strenuous activities, such as running or lifting weights, as to avoid sweat from getting in the eye. Avoid swimming, hot tubs, fumes or martha conditions.   Sleeping on operative side.  Excessive sneezing or coughing.  Hanging the head down for periods of time. Keep your head above your heart.    Some discomfort and blurred vision after surgery are normal, but if you have any unusual pain, swelling, bleeding or sudden decrease in vision, contact our office immediately.      Emergency assistance is available at any time by calling:    Dr.Mark Miach Obrien  [phone]    If unable to reach call [facility] @ [phone]    You have been prescribed an eye drop to use after surgery, please follow these instructions and bring eye drops and instructions with you to post op appointment:    Difluprednate (DUREZOL) 0.05 %. Shake well before use.    USE 1 DROP 4 (FOUR) TIMES A DAY FOR 1 WEEK THEN STARTING WEEK 2, ONLY USE 2 (TWO) TIMES A DAY UNTIL YOU RUN OUT OF DROPS.     PLACE A ABIOLA IN THE DAY COLUMN EACH TIME YOU USE TO KEEP ON SCHEDULE    WEEK 1-USE 4  (FOUR) TIMES A DAY DAY 1  []   []    []   []     DAY 2  []   []    []   []  DAY 3  []   []    []   []  DAY 4  []   []    []   []  DAY 5  []   []    []   []  DAY 6  []   []    []   []  DAY 7  []   []    []   []      WEEK 2-USE 2 (TWO)  TIMES A DAY DAY 1  []   []  DAY 2  []   []  DAY 3  []   []  DAY 4  []   []  DAY 5  []   []  DAY 6  []   []  DAY 7  []   []      WEEK 3-USE 2 (TWO) TIMES A DAY DAY 1  []   []  DAY 2  []   []  DAY 3  []   []  DAY 4  []   []  DAY 5  []   []  DAY 6  []   []  DAY 7  []   []      WEEK 4-USE 2 (TWO)TIMES A DAY DAY 1  []   []  DAY 2  []   []  DAY 3  []   []  DAY 4  []   []  DAY 5  []   []  DAY 6  []   []  DAY 7  []   []       Please follow all post op instructions and follow up appointment time from your physician's office included in your discharge packet.  .  No pushing, pulling, tugging,  heavy lifting, or strenuous activity.  No major decision making, driving, or drinking alcoholic beverages for 24 hours. ( due to the medications you have  received)  Always use good hand hygiene/washing techniques.  NO driving while taking pain medications.    * if you have an incision:  Check your incision area every day for signs of infection.   Check for:  * more redness, swelling, or pain  *more fluid or blood  *warmth  *pus or bad smell     To  assist you in voiding:  Drink plenty of fluids  Listen to running water while attempting to void.    If you are unable to urinate and you have an uncomfortable urge to void or it has been   6 hours since you were discharged, return to the Emergency Room

## 2022-06-20 NOTE — ANESTHESIA POSTPROCEDURE EVALUATION
Patient: Kimberly Chun    Procedure Summary     Date: 06/20/22 Room / Location: [facility] OR 1 /  [facility]    Anesthesia Start: 0827 Anesthesia Stop: 0844    Procedure: CATARACT PHACO EXTRACTION WITH INTRAOCULAR LENS IMPLANT LEFT (Left Eye) Diagnosis:       Combined forms of age-related cataract of left eye      (Combined forms of age-related cataract of left eye [H25.812])    Surgeons: Ayden Obrien MD Provider: Sam Park CRNA    Anesthesia Type: MAC ASA Status: 3          Anesthesia Type: MAC    Vitals  No vitals data found for the desired time range.          Post Anesthesia Care and Evaluation    Patient location during evaluation: PHASE II  Patient participation: complete - patient participated  Level of consciousness: awake and alert  Pain score: 1  Pain management: satisfactory to patient    Airway patency: patent  Anesthetic complications: No anesthetic complications  PONV Status: none  Cardiovascular status: acceptable and stable  Respiratory status: acceptable, spontaneous ventilation and unassisted  Hydration status: acceptable    Comments: Vitals signs as noted in nursing documentation as per protocol.

## 2022-06-20 NOTE — H&P
[facility]         History and Physical    Patient Name: Kimberly Chun  MRN: [id number]  : 1965  Gender: female     HPI: Patient complaint of PPLOV Left eye diagnosed with cataract. Patient requests PHACO PCIOL for Increase of VA/ADL.    History:    Past Medical History:   Diagnosis Date   • Anxiety    • Arthritis    • Bladder cancer (HCC)    • Cataract    • COPD (chronic obstructive pulmonary disease) ([facility])    • COVID-19 vaccine series completed     with booster,  moderna   • Dysphagia    • Fibromyalgia    • Fracture     RIGHT LEG, RIGHT ANKLE, MULTIPLE TOES   • Full dentures    • GERD (gastroesophageal reflux disease)    • Ovarian cyst    • Recurrent urinary tract infection    • Sciatica    • Seasonal allergies    • Statin intolerance    • Stroke (HCC)    • Type 1 diabetes (HCC)    • Type 2 diabetes mellitus ([facility])    • Wears glasses        Past Surgical History:   Procedure Laterality Date   • BACK SURGERY  2020   • CATARACT EXTRACTION W/ INTRAOCULAR LENS IMPLANT Right 2022    Procedure: CATARACT PHACO EXTRACTION WITH INTRAOCULAR LENS IMPLANT RIGHT;  Surgeon: Eugene Avilez MD;  Location: [facility] OR;  Service: Ophthalmology;  Laterality: Right;   • CYSTOSCOPY BLADDER BIOPSY     • ENDOSCOPY N/A 7/3/2017    Procedure: ESOPHAGOGASTRODUODENOSCOPY WITH BIOPSY;  Surgeon: Lindy Bey MD;  Location: [facility] ENDOSCOPY;  Service:    • HYSTERECTOMY     • TONSILLECTOMY         Social History     Socioeconomic History   • Marital status:    Tobacco Use   • Smoking status: Current Every Day Smoker     Packs/day: 1.00     Years: 40.00     Pack years: 40.00     Types: Cigarettes   • Smokeless tobacco: Never Used   Vaping Use   • Vaping Use: Never used   Substance and Sexual Activity   • Alcohol use: No   • Drug use: No   • Sexual activity: Defer       Family History   Problem Relation Age of Onset   • Arthritis Mother    • Hypertension  Mother    • Heart attack Mother    • Osteoporosis Mother    • Arthritis Father    • Cancer Father    • Diabetes Father    • Hypertension Father    • Stroke Other    • Drug abuse Daughter        Prior to Admission Medications:  Medications Prior to Admission   Medication Sig Dispense Refill Last Dose   • albuterol (PROVENTIL) (2.5 MG/3ML) 0.083% nebulizer solution Take 2.5 mg by nebulization Every 6 (Six) Hours As Needed for Wheezing or Shortness of Air. 180 each 3    • albuterol sulfate HFA (ProAir HFA) 108 (90 Base) MCG/ACT inhaler Inhale 2 puffs Every 6 (Six) Hours As Needed for Wheezing or Shortness of Air. 54 g 3    • aspirin 81 MG EC tablet Take 1 tablet by mouth Daily. 90 tablet 3    • Breo Ellipta 200-25 MCG/INH inhaler Inhale 1 puff Daily. 90 each 3    • clopidogrel (PLAVIX) 75 MG tablet Take 1 tablet by mouth Daily. 90 tablet 3    • cyclobenzaprine (FLEXERIL) 10 MG tablet Take 1 tablet by mouth 3 (Three) Times a Day As Needed for Muscle Spasms. 270 tablet 3    • gabapentin (NEURONTIN) 800 MG tablet Take 1 tablet by mouth 3 (Three) Times a Day. Indications: Neuropathic Pain 270 tablet 1    • insulin aspart (NovoLOG FlexPen) 100 UNIT/ML solution pen-injector sc pen Inject 15 Units under the skin into the appropriate area as directed 3 (Three) Times a Day With Meals. 15 mL 3    • Lantus SoloStar 100 UNIT/ML injection pen INJECT 65 UNITS UNDER THE SKIN INTO THE APPROPRIATE AREA AS DIRECTED EVERY NIGHT (Patient taking differently: 63 Units Daily.) 60 mL 2    • levocetirizine (Xyzal) 5 MG tablet Take 1 tablet by mouth Every Evening. 90 tablet 3    • lisinopril (PRINIVIL,ZESTRIL) 5 MG tablet Take 1 tablet by mouth Daily. For kidney protection due to diabetes mellitus 90 tablet 3    • LORazepam (ATIVAN) 1 MG tablet TAKE ONE-HALF (1/2) TO ONE TABLET TWICE A DAY AS NEEDED FOR ANXIETY (Patient taking differently: 1 mg Daily As Needed. TAKE ONE-HALF (1/2) TO ONE TABLET TWICE A DAY AS NEEDED FOR ANXIETY) 60 tablet 0     • metFORMIN (GLUCOPHAGE) 1000 MG tablet TAKE 1 TABLET TWICE A DAY WITH MEALS FOR TYPE 2 DIABETES 180 tablet 3    • omeprazole (priLOSEC) 20 MG capsule Take 1 capsule by mouth 2 (Two) Times a Day. 180 capsule 3    • oxyCODONE-acetaminophen (Percocet) 5-325 MG per tablet Take 1 tablet by mouth 2 (Two) Times a Day As Needed for Severe Pain . 60 tablet 0    • SITagliptin (JANUVIA) 100 MG tablet Take 1 tablet by mouth Daily. Indications: Type 2 Diabetes 90 tablet 3    • Alcohol Swabs (ALCOHOL PADS) 70 % pads 1 pad 4 (Four) Times a Day. Check sugars fasting and 2 hours after meals. 100 each 12    • B-D ULTRA-FINE 33 LANCETS misc 1 Units 4 (Four) Times a Day. Check sugars fasting and 2 hours after meals. 100 each 12    • Continuous Blood Gluc Sensor (Dexcom G6 Sensor) Every 10 (Ten) Days. Apply as directed 9 each 4    • glucose blood test strip Check sugars fasting and 2 hours after meals (4x a day due to hyperglycemia) 100 each 12    • glucose monitor monitoring kit 1 each As Needed (diabetes). Check sugars fasting and 2 hours after meals. 1 each 0    • Insulin Pen Needle (BD Pen Needle Ursula U/F) 32G X 4 MM misc USE TO INJECT INSULIN 4x DAY AND TRULICITY ONCE A WEEK 200 each 3        Allergies:  Allergies   Allergen Reactions   • Iodinated Diagnostic Agents Hives        Vitals:      Review of Systems:   Within Normal Limits Abnormal   HEENT [x]    []     Cardiovascular [x]   []     Gastrointestinal [x]   []     Genitourinary [x]   []     Neurologic [x]   []     Pulmonary [x]   []       Physical Exam:   Within Normal Limits Abnormal   HEENT [x]    []     Heart [x]   []     Lungs [x]   []     Abdomen [x]   []     Extremities [x]   []       Impression: Left nuclear sclerotic cataract.     Plan: CATARACT PHACO EXTRACTION WITH INTRAOCULAR LENS IMPLANT LEFT (Left)     Ayden Obrien MD  6/20/2022

## 2022-06-21 ENCOUNTER — TELEPHONE (OUTPATIENT)
Dept: INTERNAL MEDICINE | Facility: CLINIC | Age: 57
End: 2022-06-21

## 2022-06-21 DIAGNOSIS — E11.65 TYPE 2 DIABETES MELLITUS WITH HYPERGLYCEMIA, WITH LONG-TERM CURRENT USE OF INSULIN: ICD-10-CM

## 2022-06-21 DIAGNOSIS — Z79.4 TYPE 2 DIABETES MELLITUS WITH DIABETIC POLYNEUROPATHY, WITH LONG-TERM CURRENT USE OF INSULIN: ICD-10-CM

## 2022-06-21 DIAGNOSIS — E11.42 TYPE 2 DIABETES MELLITUS WITH DIABETIC POLYNEUROPATHY, WITH LONG-TERM CURRENT USE OF INSULIN: ICD-10-CM

## 2022-06-21 DIAGNOSIS — Z79.4 TYPE 2 DIABETES MELLITUS WITH HYPERGLYCEMIA, WITH LONG-TERM CURRENT USE OF INSULIN: ICD-10-CM

## 2022-06-21 NOTE — TELEPHONE ENCOUNTER
Caller: Kimberly Chun    Relationship: Self    Best call back number: [phone]    Requested Prescriptions:   Requested Prescriptions     Pending Prescriptions Disp Refills   • metFORMIN (GLUCOPHAGE) 1000 MG tablet 180 tablet 3        Pharmacy where request should be sent: [facility] (NOW [facility]) - [facility] [address] - [phone]  - [phone] FX     Additional details provided by patient: AND YOU HAVE ENOUGH FOR MORE THAN 3 DAYS    Does the patient have less than a 3 day supply:  [] Yes  [x] No    Shantal DOHERTY Rep   06/21/22 10:35 EDT

## 2022-07-19 DIAGNOSIS — E11.42 TYPE 2 DIABETES MELLITUS WITH DIABETIC POLYNEUROPATHY, WITH LONG-TERM CURRENT USE OF INSULIN: ICD-10-CM

## 2022-07-19 DIAGNOSIS — Z79.4 TYPE 2 DIABETES MELLITUS WITH HYPERGLYCEMIA, WITH LONG-TERM CURRENT USE OF INSULIN: ICD-10-CM

## 2022-07-19 DIAGNOSIS — Z79.4 TYPE 2 DIABETES MELLITUS WITH DIABETIC POLYNEUROPATHY, WITH LONG-TERM CURRENT USE OF INSULIN: ICD-10-CM

## 2022-07-19 DIAGNOSIS — E11.65 TYPE 2 DIABETES MELLITUS WITH HYPERGLYCEMIA, WITH LONG-TERM CURRENT USE OF INSULIN: ICD-10-CM

## 2022-07-19 DIAGNOSIS — K21.9 GASTROESOPHAGEAL REFLUX DISEASE WITHOUT ESOPHAGITIS: ICD-10-CM

## 2022-07-19 RX ORDER — OMEPRAZOLE 20 MG/1
20 CAPSULE, DELAYED RELEASE ORAL 2 TIMES DAILY
Qty: 180 CAPSULE | Refills: 3 | Status: SHIPPED | OUTPATIENT
Start: 2022-07-19 | End: 2023-03-30

## 2022-07-19 RX ORDER — LISINOPRIL 5 MG/1
5 TABLET ORAL DAILY
Qty: 90 TABLET | Refills: 3 | Status: SHIPPED | OUTPATIENT
Start: 2022-07-19

## 2022-07-19 NOTE — TELEPHONE ENCOUNTER
Rx Refill Note  Requested Prescriptions     Pending Prescriptions Disp Refills   • omeprazole (priLOSEC) 20 MG capsule 180 capsule 3     Sig: Take 1 capsule by mouth 2 (Two) Times a Day.   • lisinopril (PRINIVIL,ZESTRIL) 5 MG tablet 90 tablet 3     Sig: Take 1 tablet by mouth Daily. For kidney protection due to diabetes mellitus      Last office visit with prescribing clinician: 4/18/2022      Next office visit with prescribing clinician: 7/20/2022            Natasha New MA  07/19/22, 14:11 EDT

## 2022-07-19 NOTE — TELEPHONE ENCOUNTER
Caller: [facility]IL DEL[facility]ERY (NOW [facility]IL DELIVERY) - [place], [place] - [address] - [phone] [facility] [phone] FX    Relationship: Pharmacy    Best call back number: [phone]    Requested Prescriptions:   Requested Prescriptions     Pending Prescriptions Disp Refills   • omeprazole (priLOSEC) 20 MG capsule 180 capsule 3     Sig: Take 1 capsule by mouth 2 (Two) Times a Day.   • lisinopril (PRINIVIL,ZESTRIL) 5 MG tablet 90 tablet 3     Sig: Take 1 tablet by mouth Daily. For kidney protection due to diabetes mellitus        Pharmacy where request should be sent: [facility] (NOW [facility] MAIL DELIVERY) - [place], [place] - [address] - [phone] [facility] [phone] FX     Additional details provided by patient: DOES NOT KNOW WHEN THE PATIENT WILL BE OUT OF MEDICATION       Shantal Basurto   07/19/22 12:29 EDT

## 2022-07-20 ENCOUNTER — OFFICE VISIT (OUTPATIENT)
Dept: INTERNAL MEDICINE | Facility: CLINIC | Age: 57
End: 2022-07-20

## 2022-07-20 VITALS
HEART RATE: 88 BPM | HEIGHT: 64 IN | DIASTOLIC BLOOD PRESSURE: 78 MMHG | BODY MASS INDEX: 32.68 KG/M2 | WEIGHT: 191.4 LBS | SYSTOLIC BLOOD PRESSURE: 124 MMHG | TEMPERATURE: 97.1 F | RESPIRATION RATE: 16 BRPM | OXYGEN SATURATION: 98 %

## 2022-07-20 DIAGNOSIS — E11.65 TYPE 2 DIABETES MELLITUS WITH HYPERGLYCEMIA, WITH LONG-TERM CURRENT USE OF INSULIN: Primary | ICD-10-CM

## 2022-07-20 DIAGNOSIS — Z79.4 TYPE 2 DIABETES MELLITUS WITH HYPERGLYCEMIA, WITH LONG-TERM CURRENT USE OF INSULIN: Primary | ICD-10-CM

## 2022-07-20 DIAGNOSIS — J30.89 ENVIRONMENTAL AND SEASONAL ALLERGIES: ICD-10-CM

## 2022-07-20 DIAGNOSIS — E11.42 TYPE 2 DIABETES MELLITUS WITH DIABETIC POLYNEUROPATHY, WITH LONG-TERM CURRENT USE OF INSULIN: ICD-10-CM

## 2022-07-20 DIAGNOSIS — E66.09 CLASS 1 OBESITY DUE TO EXCESS CALORIES WITH SERIOUS COMORBIDITY AND BODY MASS INDEX (BMI) OF 32.0 TO 32.9 IN ADULT: ICD-10-CM

## 2022-07-20 DIAGNOSIS — G89.29 CHRONIC MIDLINE LOW BACK PAIN WITH SCIATICA, SCIATICA LATERALITY UNSPECIFIED: ICD-10-CM

## 2022-07-20 DIAGNOSIS — G89.29 CHRONIC RIGHT SHOULDER PAIN: ICD-10-CM

## 2022-07-20 DIAGNOSIS — M25.511 CHRONIC RIGHT SHOULDER PAIN: ICD-10-CM

## 2022-07-20 DIAGNOSIS — Z79.4 TYPE 2 DIABETES MELLITUS WITH DIABETIC POLYNEUROPATHY, WITH LONG-TERM CURRENT USE OF INSULIN: ICD-10-CM

## 2022-07-20 DIAGNOSIS — M46.1 SACROILIITIS, NOT ELSEWHERE CLASSIFIED: ICD-10-CM

## 2022-07-20 DIAGNOSIS — M54.40 CHRONIC MIDLINE LOW BACK PAIN WITH SCIATICA, SCIATICA LATERALITY UNSPECIFIED: ICD-10-CM

## 2022-07-20 LAB
EXPIRATION DATE: NORMAL
HBA1C MFR BLD: 8.7 %
Lab: NORMAL

## 2022-07-20 PROCEDURE — 3052F HG A1C>EQUAL 8.0%<EQUAL 9.0%: CPT | Performed by: FAMILY MEDICINE

## 2022-07-20 PROCEDURE — 99214 OFFICE O/P EST MOD 30 MIN: CPT | Performed by: FAMILY MEDICINE

## 2022-07-20 PROCEDURE — 83036 HEMOGLOBIN GLYCOSYLATED A1C: CPT | Performed by: FAMILY MEDICINE

## 2022-07-20 RX ORDER — PEN NEEDLE, DIABETIC 32GX 5/32"
NEEDLE, DISPOSABLE MISCELLANEOUS
Qty: 200 EACH | Refills: 3 | Status: SHIPPED | OUTPATIENT
Start: 2022-07-20 | End: 2023-01-04

## 2022-07-20 RX ORDER — LEVOCETIRIZINE DIHYDROCHLORIDE 5 MG/1
5 TABLET, FILM COATED ORAL EVERY EVENING
Qty: 90 TABLET | Refills: 3 | Status: SHIPPED | OUTPATIENT
Start: 2022-07-20 | End: 2023-03-20

## 2022-07-20 RX ORDER — CYCLOBENZAPRINE HCL 10 MG
10 TABLET ORAL 3 TIMES DAILY PRN
Qty: 270 TABLET | Refills: 3 | Status: SHIPPED | OUTPATIENT
Start: 2022-07-20

## 2022-07-20 RX ORDER — INSULIN ASPART 100 [IU]/ML
20 INJECTION, SOLUTION INTRAVENOUS; SUBCUTANEOUS
Qty: 54 ML | Refills: 3 | Status: SHIPPED | OUTPATIENT
Start: 2022-07-20

## 2022-07-20 RX ORDER — OXYCODONE HYDROCHLORIDE AND ACETAMINOPHEN 5; 325 MG/1; MG/1
1 TABLET ORAL 2 TIMES DAILY PRN
Qty: 60 TABLET | Refills: 0 | Status: SHIPPED | OUTPATIENT
Start: 2022-07-20 | End: 2022-12-01 | Stop reason: SDUPTHER

## 2022-07-20 RX ORDER — INSULIN GLARGINE 100 [IU]/ML
65 INJECTION, SOLUTION SUBCUTANEOUS NIGHTLY
Qty: 20 PEN | Refills: 3 | Status: SHIPPED | OUTPATIENT
Start: 2022-07-20

## 2022-07-20 NOTE — PROGRESS NOTES
"Chief Complaint  Diabetes (3 month follow up)    Subjective        Kimberly Chun presents to [facility]  Increased tomato intake  Has had some low sugars  Expected A1c to be better today under 7.  Had actually made bets with friends and family members on this.  Other than tomatoes has been working on her diet, lifestyle.  Has lost 10 pounds which she is very happy about.    Request refill on her pain medicine which she takes very sparingly.  Has to get through some upcoming procedures and then plans to follow-up with pain management regarding injection therapy.    Diabetes        Objective   Vital Signs:  /78 (BP Location: Left arm, Patient Position: Sitting, Cuff Size: Adult)   Pulse 88   Temp 97.1 °F (36.2 °C) (Temporal)   Resp 16   Ht 162.6 cm (64\")   Wt 86.8 kg (191 lb 6.4 oz)   SpO2 98%   BMI 32.85 kg/m²   Estimated body mass index is 32.85 kg/m² as calculated from the following:    Height as of this encounter: 162.6 cm (64\").    Weight as of this encounter: 86.8 kg (191 lb 6.4 oz).    BMI is >= 30 and <35. (Class 1 Obesity). The following options were offered after discussion;: weight loss educational material (shared in after visit summary) and nutrition counseling/recommendations      Physical Exam  Vitals and nursing note reviewed.   Constitutional:       General: She is not in acute distress.     Appearance: Normal appearance. She is well-developed and well-groomed. She is obese. She is not ill-appearing, toxic-appearing or diaphoretic.      Interventions: Face mask in place.   HENT:      Head: Normocephalic and atraumatic.      Right Ear: Hearing normal.      Left Ear: Hearing normal.   Eyes:      General: Lids are normal. No scleral icterus.     Extraocular Movements: Extraocular movements intact.   Neck:      Trachea: Phonation normal.   Pulmonary:      Effort: Pulmonary effort is normal.   Musculoskeletal:      Cervical back: Neck supple.   Skin:     " Coloration: Skin is not jaundiced or pale.   Neurological:      General: No focal deficit present.      Mental Status: She is alert and oriented to person, place, and time.      Motor: Motor function is intact.   Psychiatric:         Attention and Perception: Attention and perception normal.         Mood and Affect: Mood and affect normal.         Speech: Speech normal.         Behavior: Behavior normal. Behavior is cooperative.         Thought Content: Thought content normal.         Cognition and Memory: Cognition and memory normal.         Judgment: Judgment normal.        Result Review :  The following data was reviewed by: Maria Luz Longoria MD on 07/20/2022:  Lab Results   Component Value Date    HGBA1C 8.7 07/20/2022    HGBA1C 8.1 04/18/2022    HGBA1C 8.9 01/12/2022     Compliance Drug Analysis, Ur - Urine, Clean Catch (04/08/2021 14:12)              Assessment and Plan   Diagnoses and all orders for this visit:    1. Type 2 diabetes mellitus with hyperglycemia, with long-term current use of insulin ([facility]) (Primary)  -     POC Glycosylated Hemoglobin (Hb A1C)  -     insulin aspart (NovoLOG FlexPen) 100 UNIT/ML solution pen-injector sc pen; Inject 20 Units under the skin into the appropriate area as directed 3 (Three) Times a Day With Meals.  Dispense: 54 mL; Refill: 3  -     Insulin Glargine (Lantus SoloStar) 100 UNIT/ML injection pen; Inject 65 Units under the skin into the appropriate area as directed Every Night.  Dispense: 20 pen; Refill: 3  -     Insulin Pen Needle (BD Pen Needle Ursula U/F) 32G X 4 MM misc; USE TO INJECT INSULIN 4x DAY AND  Dispense: 200 each; Refill: 3    2. Type 2 diabetes mellitus with diabetic polyneuropathy, with long-term current use of insulin ([facility])  -     Insulin Glargine (Lantus SoloStar) 100 UNIT/ML injection pen; Inject 65 Units under the skin into the appropriate area as directed Every Night.  Dispense: 20 pen; Refill: 3  -     Insulin Pen Needle (BD Pen Needle Ursula U/F) 32G  X 4 MM misc; USE TO INJECT INSULIN 4x DAY AND  Dispense: 200 each; Refill: 3    3. Class 1 obesity due to excess calories with serious comorbidity and body mass index (BMI) of 32.0 to 32.9 in adult  Comments:  Diabetic diet discussed, information on Mediterranean diet via AVS    4. Chronic midline low back pain with sciatica, sciatica laterality unspecified  Comments:  opiate refilled  uses sparingly; interested in injection therapies with pain management and will follow up with them  Orders:  -     cyclobenzaprine (FLEXERIL) 10 MG tablet; Take 1 tablet by mouth 3 (Three) Times a Day As Needed for Muscle Spasms.  Dispense: 270 tablet; Refill: 3    5. Chronic right shoulder pain  Comments:  use narcotic sparingly, deanna reviewed and appropriate  Orders:  -     oxyCODONE-acetaminophen (Percocet) 5-325 MG per tablet; Take 1 tablet by mouth 2 (Two) Times a Day As Needed for Severe Pain . Use sparingly  Dispense: 60 tablet; Refill: 0    6. Environmental and seasonal allergies  -     levocetirizine (Xyzal) 5 MG tablet; Take 1 tablet by mouth Every Evening. For allergies  Dispense: 90 tablet; Refill: 3    7. Sacroiliitis, not elsewhere classified (HCC)  Comments:  Follow-up with pain management             Follow Up   Return in about 3 months (around 10/23/2022) for Diabetes follow up.  Patient was given instructions and counseling regarding her condition or for health maintenance advice. Please see specific information pulled into the AVS if appropriate.

## 2022-07-29 ENCOUNTER — TELEPHONE (OUTPATIENT)
Dept: INTERNAL MEDICINE | Facility: CLINIC | Age: 57
End: 2022-07-29

## 2022-09-01 ENCOUNTER — TELEPHONE (OUTPATIENT)
Dept: INTERNAL MEDICINE | Facility: CLINIC | Age: 57
End: 2022-09-01

## 2022-09-01 NOTE — TELEPHONE ENCOUNTER
Patient advised At this time our providers currently recommend over the counter treatment such as. Mucinex, Delsym cough syrup, tylenol and motrin for you fever and body aches, plenty of fluids, rest and if you are having GI symptoms like diarrhea and vomiting eat a bland diet. If you do not start feeling better after using over the counter medications we ask that you schedule a video visit with one of our providers.   Per the CDC quarantine guidelines say to quarantine 5 days after you test positive on day 5 you are to be fever free without any medications for at least 24 hours. If you are not it is recommended to quarantine a additional 5 days.

## 2022-09-01 NOTE — TELEPHONE ENCOUNTER
Caller: Kimberly Chun    Relationship to patient: Self    Best call back number: [phone]    Date of exposure: UNKNOWN    Date of positive COVID19 test: 09/01/2022    Date if possible COVID19 exposure: UNKNOWN    COVID19 symptoms: FATIGUE, BODY ACHES, COUGH, SHORTNESS OF BREATH, CHILLS, LOW GRADE FEVER      Date of initial quarantine: 08/29/22    Additional information or concerns: PATIENT STARTED HAVING SYMPTOMS 08/31/22, SHE TESTED POSITIVE FOR COVID 09/01/22 WITH A HOME KIT. PATIENT NEEDS TO KNOW IF SHE SHOULD COME IN, DO A VIDEO VISIT, OR IF SOMETHING CAN BE CALLED IN. PLEASE ADVISE AND CALL PATIENT     What is the patients preferred pharmacy: [address] - [phone] [facility] [phone] FX

## 2022-09-14 ENCOUNTER — APPOINTMENT (OUTPATIENT)
Dept: GENERAL RADIOLOGY | Facility: HOSPITAL | Age: 57
End: 2022-09-14
Payer: MEDICARE

## 2022-09-14 ENCOUNTER — HOSPITAL ENCOUNTER (EMERGENCY)
Facility: HOSPITAL | Age: 57
Discharge: HOME | End: 2022-09-14
Attending: HOSPITALIST
Payer: MEDICARE

## 2022-09-14 VITALS
SYSTOLIC BLOOD PRESSURE: 141 MMHG | HEIGHT: 64 IN | TEMPERATURE: 98.3 F | BODY MASS INDEX: 32.44 KG/M2 | HEART RATE: 65 BPM | RESPIRATION RATE: 19 BRPM | DIASTOLIC BLOOD PRESSURE: 94 MMHG | WEIGHT: 190 LBS | OXYGEN SATURATION: 99 %

## 2022-09-14 DIAGNOSIS — R05.9 COUGH: Primary | ICD-10-CM

## 2022-09-14 DIAGNOSIS — B34.9 VIRAL ILLNESS: ICD-10-CM

## 2022-09-14 LAB
A/G RATIO: 1.5 (ref 0.8–2)
ALBUMIN SERPL-MCNC: 3.9 G/DL (ref 3.4–4.8)
ALP BLD-CCNC: 84 U/L (ref 25–100)
ALT SERPL-CCNC: 7 U/L (ref 4–36)
ANION GAP SERPL CALCULATED.3IONS-SCNC: 10 MMOL/L (ref 3–16)
AST SERPL-CCNC: 10 U/L (ref 8–33)
BASOPHILS ABSOLUTE: 0.1 K/UL (ref 0–0.1)
BASOPHILS RELATIVE PERCENT: 0.5 %
BILIRUB SERPL-MCNC: <0.2 MG/DL (ref 0.3–1.2)
BUN BLDV-MCNC: 7 MG/DL (ref 6–20)
CALCIUM SERPL-MCNC: 9.3 MG/DL (ref 8.5–10.5)
CHLORIDE BLD-SCNC: 107 MMOL/L (ref 98–107)
CO2: 25 MMOL/L (ref 20–30)
CREAT SERPL-MCNC: 0.8 MG/DL (ref 0.4–1.2)
EOSINOPHILS ABSOLUTE: 0.4 K/UL (ref 0–0.4)
EOSINOPHILS RELATIVE PERCENT: 3.3 %
GFR AFRICAN AMERICAN: >59
GFR NON-AFRICAN AMERICAN: >60
GLOBULIN: 2.6 G/DL
GLUCOSE BLD-MCNC: 136 MG/DL (ref 74–106)
HCT VFR BLD CALC: 39 % (ref 37–47)
HEMOGLOBIN: 12.7 G/DL (ref 11.5–16.5)
IMMATURE GRANULOCYTES #: 0 K/UL
IMMATURE GRANULOCYTES %: 0.4 % (ref 0–5)
LYMPHOCYTES ABSOLUTE: 3.7 K/UL (ref 1.5–4)
LYMPHOCYTES RELATIVE PERCENT: 33.4 %
MCH RBC QN AUTO: 28.2 PG (ref 27–32)
MCHC RBC AUTO-ENTMCNC: 32.6 G/DL (ref 31–35)
MCV RBC AUTO: 86.7 FL (ref 80–100)
MONOCYTES ABSOLUTE: 0.7 K/UL (ref 0.2–0.8)
MONOCYTES RELATIVE PERCENT: 6.3 %
NEUTROPHILS ABSOLUTE: 6.2 K/UL (ref 2–7.5)
NEUTROPHILS RELATIVE PERCENT: 56.1 %
PDW BLD-RTO: 14.1 % (ref 11–16)
PLATELET # BLD: 515 K/UL (ref 150–400)
PMV BLD AUTO: 9.1 FL (ref 6–10)
POTASSIUM REFLEX MAGNESIUM: 4.2 MMOL/L (ref 3.4–5.1)
RAPID INFLUENZA  B AGN: NEGATIVE
RAPID INFLUENZA A AGN: NEGATIVE
RBC # BLD: 4.5 M/UL (ref 3.8–5.8)
S PYO AG THROAT QL: NEGATIVE
SODIUM BLD-SCNC: 142 MMOL/L (ref 136–145)
TOTAL PROTEIN: 6.5 G/DL (ref 6.4–8.3)
TROPONIN: <0.3 NG/ML
WBC # BLD: 11.1 K/UL (ref 4–11)

## 2022-09-14 PROCEDURE — 87880 STREP A ASSAY W/OPTIC: CPT

## 2022-09-14 PROCEDURE — 85025 COMPLETE CBC W/AUTO DIFF WBC: CPT

## 2022-09-14 PROCEDURE — 87804 INFLUENZA ASSAY W/OPTIC: CPT

## 2022-09-14 PROCEDURE — 80053 COMPREHEN METABOLIC PANEL: CPT

## 2022-09-14 PROCEDURE — 99285 EMERGENCY DEPT VISIT HI MDM: CPT

## 2022-09-14 PROCEDURE — 36415 COLL VENOUS BLD VENIPUNCTURE: CPT

## 2022-09-14 PROCEDURE — 84484 ASSAY OF TROPONIN QUANT: CPT

## 2022-09-14 PROCEDURE — 71045 X-RAY EXAM CHEST 1 VIEW: CPT

## 2022-09-14 PROCEDURE — 93005 ELECTROCARDIOGRAM TRACING: CPT

## 2022-09-14 PROCEDURE — 2580000003 HC RX 258: Performed by: HOSPITALIST

## 2022-09-14 RX ORDER — ALBUTEROL SULFATE 0.63 MG/3ML
1 SOLUTION RESPIRATORY (INHALATION) EVERY 6 HOURS PRN
Qty: 270 ML | Refills: 3 | Status: SHIPPED | OUTPATIENT
Start: 2022-09-14

## 2022-09-14 RX ORDER — 0.9 % SODIUM CHLORIDE 0.9 %
1000 INTRAVENOUS SOLUTION INTRAVENOUS ONCE
Status: COMPLETED | OUTPATIENT
Start: 2022-09-14 | End: 2022-09-14

## 2022-09-14 RX ORDER — OMEPRAZOLE 20 MG/1
20 CAPSULE, DELAYED RELEASE ORAL 2 TIMES DAILY
COMMUNITY

## 2022-09-14 RX ADMIN — SODIUM CHLORIDE 1000 ML: 9 INJECTION, SOLUTION INTRAVENOUS at 10:21

## 2022-09-14 ASSESSMENT — PAIN DESCRIPTION - FREQUENCY: FREQUENCY: CONTINUOUS

## 2022-09-14 ASSESSMENT — PAIN DESCRIPTION - PAIN TYPE: TYPE: ACUTE PAIN

## 2022-09-14 ASSESSMENT — LIFESTYLE VARIABLES
HOW OFTEN DO YOU HAVE A DRINK CONTAINING ALCOHOL: NEVER
HOW MANY STANDARD DRINKS CONTAINING ALCOHOL DO YOU HAVE ON A TYPICAL DAY: PATIENT DOES NOT DRINK

## 2022-09-14 ASSESSMENT — PAIN - FUNCTIONAL ASSESSMENT: PAIN_FUNCTIONAL_ASSESSMENT: 0-10

## 2022-09-14 ASSESSMENT — PAIN DESCRIPTION - LOCATION: LOCATION: CHEST

## 2022-09-14 NOTE — ED PROVIDER NOTES
(PLAVIX) 75 MG TABLET    Take 75 mg by mouth daily    GABAPENTIN (NEURONTIN) 600 MG TABLET    Take 800 mg by mouth 3 times daily. INSULIN ASPART (NOVOLOG) 100 UNIT/ML INJECTION VIAL    Inject into the skin daily Sliding scale    INSULIN GLARGINE (LANTUS) 100 UNIT/ML INJECTION    Inject 65 Units into the skin every morning Indications: 56 units    METFORMIN (GLUCOPHAGE) 1000 MG TABLET    Take 1,000 mg by mouth 2 times daily (with meals)    OMEPRAZOLE (PRILOSEC) 20 MG DELAYED RELEASE CAPSULE    Take 20 mg by mouth in the morning and at bedtime    SITAGLIPTIN (JANUVIA) 100 MG TABLET    Take 100 mg by mouth daily       ALLERGIES     Iodides    FAMILY HISTORY       Family History   Problem Relation Age of Onset    Coronary Art Dis Mother     Cancer Father         lung    Cancer Sister         ovarian     Stroke Maternal Grandmother     Cancer Paternal Grandmother         breast          SOCIAL HISTORY       Social History     Socioeconomic History    Marital status:      Spouse name: None    Number of children: None    Years of education: None    Highest education level: None   Tobacco Use    Smoking status: Every Day     Packs/day: 1.00     Years: 30.00     Pack years: 30.00     Types: Cigarettes    Smokeless tobacco: Never   Vaping Use    Vaping Use: Never used   Substance and Sexual Activity    Alcohol use: No    Drug use: No         PHYSICAL EXAM    (up to 7 for level 4, 8 or more for level 5)     ED Triage Vitals   BP Temp Temp src Pulse Resp SpO2 Height Weight   -- -- -- -- -- -- -- --       Physical Exam  General :Patient is awake, alert, oriented, in no acute distress, nontoxic appearing  HEENT: Pupils are equally round and reactive to light, EOMI, conjunctivae clear. Oral mucosa is moist, no exudate. Uvula is midline, bilateral tympanic membranes and canals are normal in appearance.   Cardiac: Heart regular rate, rhythm, no murmurs, rubs, or gallops  Lungs: Lungs are clear to auscultation, there is no wheezing, rhonchi, or rales. There is no use of accessory muscles. Abdomen: Abdomen is soft, nontender, nondistended. There is no firm or pulsatile masses, no rebound rigidity or guarding. Musculoskeletal: No focal muscle deficits are appreciated  Neuro: Motor intact, sensory intact, level of consciousness is normal  Dermatology: Skin is warm and dry  Psych: Mentation is grossly normal, cognition is grossly normal. Affect is appropriate. DIAGNOSTIC RESULTS     EKG: All EKG's are interpreted by the Emergency Department Physician who either signs or Co-signs this chart in the [address] a cardiologist.    The EKG interpreted by me shows sinus rhythm. Low voltage, extremity and precordial leads. Heart rate is 70 bpm, NM interval is 133 ms, QRS durations 93, QT is 416 QTC is 450 ms. No acute ST depressions concerning for acute myocardial ischemia. No ST elevations concerning for acute myocardial infarction. RADIOLOGY:   Non-plain film images such as CT, Ultrasound and MRI are read by the radiologist. Plain radiographic images are visualized and preliminarily interpreted by the emergency physician with the below findings:      [] Radiologist's Report Reviewed:  XR CHEST PORTABLE   Final Result   1. No acute cardiopulmonary abnormalities.    2. No interval changes            ED BEDSIDE ULTRASOUND:   Performed by ED Physician - none    LABS:    I have reviewed and interpreted all of the currently available lab results from this visit (ifapplicable):  Results for orders placed or performed during the hospital encounter of 09/14/22   Strep Screen Group A Throat    Specimen: Throat   Result Value Ref Range    Rapid Strep A Screen Negative Negative   Rapid influenza A/B antigens    Specimen: Nares   Result Value Ref Range    Rapid Influenza A Ag Negative Negative    Rapid Influenza B Ag Negative Negative   CBC with Auto Differential   Result Value Ref Range    WBC 11.1 (H) 4.0 - 11.0 K/uL    RBC 4.50 3.80 - 5.80 M/uL    Hemoglobin 12.7 11.5 - 16.5 g/dL    Hematocrit 39.0 37.0 - 47.0 %    MCV 86.7 80.0 - 100.0 fL    MCH 28.2 27.0 - 32.0 pg    MCHC 32.6 31.0 - 35.0 g/dL    RDW 14.1 11.0 - 16.0 %    Platelets 690 (H) 454 - 400 K/uL    MPV 9.1 6.0 - 10.0 fL    Neutrophils % 56.1 %    Immature Granulocytes % 0.4 0.0 - 5.0 %    Lymphocytes % 33.4 %    Monocytes % 6.3 %    Eosinophils % 3.3 %    Basophils % 0.5 %    Neutrophils Absolute 6.2 2.0 - 7.5 K/uL    Immature Granulocytes # 0.0 K/uL    Lymphocytes Absolute 3.7 1.5 - 4.0 K/uL    Monocytes Absolute 0.7 0.2 - 0.8 K/uL    Eosinophils Absolute 0.4 0.0 - 0.4 K/uL    Basophils Absolute 0.1 0.0 - 0.1 K/uL   Comprehensive Metabolic Panel w/ Reflex to MG   Result Value Ref Range    Sodium 142 136 - 145 mmol/L    Potassium reflex Magnesium 4.2 3.4 - 5.1 mmol/L    Chloride 107 98 - 107 mmol/L    CO2 25 20 - 30 mmol/L    Anion Gap 10 3 - 16    Glucose 136 (H) 74 - 106 mg/dL    BUN 7 6 - 20 mg/dL    Creatinine 0.8 0.4 - 1.2 mg/dL    GFR Non-African American >60 >59    GFR African American >59 >59    Calcium 9.3 8.5 - 10.5 mg/dL    Total Protein 6.5 6.4 - 8.3 g/dL    Albumin 3.9 3.4 - 4.8 g/dL    Albumin/Globulin Ratio 1.5 0.8 - 2.0    Total Bilirubin <0.2 (L) 0.3 - 1.2 mg/dL    Alkaline Phosphatase 84 25 - 100 U/L    ALT 7 4 - 36 U/L    AST 10 8 - 33 U/L    Globulin 2.6 Not Established g/dL   Troponin   Result Value Ref Range    Troponin <0.30 <0.30 ng/mL        All other labs were within normal range or not returned as of this dictation.     EMERGENCY DEPARTMENT COURSE and DIFFERENTIAL DIAGNOSIS/MDM:   Vitals:    Vitals:    09/14/22 0943 09/14/22 0958 09/14/22 1013 09/14/22 1028   BP: (!) 147/84 133/74 137/78 137/78   Pulse:  74 73 69   Resp:  11 21 16   Temp:       SpO2: 97% 98% 97% 95%   Weight:       Height:           MEDICATIONS ADMINISTERED IN ED:  Medications   0.9 % sodium chloride bolus (1,000 mLs IntraVENous New Bag 9/14/22 1021)       After initial evaluation examination home.  She states that she does have a nebulizer machine but she no longer has any of the solution. Advised we could write her prescription for some albuterol to use in her nebulizer. Otherwise continue with Tylenol Motrin for body aches fevers or chills. No antibiotics are warranted at this time. Would not provide steroids at this time either since she is not actively wheezing I did discuss this with her she states that she would prefer not to use steroids because of her diabetic status. Otherwise patient advised should be discharged home in stable condition. The patient has a do need to follow-up with a regular family physician within the next 1 to 2 days for evaluation. They are also given instructions of the symptoms worsens or new symptoms arise they should return back to the emergency department for further evaluation work-up. Is this patient to be included in the SEP-1 Core Measure due to severe sepsis or septic shock? No   Exclusion criteria - the patient is NOT to be included for SEP-1 Core Measure due to: Infection is not suspected          CONSULTS:  None    PROCEDURES:  Procedures    CRITICAL CARE TIME    Total Critical Care time was 0 minutes, excluding separately reportable procedures. There was a high probability of clinically significant/life threatening deterioration in the patient's condition which required my urgent intervention. FINAL IMPRESSION      1. Cough    2. Viral illness          DISPOSITION/PLAN   DISPOSITION Decision To Discharge 09/14/2022 10:59:39 AM      PATIENT REFERRED TO:  Erik Sanchez MD  [facility] [address] [id number]  [phone]    In 2 days      [facility] Emergency Department  [facility] [address]   [facility]  [phone]    As needed, If symptoms worsen    DISCHARGE MEDICATIONS:  New Prescriptions    ALBUTEROL (ACCUNEB) 0.63 MG/3ML NEBULIZER SOLUTION    Take 3 mLs by nebulization every 6 hours as needed for Wheezing Comment: Please note this report has been produced using speech recognition software and may contain errorsrelated to that system including errors in grammar, punctuation, and spelling, as well as words and phrases that may be inappropriate. If there are any questions or concerns please feel free to contact the dictating providerfor clarification.     Camille León DO  Attending Emergency Physician               Camille León DO  09/14/22 3070

## 2022-09-14 NOTE — TELEPHONE ENCOUNTER
Caller: Kimberly Chun    Relationship to patient: Self    Best call back number: [phone]    Chief complaint: PT CALLED STATED THAT SHE TEST POSITIVE FOR COVID ABOUT 2 WEEKS AGO AND NOW IT HAS GOINE DOWN TO HER CHEST, PT HAS HEAVY CHEST DIFFICULTY BREATHING.    Patient directed to call 911 or go to their nearest emergency room.     Patient verbalized understanding: [x] Yes  [] No  If no, why?

## 2022-10-20 ENCOUNTER — OFFICE VISIT (OUTPATIENT)
Dept: INTERNAL MEDICINE | Facility: CLINIC | Age: 57
End: 2022-10-20

## 2022-10-20 VITALS
TEMPERATURE: 97.5 F | DIASTOLIC BLOOD PRESSURE: 74 MMHG | BODY MASS INDEX: 32.78 KG/M2 | HEART RATE: 98 BPM | OXYGEN SATURATION: 97 % | WEIGHT: 192 LBS | RESPIRATION RATE: 16 BRPM | HEIGHT: 64 IN | SYSTOLIC BLOOD PRESSURE: 128 MMHG

## 2022-10-20 DIAGNOSIS — Z79.4 TYPE 2 DIABETES MELLITUS WITH DIABETIC POLYNEUROPATHY, WITH LONG-TERM CURRENT USE OF INSULIN: Chronic | ICD-10-CM

## 2022-10-20 DIAGNOSIS — E11.42 DIABETIC PERIPHERAL NEUROPATHY ASSOCIATED WITH TYPE 2 DIABETES MELLITUS: ICD-10-CM

## 2022-10-20 DIAGNOSIS — G89.29 CHRONIC MIDLINE LOW BACK PAIN WITH SCIATICA, SCIATICA LATERALITY UNSPECIFIED: ICD-10-CM

## 2022-10-20 DIAGNOSIS — E11.42 TYPE 2 DIABETES MELLITUS WITH DIABETIC POLYNEUROPATHY, WITH LONG-TERM CURRENT USE OF INSULIN: Chronic | ICD-10-CM

## 2022-10-20 DIAGNOSIS — M54.40 CHRONIC MIDLINE LOW BACK PAIN WITH SCIATICA, SCIATICA LATERALITY UNSPECIFIED: ICD-10-CM

## 2022-10-20 DIAGNOSIS — E66.09 CLASS 1 OBESITY DUE TO EXCESS CALORIES WITH SERIOUS COMORBIDITY AND BODY MASS INDEX (BMI) OF 32.0 TO 32.9 IN ADULT: ICD-10-CM

## 2022-10-20 DIAGNOSIS — E11.65 TYPE 2 DIABETES MELLITUS WITH HYPERGLYCEMIA, WITH LONG-TERM CURRENT USE OF INSULIN: Primary | ICD-10-CM

## 2022-10-20 DIAGNOSIS — F41.9 ANXIETY: ICD-10-CM

## 2022-10-20 DIAGNOSIS — Z79.4 TYPE 2 DIABETES MELLITUS WITH HYPERGLYCEMIA, WITH LONG-TERM CURRENT USE OF INSULIN: Primary | ICD-10-CM

## 2022-10-20 DIAGNOSIS — Z23 NEED FOR INFLUENZA VACCINATION: ICD-10-CM

## 2022-10-20 LAB
EXPIRATION DATE: NORMAL
HBA1C MFR BLD: 8 %
Lab: NORMAL
POC CREATININE URINE: 100
POC MICROALBUMIN URINE: 10

## 2022-10-20 PROCEDURE — 3052F HG A1C>EQUAL 8.0%<EQUAL 9.0%: CPT | Performed by: FAMILY MEDICINE

## 2022-10-20 PROCEDURE — 99214 OFFICE O/P EST MOD 30 MIN: CPT | Performed by: FAMILY MEDICINE

## 2022-10-20 PROCEDURE — 83036 HEMOGLOBIN GLYCOSYLATED A1C: CPT | Performed by: FAMILY MEDICINE

## 2022-10-20 PROCEDURE — 82044 UR ALBUMIN SEMIQUANTITATIVE: CPT | Performed by: FAMILY MEDICINE

## 2022-10-20 PROCEDURE — 90686 IIV4 VACC NO PRSV 0.5 ML IM: CPT | Performed by: FAMILY MEDICINE

## 2022-10-20 PROCEDURE — G0008 ADMIN INFLUENZA VIRUS VAC: HCPCS | Performed by: FAMILY MEDICINE

## 2022-10-20 RX ORDER — LORAZEPAM 1 MG/1
.5-1 TABLET ORAL 2 TIMES DAILY PRN
Qty: 180 TABLET | Refills: 1 | Status: SHIPPED | OUTPATIENT
Start: 2022-10-20 | End: 2023-01-20 | Stop reason: SDUPTHER

## 2022-10-20 RX ORDER — GABAPENTIN 800 MG/1
800 TABLET ORAL 3 TIMES DAILY
Qty: 270 TABLET | Refills: 1 | Status: SHIPPED | OUTPATIENT
Start: 2022-10-20 | End: 2023-01-20 | Stop reason: SDUPTHER

## 2022-10-20 RX ORDER — FLASH GLUCOSE SENSOR
1 KIT MISCELLANEOUS ONCE
Qty: 1 EACH | Refills: 0 | Status: SHIPPED | OUTPATIENT
Start: 2022-10-20 | End: 2022-10-20

## 2022-10-20 RX ORDER — FLASH GLUCOSE SENSOR
1 KIT MISCELLANEOUS
Qty: 2 EACH | Refills: 6 | Status: SHIPPED | OUTPATIENT
Start: 2022-10-20

## 2022-10-20 NOTE — PROGRESS NOTES
"Chief Complaint  Diabetes (3 month follow up)    Subjective        Kimberly Chun presents to [facility]IMARY [facility]  History of Present Illness  Having sugar crashes at 2 am \"and I over correct\"  Labile glucose levels  Wondering about pump options      Objective   Vital Signs:  /74 (BP Location: Left arm, Patient Position: Sitting, Cuff Size: Adult)   Pulse 98   Temp 97.5 °F (36.4 °C) (Temporal)   Resp 16   Ht 162.6 cm (64\")   Wt 87.1 kg (192 lb)   SpO2 97%   BMI 32.96 kg/m²   Estimated body mass index is 32.96 kg/m² as calculated from the following:    Height as of this encounter: 162.6 cm (64\").    Weight as of this encounter: 87.1 kg (192 lb).    BMI is >= 30 and <35. (Class 1 Obesity). The following options were offered after discussion;: weight loss educational material (shared in after visit summary) and nutrition counseling/recommendations      Physical Exam  Vitals and nursing note reviewed.   Constitutional:       General: She is not in acute distress.     Appearance: Normal appearance. She is well-developed and well-groomed. She is obese. She is not ill-appearing, toxic-appearing or diaphoretic.      Interventions: Face mask in place.   HENT:      Head: Normocephalic and atraumatic.      Right Ear: Hearing normal.      Left Ear: Hearing normal.   Eyes:      General: Lids are normal. No scleral icterus.     Extraocular Movements: Extraocular movements intact.   Neck:      Trachea: Phonation normal.   Cardiovascular:      Rate and Rhythm: Normal rate and regular rhythm.   Pulmonary:      Effort: Pulmonary effort is normal.   Musculoskeletal:      Cervical back: Neck supple.   Skin:     Coloration: Skin is not jaundiced or pale.   Neurological:      General: No focal deficit present.      Mental Status: She is alert and oriented to person, place, and time.      Motor: Motor function is intact.   Psychiatric:         Attention and Perception: Attention and perception normal. "         Mood and Affect: Mood and affect normal.         Speech: Speech normal.         Behavior: Behavior normal. Behavior is cooperative.         Thought Content: Thought content normal.         Cognition and Memory: Cognition and memory normal.         Judgment: Judgment normal.        Result Review :  The following data was reviewed by: Maria Luz Longoria MD on 10/20/2022:  Lab Results   Component Value Date    HGBA1C 8.0 10/20/2022    HGBA1C 8.7 07/20/2022    HGBA1C 8.1 04/18/2022                Assessment and Plan   Diagnoses and all orders for this visit:    1. Type 2 diabetes mellitus with hyperglycemia, with long-term current use of insulin (HCC) (Primary)  -     POC Glycosylated Hemoglobin (Hb A1C)  -     POCT microalbumin  -     Continuous Blood Gluc  (FreeStyle Andree O'Brien) device; 1 each 1 (One) Time for 1 dose. Disp one reader for use as directed.  Dispense: 1 each; Refill: 0  -     Continuous Blood Gluc Sensor (FreeStyle Andree Sensor System); 1 each Every 14 (Fourteen) Days.  Dispense: 2 each; Refill: 6  -     Ambulatory Referral to Endocrinology    2. Type 2 diabetes mellitus with diabetic polyneuropathy, with long-term current use of insulin ([facility])  -     POC Glycosylated Hemoglobin (Hb A1C)  -     POCT microalbumin  -     Continuous Blood Gluc  (FreeStyle Andree O'Brien) device; 1 each 1 (One) Time for 1 dose. Disp one reader for use as directed.  Dispense: 1 each; Refill: 0  -     Continuous Blood Gluc Sensor (FreeStyle Andree Sensor System); 1 each Every 14 (Fourteen) Days.  Dispense: 2 each; Refill: 6  -     Ambulatory Referral to Endocrinology  -     gabapentin (NEURONTIN) 800 MG tablet; Take 1 tablet by mouth 3 (Three) Times a Day. Indications: Neuropathic Pain  Dispense: 270 tablet; Refill: 1    3. Diabetic peripheral neuropathy associated with type 2 diabetes mellitus (HCC)  -     gabapentin (NEURONTIN) 800 MG tablet; Take 1 tablet by mouth 3 (Three) Times a Day. Indications:  Neuropathic Pain  Dispense: 270 tablet; Refill: 1    4. Chronic midline low back pain with sciatica, sciatica laterality unspecified  Comments:  opiate refilled in august then again in february, uses sparingly; interested in injection therapies with pain management.  Orders:  -     gabapentin (NEURONTIN) 800 MG tablet; Take 1 tablet by mouth 3 (Three) Times a Day. Indications: Neuropathic Pain  Dispense: 270 tablet; Refill: 1    5. Anxiety  -     LORazepam (ATIVAN) 1 MG tablet; Take 0.5-1 tablets by mouth 2 (Two) Times a Day As Needed (severe anxiety).  Dispense: 180 tablet; Refill: 1    6. Class 1 obesity due to excess calories with serious comorbidity and body mass index (BMI) of 32.0 to 32.9 in adult  Comments:  information on Mediterranean diet via avs    7. Need for influenza vaccination  -     FluLaval/Fluzone >6 mos ([id number])             Follow Up   Return in about 3 months (around 1/23/2023) for Diabetes follow up.  Patient was given instructions and counseling regarding her condition or for health maintenance advice. Please see specific information pulled into the AVS if appropriate.

## 2022-12-01 DIAGNOSIS — M25.511 CHRONIC RIGHT SHOULDER PAIN: ICD-10-CM

## 2022-12-01 DIAGNOSIS — G89.29 CHRONIC RIGHT SHOULDER PAIN: ICD-10-CM

## 2022-12-01 RX ORDER — OXYCODONE HYDROCHLORIDE AND ACETAMINOPHEN 5; 325 MG/1; MG/1
1 TABLET ORAL 2 TIMES DAILY PRN
Qty: 60 TABLET | Refills: 0 | Status: SHIPPED | OUTPATIENT
Start: 2022-12-01 | End: 2023-01-20 | Stop reason: SDUPTHER

## 2022-12-01 NOTE — TELEPHONE ENCOUNTER
Caller: Kimberly Chun    Relationship: Self    Best call back number: [phone]    Requested Prescriptions:   Requested Prescriptions     Pending Prescriptions Disp Refills   • oxyCODONE-acetaminophen (Percocet) 5-325 MG per tablet 60 tablet 0     Sig: Take 1 tablet by mouth 2 (Two) Times a Day As Needed for Severe Pain. Use sparingly        Pharmacy where request should be sent: [address] - [phone] [facility] [phone] FX     Additional details provided by patient: PATIENT IS OUT OF MEDICATION    Does the patient have less than a 3 day supply:  [x] Yes  [] No    Would you like a call back once the refill request has been completed: [x] Yes [] No    If the office needs to give you a call back, can they leave a voicemail: [x] Yes [] No    Shantal Shultz Rep   12/01/22 10:34 EST

## 2022-12-01 NOTE — TELEPHONE ENCOUNTER
Rx Refill Note  Requested Prescriptions     Pending Prescriptions Disp Refills   • oxyCODONE-acetaminophen (Percocet) 5-325 MG per tablet 60 tablet 0     Sig: Take 1 tablet by mouth 2 (Two) Times a Day As Needed for Severe Pain. Use sparingly      Last office visit with prescribing clinician: 10/20/2022   Last telemedicine visit with prescribing clinician: 1/20/2023   Next office visit with prescribing clinician: 1/20/2023                         Would you like a call back once the refill request has been completed: [] Yes [] No    If the office needs to give you a call back, can they leave a voicemail: [] Yes [] No    Jenni Weston LPN  12/01/22, 13:06 EST

## 2022-12-13 ENCOUNTER — TELEPHONE (OUTPATIENT)
Dept: INTERNAL MEDICINE | Facility: CLINIC | Age: 57
End: 2022-12-13

## 2022-12-13 NOTE — TELEPHONE ENCOUNTER
PATIENT WENT TO URGENT CARE IN [place] AND WAS DIAGNOSED WITH BRONCHITIS.  SHE FEELS SHE HAS IT AGAIN, NOT SURE SHE GOT OVER IT.  SHE WOULD LIKE MEDICATION FOR THIS.  UNABLE TO COME IN.     PLEASE CALL [phone]

## 2022-12-13 NOTE — TELEPHONE ENCOUNTER
Patient informed. No openings in clinic today. Patient is going to call back to schedule in the morning

## 2022-12-15 ENCOUNTER — TELEPHONE (OUTPATIENT)
Dept: INTERNAL MEDICINE | Facility: CLINIC | Age: 57
End: 2022-12-15

## 2023-01-01 NOTE — TELEPHONE ENCOUNTER
Needs telephone visit to discuss. I put her down at 330 today.   
Patient requested a prescription for prednisone. Patient stated she has pain that starts in her right lower back/buttock and runs down her leg. Patient stated her right calf feels like it is on fire. Patient stated this has been ongoing for about a week. Patient stated she has Novolog available to her that she could take if Dr. Longoria would call in prednisone. Patient wanted to let Dr. Longoria know that her blood sugar has been doing great lately and she promises to keep a close eye on it.    Please call and advise. Patient call back [phone]    [id number] [address] AT [facility] OF [facility] & [address]  - [phone]  - [phone] FX    
Please advise  
96.9

## 2023-01-04 DIAGNOSIS — E11.42 TYPE 2 DIABETES MELLITUS WITH DIABETIC POLYNEUROPATHY, WITH LONG-TERM CURRENT USE OF INSULIN: ICD-10-CM

## 2023-01-04 DIAGNOSIS — E11.65 TYPE 2 DIABETES MELLITUS WITH HYPERGLYCEMIA, WITH LONG-TERM CURRENT USE OF INSULIN: ICD-10-CM

## 2023-01-04 DIAGNOSIS — Z79.4 TYPE 2 DIABETES MELLITUS WITH HYPERGLYCEMIA, WITH LONG-TERM CURRENT USE OF INSULIN: ICD-10-CM

## 2023-01-04 DIAGNOSIS — Z79.4 TYPE 2 DIABETES MELLITUS WITH DIABETIC POLYNEUROPATHY, WITH LONG-TERM CURRENT USE OF INSULIN: ICD-10-CM

## 2023-01-04 RX ORDER — PEN NEEDLE, DIABETIC 32GX 5/32"
NEEDLE, DISPOSABLE MISCELLANEOUS
Qty: 400 EACH | Refills: 1 | Status: SHIPPED | OUTPATIENT
Start: 2023-01-04

## 2023-01-20 ENCOUNTER — OFFICE VISIT (OUTPATIENT)
Dept: INTERNAL MEDICINE | Facility: CLINIC | Age: 58
End: 2023-01-20
Payer: MEDICARE

## 2023-01-20 VITALS
OXYGEN SATURATION: 96 % | SYSTOLIC BLOOD PRESSURE: 128 MMHG | WEIGHT: 188.6 LBS | HEIGHT: 64 IN | DIASTOLIC BLOOD PRESSURE: 76 MMHG | TEMPERATURE: 97.8 F | RESPIRATION RATE: 16 BRPM | BODY MASS INDEX: 32.2 KG/M2 | HEART RATE: 92 BPM

## 2023-01-20 DIAGNOSIS — Z79.4 TYPE 2 DIABETES MELLITUS WITH DIABETIC POLYNEUROPATHY, WITH LONG-TERM CURRENT USE OF INSULIN: Chronic | ICD-10-CM

## 2023-01-20 DIAGNOSIS — E55.9 VITAMIN D DEFICIENCY: ICD-10-CM

## 2023-01-20 DIAGNOSIS — Z51.81 MEDICATION MONITORING ENCOUNTER: ICD-10-CM

## 2023-01-20 DIAGNOSIS — M25.511 CHRONIC RIGHT SHOULDER PAIN: ICD-10-CM

## 2023-01-20 DIAGNOSIS — F41.9 ANXIETY: ICD-10-CM

## 2023-01-20 DIAGNOSIS — M54.40 CHRONIC MIDLINE LOW BACK PAIN WITH SCIATICA, SCIATICA LATERALITY UNSPECIFIED: ICD-10-CM

## 2023-01-20 DIAGNOSIS — Z53.20 MAMMOGRAM DECLINED: ICD-10-CM

## 2023-01-20 DIAGNOSIS — E11.65 TYPE 2 DIABETES MELLITUS WITH HYPERGLYCEMIA, WITH LONG-TERM CURRENT USE OF INSULIN: Primary | ICD-10-CM

## 2023-01-20 DIAGNOSIS — Z79.4 TYPE 2 DIABETES MELLITUS WITH HYPERGLYCEMIA, WITH LONG-TERM CURRENT USE OF INSULIN: Primary | ICD-10-CM

## 2023-01-20 DIAGNOSIS — G89.29 CHRONIC MIDLINE LOW BACK PAIN WITH SCIATICA, SCIATICA LATERALITY UNSPECIFIED: ICD-10-CM

## 2023-01-20 DIAGNOSIS — E11.42 TYPE 2 DIABETES MELLITUS WITH DIABETIC POLYNEUROPATHY, WITH LONG-TERM CURRENT USE OF INSULIN: Chronic | ICD-10-CM

## 2023-01-20 DIAGNOSIS — G89.29 CHRONIC RIGHT SHOULDER PAIN: ICD-10-CM

## 2023-01-20 DIAGNOSIS — R79.9 ABNORMAL BLOOD CHEMISTRY: ICD-10-CM

## 2023-01-20 DIAGNOSIS — R53.83 FATIGUE, UNSPECIFIED TYPE: ICD-10-CM

## 2023-01-20 DIAGNOSIS — E11.42 DIABETIC PERIPHERAL NEUROPATHY ASSOCIATED WITH TYPE 2 DIABETES MELLITUS: ICD-10-CM

## 2023-01-20 PROCEDURE — 99214 OFFICE O/P EST MOD 30 MIN: CPT | Performed by: FAMILY MEDICINE

## 2023-01-20 RX ORDER — BLOOD-GLUCOSE METER
1 KIT MISCELLANEOUS 4 TIMES DAILY
Qty: 100 EACH | Refills: 12 | Status: SHIPPED | OUTPATIENT
Start: 2023-01-20

## 2023-01-20 RX ORDER — BLOOD SUGAR DIAGNOSTIC
1 STRIP MISCELLANEOUS 4 TIMES DAILY
Qty: 100 EACH | Refills: 12 | Status: SHIPPED | OUTPATIENT
Start: 2023-01-20

## 2023-01-20 RX ORDER — GABAPENTIN 800 MG/1
800 TABLET ORAL 3 TIMES DAILY
Qty: 270 TABLET | Refills: 1 | Status: SHIPPED | OUTPATIENT
Start: 2023-01-20

## 2023-01-20 RX ORDER — OXYCODONE HYDROCHLORIDE AND ACETAMINOPHEN 5; 325 MG/1; MG/1
1 TABLET ORAL 2 TIMES DAILY PRN
Qty: 60 TABLET | Refills: 0 | Status: SHIPPED | OUTPATIENT
Start: 2023-01-20

## 2023-01-20 RX ORDER — BLOOD-GLUCOSE METER
1 KIT MISCELLANEOUS AS NEEDED
Qty: 1 EACH | Refills: 0 | Status: SHIPPED | OUTPATIENT
Start: 2023-01-20 | End: 2023-03-20

## 2023-01-20 RX ORDER — LORAZEPAM 1 MG/1
.5-1 TABLET ORAL 2 TIMES DAILY PRN
Qty: 180 TABLET | Refills: 1 | Status: SHIPPED | OUTPATIENT
Start: 2023-01-20

## 2023-01-20 RX ORDER — MONTELUKAST SODIUM 10 MG/1
10 TABLET ORAL DAILY
COMMUNITY
Start: 2022-12-13

## 2023-01-20 NOTE — PROGRESS NOTES
"Chief Complaint  Diabetes (3 month follow up) and Fatigue (Pt states she struggles feeling exhausted and is requesting lab work)    Subjective        Kimberly Chun presents to [facility]  History of Present Illness    History per ma reviewed    Diabetes mellitus--hasn't been able to check sugars regularly. Insurance sent her strips for a different meter, the meter she's using those strips not covered and she doesn't have the new meter.     Taking meds for anxiety and pain sparingly only when absolutely needed. Gets quite irritable, family will point out she's not taken her \"nerve\" pill. Takes for a day or so then is doing better. Not taking daily.       Objective   Vital Signs:  /76 (BP Location: Left arm, Patient Position: Sitting, Cuff Size: Adult)   Pulse 92   Temp 97.8 °F (36.6 °C) (Temporal)   Resp 16   Ht 162.6 cm (64\")   Wt 85.5 kg (188 lb 9.6 oz)   SpO2 96%   BMI 32.37 kg/m²   Estimated body mass index is 32.37 kg/m² as calculated from the following:    Height as of this encounter: 162.6 cm (64\").    Weight as of this encounter: 85.5 kg (188 lb 9.6 oz).             Physical Exam  Vitals and nursing note reviewed.   Constitutional:       General: She is not in acute distress.     Appearance: Normal appearance. She is well-developed and well-groomed. She is not ill-appearing, toxic-appearing or diaphoretic.      Interventions: Face mask in place.   HENT:      Head: Normocephalic and atraumatic.      Right Ear: Hearing normal.      Left Ear: Hearing normal.   Eyes:      General: Lids are normal. No scleral icterus.     Extraocular Movements: Extraocular movements intact.   Neck:      Trachea: Phonation normal.   Pulmonary:      Effort: Pulmonary effort is normal.   Musculoskeletal:      Cervical back: Neck supple.   Skin:     Coloration: Skin is not jaundiced or pale.   Neurological:      General: No focal deficit present.      Mental Status: She is alert and " oriented to person, place, and time.      Motor: Motor function is intact.   Psychiatric:         Attention and Perception: Attention and perception normal.         Mood and Affect: Mood and affect normal.         Speech: Speech normal.         Behavior: Behavior normal. Behavior is cooperative.         Thought Content: Thought content normal.         Cognition and Memory: Cognition and memory normal.         Judgment: Judgment normal.        Result Review :  The following data was reviewed by: Maria Luz Longoria MD on 01/20/2023:  Lab Results   Component Value Date    HGBA1C 8.0 10/20/2022    HGBA1C 8.7 07/20/2022    HGBA1C 8.1 04/18/2022                   Assessment and Plan   Diagnoses and all orders for this visit:    1. Type 2 diabetes mellitus with hyperglycemia, with long-term current use of insulin ([facility]) (Primary)  -     Cancel: POC Glycosylated Hemoglobin (Hb A1C)  -     Hemoglobin A1c  -     Alcohol Swabs (Alcohol Pads) 70 % pads; 1 pad 4 (Four) Times a Day. Check sugars fasting and 2 hours after meals.  Dispense: 100 each; Refill: 12  -     glucose monitor monitoring kit; 1 each As Needed (diabetes). Check sugars fasting and 2 hours after meals.  Dispense: 1 each; Refill: 0  -     B-D ULTRA-FINE 33 LANCETS misc; 1 Units 4 (Four) Times a Day. Check sugars fasting and 2 hours after meals.  Dispense: 100 each; Refill: 12  -     glucose blood test strip; Check sugars fasting and 2 hours after meals.  Dispense: 100 each; Refill: 12    2. Type 2 diabetes mellitus with diabetic polyneuropathy, with long-term current use of insulin ([facility])  -     gabapentin (NEURONTIN) 800 MG tablet; Take 1 tablet by mouth 3 (Three) Times a Day. Indications: Neuropathic Pain  Dispense: 270 tablet; Refill: 1    3. Diabetic peripheral neuropathy associated with type 2 diabetes mellitus ([facility])  -     gabapentin (NEURONTIN) 800 MG tablet; Take 1 tablet by mouth 3 (Three) Times a Day. Indications: Neuropathic Pain  Dispense: 270  tablet; Refill: 1    4. Fatigue, unspecified type  -     Lipid Panel  -     TSH+Free T4  -     Vitamin D,25-Hydroxy  -     Vitamin B12 Deficiency Cascade  -     CBC (No Diff)  -     Comprehensive Metabolic Panel  -     Folate  -     Ferritin  -     Iron Profile  -     Vitamin B6  -     Vitamin B1, Whole Blood  -     Sedimentation Rate    5. Abnormal blood chemistry  -     Hemoglobin A1c  -     Lipid Panel  -     TSH+Free T4  -     Vitamin D,25-Hydroxy  -     Vitamin B12 Deficiency Cascade  -     CBC (No Diff)  -     Comprehensive Metabolic Panel  -     Folate  -     Ferritin  -     Iron Profile  -     Vitamin B6  -     Vitamin B1, Whole Blood  -     Sedimentation Rate    6. Medication monitoring encounter  -     Hemoglobin A1c  -     Lipid Panel  -     TSH+Free T4  -     Vitamin D,25-Hydroxy  -     Vitamin B12 Deficiency Cascade  -     CBC (No Diff)  -     Comprehensive Metabolic Panel  -     Folate  -     Ferritin  -     Iron Profile  -     Vitamin B6  -     Vitamin B1, Whole Blood  -     Sedimentation Rate    7. Vitamin D deficiency  -     Vitamin D,25-Hydroxy    8. Mammogram declined  Comments:  explained self exam doesn't catch cancer as early as mammogram, she'll consider doing outside of winter    9. Anxiety  -     LORazepam (ATIVAN) 1 MG tablet; Take 0.5-1 tablets by mouth 2 (Two) Times a Day As Needed (severe anxiety).  Dispense: 180 tablet; Refill: 1    10. Chronic midline low back pain with sciatica, sciatica laterality unspecified  -     gabapentin (NEURONTIN) 800 MG tablet; Take 1 tablet by mouth 3 (Three) Times a Day. Indications: Neuropathic Pain  Dispense: 270 tablet; Refill: 1    11. Chronic right shoulder pain  Comments:  use narcotic sparingly, deanna reviewed and appropriate  Orders:  -     oxyCODONE-acetaminophen (Percocet) 5-325 MG per tablet; Take 1 tablet by mouth 2 (Two) Times a Day As Needed for Severe Pain. Use sparingly  Dispense: 60 tablet; Refill: 0             Follow Up   Return in  about 3 months (around 4/25/2023) for Medicare Wellness, Diabetes follow up.  Patient was given instructions and counseling regarding her condition or for health maintenance advice. Please see specific information pulled into the AVS if appropriate.

## 2023-01-23 LAB
25(OH)D3+25(OH)D2 SERPL-MCNC: 21.4 NG/ML (ref 30–100)
ALBUMIN SERPL-MCNC: 4.4 G/DL (ref 3.8–4.9)
ALBUMIN/GLOB SERPL: 1.8 {RATIO} (ref 1.2–2.2)
ALP SERPL-CCNC: 87 IU/L (ref 44–121)
ALT SERPL-CCNC: 5 IU/L (ref 0–32)
AST SERPL-CCNC: 6 IU/L (ref 0–40)
BILIRUB SERPL-MCNC: <0.2 MG/DL (ref 0–1.2)
BUN SERPL-MCNC: 9 MG/DL (ref 6–24)
BUN/CREAT SERPL: 10 (ref 9–23)
CALCIUM SERPL-MCNC: 9.9 MG/DL (ref 8.7–10.2)
CHLORIDE SERPL-SCNC: 102 MMOL/L (ref 96–106)
CHOLEST SERPL-MCNC: 201 MG/DL (ref 100–199)
CO2 SERPL-SCNC: 23 MMOL/L (ref 20–29)
CREAT SERPL-MCNC: 0.91 MG/DL (ref 0.57–1)
EGFRCR SERPLBLD CKD-EPI 2021: 73 ML/MIN/1.73
ERYTHROCYTE [DISTWIDTH] IN BLOOD BY AUTOMATED COUNT: 13.1 % (ref 11.7–15.4)
ERYTHROCYTE [SEDIMENTATION RATE] IN BLOOD BY WESTERGREN METHOD: 6 MM/HR (ref 0–40)
FERRITIN SERPL-MCNC: 20 NG/ML (ref 15–150)
FOLATE SERPL-MCNC: 9.4 NG/ML
GLOBULIN SER CALC-MCNC: 2.4 G/DL (ref 1.5–4.5)
GLUCOSE SERPL-MCNC: 262 MG/DL (ref 70–99)
HBA1C MFR BLD: 9.1 % (ref 4.8–5.6)
HCT VFR BLD AUTO: 41.8 % (ref 34–46.6)
HDLC SERPL-MCNC: 35 MG/DL
HGB BLD-MCNC: 13.7 G/DL (ref 11.1–15.9)
INTERPRETATION: NORMAL
IRON SATN MFR SERPL: 25 % (ref 15–55)
IRON SERPL-MCNC: 93 UG/DL (ref 27–159)
LDLC SERPL CALC-MCNC: 137 MG/DL (ref 0–99)
MCH RBC QN AUTO: 28.1 PG (ref 26.6–33)
MCHC RBC AUTO-ENTMCNC: 32.8 G/DL (ref 31.5–35.7)
MCV RBC AUTO: 86 FL (ref 79–97)
PLATELET # BLD AUTO: 518 X10E3/UL (ref 150–450)
POTASSIUM SERPL-SCNC: 5.2 MMOL/L (ref 3.5–5.2)
PROT SERPL-MCNC: 6.8 G/DL (ref 6–8.5)
PYRIDOXAL PHOS SERPL-MCNC: 3.8 UG/L (ref 3.4–65.2)
RBC # BLD AUTO: 4.88 X10E6/UL (ref 3.77–5.28)
SODIUM SERPL-SCNC: 141 MMOL/L (ref 134–144)
T4 FREE SERPL-MCNC: 1.26 NG/DL (ref 0.82–1.77)
TIBC SERPL-MCNC: 366 UG/DL (ref 250–450)
TRIGL SERPL-MCNC: 159 MG/DL (ref 0–149)
TSH SERPL DL<=0.005 MIU/L-ACNC: 1.99 UIU/ML (ref 0.45–4.5)
UIBC SERPL-MCNC: 273 UG/DL (ref 131–425)
VIT B1 BLD-SCNC: 144.6 NMOL/L (ref 66.5–200)
VIT B12 SERPL-MCNC: 720 PG/ML (ref 232–1245)
VLDLC SERPL CALC-MCNC: 29 MG/DL (ref 5–40)
WBC # BLD AUTO: 12.2 X10E3/UL (ref 3.4–10.8)

## 2023-01-25 ENCOUNTER — TELEPHONE (OUTPATIENT)
Dept: INTERNAL MEDICINE | Facility: CLINIC | Age: 58
End: 2023-01-25
Payer: MEDICARE

## 2023-01-25 DIAGNOSIS — E78.5 HYPERLIPIDEMIA ASSOCIATED WITH TYPE 2 DIABETES MELLITUS: ICD-10-CM

## 2023-01-25 DIAGNOSIS — R79.9 ABNORMAL BLOOD CHEMISTRY: ICD-10-CM

## 2023-01-25 DIAGNOSIS — E11.69 HYPERLIPIDEMIA ASSOCIATED WITH TYPE 2 DIABETES MELLITUS: ICD-10-CM

## 2023-01-25 DIAGNOSIS — Z79.4 TYPE 2 DIABETES MELLITUS WITH HYPERGLYCEMIA, WITH LONG-TERM CURRENT USE OF INSULIN: ICD-10-CM

## 2023-01-25 DIAGNOSIS — D75.839 THROMBOCYTOSIS: Primary | ICD-10-CM

## 2023-01-25 DIAGNOSIS — Z79.4 TYPE 2 DIABETES MELLITUS WITH DIABETIC POLYNEUROPATHY, WITH LONG-TERM CURRENT USE OF INSULIN: ICD-10-CM

## 2023-01-25 DIAGNOSIS — E55.9 VITAMIN D DEFICIENCY: ICD-10-CM

## 2023-01-25 DIAGNOSIS — D72.829 LEUKOCYTOSIS, UNSPECIFIED TYPE: ICD-10-CM

## 2023-01-25 DIAGNOSIS — E11.65 TYPE 2 DIABETES MELLITUS WITH HYPERGLYCEMIA, WITH LONG-TERM CURRENT USE OF INSULIN: ICD-10-CM

## 2023-01-25 DIAGNOSIS — E11.42 TYPE 2 DIABETES MELLITUS WITH DIABETIC POLYNEUROPATHY, WITH LONG-TERM CURRENT USE OF INSULIN: ICD-10-CM

## 2023-01-25 DIAGNOSIS — E53.1 VITAMIN B6 DEFICIENCY: ICD-10-CM

## 2023-01-25 DIAGNOSIS — I67.2 CEREBRAL ATHEROSCLEROSIS: ICD-10-CM

## 2023-01-25 RX ORDER — ROSUVASTATIN CALCIUM 10 MG/1
10 TABLET, COATED ORAL NIGHTLY
Qty: 90 TABLET | Refills: 3 | Status: SHIPPED | OUTPATIENT
Start: 2023-01-25

## 2023-01-25 RX ORDER — CHOLECALCIFEROL (VITAMIN D3) 1250 MCG
50000 CAPSULE ORAL
Qty: 12 CAPSULE | Refills: 0 | Status: SHIPPED | OUTPATIENT
Start: 2023-01-25 | End: 2023-03-23 | Stop reason: SDUPTHER

## 2023-01-25 RX ORDER — DIPHENHYDRAMINE HYDROCHLORIDE 25 MG/1
25 CAPSULE ORAL DAILY
Qty: 90 TABLET | Refills: 1 | Status: SHIPPED | OUTPATIENT
Start: 2023-01-25

## 2023-01-25 NOTE — TELEPHONE ENCOUNTER
Caller: Kimberly Chun    Relationship: Self    Best call back number: [phone]    What test was performed: LAB    When was the test performed: 1/20    Where was the test performed: UPSTAIRS FROM OFFICE

## 2023-01-25 NOTE — PROGRESS NOTES
A1C up from 8 to 9.1. I highly recommend we get her in with endocrinology as her A1C hasn't been under 8 since 2019. We need specialist input on this to help get her under control. I've placed a referral for this. This may take several months to get in. In the meantime I want her keeping a log of sugar levels fasting and 2 hours after eating (easier if has continuous glucose monitor, if needs rx let me know). Please schedule her to see me in approx 2-3 weeks (okay to take new peds or procedure spot) and she needs to bring her glucose log with her.     Adding low dose of Crestor (statin) back on as cholesterol is high, this med lowers her risk of stroke.     Blood counts show elevated white blood cell count and platelet count, unclear why. This needs further evaluation. Please have patient come by lab downstairs sometime in the next week to recheck this lab (this needs further evaluation).    Vitamin D deficient. Vitamin D is important to keep bones strong. Deficiencies can also lead to aches/pains, fatigue, etc. Sending high dose supplement. Take this with food as on an empty stomach it may cause nausea. When you finish this supplement start taking vitamin D3 2000 IU daily over the counter. Also, try to get 15 min of sun exposure to face and arms daily to build levels naturally.    Vitamin B6 is borderline deficient as well. Can lead to fatigue, etc. Supplement sent to pharmacy.     Can discuss all labs further when she's back in for the glucose log review appointment.

## 2023-02-01 LAB
ALBUMIN SERPL ELPH-MCNC: 3.6 G/DL (ref 2.9–4.4)
ALBUMIN/GLOB SERPL: 1.3 {RATIO} (ref 0.7–1.7)
ALPHA1 GLOB SERPL ELPH-MCNC: 0.3 G/DL (ref 0–0.4)
ALPHA2 GLOB SERPL ELPH-MCNC: 0.8 G/DL (ref 0.4–1)
B-GLOBULIN SERPL ELPH-MCNC: 1.1 G/DL (ref 0.7–1.3)
BASOPHILS # BLD AUTO: 0.1 X10E3/UL (ref 0–0.2)
BASOPHILS NFR BLD AUTO: 1 %
EOSINOPHIL # BLD AUTO: 0.3 X10E3/UL (ref 0–0.4)
EOSINOPHIL NFR BLD AUTO: 2 %
ERYTHROCYTE [DISTWIDTH] IN BLOOD BY AUTOMATED COUNT: 13.2 % (ref 11.7–15.4)
GAMMA GLOB SERPL ELPH-MCNC: 0.8 G/DL (ref 0.4–1.8)
GLOBULIN SER-MCNC: 2.9 G/DL (ref 2.2–3.9)
HCT VFR BLD AUTO: 41.6 % (ref 34–46.6)
HGB BLD-MCNC: 13.3 G/DL (ref 11.1–15.9)
IGA SERPL-MCNC: 142 MG/DL (ref 87–352)
IGG SERPL-MCNC: 801 MG/DL (ref 586–1602)
IGM SERPL-MCNC: 60 MG/DL (ref 26–217)
IMM GRANULOCYTES # BLD AUTO: 0.1 X10E3/UL (ref 0–0.1)
IMM GRANULOCYTES NFR BLD AUTO: 1 %
INTERPRETATION SERPL IEP-IMP: ABNORMAL
KAPPA LC FREE SER-MCNC: 20.8 MG/L (ref 3.3–19.4)
KAPPA LC FREE/LAMBDA FREE SER: 1.4 {RATIO} (ref 0.26–1.65)
LABORATORY COMMENT REPORT: ABNORMAL
LAMBDA LC FREE SERPL-MCNC: 14.9 MG/L (ref 5.7–26.3)
LYMPHOCYTES # BLD AUTO: 6.4 X10E3/UL (ref 0.7–3.1)
LYMPHOCYTES NFR BLD AUTO: 40 %
M PROTEIN SERPL ELPH-MCNC: ABNORMAL G/DL
MCH RBC QN AUTO: 27.5 PG (ref 26.6–33)
MCHC RBC AUTO-ENTMCNC: 32 G/DL (ref 31.5–35.7)
MCV RBC AUTO: 86 FL (ref 79–97)
MONOCYTES # BLD AUTO: 1 X10E3/UL (ref 0.1–0.9)
MONOCYTES NFR BLD AUTO: 6 %
NEUTROPHILS # BLD AUTO: 8.1 X10E3/UL (ref 1.4–7)
NEUTROPHILS NFR BLD AUTO: 50 %
PATH INTERP BLD-IMP: ABNORMAL
PATH REV BLD -IMP: ABNORMAL
PATHOLOGIST NAME: ABNORMAL
PLATELET # BLD AUTO: 432 X10E3/UL (ref 150–450)
PROT SERPL-MCNC: 6.5 G/DL (ref 6–8.5)
RBC # BLD AUTO: 4.83 X10E6/UL (ref 3.77–5.28)
WBC # BLD AUTO: 16 X10E3/UL (ref 3.4–10.8)

## 2023-02-01 NOTE — PROGRESS NOTES
Please call patient.   See if she's having any symptoms of illness at this time. Illness or taking steroid such as prednisone (which is not on med list) could cause white cell count to go up. Level is higher than last check. Please let me know if either case is hers. If not, let me know as well, as I'd suggest a consult with hematology (blood doctors) for a consult. If not on steroids and not sick it's not clear why white blood cell count is high.

## 2023-02-03 ENCOUNTER — TELEPHONE (OUTPATIENT)
Dept: INTERNAL MEDICINE | Facility: CLINIC | Age: 58
End: 2023-02-03
Payer: MEDICARE

## 2023-02-03 DIAGNOSIS — R76.8 ELEVATED SERUM IMMUNOGLOBULIN FREE LIGHT CHAINS: ICD-10-CM

## 2023-02-03 DIAGNOSIS — R79.89 ABNORMAL CBC: Primary | ICD-10-CM

## 2023-02-03 NOTE — TELEPHONE ENCOUNTER
----- Message from Loretta Reardon MA sent at 2/3/2023  4:10 PM EST -----  Pt notified, pt stated that she is staying worn out, little to no energy. She is not taking any steroids and not experiencing any symptoms.

## 2023-02-03 NOTE — TELEPHONE ENCOUNTER
Called pt, verbally informed, pt also asked me to verify vit d3 prescription, I informed her of directions Dr. Longoria prescribed.

## 2023-02-07 ENCOUNTER — OFFICE VISIT (OUTPATIENT)
Dept: INTERNAL MEDICINE | Facility: CLINIC | Age: 58
End: 2023-02-07
Payer: MEDICARE

## 2023-02-07 VITALS
BODY MASS INDEX: 32.54 KG/M2 | WEIGHT: 190.6 LBS | HEIGHT: 64 IN | TEMPERATURE: 97.9 F | OXYGEN SATURATION: 98 % | HEART RATE: 88 BPM | RESPIRATION RATE: 16 BRPM | DIASTOLIC BLOOD PRESSURE: 78 MMHG | SYSTOLIC BLOOD PRESSURE: 124 MMHG

## 2023-02-07 DIAGNOSIS — E11.69 HYPERLIPIDEMIA ASSOCIATED WITH TYPE 2 DIABETES MELLITUS: ICD-10-CM

## 2023-02-07 DIAGNOSIS — E11.65 TYPE 2 DIABETES MELLITUS WITH HYPERGLYCEMIA, WITH LONG-TERM CURRENT USE OF INSULIN: Primary | ICD-10-CM

## 2023-02-07 DIAGNOSIS — E55.9 VITAMIN D DEFICIENCY: ICD-10-CM

## 2023-02-07 DIAGNOSIS — Z23 NEED FOR PNEUMOCOCCAL VACCINATION: ICD-10-CM

## 2023-02-07 DIAGNOSIS — Z79.4 TYPE 2 DIABETES MELLITUS WITH HYPERGLYCEMIA, WITH LONG-TERM CURRENT USE OF INSULIN: Primary | ICD-10-CM

## 2023-02-07 DIAGNOSIS — E78.5 HYPERLIPIDEMIA ASSOCIATED WITH TYPE 2 DIABETES MELLITUS: ICD-10-CM

## 2023-02-07 DIAGNOSIS — R79.89 ABNORMAL CBC: ICD-10-CM

## 2023-02-07 DIAGNOSIS — E53.1 VITAMIN B6 DEFICIENCY: ICD-10-CM

## 2023-02-07 PROCEDURE — 90677 PCV20 VACCINE IM: CPT | Performed by: FAMILY MEDICINE

## 2023-02-07 PROCEDURE — G0009 ADMIN PNEUMOCOCCAL VACCINE: HCPCS | Performed by: FAMILY MEDICINE

## 2023-02-07 PROCEDURE — 3046F HEMOGLOBIN A1C LEVEL >9.0%: CPT | Performed by: FAMILY MEDICINE

## 2023-02-07 PROCEDURE — 99214 OFFICE O/P EST MOD 30 MIN: CPT | Performed by: FAMILY MEDICINE

## 2023-02-07 RX ORDER — GLUCAGON INJECTION, SOLUTION 0.5 MG/.1ML
0.5 INJECTION, SOLUTION SUBCUTANEOUS ONCE AS NEEDED
Qty: 0.2 ML | Refills: 0 | Status: SHIPPED | OUTPATIENT
Start: 2023-02-07 | End: 2023-03-07

## 2023-02-07 NOTE — PROGRESS NOTES
"Chief Complaint  Fatigue (Follow up on labs)    Subjective        Kimberly Chun presents to [facility]  History of Present Illness  Glucose levels improved now that she's able to monitor closely. Didn't have device at last visit. Mistakenly took vitamin D daily instead of once a week, has five caps left. Has felt better, though, since starting this. Taking b6 as well. Agreeable to seeing hematology to be thorough with her persistently elevated white blood cell count.      Objective   Vital Signs:  /78 (BP Location: Left arm, Patient Position: Sitting, Cuff Size: Adult)   Pulse 88   Temp 97.9 °F (36.6 °C) (Temporal)   Resp 16   Ht 162.6 cm (64\")   Wt 86.5 kg (190 lb 9.6 oz)   SpO2 98%   BMI 32.72 kg/m²   Estimated body mass index is 32.72 kg/m² as calculated from the following:    Height as of this encounter: 162.6 cm (64\").    Weight as of this encounter: 86.5 kg (190 lb 9.6 oz).             Physical Exam  Vitals and nursing note reviewed.   Constitutional:       General: She is not in acute distress.     Appearance: Normal appearance. She is well-developed and well-groomed. She is not ill-appearing, toxic-appearing or diaphoretic.      Interventions: Face mask in place.   HENT:      Head: Normocephalic and atraumatic.      Right Ear: Hearing normal.      Left Ear: Hearing normal.   Eyes:      General: Lids are normal. No scleral icterus.     Extraocular Movements: Extraocular movements intact.   Neck:      Trachea: Phonation normal.   Pulmonary:      Effort: Pulmonary effort is normal.   Musculoskeletal:      Cervical back: Neck supple.   Skin:     Coloration: Skin is not jaundiced or pale.   Neurological:      General: No focal deficit present.      Mental Status: She is alert and oriented to person, place, and time.      Motor: Motor function is intact.   Psychiatric:         Attention and Perception: Attention and perception normal.         Mood and Affect: Mood " and affect normal.         Speech: Speech normal.         Behavior: Behavior normal. Behavior is cooperative.         Thought Content: Thought content normal.         Cognition and Memory: Cognition and memory normal.         Judgment: Judgment normal.        Result Review :  The following data was reviewed by: Maria Luz Longoria MD on 02/07/2023:  Reviewed labs from 1/20/23 and 1/30/23 with patient.               Assessment and Plan   Diagnoses and all orders for this visit:    1. Type 2 diabetes mellitus with hyperglycemia, with long-term current use of insulin (HCC) (Primary)  -     Glucagon (Gvoke HypoPen 2-Pack) 0.5 MG/0.1ML solution auto-injector; Inject 0.5 mg under the skin into the appropriate area as directed 1 (One) Time As Needed (hypoglycemia).  Dispense: 0.2 mL; Refill: 0    2. Abnormal CBC    3. Vitamin B6 deficiency    4. Hyperlipidemia associated with type 2 diabetes mellitus (HCC)    5. Vitamin D deficiency    6. Need for pneumococcal vaccination  -     Pneumococcal Conjugate Vaccine 20-Valent All    discussed labs. Elevated WBC suggestive of underlying inflammation and has been a recurrent finding >2 years. Being thorough and getting hematology consult but low suspicion for underlying malignancy at this time. Pt understands need for referral. Scheduled to see endocrinology, she's interested in her insulin pump options. Take vitamin D weekly. Encouraged statin use to lower stroke risk. Need to recheck lipid panel next visit. Continue b6 supplement.        I spent 30 minutes caring for Kimberly on this date of service. This time includes time spent by me in the following activities:preparing for the visit, reviewing tests, counseling and educating the patient/family/caregiver, ordering medications, tests, or procedures and documenting information in the medical record  Follow Up   Return for As scheduled previously.  Patient was given instructions and counseling regarding her condition or for  health maintenance advice. Please see specific information pulled into the AVS if appropriate.

## 2023-02-09 ENCOUNTER — PATIENT OUTREACH (OUTPATIENT)
Dept: ONCOLOGY | Facility: HOSPITAL | Age: 58
End: 2023-02-09
Payer: MEDICARE

## 2023-02-09 NOTE — SIGNIFICANT NOTE
Talked with patient about completing ldct. She asked me to schedule appt. I arranged an appt. For 2/16/23 at 11am. Called and left message for patient of appt.

## 2023-02-21 NOTE — ED PROVIDER NOTES
Cerebral artery occlusion with cerebral infarction (HCC)     COPD (chronic obstructive pulmonary disease) (HCC)     Diabetes mellitus type 2     Hyperlipidemia     Hypotension     Neuropathy     TIA (transient ischemic attack)          SURGICAL HISTORY       Past Surgical History:   Procedure Laterality Date    CARPAL TUNNEL RELEASE      both     HYSTERECTOMY      total     TONSILLECTOMY      TUBAL LIGATION           CURRENT MEDICATIONS       Previous Medications    BACLOFEN (LIORESAL) 10 MG TABLET    Take 1 tablet by mouth 3 times daily    CLOPIDOGREL (PLAVIX) 75 MG TABLET    Take 75 mg by mouth daily    DULAGLUTIDE (TRULICITY SC)    Inject into the skin once a week Indications: on Fridays    GABAPENTIN (NEURONTIN) 600 MG TABLET    Take 800 mg by mouth 3 times daily. INSULIN ASPART (NOVOLOG FLEXPEN) 100 UNIT/ML INJECTION    Inject  into the skin daily.     INSULIN GLARGINE (LANTUS) 100 UNIT/ML INJECTION    Inject 59 Units into the skin every morning Indications: 56 units     MELOXICAM (MOBIC) 7.5 MG TABLET    Take 7.5 mg by mouth as needed for Pain    METFORMIN (GLUCOPHAGE) 1000 MG TABLET    Take 1,000 mg by mouth 2 times daily (with meals)    TIZANIDINE (ZANAFLEX) 2 MG TABLET    Take 2 mg by mouth every 6 hours as needed       ALLERGIES     Iodides    FAMILY HISTORY       Family History   Problem Relation Age of Onset    Coronary Art Dis Mother     Cancer Father         lung    Cancer Sister         ovarian     Stroke Maternal Grandmother     Cancer Paternal Grandmother         breast          SOCIAL HISTORY       Social History     Socioeconomic History    Marital status:      Spouse name: None    Number of children: None    Years of education: None    Highest education level: None   Occupational History    None   Social Needs    Financial resource strain: None    Food insecurity     Worry: None     Inability: None    Transportation needs     Medical: None     Non-medical: None Location From Outside Provider (Will Override Previously Chosen Location): Left zygomatic cheek baseline. DIAGNOSTIC RESULTS     EKG: All EKG's are interpreted by the Emergency Department Physician who either signs or Co-signs this chart in the absence of a cardiologist.        RADIOLOGY:   Non-plain film images such as CT, Ultrasound and MRI are read by the radiologist. Plain radiographic images are visualized andpreliminarily interpreted by the emergency physician with the below findings:        Interpretationper the Radiologist below, if available at the time of this note:    No orders to display         ED BEDSIDE ULTRASOUND:   Performed by ED Physician - none    LABS:  Labs Reviewed - No data to display    All other labs were within normal range or not returned as of this dictation. EMERGENCY DEPARTMENT COURSE and DIFFERENTIAL DIAGNOSIS/MDM:   Vitals:    Vitals:    06/13/20 1126   BP: (!) 157/79   Pulse: 95   Resp: 18   Temp: 98.2 °F (36.8 °C)   TempSrc: Oral   SpO2: 97%   Weight: 180 lb (81.6 kg)   Height: 5' 4\" (1.626 m)           CRITICAL CARE TIME   Total Critical Care time was minutes, excluding separatelyreportable procedures. There was a high probability ofclinically significant/life threatening deterioration in the patient's condition which required my urgent intervention. CONSULTS:  None    PROCEDURES:  None    PROGRESS NOTES:    toradol IM. Percocet x 2days. Follow up with PCP for pain control. FINAL IMPRESSION      1. Strain of lumbar region, initial encounter    2. Spasm of muscle          Final diagnoses:   Strain of lumbar region, initial encounter   Spasm of muscle       DISPOSITION/PLAN   DISPOSITION Decision To Discharge 06/13/2020 11:35:24 AM      PATIENT REFERRED TO:  Jessi Zafar MD  [id number] [id number]  [phone]      If symptoms worsen      DISCHARGE MEDICATIONS:  New Prescriptions    OXYCODONE-ACETAMINOPHEN (PERCOCET) 5-325 MG PER TABLET    Take 1 tablet by mouth every 8 hours as needed for Pain for up to 2 days.  Intended supply: 3 days. Take lowest dose possible to manage pain         (Please note thatportions of this note may have been completed with a voice recognition program.  Efforts were [facility] edit the dictations but occasionally words are mis-transcribed.)    Cheryl Morrison MD (electronically signed)  Attending Emergency Physician       Marisa Arguello MD  06/13/20 [phone]

## 2023-03-07 DIAGNOSIS — Z79.4 TYPE 2 DIABETES MELLITUS WITH HYPERGLYCEMIA, WITH LONG-TERM CURRENT USE OF INSULIN: ICD-10-CM

## 2023-03-07 DIAGNOSIS — E11.65 TYPE 2 DIABETES MELLITUS WITH HYPERGLYCEMIA, WITH LONG-TERM CURRENT USE OF INSULIN: ICD-10-CM

## 2023-03-07 RX ORDER — GLUCAGON INJECTION, SOLUTION 0.5 MG/.1ML
INJECTION, SOLUTION SUBCUTANEOUS
Qty: 0.2 ML | Refills: 0 | Status: SHIPPED | OUTPATIENT
Start: 2023-03-07

## 2023-03-09 ENCOUNTER — HOSPITAL ENCOUNTER (OUTPATIENT)
Dept: CT IMAGING | Facility: HOSPITAL | Age: 58
Discharge: HOME | End: 2023-03-09
Admitting: FAMILY MEDICINE
Payer: MEDICARE

## 2023-03-09 DIAGNOSIS — Z87.891 PERSONAL HISTORY OF TOBACCO USE, PRESENTING HAZARDS TO HEALTH: ICD-10-CM

## 2023-03-09 PROCEDURE — 71271 CT THORAX LUNG CANCER SCR C-: CPT

## 2023-03-20 ENCOUNTER — LAB (OUTPATIENT)
Dept: LAB | Facility: HOSPITAL | Age: 58
End: 2023-03-20
Payer: MEDICARE

## 2023-03-20 ENCOUNTER — CONSULT (OUTPATIENT)
Dept: ONCOLOGY | Facility: CLINIC | Age: 58
End: 2023-03-20
Payer: MEDICARE

## 2023-03-20 VITALS
OXYGEN SATURATION: 100 % | BODY MASS INDEX: 31.58 KG/M2 | RESPIRATION RATE: 16 BRPM | TEMPERATURE: 97.8 F | DIASTOLIC BLOOD PRESSURE: 70 MMHG | HEART RATE: 84 BPM | SYSTOLIC BLOOD PRESSURE: 148 MMHG | WEIGHT: 185 LBS | HEIGHT: 64 IN

## 2023-03-20 DIAGNOSIS — D47.2 MGUS (MONOCLONAL GAMMOPATHY OF UNKNOWN SIGNIFICANCE): ICD-10-CM

## 2023-03-20 DIAGNOSIS — D47.2 MGUS (MONOCLONAL GAMMOPATHY OF UNKNOWN SIGNIFICANCE): Primary | ICD-10-CM

## 2023-03-20 LAB
ALBUMIN SERPL-MCNC: 4.3 G/DL (ref 3.5–5.2)
ALBUMIN/GLOB SERPL: 1.6 G/DL
ALP SERPL-CCNC: 82 U/L (ref 39–117)
ALT SERPL W P-5'-P-CCNC: 10 U/L (ref 1–33)
ANION GAP SERPL CALCULATED.3IONS-SCNC: 12 MMOL/L (ref 5–15)
AST SERPL-CCNC: 17 U/L (ref 1–32)
B2 MICROGLOB SERPL-MCNC: 1.9 MG/L (ref 0.8–2.2)
BASOPHILS # BLD AUTO: 0.07 10*3/MM3 (ref 0–0.2)
BASOPHILS NFR BLD AUTO: 0.6 % (ref 0–1.5)
BILIRUB SERPL-MCNC: 0.2 MG/DL (ref 0–1.2)
BUN SERPL-MCNC: 12 MG/DL (ref 6–20)
BUN/CREAT SERPL: 12.5 (ref 7–25)
CALCIUM SPEC-SCNC: 9.8 MG/DL (ref 8.6–10.5)
CHLORIDE SERPL-SCNC: 104 MMOL/L (ref 98–107)
CO2 SERPL-SCNC: 20 MMOL/L (ref 22–29)
CREAT SERPL-MCNC: 0.96 MG/DL (ref 0.57–1)
DEPRECATED RDW RBC AUTO: 38.3 FL (ref 37–54)
EGFRCR SERPLBLD CKD-EPI 2021: 68.7 ML/MIN/1.73
EOSINOPHIL # BLD AUTO: 0.36 10*3/MM3 (ref 0–0.4)
EOSINOPHIL NFR BLD AUTO: 3.2 % (ref 0.3–6.2)
ERYTHROCYTE [DISTWIDTH] IN BLOOD BY AUTOMATED COUNT: 12.7 % (ref 12.3–15.4)
GLOBULIN UR ELPH-MCNC: 2.7 GM/DL
GLUCOSE SERPL-MCNC: 145 MG/DL (ref 65–99)
HCT VFR BLD AUTO: 40.6 % (ref 34–46.6)
HGB BLD-MCNC: 13.6 G/DL (ref 12–15.9)
IMM GRANULOCYTES # BLD AUTO: 0.02 10*3/MM3 (ref 0–0.05)
IMM GRANULOCYTES NFR BLD AUTO: 0.2 % (ref 0–0.5)
LYMPHOCYTES # BLD AUTO: 5.37 10*3/MM3 (ref 0.7–3.1)
LYMPHOCYTES NFR BLD AUTO: 47.2 % (ref 19.6–45.3)
MCH RBC QN AUTO: 28 PG (ref 26.6–33)
MCHC RBC AUTO-ENTMCNC: 33.5 G/DL (ref 31.5–35.7)
MCV RBC AUTO: 83.7 FL (ref 79–97)
MONOCYTES # BLD AUTO: 0.58 10*3/MM3 (ref 0.1–0.9)
MONOCYTES NFR BLD AUTO: 5.1 % (ref 5–12)
NEUTROPHILS NFR BLD AUTO: 4.97 10*3/MM3 (ref 1.7–7)
NEUTROPHILS NFR BLD AUTO: 43.7 % (ref 42.7–76)
NRBC BLD AUTO-RTO: 0 /100 WBC (ref 0–0.2)
PLATELET # BLD AUTO: 420 10*3/MM3 (ref 140–450)
PMV BLD AUTO: 9.9 FL (ref 6–12)
POTASSIUM SERPL-SCNC: 4.5 MMOL/L (ref 3.5–5.2)
PROT SERPL-MCNC: 7 G/DL (ref 6–8.5)
RBC # BLD AUTO: 4.85 10*6/MM3 (ref 3.77–5.28)
SODIUM SERPL-SCNC: 136 MMOL/L (ref 136–145)
WBC NRBC COR # BLD: 11.37 10*3/MM3 (ref 3.4–10.8)

## 2023-03-20 PROCEDURE — 85025 COMPLETE CBC W/AUTO DIFF WBC: CPT

## 2023-03-20 PROCEDURE — 99204 OFFICE O/P NEW MOD 45 MIN: CPT | Performed by: INTERNAL MEDICINE

## 2023-03-20 PROCEDURE — 82784 ASSAY IGA/IGD/IGG/IGM EACH: CPT

## 2023-03-20 PROCEDURE — 1125F AMNT PAIN NOTED PAIN PRSNT: CPT | Performed by: INTERNAL MEDICINE

## 2023-03-20 PROCEDURE — 84165 PROTEIN E-PHORESIS SERUM: CPT

## 2023-03-20 PROCEDURE — 80053 COMPREHEN METABOLIC PANEL: CPT

## 2023-03-20 PROCEDURE — 83521 IG LIGHT CHAINS FREE EACH: CPT

## 2023-03-20 PROCEDURE — 82232 ASSAY OF BETA-2 PROTEIN: CPT

## 2023-03-20 PROCEDURE — 36415 COLL VENOUS BLD VENIPUNCTURE: CPT

## 2023-03-20 PROCEDURE — 86334 IMMUNOFIX E-PHORESIS SERUM: CPT

## 2023-03-20 NOTE — PROGRESS NOTES
[facility]ology [facility]  Office number [phone]    Fax number [phone]    New Patient Office Visit      Date: 2023     Patient Name: Kimberly Chun  MRN: [id number]  : 1965    Referring Physician: Dr. Maria Luz Longoria    Chief Complaint: Leukocytosis; elevated free light chains      History of Present Illness: Kimberly Chun is a pleasant 57 y.o. female who presents today for evaluation of leukocytosis and elevated free light chains.    She had COVID infection in 2022 and reports persistent fatigue and dyspnea on exertion since she then developed bronchitis and was treated with antibiotics in November as well as another round of antibiotics and steroids in December.  She followed up with her PCP and had lab work because of her ongoing symptoms that prompted this referral.  She was also found to have an elevated A1c to which she currently is attributing her ongoing symptoms.    She has a notable past medical history of low-grade noninvasive bladder cancer  s/p biopsy and fulguration in 2016 with Dr. Lemos.  She did not require any intravesicular therapy, radiation, or chemotherapy.  Last cystoscopy . No longer on surveillance.     Prior CVA on ASA/Plavix  No VTE.   No MI or stents.   No blood transfusions  No autoimmune or chronic infections.  Not getting routine mammograms. No prior colonoscopy.   S/p SHOLA/BSO for bleeding  Type I and type 2 diabetes.    She has an extensive family history of malignancy as outlined below:  Brother head and neck cancer.   Sister had ovarian cancer and  at 36  Dad lung cancer  Paternal grandmother breast cancer in her 60s.   No one has had genetic testing  She is not undergoing any routine cancer screening and she currently declines this.  She is a current every day smoker.  1 pack/day since the age of 29.     Review of Systems:   I have reviewed the review of systems completed by the patient. This is negative for clinically  significant symptoms except as noted below. This document has been scanned into the patient's chart.  Poor appetite, night sweats, fatigue, cough, difficulty breathing, bone pain, joint pain, all of which she endorses is chronic.    Past Medical History:   Past Medical History:   Diagnosis Date   • Anxiety    • Arthritis    • Bladder cancer ([facility]) 2016   • Cataract    • COPD (chronic obstructive pulmonary disease) ([facility])    • COVID-19 vaccine series completed     with booster,  moderna   • Dysphagia    • Fibromyalgia    • Fracture     RIGHT LEG, RIGHT ANKLE, MULTIPLE TOES   • Full dentures    • GERD (gastroesophageal reflux disease)    • Ovarian cyst    • Recurrent urinary tract infection    • Sciatica    • Seasonal allergies    • Statin intolerance    • Stroke ([facility]) 2021   • Type 1 diabetes ([facility])    • Type 2 diabetes mellitus ([facility])    • Wears glasses        Past Surgical History:   Past Surgical History:   Procedure Laterality Date   • BACK SURGERY  08/25/2020   • CATARACT EXTRACTION W/ INTRAOCULAR LENS IMPLANT Right 5/6/2022    Procedure: CATARACT PHACO EXTRACTION WITH INTRAOCULAR LENS IMPLANT RIGHT;  Surgeon: Eugene Avilez MD;  Location: [facility];  Service: Ophthalmology;  Laterality: Right;   • CATARACT EXTRACTION W/ INTRAOCULAR LENS IMPLANT Left 6/20/2022    Procedure: CATARACT PHACO EXTRACTION WITH INTRAOCULAR LENS IMPLANT LEFT;  Surgeon: Ayden Obrien MD;  Location: [facility] OR;  Service: Ophthalmology;  Laterality: Left;   • CYSTOSCOPY BLADDER BIOPSY  2016   • ENDOSCOPY N/A 7/3/2017    Procedure: ESOPHAGOGASTRODUODENOSCOPY WITH BIOPSY;  Surgeon: Lindy Bey MD;  Location: [facility] ENDOSCOPY;  Service:    • HYSTERECTOMY     • TONSILLECTOMY         Family History:   Family History   Problem Relation Age of Onset   • Arthritis Mother    • Hypertension Mother    • Heart attack Mother    • Osteoporosis Mother    • Arthritis Father    • Diabetes Father    • Hypertension Father    • Lung  cancer Father    • Ovarian cancer Sister    • Throat cancer Brother    • Breast cancer Paternal Grandmother    • Drug abuse Daughter    • Stroke Other        Social History:   Social History     Socioeconomic History   • Marital status:    Tobacco Use   • Smoking status: Every Day     Packs/day: 1.00     Years: 40.00     Pack years: 40.00     Types: Cigarettes   • Smokeless tobacco: Never   Vaping Use   • Vaping Use: Never used   Substance and Sexual Activity   • Alcohol use: No   • Drug use: No   • Sexual activity: Defer       Medications:     Current Outpatient Medications:   •  albuterol (PROVENTIL) (2.5 MG/3ML) 0.083% nebulizer solution, Take 2.5 mg by nebulization Every 6 (Six) Hours As Needed for Wheezing or Shortness of Air., Disp: 180 each, Rfl: 3  •  albuterol sulfate HFA (ProAir HFA) 108 (90 Base) MCG/ACT inhaler, Inhale 2 puffs Every 6 (Six) Hours As Needed for Wheezing or Shortness of Air., Disp: 54 g, Rfl: 3  •  Alcohol Swabs (Alcohol Pads) 70 % pads, 1 pad 4 (Four) Times a Day. Check sugars fasting and 2 hours after meals., Disp: 100 each, Rfl: 12  •  aspirin 81 MG EC tablet, Take 1 tablet by mouth Daily., Disp: 90 tablet, Rfl: 3  •  B-D ULTRA-FINE 33 LANCETS misc, 1 Units 4 (Four) Times a Day. Check sugars fasting and 2 hours after meals., Disp: 100 each, Rfl: 12  •  Breo Ellipta 200-25 MCG/INH inhaler, Inhale 1 puff Daily., Disp: 90 each, Rfl: 3  •  Cholecalciferol (Vitamin D3) 1.25 MG (48612 UT) capsule, Take 1 capsule by mouth Every 7 (Seven) Days., Disp: 12 capsule, Rfl: 0  •  clopidogrel (PLAVIX) 75 MG tablet, Take 1 tablet by mouth Daily., Disp: 90 tablet, Rfl: 3  •  Continuous Blood Gluc Sensor (FreeStyle Andree Sensor System), 1 each Every 14 (Fourteen) Days., Disp: 2 each, Rfl: 6  •  Cyanocobalamin (VITAMIN B-12 PO), Take  by mouth., Disp: , Rfl:   •  cyclobenzaprine (FLEXERIL) 10 MG tablet, Take 1 tablet by mouth 3 (Three) Times a Day As Needed for Muscle Spasms., Disp: 270 tablet,  Rfl: 3  •  Droplet Pen Needles 32G X 4 MM misc, USE TO INJECT INSULIN 4X DAY, Disp: 400 each, Rfl: 1  •  FOLIC ACID PO, Take  by mouth., Disp: , Rfl:   •  gabapentin (NEURONTIN) 800 MG tablet, Take 1 tablet by mouth 3 (Three) Times a Day. Indications: Neuropathic Pain, Disp: 270 tablet, Rfl: 1  •  glucose blood test strip, Check sugars fasting and 2 hours after meals (4x a day due to hyperglycemia), Disp: 100 each, Rfl: 12  •  glucose blood test strip, Check sugars fasting and 2 hours after meals., Disp: 100 each, Rfl: 12  •  glucose monitor monitoring kit, 1 each As Needed (diabetes). Check sugars fasting and 2 hours after meals., Disp: 1 each, Rfl: 0  •  Gvoke HypoPen 2-Pack 0.5 MG/0.1ML solution auto-injector, INJECT 0.5 MG UNDER THE SKIN INTO THE APPROPRIATE AREA AS DIRECTED ONE TIME AS NEEDED FOR HYPOGLYCEMIA, Disp: 0.2 mL, Rfl: 0  •  insulin aspart (NovoLOG FlexPen) 100 UNIT/ML solution pen-injector sc pen, Inject 20 Units under the skin into the appropriate area as directed 3 (Three) Times a Day With Meals., Disp: 54 mL, Rfl: 3  •  Insulin Glargine (Lantus SoloStar) 100 UNIT/ML injection pen, Inject 65 Units under the skin into the appropriate area as directed Every Night., Disp: 20 pen, Rfl: 3  •  lisinopril (PRINIVIL,ZESTRIL) 5 MG tablet, Take 1 tablet by mouth Daily. For kidney protection due to diabetes mellitus, Disp: 90 tablet, Rfl: 3  •  LORazepam (ATIVAN) 1 MG tablet, Take 0.5-1 tablets by mouth 2 (Two) Times a Day As Needed (severe anxiety)., Disp: 180 tablet, Rfl: 1  •  metFORMIN (GLUCOPHAGE) 1000 MG tablet, Take 1 tablet twice a day with meals for type 2 diabetes, Disp: 180 tablet, Rfl: 3  •  montelukast (SINGULAIR) 10 MG tablet, Take 1 tablet by mouth Daily., Disp: , Rfl:   •  omeprazole (priLOSEC) 20 MG capsule, Take 1 capsule by mouth 2 (Two) Times a Day., Disp: 180 capsule, Rfl: 3  •  oxyCODONE-acetaminophen (Percocet) 5-325 MG per tablet, Take 1 tablet by mouth 2 (Two) Times a Day As Needed  "for Severe Pain. Use sparingly, Disp: 60 tablet, Rfl: 0  •  rosuvastatin (Crestor) 10 MG tablet, Take 1 tablet by mouth Every Night. To lower cholesterol and risk of stroke, Disp: 90 tablet, Rfl: 3  •  SITagliptin (JANUVIA) 100 MG tablet, Take 1 tablet by mouth Daily. Indications: Type 2 Diabetes, Disp: 90 tablet, Rfl: 3  •  vitamin B-6 (PYRIDOXINE) 25 MG tablet, Take 1 tablet by mouth Daily., Disp: 90 tablet, Rfl: 1    Allergies:   Allergies   Allergen Reactions   • Iodinated Contrast Media Hives   • Trulicity [Dulaglutide] GI Intolerance       Objective     Vital Signs:   Vitals:    03/20/23 1039   BP: 148/70   Pulse: 84   Resp: 16   Temp: 97.8 °F (36.6 °C)   TempSrc: Temporal   SpO2: 100%   Weight: 83.9 kg (185 lb)   Height: 162.6 cm (64\")   PainSc:   8   PainLoc: Back    Body mass index is 31.76 kg/m².   Pain Score    03/20/23 1039   PainSc:   8   PainLoc: Back       Physical Exam:   General: No acute distress. Well appearing   HEENT: Normocephalic, atraumatic. Sclera anicteric. Masked.  Neck: supple, no adenopathy.   Cardiovascular: regular rate and rhythm. No murmurs.   Respiratory: Normal rate. Clear to auscultation bilaterally.  Abdomen: Soft, nontender, non distended with normoactive bowel sounds. No hepatosplenomegaly  Lymph: no cervical, supraclavicular or axillary adenopathy.  Neuro: Alert and oriented x 3. No focal deficits.   Ext: Symmetric, no swelling.   Psych: Euthymic      Laboratory/Imaging Reviewed:   No visits with results within 2 Week(s) from this visit.   Latest known visit with results is:   Orders Only on 01/25/2023   Component Date Value Ref Range Status   • WBC 01/30/2023 Comment   Final    Comment: Neutrophilic leukocytosis and mild lymphocytosis. Cells are  mature. Reactive changes are present.     • RBC 01/30/2023 Comment   Final    RBC morphology is normal.   • Platelets 01/30/2023 Comment   Final    Platelet morphology appears normal.   • Comment 01/30/2023 Comment   Final   • " Pathologist Review 01/30/2023 Comment   Final    Reviewed by: Kath High MD, Pathologist   • WBC 01/30/2023 16.0 (H)  3.4 - 10.8 x10E3/uL Final   • RBC 01/30/2023 4.83  3.77 - 5.28 x10E6/uL Final   • Hemoglobin 01/30/2023 13.3  11.1 - 15.9 g/dL Final   • Hematocrit 01/30/2023 41.6  34.0 - 46.6 % Final   • MCV 01/30/2023 86  79 - 97 fL Final   • MCH 01/30/2023 27.5  26.6 - 33.0 pg Final   • MCHC 01/30/2023 32.0  31.5 - 35.7 g/dL Final   • RDW 01/30/2023 13.2  11.7 - 15.4 % Final   • Platelets 01/30/2023 432  150 - 450 x10E3/uL Final   • Neutrophil Rel % 01/30/2023 50  Not Estab. % Final   • Lymphocyte Rel % 01/30/2023 40  Not Estab. % Final   • Monocyte Rel % 01/30/2023 6  Not Estab. % Final   • Eosinophil Rel % 01/30/2023 2  Not Estab. % Final   • Basophil Rel % 01/30/2023 1  Not Estab. % Final   • Neutrophils Absolute 01/30/2023 8.1 (H)  1.4 - 7.0 x10E3/uL Final   • Lymphocytes Absolute 01/30/2023 6.4 (H)  0.7 - 3.1 x10E3/uL Final   • Monocytes Absolute 01/30/2023 1.0 (H)  0.1 - 0.9 x10E3/uL Final   • Eosinophils Absolute 01/30/2023 0.3  0.0 - 0.4 x10E3/uL Final   • Basophils Absolute 01/30/2023 0.1  0.0 - 0.2 x10E3/uL Final   • Immature Granulocyte Rel % 01/30/2023 1  Not Estab. % Final   • Immature Grans Absolute 01/30/2023 0.1  0.0 - 0.1 x10E3/uL Final   • IgG 01/30/2023 801  586 - 1,602 mg/dL Final   • IgA 01/30/2023 142  87 - 352 mg/dL Final   • IgM 01/30/2023 60  26 - 217 mg/dL Final   • Total Protein 01/30/2023 6.5  6.0 - 8.5 g/dL Final   • Albumin 01/30/2023 3.6  2.9 - 4.4 g/dL Final   • Alpha-1-Globulin 01/30/2023 0.3  0.0 - 0.4 g/dL Final   • Alpha-2-Globulin 01/30/2023 0.8  0.4 - 1.0 g/dL Final   • Beta Globulin 01/30/2023 1.1  0.7 - 1.3 g/dL Final   • Gamma Globulin 01/30/2023 0.8  0.4 - 1.8 g/dL Final   • M-Neptali 01/30/2023 Not Observed  Not Observed g/dL Final   • Globulin 01/30/2023 2.9  2.2 - 3.9 g/dL Final   • A/G Ratio 01/30/2023 1.3  0.7 - 1.7 Final   • Immunofixation Reflex, Serum  01/30/2023 Comment   Final    No monoclonality detected.   • Please note 01/30/2023 Comment   Final    Comment: Protein electrophoresis scan will follow via computer, mail, or   delivery.     • Free Light Chain, Kappa 01/30/2023 20.8 (H)  3.3 - 19.4 mg/L Final   • Free Lambda Light Chains 01/30/2023 14.9  5.7 - 26.3 mg/L Final   • Kappa/Lambda Ratio 01/30/2023 1.40  0.26 - 1.65 Final       CT Chest Low Dose Cancer Screening WO    Result Date: 3/10/2023  Narrative: CT CHEST LOW DOSE SCREENING  PROCEDURE: CT CHEST LOW DOSE CANCER SCREENING WO-  DOSE:  CTDIvol (mGy): 0.84 mGy     DLP (mGycm): 30.06 mGy.cm  HISTORY: Lung cancer screening, >= 30 pk-yr current smoker (Age 55-80y); Z87.891-Personal history of nicotine dependence  SMOKING STATUS:  Current         PACK YEARS:  59  COMPARISON: None.  TECHNIQUE: Low-dose axial CT without IV contrast administration. Multiplanar reconstruction images were obtained from the axial data.  FINDINGS:  No acute lung disease is present.There is evidence of prior granulomatous disease.  The lungs are otherwise clear, without suspicious pulmonary nodule or mass. No consolidation.  No pleural or pericardial effusion is seen. No adenopathy or mass lesion is present. Limited evaluation of the upper abdomen is without acute abnormality.      Impression: 1. No evidence of primary lung neoplasm  LUNG RADS CATEGORY: 1, negative  RECOMMENDATION:LDCT in 12 months     30.06 mGy.cm    This study was performed with techniques to keep radiation doses as low as reasonably achievable (ALARA). Individualized dose reduction techniques using automated exposure control or adjustment of mA and/or kV according to the patient size were employed.  This report was signed and finalized on 3/10/2023 9:21 AM by Antonella Hayes MD.    Component      Latest Ref Rng & Units 1/20/2023 1/30/2023 1/30/2023 1/30/2023           10:02 AM 10:02 AM 10:02 AM   WBC       12.2 (H)  Comment 16.0 (H)   RBC       4.88   Comment 4.83   Platelets       518 (H)  Comment 432   Comment         Comment    Pathologist Review         Comment    Hemoglobin      11.1 - 15.9 g/dL 13.7  13.3    Hematocrit      34.0 - 46.6 % 41.8  41.6    MCV      79 - 97 fL 86  86    MCH      26.6 - 33.0 pg 28.1  27.5    MCHC      31.5 - 35.7 g/dL 32.8  32.0    RDW      11.7 - 15.4 % 13.1  13.2    Neutrophil Rel %      Not Estab. %   50    Lymphocyte Rel %      Not Estab. %   40    Monocyte Rel %      Not Estab. %   6    Eosinophil Rel %      Not Estab. %   2    Basophil Rel %      Not Estab. %   1    Neutrophils Absolute      1.4 - 7.0 x10E3/uL   8.1 (H)    Lymphocytes Absolute      0.7 - 3.1 x10E3/uL   6.4 (H)    Monocytes Absolute      0.1 - 0.9 x10E3/uL   1.0 (H)    Eosinophils Absolute      0.0 - 0.4 x10E3/uL   0.3    Basophils Absolute      0.0 - 0.2 x10E3/uL   0.1    Immature Granulocyte Rel %      Not Estab. %   1    Immature Grans, Absolute      0.0 - 0.1 x10E3/uL   0.1    Glucose      70 - 99 mg/dL 262 (H)      BUN      6 - 24 mg/dL 9      Creatinine      0.57 - 1.00 mg/dL 0.91      EGFR Result      >59 mL/min/1.73 73      BUN/Creatinine Ratio      9 - 23 10      Sodium      134 - 144 mmol/L 141      Potassium      3.5 - 5.2 mmol/L 5.2      Chloride      96 - 106 mmol/L 102      CO2      20 - 29 mmol/L 23      Calcium      8.7 - 10.2 mg/dL 9.9      Total Protein      6.0 - 8.5 g/dL 6.8 6.5     Albumin      2.9 - 4.4 g/dL 4.4 3.6     Globulin      2.2 - 3.9 g/dL 2.4 2.9     A/G Ratio      0.7 - 1.7 1.8 1.3     Total Bilirubin      0.0 - 1.2 mg/dL <0.2      Alkaline Phosphatase      44 - 121 IU/L 87      AST (SGOT)      0 - 40 IU/L 6      ALT (SGPT)      0 - 32 IU/L 5      IgG      586 - 1,602 mg/dL  801     IgA      87 - 352 mg/dL  142     IgM      26 - 217 mg/dL  60     Alpha-1-Globulin      0.0 - 0.4 g/dL  0.3     Alpha-2-Globulin      0.4 - 1.0 g/dL  0.8     Beta Globulin      0.7 - 1.3 g/dL  1.1     Gamma Globulin      0.4 - 1.8 g/dL  0.8      M-Neptali      Not Observed g/dL  Not Observed     Immunofixation Reflex, Serum        Comment     Please note        Comment     Kappa FLC      3.3 - 19.4 mg/L  20.8 (H)     Free Lambda Light Chains      5.7 - 26.3 mg/L  14.9     Kappa/Lambda Ratio      0.26 - 1.65  1.40     Total Cholesterol      100 - 199 mg/dL 201 (H)      Triglycerides      0 - 149 mg/dL 159 (H)      HDL Cholesterol      >39 mg/dL 35 (L)      VLDL Cholesterol Gene      5 - 40 mg/dL 29      LDL Cholesterol       0 - 99 mg/dL 137 (H)      TIBC      250 - 450 ug/dL 366      UIBC      131 - 425 ug/dL 273      Iron      27 - 159 ug/dL 93      Iron Saturation      15 - 55 % 25      TSH Baseline      0.450 - 4.500 uIU/mL 1.990      Free T4      0.82 - 1.77 ng/dL 1.26      Hemoglobin A1C      4.8 - 5.6 % 9.1 (H)      25 Hydroxy, Vitamin D      30.0 - 100.0 ng/mL 21.4 (L)      Vitamin B-12      232 - 1,245 pg/mL 720      Folate      >3.0 ng/mL 9.4      Ferritin      15 - 150 ng/mL 20      Vitamin B6      3.4 - 65.2 ug/L 3.8      Vitamin B1, Whole Blood      66.5 - 200.0 nmol/L 144.6      Sed Rate      0 - 40 mm/hr 6      Interpretation       Comment        Component      Latest Ref Rng & Units 4/15/2016 5/9/2017 12/20/2018 2/4/2021   WBC      3.4 - 10.8 x10E3/uL 10.78 13.22 (H) 10.33 11.68 (H)   RBC      3.77 - 5.28 x10E6/uL 4.64 4.56 4.98 4.88   Hemoglobin      11.1 - 15.9 g/dL 13.6 13.3 14.3 13.8   Hematocrit      34.0 - 46.6 % 41.0 40.6 43.8 42.3   MCV      79 - 97 fL 88.4 89.0 88.0 86.7   MCH      26.6 - 33.0 pg 29.3 29.2 28.7 28.3   MCHC      31.5 - 35.7 g/dL 33.2 32.8 32.6 32.6   RDW      11.7 - 15.4 % 13.7 12.9 12.9 13.3   RDW-SD      37.0 - 54.0 fl    41.2   MPV      6.0 - 10.0 fL    10.2   Platelets      150 - 450 x10E3/uL 361 397 397 425   Neutrophil Rel %      % 47.2 35.6 (L) 49.9    Lymphocyte Rel %      % 44.2 (H) 53.7 (H) 39.9    Monocyte Rel %      % 4.5 5.1 5.6    Eosinophil Rel %      % 3.3 (H) 4.6 3.8    Basophil Rel %      % 0.7 0.8  0.6    Immature Granulocyte Rel %      0.0 - 5.0 % 0.1 0.2 0.2    Neutrophils Absolute      2.0 - 7.5 K/uL 5.07 4.70 5.16    Lymphocytes Absolute      1.5 - 4.0 K/uL 4.77 7.10 (H) 4.12 (H)    Monocytes Absolute      0.2 - 0.8 K/uL 0.49 0.68 0.58    Eosinophils Absolute      0.0 - 0.4 K/uL 0.36 (H) 0.61 0.39    Basophils Absolute      0.0 - 0.1 K/uL 0.08 0.10 0.06    Immature Grans, Absolute      K/uL  0.03 0.02    nRBC      0.0 - 0.2 /100 WBC 0.0 0.0 0.0      Component      Latest Ref Rng & Units 5/6/2021 8/23/2021 9/14/2022 1/20/2023   WBC      3.4 - 10.8 x10E3/uL 12.10 (H) 12.35 (H) 11.1 (H) 12.2 (H)   RBC      3.77 - 5.28 x10E6/uL 4.37 4.70 4.50 4.88   Hemoglobin      11.1 - 15.9 g/dL 12.2 13.4 12.7 13.7   Hematocrit      34.0 - 46.6 % 37.3 40.8 39.0 41.8   MCV      79 - 97 fL 85.4 86.8 86.7 86   MCH      26.6 - 33.0 pg 27.9 28.5 28.2 28.1   MCHC      31.5 - 35.7 g/dL 32.7 32.8 32.6 32.8   RDW      11.7 - 15.4 % 13.1 13.3 14.1 13.1   RDW-SD      37.0 - 54.0 fl 40.8      MPV      6.0 - 10.0 fL 10.2  9.1    Platelets      150 - 450 x10E3/uL 216 389 515 (H) 518 (H)   Neutrophil Rel %      % 65.1  56.1    Lymphocyte Rel %      % 23.6  33.4    Monocyte Rel %      % 10.2  6.3    Eosinophil Rel %      % 0.4  3.3    Basophil Rel %      % 0.3  0.5    Immature Granulocyte Rel %      0.0 - 5.0 % 0.4  0.4    Neutrophils Absolute      2.0 - 7.5 K/uL 7.88 (H)  6.2    Lymphocytes Absolute      1.5 - 4.0 K/uL 2.85  3.7    Monocytes Absolute      0.2 - 0.8 K/uL 1.23 (H)  0.7    Eosinophils Absolute      0.0 - 0.4 K/uL 0.05  0.4    Basophils Absolute      0.0 - 0.1 K/uL 0.04  0.1    Immature Grans, Absolute      K/uL 0.05  0.0    nRBC      0.0 - 0.2 /100 WBC 0.0      Comment: Neutrophilic leukocytosis and mild lymphocytosis. Cells are   mature. Reactive changes are present.   Component  Ref Range & Units 1 mo ago   Ferritin  15 - 150 ng/mL 20    Resulting Agency [facility]        Component  Ref Range & Units 1 mo ago   TIBC  250 -  450 ug/dL 366    UIBC  131 - 425 ug/dL 273    Iron  27 - 159 ug/dL 93    Iron Saturation  15 - 55 % 25      Component  Ref Range & Units 1 mo ago  (1/30/23) 1 mo ago  (1/20/23) 1 yr ago  (8/23/21) 1 yr ago  (5/6/21) 2 yr ago  (2/4/21) 4 yr ago  (12/20/18) 5 yr ago  (5/9/17)   IgG  586 - 1,602 mg/dL 801          IgA  87 - 352 mg/dL 142          IgM  26 - 217 mg/dL 60          Total Protein  6.0 - 8.5 g/dL 6.5  6.8  6.4  6.3  6.4  6.9 R  6.8 R    Albumin  2.9 - 4.4 g/dL 3.6  4.4 R  4.00 R  3.50 R  4.10 R  4.40 R  4.10 R    Alpha-1-Globulin  0.0 - 0.4 g/dL 0.3          Alpha-2-Globulin  0.4 - 1.0 g/dL 0.8          Beta Globulin  0.7 - 1.3 g/dL 1.1          Gamma Globulin  0.4 - 1.8 g/dL 0.8          M-Neptali  Not Observed g/dL Not Observed          Globulin  2.2 - 3.9 g/dL 2.9  2.4 R  2.4 R    2.5 R  2.7 R    A/G Ratio  0.7 - 1.7 1.3  1.8 R  1.7 R  1.3 R  1.8 R  1.8 R  1.5 R    Immunofixation Reflex, Serum Comment          Comment: No monoclonality detected.   Please note Comment          Comment: Protein electrophoresis scan will follow via computer, mail, or    delivery.    Free Light Chain, Kappa  3.3 - 19.4 mg/L 20.8 High           Free Lambda Light Chains  5.7 - 26.3 mg/L 14.9          Kappa/Lambda Ratio  0.26 - 1.65 1.40          Resulting Agency [facility]             Assessment / Plan      Assessment/Plan:   1.  Mild leukocytosis  We discussed the potential causes of an elevated white count including inflammation, infection, or bone marrow disease.  Cigarette use can cause a chronic inflammation and is commonly associated with a chronic elevation of the white count.  Chronic inflammatory diseases such as rheumatoid arthritis or other autoimmune diseases can cause a leukocytosis.  Bone marrow diseases are less common but include myeloproliferative disorders like chronic myelogenous leukemia or polycythemia vera.  -I reviewed her available CBCs dating back several years, and this is a intermittent, albeit  relatively frequent finding.  Her blood smear had no concerning features.  Most recent CBC was after treatment for bronchitis.  We will start with repeating a CBC.  If it is persistently elevated we can obtain additional blood work on her next routine labs.    2.  Free light chain MGUS   -We reviewed the diagnosis of MGUS including the incidence, prognosis, and possibility of transformation to a bloodstream malignancy such as myeloma. I did reassure him that transformation to symptomatic MM is rare and rate of transformation in the MGUS population can be estimated at 1% per year.   -I recommended a full laboratory panel to confirm the diagnosis of MGUS, including immunofixation and baseline urinary studies which we will obtain in 6 months assuming that the asymmetric light chain elevation persists.  If free light chains normalized, no further work-up is indicated.  -She currently has no myeloma defining features such as hypercalcemia, kidney disease, known bone lesions or anemia.    3.  Personal and family history of malignancy  -I recommended that she proceed with a baseline mammogram, colonoscopy or Cologuard testing, and screening lung CT.  She currently declines but will let me know if she reconsiders.  In the context of her borderline ferritin, she is amenable to fecal occult blood testing, and states she may reconsider if blood is identified on her pending testing.  -I also offered her genetic counseling appointment but she declined.      Follow Up:   6 mo      Abby Peng MD  Hematology and Oncology     Time spent on the day of service was 45 minutes inclusive of time before, during, and after office visit on record review, medically appropriate history and physical, counseling patient, ordering tests, documenting in the medical record, and communicating with referring provider.

## 2023-03-21 LAB
ALBUMIN SERPL ELPH-MCNC: 3.8 G/DL (ref 2.9–4.4)
ALBUMIN/GLOB SERPL: 1.4 {RATIO} (ref 0.7–1.7)
ALPHA1 GLOB SERPL ELPH-MCNC: 0.3 G/DL (ref 0–0.4)
ALPHA2 GLOB SERPL ELPH-MCNC: 0.9 G/DL (ref 0.4–1)
B-GLOBULIN SERPL ELPH-MCNC: 1 G/DL (ref 0.7–1.3)
GAMMA GLOB SERPL ELPH-MCNC: 0.7 G/DL (ref 0.4–1.8)
GLOBULIN SER-MCNC: 2.9 G/DL (ref 2.2–3.9)
IGA SERPL-MCNC: 142 MG/DL (ref 87–352)
IGG SERPL-MCNC: 818 MG/DL (ref 586–1602)
IGM SERPL-MCNC: 60 MG/DL (ref 26–217)
INTERPRETATION SERPL IEP-IMP: ABNORMAL
KAPPA LC FREE SER-MCNC: 21.4 MG/L (ref 3.3–19.4)
KAPPA LC FREE/LAMBDA FREE SER: 1.53 {RATIO} (ref 0.26–1.65)
LABORATORY COMMENT REPORT: ABNORMAL
LAMBDA LC FREE SERPL-MCNC: 14 MG/L (ref 5.7–26.3)
M PROTEIN SERPL ELPH-MCNC: ABNORMAL G/DL
PROT SERPL-MCNC: 6.7 G/DL (ref 6–8.5)

## 2023-03-23 DIAGNOSIS — E55.9 VITAMIN D DEFICIENCY: ICD-10-CM

## 2023-03-23 RX ORDER — CHOLECALCIFEROL (VITAMIN D3) 1250 MCG
50000 CAPSULE ORAL
Qty: 12 CAPSULE | Refills: 0 | Status: SHIPPED | OUTPATIENT
Start: 2023-03-23

## 2023-03-23 NOTE — TELEPHONE ENCOUNTER
Caller: Kimberly Chun    Relationship: Self    Best call back number: [phone]    Requested Prescriptions:   Requested Prescriptions     Pending Prescriptions Disp Refills   • Cholecalciferol (Vitamin D3) 1.25 MG (26896 UT) capsule 12 capsule 0     Sig: Take 1 capsule by mouth Every 7 (Seven) Days.        Pharmacy where request should be sent: [facility] [address] - [phone]  - [phone]      Last office visit with prescribing clinician: 2/7/2023   Last telemedicine visit with prescribing clinician: 5/1/2023   Next office visit with prescribing clinician: 5/1/2023     Additional details provided by patient: PATIENT IS COMPLETELY OUT OF THIS MEDICATION.    Does the patient have less than a 3 day supply:  [x] Yes  [] No    Would you like a call back once the refill request has been completed: [] Yes [x] No    If the office needs to give you a call back, can they leave a voicemail: [] Yes [x] No    Shantal Olivera Rep   03/23/23 14:49 EDT

## 2023-03-28 PROCEDURE — 84166 PROTEIN E-PHORESIS/URINE/CSF: CPT

## 2023-03-28 PROCEDURE — 81050 URINALYSIS VOLUME MEASURE: CPT

## 2023-03-28 PROCEDURE — 84156 ASSAY OF PROTEIN URINE: CPT

## 2023-03-28 PROCEDURE — 83521 IG LIGHT CHAINS FREE EACH: CPT

## 2023-03-28 PROCEDURE — 86335 IMMUNFIX E-PHORSIS/URINE/CSF: CPT

## 2023-03-29 LAB
HEMOCCULT STL QL: NEGATIVE
HEMOCCULT STL QL: NEGATIVE

## 2023-03-29 PROCEDURE — 82270 OCCULT BLOOD FECES: CPT

## 2023-03-29 PROCEDURE — 82270 OCCULT BLOOD FECES: CPT | Performed by: INTERNAL MEDICINE

## 2023-03-30 DIAGNOSIS — Z79.4 TYPE 2 DIABETES MELLITUS WITH DIABETIC POLYNEUROPATHY, WITH LONG-TERM CURRENT USE OF INSULIN: ICD-10-CM

## 2023-03-30 DIAGNOSIS — I67.2 CEREBRAL ATHEROSCLEROSIS: ICD-10-CM

## 2023-03-30 DIAGNOSIS — K29.70 GASTRITIS DETERMINED BY BIOPSY: Primary | ICD-10-CM

## 2023-03-30 DIAGNOSIS — Z79.4 TYPE 2 DIABETES MELLITUS WITH HYPERGLYCEMIA, WITH LONG-TERM CURRENT USE OF INSULIN: ICD-10-CM

## 2023-03-30 DIAGNOSIS — E11.65 TYPE 2 DIABETES MELLITUS WITH HYPERGLYCEMIA, WITH LONG-TERM CURRENT USE OF INSULIN: ICD-10-CM

## 2023-03-30 DIAGNOSIS — E11.42 TYPE 2 DIABETES MELLITUS WITH DIABETIC POLYNEUROPATHY, WITH LONG-TERM CURRENT USE OF INSULIN: ICD-10-CM

## 2023-03-30 DIAGNOSIS — K21.9 GASTROESOPHAGEAL REFLUX DISEASE, UNSPECIFIED WHETHER ESOPHAGITIS PRESENT: ICD-10-CM

## 2023-03-30 RX ORDER — SITAGLIPTIN 100 MG/1
TABLET, FILM COATED ORAL
Qty: 90 TABLET | Refills: 3 | Status: SHIPPED | OUTPATIENT
Start: 2023-03-30

## 2023-03-30 RX ORDER — PANTOPRAZOLE SODIUM 40 MG/1
40 TABLET, DELAYED RELEASE ORAL DAILY
Qty: 90 TABLET | Refills: 3 | Status: SHIPPED | OUTPATIENT
Start: 2023-03-30

## 2023-03-30 RX ORDER — CLOPIDOGREL BISULFATE 75 MG/1
TABLET ORAL
Qty: 90 TABLET | Refills: 3 | Status: SHIPPED | OUTPATIENT
Start: 2023-03-30

## 2023-03-30 NOTE — TELEPHONE ENCOUNTER
Rx Refill Note  Requested Prescriptions     Pending Prescriptions Disp Refills   • Januvia 100 MG tablet [Pharmacy Med Name: JANUVIA 100 MG Tablet] 90 tablet 3     Sig: TAKE 1 TABLET EVERY DAY FOR TYPE 2 DIABETES   • clopidogrel (PLAVIX) 75 MG tablet [Pharmacy Med Name: CLOPIDOGREL 75 MG Tablet] 90 tablet 3     Sig: TAKE 1 TABLET EVERY DAY      Last office visit with prescribing clinician: 2/7/2023   Last telemedicine visit with prescribing clinician:   Next office visit with prescribing clinician: 5/1/2023         Loretta Reardon MA  03/30/23, 08:46 EDT

## 2023-03-30 NOTE — TELEPHONE ENCOUNTER
Potential interaction between Plavix and omeprazole. Needs to take a different med for heartburn. I sent pantoprazole 40 mg to take once a day. Stop omeprazole when she receives it.

## 2023-03-31 LAB
ALBUMIN 24H MFR UR ELPH: 34.9 %
ALPHA1 GLOB 24H MFR UR ELPH: 7.1 %
ALPHA2 GLOB 24H MFR UR ELPH: 16.2 %
B-GLOBULIN MFR UR ELPH: 26.2 %
GAMMA GLOB 24H MFR UR ELPH: 15.7 %
HIV 1 & 2 AB SER-IMP: ABNORMAL
INTERPRETATION UR IFE-IMP: ABNORMAL
KAPPA LC FREE 24H UR-MRATE: 19.42 MG/24 HR
KAPPA LC FREE UR-MCNC: 8.16 MG/L (ref 1.17–86.46)
KAPPA LC FREE/LAMBDA FREE UR: >11.83 (ref 1.83–14.26)
LAMBDA LC FREE 24H UR-MRATE: <1.64 MG/24 HR
LAMBDA LC FREE UR-MCNC: <0.69 MG/L (ref 0.27–15.21)
M PROTEIN 24H MFR UR ELPH: ABNORMAL %
PROT 24H UR-MRATE: 493 MG/24 HR (ref 30–150)
PROT UR-MCNC: 20.7 MG/DL

## 2023-04-12 DIAGNOSIS — E11.65 TYPE 2 DIABETES MELLITUS WITH HYPERGLYCEMIA, WITH LONG-TERM CURRENT USE OF INSULIN: ICD-10-CM

## 2023-04-12 DIAGNOSIS — E11.42 TYPE 2 DIABETES MELLITUS WITH DIABETIC POLYNEUROPATHY, WITH LONG-TERM CURRENT USE OF INSULIN: ICD-10-CM

## 2023-04-12 DIAGNOSIS — Z79.4 TYPE 2 DIABETES MELLITUS WITH DIABETIC POLYNEUROPATHY, WITH LONG-TERM CURRENT USE OF INSULIN: ICD-10-CM

## 2023-04-12 DIAGNOSIS — Z79.4 TYPE 2 DIABETES MELLITUS WITH HYPERGLYCEMIA, WITH LONG-TERM CURRENT USE OF INSULIN: ICD-10-CM

## 2023-04-17 ENCOUNTER — TELEPHONE (OUTPATIENT)
Dept: INTERNAL MEDICINE | Facility: CLINIC | Age: 58
End: 2023-04-17

## 2023-04-17 NOTE — TELEPHONE ENCOUNTER
Caller: Kimberly Chun    Relationship: Self    Best call back number: [phone]    What medications are you currently taking:   Current Outpatient Medications on File Prior to Visit   Medication Sig Dispense Refill   • Cholecalciferol (Vitamin D3) 1.25 MG (72461 UT) capsule Take 1 capsule by mouth Every 7 (Seven) Days. 12 capsule 0   • albuterol (PROVENTIL) (2.5 MG/3ML) 0.083% nebulizer solution Take 2.5 mg by nebulization Every 6 (Six) Hours As Needed for Wheezing or Shortness of Air. 180 each 3   • albuterol sulfate HFA (ProAir HFA) 108 (90 Base) MCG/ACT inhaler Inhale 2 puffs Every 6 (Six) Hours As Needed for Wheezing or Shortness of Air. 54 g 3   • Alcohol Swabs (Alcohol Pads) 70 % pads 1 pad 4 (Four) Times a Day. Check sugars fasting and 2 hours after meals. 100 each 12   • aspirin 81 MG EC tablet Take 1 tablet by mouth Daily. 90 tablet 3   • B-D ULTRA-FINE 33 LANCETS misc 1 Units 4 (Four) Times a Day. Check sugars fasting and 2 hours after meals. 100 each 12   • Breo Ellipta 200-25 MCG/INH inhaler Inhale 1 puff Daily. 90 each 3   • clopidogrel (PLAVIX) 75 MG tablet TAKE 1 TABLET EVERY DAY 90 tablet 3   • Continuous Blood Gluc Sensor (FreeStyle Andree Sensor System) 1 each Every 14 (Fourteen) Days. 2 each 6   • Cyanocobalamin (VITAMIN B-12 PO) Take  by mouth.     • cyclobenzaprine (FLEXERIL) 10 MG tablet Take 1 tablet by mouth 3 (Three) Times a Day As Needed for Muscle Spasms. 270 tablet 3   • Droplet Pen Needles 32G X 4 MM misc USE TO INJECT INSULIN 4X  each 1   • FOLIC ACID PO Take  by mouth.     • gabapentin (NEURONTIN) 800 MG tablet Take 1 tablet by mouth 3 (Three) Times a Day. Indications: Neuropathic Pain 270 tablet 1   • glucose blood test strip Check sugars fasting and 2 hours after meals (4x a day due to hyperglycemia) 100 each 12   • glucose blood test strip Check sugars fasting and 2 hours after meals. 100 each 12   • glucose monitor monitoring kit 1 each As Needed (diabetes). Check  sugars fasting and 2 hours after meals. 1 each 0   • Gvoke HypoPen 2-Pack 0.5 MG/0.1ML solution auto-injector INJECT 0.5 MG UNDER THE SKIN INTO THE APPROPRIATE AREA AS DIRECTED ONE TIME AS NEEDED FOR HYPOGLYCEMIA 0.2 mL 0   • insulin aspart (NovoLOG FlexPen) 100 UNIT/ML solution pen-injector sc pen Inject 20 Units under the skin into the appropriate area as directed 3 (Three) Times a Day With Meals. 54 mL 3   • Insulin Glargine (Lantus SoloStar) 100 UNIT/ML injection pen Inject 65 Units under the skin into the appropriate area as directed Every Night. 20 pen 3   • Januvia 100 MG tablet TAKE 1 TABLET EVERY DAY FOR TYPE 2 DIABETES 90 tablet 3   • lisinopril (PRINIVIL,ZESTRIL) 5 MG tablet Take 1 tablet by mouth Daily. For kidney protection due to diabetes mellitus 90 tablet 3   • LORazepam (ATIVAN) 1 MG tablet Take 0.5-1 tablets by mouth 2 (Two) Times a Day As Needed (severe anxiety). 180 tablet 1   • metFORMIN (GLUCOPHAGE) 1000 MG tablet TAKE 1 TABLET TWICE A DAY WITH MEALS FOR TYPE 2 DIABETES 180 tablet 3   • montelukast (SINGULAIR) 10 MG tablet Take 1 tablet by mouth Daily.     • oxyCODONE-acetaminophen (Percocet) 5-325 MG per tablet Take 1 tablet by mouth 2 (Two) Times a Day As Needed for Severe Pain. Use sparingly 60 tablet 0   • pantoprazole (Protonix) 40 MG EC tablet Take 1 tablet by mouth Daily. 90 tablet 3   • rosuvastatin (Crestor) 10 MG tablet Take 1 tablet by mouth Every Night. To lower cholesterol and risk of stroke 90 tablet 3   • vitamin B-6 (PYRIDOXINE) 25 MG tablet Take 1 tablet by mouth Daily. 90 tablet 1     No current facility-administered medications on file prior to visit.          When did you start taking these medications:     Which medication are you concerned about: pantoprazole (Protonix) 40 MG EC tablet    Who prescribed you this medication:     What are your concerns: PATIENT STATES SHE TAKES ONE A DAY BUT DOES NOT THINK IT IS WORKING SO SHE WOULD LIKE TO KNOW IF SHE CAN START TAKING 2 A  DAY AND IF NOT WHAT ELSE CAN SHE TAKE     How long have you had these concerns:

## 2023-05-01 ENCOUNTER — OFFICE VISIT (OUTPATIENT)
Dept: INTERNAL MEDICINE | Facility: CLINIC | Age: 58
End: 2023-05-01
Payer: MEDICARE

## 2023-05-01 VITALS
HEART RATE: 85 BPM | BODY MASS INDEX: 32.44 KG/M2 | TEMPERATURE: 98.2 F | OXYGEN SATURATION: 95 % | HEIGHT: 64 IN | RESPIRATION RATE: 16 BRPM | DIASTOLIC BLOOD PRESSURE: 70 MMHG | WEIGHT: 190 LBS | SYSTOLIC BLOOD PRESSURE: 124 MMHG

## 2023-05-01 DIAGNOSIS — F41.9 ANXIETY: ICD-10-CM

## 2023-05-01 DIAGNOSIS — M54.40 CHRONIC MIDLINE LOW BACK PAIN WITH SCIATICA, SCIATICA LATERALITY UNSPECIFIED: ICD-10-CM

## 2023-05-01 DIAGNOSIS — Z79.4 TYPE 2 DIABETES MELLITUS WITH DIABETIC POLYNEUROPATHY, WITH LONG-TERM CURRENT USE OF INSULIN: ICD-10-CM

## 2023-05-01 DIAGNOSIS — Z79.4 TYPE 2 DIABETES MELLITUS WITH HYPERGLYCEMIA, WITH LONG-TERM CURRENT USE OF INSULIN: ICD-10-CM

## 2023-05-01 DIAGNOSIS — G89.29 CHRONIC MIDLINE LOW BACK PAIN WITH SCIATICA, SCIATICA LATERALITY UNSPECIFIED: ICD-10-CM

## 2023-05-01 DIAGNOSIS — C67.9: ICD-10-CM

## 2023-05-01 DIAGNOSIS — K29.70 GASTRITIS DETERMINED BY BIOPSY: ICD-10-CM

## 2023-05-01 DIAGNOSIS — I20.8 ANGINAL EQUIVALENT: ICD-10-CM

## 2023-05-01 DIAGNOSIS — J44.9 CHRONIC OBSTRUCTIVE PULMONARY DISEASE, UNSPECIFIED COPD TYPE: ICD-10-CM

## 2023-05-01 DIAGNOSIS — Z12.31 ENCOUNTER FOR SCREENING MAMMOGRAM FOR BREAST CANCER: ICD-10-CM

## 2023-05-01 DIAGNOSIS — E11.42 TYPE 2 DIABETES MELLITUS WITH DIABETIC POLYNEUROPATHY, WITH LONG-TERM CURRENT USE OF INSULIN: ICD-10-CM

## 2023-05-01 DIAGNOSIS — E11.65 TYPE 2 DIABETES MELLITUS WITH HYPERGLYCEMIA, WITH LONG-TERM CURRENT USE OF INSULIN: ICD-10-CM

## 2023-05-01 DIAGNOSIS — K21.9 GASTROESOPHAGEAL REFLUX DISEASE, UNSPECIFIED WHETHER ESOPHAGITIS PRESENT: ICD-10-CM

## 2023-05-01 DIAGNOSIS — Z72.0 TOBACCO USE: ICD-10-CM

## 2023-05-01 DIAGNOSIS — Z00.00 MEDICARE ANNUAL WELLNESS VISIT, SUBSEQUENT: Primary | ICD-10-CM

## 2023-05-01 LAB
EXPIRATION DATE: NORMAL
HBA1C MFR BLD: 9.1 %
Lab: NORMAL

## 2023-05-01 RX ORDER — BLOOD-GLUCOSE,RECEIVER,CONT
1 EACH MISCELLANEOUS ONCE
Qty: 1 EACH | Refills: 0 | Status: SHIPPED | OUTPATIENT
Start: 2023-05-01 | End: 2023-05-01

## 2023-05-01 RX ORDER — LORAZEPAM 1 MG/1
.5-1 TABLET ORAL 2 TIMES DAILY PRN
Qty: 180 TABLET | Refills: 1 | Status: SHIPPED | OUTPATIENT
Start: 2023-05-01

## 2023-05-01 RX ORDER — BLOOD-GLUCOSE SENSOR
1 EACH MISCELLANEOUS
Qty: 3 EACH | Refills: 11 | Status: SHIPPED | OUTPATIENT
Start: 2023-05-01

## 2023-05-01 RX ORDER — RABEPRAZOLE SODIUM 20 MG/1
20 TABLET, DELAYED RELEASE ORAL DAILY
Qty: 90 TABLET | Refills: 3 | Status: SHIPPED | OUTPATIENT
Start: 2023-05-01

## 2023-05-01 RX ORDER — INSULIN GLARGINE 100 [IU]/ML
70 INJECTION, SOLUTION SUBCUTANEOUS NIGHTLY
Qty: 63 ML | Refills: 3 | Status: SHIPPED | OUTPATIENT
Start: 2023-05-01

## 2023-05-01 NOTE — PROGRESS NOTES
The ABCs of the Annual Wellness Visit  Subsequent Medicare Wellness Visit    Subjective    Kimberly Chun is a 58 y.o. female who presents for a Subsequent Medicare Wellness Visit.    The following portions of the patient's history were reviewed and   updated as appropriate: allergies, current medications, past family history, past medical history, past social history, past surgical history and problem list.    Compared to one year ago, the patient feels her physical   health is the same.    Compared to one year ago, the patient feels her mental   health is the same.    Recent Hospitalizations:  She was not admitted to the hospital during the last year.       Current Medical Providers:  Patient Care Team:  Maria Luz Longoria MD as PCP - General (Family Medicine)  Shelley Curran MD as Consulting Physician (Ophthalmology)  Evert Jones MD (Orthopedic Surgery)    Outpatient Medications Prior to Visit   Medication Sig Dispense Refill   • albuterol (PROVENTIL) (2.5 MG/3ML) 0.083% nebulizer solution Take 2.5 mg by nebulization Every 6 (Six) Hours As Needed for Wheezing or Shortness of Air. 180 each 3   • albuterol sulfate HFA (ProAir HFA) 108 (90 Base) MCG/ACT inhaler Inhale 2 puffs Every 6 (Six) Hours As Needed for Wheezing or Shortness of Air. 54 g 3   • Alcohol Swabs (Alcohol Pads) 70 % pads 1 pad 4 (Four) Times a Day. Check sugars fasting and 2 hours after meals. 100 each 12   • aspirin 81 MG EC tablet Take 1 tablet by mouth Daily. 90 tablet 3   • B-D ULTRA-FINE 33 LANCETS misc 1 Units 4 (Four) Times a Day. Check sugars fasting and 2 hours after meals. 100 each 12   • Breo Ellipta 200-25 MCG/INH inhaler Inhale 1 puff Daily. 90 each 3   • Cholecalciferol (Vitamin D3) 1.25 MG (49782 UT) capsule Take 1 capsule by mouth Every 7 (Seven) Days. 12 capsule 0   • clopidogrel (PLAVIX) 75 MG tablet TAKE 1 TABLET EVERY DAY 90 tablet 3   • Continuous Blood Gluc Sensor (DGTS Andree Sensor System) 1 each  Every 14 (Fourteen) Days. 2 each 6   • Cyanocobalamin (VITAMIN B-12 PO) Take  by mouth.     • cyclobenzaprine (FLEXERIL) 10 MG tablet Take 1 tablet by mouth 3 (Three) Times a Day As Needed for Muscle Spasms. 270 tablet 3   • Droplet Pen Needles 32G X 4 MM misc USE TO INJECT INSULIN 4X  each 1   • FOLIC ACID PO Take  by mouth.     • gabapentin (NEURONTIN) 800 MG tablet Take 1 tablet by mouth 3 (Three) Times a Day. Indications: Neuropathic Pain 270 tablet 1   • glucose blood test strip Check sugars fasting and 2 hours after meals (4x a day due to hyperglycemia) 100 each 12   • glucose blood test strip Check sugars fasting and 2 hours after meals. 100 each 12   • glucose monitor monitoring kit 1 each As Needed (diabetes). Check sugars fasting and 2 hours after meals. 1 each 0   • Gvoke HypoPen 2-Pack 0.5 MG/0.1ML solution auto-injector INJECT 0.5 MG UNDER THE SKIN INTO THE APPROPRIATE AREA AS DIRECTED ONE TIME AS NEEDED FOR HYPOGLYCEMIA 0.2 mL 0   • insulin aspart (NovoLOG FlexPen) 100 UNIT/ML solution pen-injector sc pen Inject 20 Units under the skin into the appropriate area as directed 3 (Three) Times a Day With Meals. 54 mL 3   • Januvia 100 MG tablet TAKE 1 TABLET EVERY DAY FOR TYPE 2 DIABETES 90 tablet 3   • lisinopril (PRINIVIL,ZESTRIL) 5 MG tablet Take 1 tablet by mouth Daily. For kidney protection due to diabetes mellitus 90 tablet 3   • metFORMIN (GLUCOPHAGE) 1000 MG tablet TAKE 1 TABLET TWICE A DAY WITH MEALS FOR TYPE 2 DIABETES 180 tablet 3   • montelukast (SINGULAIR) 10 MG tablet Take 1 tablet by mouth Daily.     • oxyCODONE-acetaminophen (Percocet) 5-325 MG per tablet Take 1 tablet by mouth 2 (Two) Times a Day As Needed for Severe Pain. Use sparingly 60 tablet 0   • rosuvastatin (Crestor) 10 MG tablet Take 1 tablet by mouth Every Night. To lower cholesterol and risk of stroke 90 tablet 3   • vitamin B-6 (PYRIDOXINE) 25 MG tablet Take 1 tablet by mouth Daily. 90 tablet 1   • Insulin Glargine  (Lantus SoloStar) 100 UNIT/ML injection pen Inject 65 Units under the skin into the appropriate area as directed Every Night. 20 pen 3   • LORazepam (ATIVAN) 1 MG tablet Take 0.5-1 tablets by mouth 2 (Two) Times a Day As Needed (severe anxiety). 180 tablet 1   • pantoprazole (Protonix) 40 MG EC tablet Take 1 tablet by mouth Daily. 90 tablet 3     No facility-administered medications prior to visit.       Opioid medication/s are on active medication list.  and I have evaluated her active treatment plan and pain score trends (see table).  Vitals:    05/01/23 1127   PainSc:   5   PainLoc: Back     I have reviewed the chart for potential of high risk medication and harmful drug interactions in the elderly.            Aspirin is on active medication list. Aspirin use is indicated based on review of current medical condition/s. Pros and cons of this therapy have been discussed today. Benefits of this medication outweigh potential harm.  Patient has been encouraged to continue taking this medication.  .      Patient Active Problem List   Diagnosis   • Anxiety   • Cerebral atherosclerosis   • Gastroesophageal reflux disease   • Chronic obstructive pulmonary disease   • Osteoarthritis   • Dyssomnia   • Hematuria   • Primary cancer of bladder   • Tobacco abuse   • Diabetic peripheral neuropathy associated with type 2 diabetes mellitus   • Chronic fatigue   • Gastritis determined by biopsy   • Type 2 diabetes mellitus with diabetic polyneuropathy, with long-term current use of insulin   • Type 2 diabetes mellitus with hyperglycemia, with long-term current use of insulin   • Chronic midline low back pain with sciatica   • Nuclear sclerotic cataract of right eye   • Combined forms of age-related cataract of left eye     Advance Care Planning   Advance Care Planning     Advance Directive is not on file.  ACP discussion was declined by the patient. Patient does not have an advance directive, declines further assistance.    "  Objective    Vitals:    23 1127   BP: 124/70   BP Location: Left arm   Patient Position: Sitting   Cuff Size: Adult   Pulse: 85   Resp: 16   Temp: 98.2 °F (36.8 °C)   TempSrc: Temporal   SpO2: 95%   Weight: 86.2 kg (190 lb)   Height: 162.6 cm (64\")   PainSc:   5   PainLoc: Back     Estimated body mass index is 32.61 kg/m² as calculated from the following:    Height as of this encounter: 162.6 cm (64\").    Weight as of this encounter: 86.2 kg (190 lb).    BMI is >= 30 and <35. (Class 1 Obesity). The following options were offered after discussion;: weight loss educational material (shared in after visit summary)      Does the patient have evidence of cognitive impairment? No    Lab Results   Component Value Date    HGBA1C 9.1 2023        HEALTH RISK ASSESSMENT    Smoking Status:  Social History     Tobacco Use   Smoking Status Every Day   • Packs/day: 1.00   • Years: 40.00   • Pack years: 40.00   • Types: Cigarettes   Smokeless Tobacco Never     Alcohol Consumption:  Social History     Substance and Sexual Activity   Alcohol Use No     Fall Risk Screen:    STEADI Fall Risk Assessment was completed, and patient is at LOW risk for falls.Assessment completed on:2023    Depression Screenin/1/2023    11:25 AM   PHQ-2/PHQ-9 Depression Screening   Little Interest or Pleasure in Doing Things 0-->not at all   Feeling Down, Depressed or Hopeless 0-->not at all   PHQ-9: Brief Depression Severity Measure Score 0       Health Habits and Functional and Cognitive Screenin/1/2023    11:22 AM   Functional & Cognitive Status   Do you have difficulty preparing food and eating? No   Do you have difficulty bathing yourself, getting dressed or grooming yourself? No   Do you have difficulty using the toilet? No   Do you have difficulty moving around from place to place? No   Do you have trouble with steps or getting out of a bed or a chair? No   Current Diet Well Balanced Diet   Dental Exam Up to date "   Eye Exam Up to date   Exercise (times per week) 3 times per week   Current Exercises Include Walking;Other   Do you need help using the phone?  No   Are you deaf or do you have serious difficulty hearing?  No   Do you need help with transportation? No   Do you need help shopping? No   Do you need help preparing meals?  No   Do you need help with housework?  No   Do you need help with laundry? No   Do you need help taking your medications? No   Do you need help managing money? No   Do you ever drive or ride in a car without wearing a seat belt? No   Have you felt unusual stress, anger or loneliness in the last month? No   Who do you live with? Child   If you need help, do you have trouble finding someone available to you? No   Have you been bothered in the last four weeks by sexual problems? No   Do you have difficulty concentrating, remembering or making decisions? No       Age-appropriate Screening Schedule:  Refer to the list below for future screening recommendations based on patient's age, sex and/or medical conditions. Orders for these recommended tests are listed in the plan section. The patient has been provided with a written plan.    Health Maintenance   Topic Date Due   • MAMMOGRAM  08/22/2020   • ANNUAL WELLNESS VISIT  04/18/2023   • DIABETIC FOOT EXAM  04/18/2023   • ZOSTER VACCINE (1 of 2) 05/01/2023 (Originally 4/1/2014)   • COVID-19 Vaccine (4 - Booster for Moderna series) 05/30/2024 (Originally 3/23/2022)   • INFLUENZA VACCINE  08/01/2023   • DIABETIC EYE EXAM  09/26/2023   • URINE MICROALBUMIN  10/20/2023   • HEMOGLOBIN A1C  11/01/2023   • LIPID PANEL  01/20/2024   • LUNG CANCER SCREENING  03/09/2024   • TDAP/TD VACCINES (2 - Td or Tdap) 02/05/2025   • COLORECTAL CANCER SCREENING  08/22/2028   • HEPATITIS C SCREENING  Completed   • Pneumococcal Vaccine 0-64  Completed   • Hepatitis B  Discontinued   • PAP SMEAR  Discontinued                  CMS Preventative Services Quick Reference  Risk Factors  "Identified During Encounter  Chronic Pain: follow up with Dr Jones  Polypharmacy: Medication List reviewed and Medications are appropriate for patient  The above risks/problems have been discussed with the patient.  Pertinent information has been shared with the patient in the After Visit Summary.  An After Visit Summary and PPPS were made available to the patient.    Follow Up:   Next Medicare Wellness visit to be scheduled in 1 year.       Additional E&M Note during same encounter follows:  Patient has multiple medical problems which are significant and separately identifiable that require additional work above and beyond the Medicare Wellness Visit.      Chief Complaint  Medicare Wellness-subsequent (AWV and preventive care ) and Diabetes    Subjective        Labile glucose levels  \"very few 300s\"  Some down to 30s    Some discomfort left side of chest, seems to wrap around from back/shoulder area. Associated with some back pain, history of back surgery. Has seen Dr. Jones for this in past.    Kimberly Adiel is also being seen today for follow up chronic conditions (diabetes mellitus, etc).         Objective   Vital Signs:  /70 (BP Location: Left arm, Patient Position: Sitting, Cuff Size: Adult)   Pulse 85   Temp 98.2 °F (36.8 °C) (Temporal)   Resp 16   Ht 162.6 cm (64\")   Wt 86.2 kg (190 lb)   SpO2 95%   BMI 32.61 kg/m²     Physical Exam  Vitals and nursing note reviewed.   Constitutional:       General: She is not in acute distress.     Appearance: Normal appearance. She is well-developed and well-groomed. She is obese. She is not ill-appearing, toxic-appearing or diaphoretic.   HENT:      Head: Normocephalic and atraumatic.      Right Ear: Hearing normal.      Left Ear: Hearing normal.   Eyes:      General: Lids are normal. No scleral icterus.     Extraocular Movements: Extraocular movements intact.   Neck:      Trachea: Phonation normal.   Pulmonary:      Effort: Pulmonary effort is normal. "   Musculoskeletal:      Cervical back: Neck supple.   Skin:     Coloration: Skin is not jaundiced or pale.   Neurological:      General: No focal deficit present.      Mental Status: She is alert and oriented to person, place, and time.      Motor: Motor function is intact.   Psychiatric:         Attention and Perception: Attention and perception normal.         Mood and Affect: Mood and affect normal.         Speech: Speech normal.         Behavior: Behavior normal. Behavior is cooperative.         Thought Content: Thought content normal.         Cognition and Memory: Cognition and memory normal.         Judgment: Judgment normal.          The following data was reviewed by: Maria Luz Longoria MD on 05/01/2023:  Progress Notes by Abby Peng MD (03/20/2023 10:30)    Lab Results   Component Value Date    HGBA1C 9.1 05/01/2023    HGBA1C 9.1 (H) 01/20/2023    HGBA1C 8.0 10/20/2022                 Assessment and Plan   Diagnoses and all orders for this visit:    1. Medicare annual wellness visit, subsequent (Primary)    2. Type 2 diabetes mellitus with hyperglycemia, with long-term current use of insulin  -     POC Glycosylated Hemoglobin (Hb A1C)  -     Insulin Glargine (Lantus SoloStar) 100 UNIT/ML injection pen; Inject 70 Units under the skin into the appropriate area as directed Every Night.  Dispense: 63 mL; Refill: 3  -     Continuous Blood Gluc  (Dexcom G7 ) device; 1 each 1 (One) Time for 1 dose.  Dispense: 1 each; Refill: 0  -     Continuous Blood Gluc Sensor (Dexcom G7 Sensor) misc; 1 each Every 10 (Ten) Days.  Dispense: 3 each; Refill: 11    3. Type 2 diabetes mellitus with diabetic polyneuropathy, with long-term current use of insulin  -     Insulin Glargine (Lantus SoloStar) 100 UNIT/ML injection pen; Inject 70 Units under the skin into the appropriate area as directed Every Night.  Dispense: 63 mL; Refill: 3  -     Continuous Blood Gluc  (Dexcom G7 ) device; 1 each 1  (One) Time for 1 dose.  Dispense: 1 each; Refill: 0  -     Continuous Blood Gluc Sensor (Dexcom G7 Sensor) misc; 1 each Every 10 (Ten) Days.  Dispense: 3 each; Refill: 11    4. Anginal equivalent  -     Ambulatory Referral to Cardiology    5. Primary cancer of bladder  -     Ambulatory Referral to Urology    6. Gastroesophageal reflux disease, unspecified whether esophagitis present  -     RABEprazole (Aciphex) 20 MG EC tablet; Take 1 tablet by mouth Daily.  Dispense: 90 tablet; Refill: 3    7. Gastritis determined by biopsy  -     RABEprazole (Aciphex) 20 MG EC tablet; Take 1 tablet by mouth Daily.  Dispense: 90 tablet; Refill: 3    8. Chronic obstructive pulmonary disease, unspecified COPD type  Assessment & Plan:  COPD is unchanged.  smoking cessation ideal          9. Tobacco use  Comments:  info via avs    10. Anxiety  -     LORazepam (ATIVAN) 1 MG tablet; Take 0.5-1 tablets by mouth 2 (Two) Times a Day As Needed (severe anxiety).  Dispense: 180 tablet; Refill: 1    11. Chronic midline low back pain with sciatica, sciatica laterality unspecified    12. Encounter for screening mammogram for breast cancer  -     Mammo Screening Digital Tomosynthesis Bilateral With CAD; Future    Chest pain worrisome. Higher risk for cardiac issues with uncontrolled diabetes mellitus, smoker, etc. Encouraged cardiology consult, pt open to this as long as they do not make her do a treadmill stress test (will likely need nuclear stress test).    Encouraged follow up for bladder cancer, hasn't had follow up on this since Dr. Lemos (years).     Heartburn not well controlled. Discouraged pantoprazole 40 mg bid. Discouraged omeprazole plus Plavix use.     Stressed need to see endocrinology as previously recommended.    Needs to follow up with Dr. Jones for back.         Follow Up   Return in about 15 weeks (around 8/14/2023) for Diabetes follow up.  Patient was given instructions and counseling regarding her condition or for health  maintenance advice. Please see specific information pulled into the AVS if appropriate.

## 2023-05-01 NOTE — PATIENT INSTRUCTIONS
Steps to Quit Smoking  Smoking tobacco is the leading cause of preventable death. It can affect almost every organ in the body. Smoking puts you and those around you at risk for developing many serious chronic diseases. Quitting smoking can be difficult, but it is one of the best things that you can do for your health. It is never too late to quit.  How do I get ready to quit?  When you decide to quit smoking, create a plan to help you succeed. Before you quit:  Pick a date to quit. Set a date within the next 2 weeks to give you time to prepare.  Write down the reasons why you are quitting. Keep this list in places where you will see it often.  Tell your family, friends, and co-workers that you are quitting. Support from your loved ones can make quitting easier.  Talk with your health care provider about your options for quitting smoking.  Find out what treatment options are covered by your health insurance.  Identify people, places, things, and activities that make you want to smoke (triggers). Avoid them.  What first steps can I take to quit smoking?  Throw away all cigarettes at home, at work, and in your car.  Throw away smoking accessories, such as ashtrays and lighters.  Clean your car. Make sure to empty the ashtray.  Clean your home, including curtains and carpets.  What strategies can I use to quit smoking?  Talk with your health care provider about combining strategies, such as taking medicines while you are also receiving in-person counseling. Using these two strategies together makes you more likely to succeed in quitting than if you used either strategy on its own.  If you are pregnant or breastfeeding, talk with your health care provider about finding counseling or other support strategies to quit smoking. Do not take medicine to help you quit smoking unless your health care provider tells you to do so.  To quit smoking:  Quit right away  Quit smoking completely, instead of gradually reducing how  much you smoke over a period of time. Research shows that stopping smoking right away is more successful than gradually quitting.  Attend in-person counseling to help you build problem-solving skills. You are more likely to succeed in quitting if you attend counseling sessions regularly. Even short sessions of 10 minutes can be effective.  Take medicine  You may take medicines to help you quit smoking. Some medicines require a prescription and some you can purchase over-the-counter. Medicines may have nicotine in them to replace the nicotine in cigarettes. Medicines may:  Help to stop cravings.  Help to relieve withdrawal symptoms.  Your health care provider may recommend:  Nicotine patches, gum, or lozenges.  Nicotine inhalers or sprays.  Non-nicotine medicine that is taken by mouth.  Find resources  Find resources and support systems that can help you to quit smoking and remain smoke-free after you quit. These resources are most helpful when you use them often. They include:  Online chats with a counselor.  Telephone quitlines.  Printed self-help materials.  Support groups or group counseling.  Text messaging programs.  Mobile phone apps or applications. Use apps that can help you stick to your quit plan by providing reminders, tips, and encouragement. There are many free apps for mobile devices as well as websites. Examples include Quit Guide from the CDC and smokefree.gov  What things can I do to make it easier to quit?    Reach out to your family and friends for support and encouragement. Call telephone quitlines (8-690-QUIT-NOW), reach out to support groups, or work with a counselor for support.  Ask people who smoke to avoid smoking around you.  Avoid places that trigger you to smoke, such as bars, parties, or smoke-break areas at work.  Spend time with people who do not smoke.  Lessen the stress in your life. Stress can be a smoking trigger for some people. To lessen stress, try:  Exercising  regularly.  Doing deep-breathing exercises.  Doing yoga.  Meditating.  Performing a body scan. This involves closing your eyes, scanning your body from head to toe, and noticing which parts of your body are particularly tense. Try to relax the muscles in those areas.  How will I feel when I quit smoking?  Day 1 to 3 weeks  Within the first 24 hours of quitting smoking, you may start to feel withdrawal symptoms. These symptoms are usually most noticeable 2-3 days after quitting, but they usually do not last for more than 2-3 weeks. You may experience these symptoms:  Mood swings.  Restlessness, anxiety, or irritability.  Trouble concentrating.  Dizziness.  Strong cravings for sugary foods and nicotine.  Mild weight gain.  Constipation.  Nausea.  Coughing or a sore throat.  Changes in how the medicines that you take for unrelated issues work in your body.  Depression.  Trouble sleeping (insomnia).  Week 3 and afterward  After the first 2-3 weeks of quitting, you may start to notice more positive results, such as:  Improved sense of smell and taste.  Decreased coughing and sore throat.  Slower heart rate.  Lower blood pressure.  Clearer skin.  The ability to breathe more easily.  Fewer sick days.  Quitting smoking can be very challenging. Do not get discouraged if you are not successful the first time. Some people need to make many attempts to quit before they achieve long-term success. Do your best to stick to your quit plan, and talk with your health care provider if you have any questions or concerns.  Summary  Smoking tobacco is the leading cause of preventable death. Quitting smoking is one of the best things that you can do for your health.  When you decide to quit smoking, create a plan to help you succeed.  Quit smoking right away, not slowly over a period of time.  When you start quitting, seek help from your health care provider, family, or friends.  This information is not intended to replace advice given to  you by your health care provider. Make sure you discuss any questions you have with your health care provider.  Document Revised: 2022 Document Reviewed: 2020  Elsevier Patient Education ©  Elsevier Inc.      Medicare Wellness  Personal Prevention Plan of Service     Date of Office Visit:    Encounter Provider:  Maria Luz Longoria MD  Place of Service:  [facility]  Patient Name: Kimberly Chun  :  1965    As part of the Medicare Wellness portion of your visit today, we are providing you with this personalized preventive plan of services (PPPS). This plan is based upon recommendations of the [place] Preventive Services Task Force (USPSTF) and the Advisory Committee on Immunization Practices (ACIP).    This lists the preventive care services that should be considered, and provides dates of when you are due. Items listed as completed are up-to-date and do not require any further intervention.    Health Maintenance   Topic Date Due    MAMMOGRAM  2020    ANNUAL WELLNESS VISIT  2023    DIABETIC FOOT EXAM  2023    ZOSTER VACCINE (1 of 2) 2023 (Originally 2014)    COVID-19 Vaccine (4 - Booster for Moderna series) 2024 (Originally 3/23/2022)    INFLUENZA VACCINE  2023    DIABETIC EYE EXAM  2023    URINE MICROALBUMIN  10/20/2023    HEMOGLOBIN A1C  2023    LIPID PANEL  2024    LUNG CANCER SCREENING  2024    TDAP/TD VACCINES (2 - Td or Tdap) 2025    COLORECTAL CANCER SCREENING  2028    HEPATITIS C SCREENING  Completed    Pneumococcal Vaccine 0-64  Completed    Hepatitis B  Discontinued    PAP SMEAR  Discontinued       Orders Placed This Encounter   Procedures    Mammo Screening Digital Tomosynthesis Bilateral With CAD     Standing Status:   Future     Standing Expiration Date:   2024     Order Specific Question:   Reason for Exam:     Answer:   screening     Order Specific Question:    Does this patient have a diabetic monitoring/medication delivering device on?     Answer:   No    Ambulatory Referral to Urology     Referral Priority:   Routine     Referral Type:   Consultation     Referral Reason:   Specialty Services Required     Referred to Provider:   Manuel Saavedra MD     Requested Specialty:   Urology     Number of Visits Requested:   1    Ambulatory Referral to Cardiology     Referral Priority:   Routine     Referral Type:   Consultation     Referral Reason:   Specialty Services Required     Referred to Provider:   Ramin Nelson MD     Requested Specialty:   Cardiology     Number of Visits Requested:   1    POC Glycosylated Hemoglobin (Hb A1C)     Order Specific Question:   Release to patient     Answer:   Routine Release       Return in about 15 weeks (around 8/14/2023) for Diabetes follow up.

## 2023-05-10 DIAGNOSIS — Z79.4 TYPE 2 DIABETES MELLITUS WITH HYPERGLYCEMIA, WITH LONG-TERM CURRENT USE OF INSULIN: ICD-10-CM

## 2023-05-10 DIAGNOSIS — Z79.4 TYPE 2 DIABETES MELLITUS WITH DIABETIC POLYNEUROPATHY, WITH LONG-TERM CURRENT USE OF INSULIN: ICD-10-CM

## 2023-05-10 DIAGNOSIS — E11.42 TYPE 2 DIABETES MELLITUS WITH DIABETIC POLYNEUROPATHY, WITH LONG-TERM CURRENT USE OF INSULIN: ICD-10-CM

## 2023-05-10 DIAGNOSIS — E11.65 TYPE 2 DIABETES MELLITUS WITH HYPERGLYCEMIA, WITH LONG-TERM CURRENT USE OF INSULIN: ICD-10-CM

## 2023-05-10 RX ORDER — LISINOPRIL 5 MG/1
TABLET ORAL
Qty: 90 TABLET | Refills: 3 | Status: SHIPPED | OUTPATIENT
Start: 2023-05-10

## 2023-05-10 RX ORDER — INSULIN ASPART 100 [IU]/ML
INJECTION, SOLUTION INTRAVENOUS; SUBCUTANEOUS
Qty: 60 ML | Refills: 1 | Status: SHIPPED | OUTPATIENT
Start: 2023-05-10

## 2023-05-11 ENCOUNTER — OFFICE VISIT (OUTPATIENT)
Dept: UROLOGY | Facility: CLINIC | Age: 58
End: 2023-05-11
Payer: MEDICARE

## 2023-05-11 VITALS
TEMPERATURE: 98.6 F | OXYGEN SATURATION: 98 % | HEIGHT: 64 IN | WEIGHT: 185.8 LBS | HEART RATE: 87 BPM | BODY MASS INDEX: 31.72 KG/M2 | DIASTOLIC BLOOD PRESSURE: 80 MMHG | SYSTOLIC BLOOD PRESSURE: 132 MMHG

## 2023-05-11 DIAGNOSIS — Z85.51 HX OF BLADDER CANCER: Primary | ICD-10-CM

## 2023-05-11 LAB
BILIRUB BLD-MCNC: NEGATIVE MG/DL
CLARITY, POC: CLEAR
COLOR UR: YELLOW
EXPIRATION DATE: NORMAL
GLUCOSE UR STRIP-MCNC: NEGATIVE MG/DL
KETONES UR QL: NEGATIVE
LEUKOCYTE EST, POC: NEGATIVE
Lab: NORMAL
NITRITE UR-MCNC: NEGATIVE MG/ML
PH UR: 6 [PH] (ref 5–8)
PROT UR STRIP-MCNC: NEGATIVE MG/DL
RBC # UR STRIP: NEGATIVE /UL
SP GR UR: 1.01 (ref 1–1.03)
UROBILINOGEN UR QL: NORMAL

## 2023-05-11 NOTE — PROGRESS NOTES
Chief Complaint   Patient presents with   • hx bladder cancer     New patient in office to establish care        HPI  Ms. Chun is a 58 y.o. female with history below in assessment, who presents for follow up.     At this visit here for bladder CA FU    Past Medical History:   Diagnosis Date   • Anxiety    • Arthritis    • Bladder cancer 2016   • Cataract    • COPD (chronic obstructive pulmonary disease)    • COVID-19 vaccine series completed     with booster,  moderna   • Dysphagia    • Fibromyalgia    • Fracture     RIGHT LEG, RIGHT ANKLE, MULTIPLE TOES   • Full dentures    • GERD (gastroesophageal reflux disease)    • Ovarian cyst    • Recurrent urinary tract infection    • Sciatica    • Seasonal allergies    • Statin intolerance    • Stroke 2021   • Type 1 diabetes    • Type 2 diabetes mellitus    • Wears glasses        Past Surgical History:   Procedure Laterality Date   • BACK SURGERY  08/25/2020   • CATARACT EXTRACTION W/ INTRAOCULAR LENS IMPLANT Right 5/6/2022    Procedure: CATARACT PHACO EXTRACTION WITH INTRAOCULAR LENS IMPLANT RIGHT;  Surgeon: Eugene Avilez MD;  Location: [facility];  Service: Ophthalmology;  Laterality: Right;   • CATARACT EXTRACTION W/ INTRAOCULAR LENS IMPLANT Left 6/20/2022    Procedure: CATARACT PHACO EXTRACTION WITH INTRAOCULAR LENS IMPLANT LEFT;  Surgeon: Ayden Obrien MD;  Location: [facility] OR;  Service: Ophthalmology;  Laterality: Left;   • CYSTOSCOPY BLADDER BIOPSY  2016   • ENDOSCOPY N/A 7/3/2017    Procedure: ESOPHAGOGASTRODUODENOSCOPY WITH BIOPSY;  Surgeon: Lindy Bey MD;  Location: [facility] ENDOSCOPY;  Service:    • HYSTERECTOMY     • TONSILLECTOMY           Current Outpatient Medications:   •  albuterol (PROVENTIL) (2.5 MG/3ML) 0.083% nebulizer solution, Take 2.5 mg by nebulization Every 6 (Six) Hours As Needed for Wheezing or Shortness of Air., Disp: 180 each, Rfl: 3  •  albuterol sulfate HFA (ProAir HFA) 108 (90 Base) MCG/ACT inhaler, Inhale 2  puffs Every 6 (Six) Hours As Needed for Wheezing or Shortness of Air., Disp: 54 g, Rfl: 3  •  Alcohol Swabs (Alcohol Pads) 70 % pads, 1 pad 4 (Four) Times a Day. Check sugars fasting and 2 hours after meals., Disp: 100 each, Rfl: 12  •  aspirin 81 MG EC tablet, Take 1 tablet by mouth Daily., Disp: 90 tablet, Rfl: 3  •  B-D ULTRA-FINE 33 LANCETS misc, 1 Units 4 (Four) Times a Day. Check sugars fasting and 2 hours after meals., Disp: 100 each, Rfl: 12  •  Breo Ellipta 200-25 MCG/INH inhaler, Inhale 1 puff Daily., Disp: 90 each, Rfl: 3  •  Cholecalciferol (Vitamin D3) 1.25 MG (84103 UT) capsule, Take 1 capsule by mouth Every 7 (Seven) Days., Disp: 12 capsule, Rfl: 0  •  clopidogrel (PLAVIX) 75 MG tablet, TAKE 1 TABLET EVERY DAY, Disp: 90 tablet, Rfl: 3  •  Continuous Blood Gluc Sensor (FreeStyle Andree Sensor System), 1 each Every 14 (Fourteen) Days., Disp: 2 each, Rfl: 6  •  Cyanocobalamin (VITAMIN B-12 PO), Take  by mouth., Disp: , Rfl:   •  cyclobenzaprine (FLEXERIL) 10 MG tablet, Take 1 tablet by mouth 3 (Three) Times a Day As Needed for Muscle Spasms., Disp: 270 tablet, Rfl: 3  •  Droplet Pen Needles 32G X 4 MM misc, USE TO INJECT INSULIN 4X DAY, Disp: 400 each, Rfl: 1  •  gabapentin (NEURONTIN) 800 MG tablet, Take 1 tablet by mouth 3 (Three) Times a Day. Indications: Neuropathic Pain, Disp: 270 tablet, Rfl: 1  •  glucose blood test strip, Check sugars fasting and 2 hours after meals (4x a day due to hyperglycemia), Disp: 100 each, Rfl: 12  •  glucose blood test strip, Check sugars fasting and 2 hours after meals., Disp: 100 each, Rfl: 12  •  glucose monitor monitoring kit, 1 each As Needed (diabetes). Check sugars fasting and 2 hours after meals., Disp: 1 each, Rfl: 0  •  Insulin Glargine (Lantus SoloStar) 100 UNIT/ML injection pen, Inject 70 Units under the skin into the appropriate area as directed Every Night., Disp: 63 mL, Rfl: 3  •  Januvia 100 MG tablet, TAKE 1 TABLET EVERY DAY FOR TYPE 2 DIABETES, Disp: 90  "tablet, Rfl: 3  •  lisinopril (PRINIVIL,ZESTRIL) 5 MG tablet, TAKE 1 TABLET EVERY DAY FOR KIDNEY PROTECTION DUE TO DIABETES MELLITUS, Disp: 90 tablet, Rfl: 3  •  LORazepam (ATIVAN) 1 MG tablet, Take 0.5-1 tablets by mouth 2 (Two) Times a Day As Needed (severe anxiety)., Disp: 180 tablet, Rfl: 1  •  metFORMIN (GLUCOPHAGE) 1000 MG tablet, TAKE 1 TABLET TWICE A DAY WITH MEALS FOR TYPE 2 DIABETES, Disp: 180 tablet, Rfl: 3  •  montelukast (SINGULAIR) 10 MG tablet, Take 1 tablet by mouth Daily., Disp: , Rfl:   •  NovoLOG FlexPen 100 UNIT/ML solution pen-injector sc pen, INJECT 20 UNITS UNDER THE SKIN INTO THE APPROPRIATE AREA AS DIRECTED 3  TIMES A DAY WITH MEALS., Disp: 60 mL, Rfl: 1  •  oxyCODONE-acetaminophen (Percocet) 5-325 MG per tablet, Take 1 tablet by mouth 2 (Two) Times a Day As Needed for Severe Pain. Use sparingly, Disp: 60 tablet, Rfl: 0  •  RABEprazole (Aciphex) 20 MG EC tablet, Take 1 tablet by mouth Daily., Disp: 90 tablet, Rfl: 3  •  rosuvastatin (Crestor) 10 MG tablet, Take 1 tablet by mouth Every Night. To lower cholesterol and risk of stroke, Disp: 90 tablet, Rfl: 3  •  vitamin B-6 (PYRIDOXINE) 25 MG tablet, Take 1 tablet by mouth Daily., Disp: 90 tablet, Rfl: 1  •  Continuous Blood Gluc Sensor (Dexcom G7 Sensor) misc, 1 each Every 10 (Ten) Days. (Patient not taking: Reported on 5/11/2023), Disp: 3 each, Rfl: 11  •  FOLIC ACID PO, Take  by mouth. (Patient not taking: Reported on 5/11/2023), Disp: , Rfl:   •  Gvoke HypoPen 2-Pack 0.5 MG/0.1ML solution auto-injector, INJECT 0.5 MG UNDER THE SKIN INTO THE APPROPRIATE AREA AS DIRECTED ONE TIME AS NEEDED FOR HYPOGLYCEMIA (Patient not taking: Reported on 5/11/2023), Disp: 0.2 mL, Rfl: 0     Physical Exam  Visit Vitals  /80 (BP Location: Left arm, Patient Position: Sitting, Cuff Size: Adult)   Pulse 87   Temp 98.6 °F (37 °C) (Temporal)   Ht 162.6 cm (64\") Comment: patient stated   Wt 84.3 kg (185 lb 12.8 oz)   SpO2 98%   BMI 31.89 kg/m² "       Labs  Brief Urine Lab Results  (Last result in the past 365 days)      Color   Clarity   Blood   Leuk Est   Nitrite   Protein   CREAT   Urine HCG        10/20/22 1205             100               Lab Results   Component Value Date    GLUCOSE 145 (H) 03/20/2023    CALCIUM 9.8 03/20/2023     03/20/2023    K 4.5 03/20/2023    CO2 20.0 (L) 03/20/2023     03/20/2023    BUN 12 03/20/2023    CREATININE 0.96 03/20/2023    EGFRIFAFRI 70 08/23/2021    EGFRIFNONA 58 (L) 08/23/2021    BCR 12.5 03/20/2023    ANIONGAP 12.0 03/20/2023       Lab Results   Component Value Date    WBC 11.37 (H) 03/20/2023    HGB 13.6 03/20/2023    HCT 40.6 03/20/2023    MCV 83.7 03/20/2023     03/20/2023            Radiographic Studies  CT Chest Low Dose Cancer Screening WO  Result Date: 3/10/2023  1. No evidence of primary lung neoplasm  LUNG RADS CATEGORY: 1, negative  RECOMMENDATION:LDCT in 12 months     30.06 mGy.cm    This study was performed with techniques to keep radiation doses as low as reasonably achievable (ALARA). Individualized dose reduction techniques using automated exposure control or adjustment of mA and/or kV according to the patient size were employed.  This report was signed and finalized on 3/10/2023 9:21 AM by Antonella Hayes MD.      I have reviewed the above labs and imaging.     Assessment  58 y.o. female with reported history of bladder cancer, previously followed with Dr. Lemos.  Last cystoscopy was in 2021.  Last + TUR was in 2016.     Plan  1.  Follow-up for office cystoscopy

## 2023-05-23 ENCOUNTER — TELEPHONE (OUTPATIENT)
Dept: INTERNAL MEDICINE | Facility: CLINIC | Age: 58
End: 2023-05-23
Payer: MEDICARE

## 2023-05-23 DIAGNOSIS — K21.9 GASTROESOPHAGEAL REFLUX DISEASE, UNSPECIFIED WHETHER ESOPHAGITIS PRESENT: Primary | ICD-10-CM

## 2023-05-23 DIAGNOSIS — M25.511 CHRONIC RIGHT SHOULDER PAIN: ICD-10-CM

## 2023-05-23 DIAGNOSIS — G89.29 CHRONIC RIGHT SHOULDER PAIN: ICD-10-CM

## 2023-05-23 RX ORDER — ESOMEPRAZOLE MAGNESIUM 40 MG/1
40 CAPSULE, DELAYED RELEASE ORAL
Qty: 30 CAPSULE | Refills: 2 | Status: SHIPPED | OUTPATIENT
Start: 2023-05-23

## 2023-05-23 RX ORDER — OXYCODONE HYDROCHLORIDE AND ACETAMINOPHEN 5; 325 MG/1; MG/1
1 TABLET ORAL 2 TIMES DAILY PRN
Qty: 90 TABLET | Refills: 0 | Status: SHIPPED | OUTPATIENT
Start: 2023-05-23 | End: 2023-05-24 | Stop reason: SDUPTHER

## 2023-05-23 NOTE — TELEPHONE ENCOUNTER
Patient wanted to see if you would have other options for her to try. Due to PPI and Plavix recommended to not be taken together.

## 2023-05-23 NOTE — TELEPHONE ENCOUNTER
Caller: ChunAlenaKimberly    Relationship: Self    Best call back number: [phone]    Requested Prescriptions:   Requested Prescriptions     Pending Prescriptions Disp Refills   • oxyCODONE-acetaminophen (Percocet) 5-325 MG per tablet 60 tablet 0     Sig: Take 1 tablet by mouth 2 (Two) Times a Day As Needed for Severe Pain. Use sparingly        Pharmacy where request should be sent: [address] - [phone] [facility] [phone]      Last office visit with prescribing clinician: 5/1/2023   Last telemedicine visit with prescribing clinician: Visit date not found   Next office visit with prescribing clinician: 8/14/2023     Additional details provided by patient: PLEASE REFILL OR CALL TO [facility].     Does the patient have less than a 3 day supply:  [x] Yes  [] No    Would you like a call back once the refill request has been completed: [] Yes [x] No    If the office needs to give you a call back, can they leave a voicemail: [] Yes [x] No    Shantal Gross Rep   05/23/23 11:33 EDT     THANK YOU.

## 2023-05-23 NOTE — TELEPHONE ENCOUNTER
Potential interaction with Prilosec and Plavix, makes Plavix less effective. Other PPIs do not interact. Sent Nexium high dose to see how she does on it. 30 day rx, if med helps we can resend as 90 days.

## 2023-05-23 NOTE — TELEPHONE ENCOUNTER
Rx Refill Note  Requested Prescriptions     Pending Prescriptions Disp Refills    oxyCODONE-acetaminophen (Percocet) 5-325 MG per tablet 90 tablet 1     Sig: Take 1 tablet by mouth 2 (Two) Times a Day As Needed for Severe Pain. Use sparingly      Last office visit with prescribing clinician: 5/1/2023   Last telemedicine visit with prescribing clinician: Visit date not found   Next office visit with prescribing clinician: 5/23/2023                         Would you like a call back once the refill request has been completed: [] Yes [] No    If the office needs to give you a call back, can they leave a voicemail: [] Yes [] No    Merlyn Latif MA  05/23/23, 13:37 EDT

## 2023-05-23 NOTE — TELEPHONE ENCOUNTER
Caller: Kimberly Chun    Relationship: Self    Best call back number: [phone]    Which medication are you concerned about: PRILOSEC    Who prescribed you this medication: DR. MANCILLA    What are your concerns: THE PATIENT STATES THAT SHE STARTED BACK ON THE PRILOSEC ABOUT A WEEK AGO. SHE STATES THAT SHE IS FEELING MUCH BETTER SINCE STARTING BACK ON IT.     THE PATIENT WOULD LIKE TO BE ADVISED ON IF THERE ARE ALTERNATIVES SHE COULD BE PRESCRIBED IN PLACE OF PRILOSEC OR PLAVIX.    [address] - [phone] [facility] [phone] FX    PLEASE CALL BACK TO [facility].     THANK YOU.

## 2023-05-24 ENCOUNTER — TELEPHONE (OUTPATIENT)
Dept: INTERNAL MEDICINE | Facility: CLINIC | Age: 58
End: 2023-05-24

## 2023-05-24 DIAGNOSIS — M25.511 CHRONIC RIGHT SHOULDER PAIN: ICD-10-CM

## 2023-05-24 DIAGNOSIS — G89.29 CHRONIC RIGHT SHOULDER PAIN: ICD-10-CM

## 2023-05-24 RX ORDER — OXYCODONE HYDROCHLORIDE AND ACETAMINOPHEN 5; 325 MG/1; MG/1
1 TABLET ORAL 2 TIMES DAILY PRN
Qty: 60 TABLET | Refills: 0 | Status: SHIPPED | OUTPATIENT
Start: 2023-05-24 | End: 2023-08-14 | Stop reason: SDUPTHER

## 2023-06-06 ENCOUNTER — OFFICE VISIT (OUTPATIENT)
Dept: CARDIOLOGY | Facility: CLINIC | Age: 58
End: 2023-06-06
Payer: MEDICARE

## 2023-06-06 VITALS
RESPIRATION RATE: 18 BRPM | OXYGEN SATURATION: 98 % | HEART RATE: 91 BPM | DIASTOLIC BLOOD PRESSURE: 68 MMHG | SYSTOLIC BLOOD PRESSURE: 126 MMHG | WEIGHT: 190 LBS | BODY MASS INDEX: 32.44 KG/M2 | HEIGHT: 64 IN

## 2023-06-06 DIAGNOSIS — Z72.0 TOBACCO ABUSE: ICD-10-CM

## 2023-06-06 DIAGNOSIS — E11.42 TYPE 2 DIABETES MELLITUS WITH DIABETIC POLYNEUROPATHY, WITH LONG-TERM CURRENT USE OF INSULIN: Chronic | ICD-10-CM

## 2023-06-06 DIAGNOSIS — R07.9 CHEST PAIN, UNSPECIFIED TYPE: Primary | ICD-10-CM

## 2023-06-06 DIAGNOSIS — Z79.4 TYPE 2 DIABETES MELLITUS WITH DIABETIC POLYNEUROPATHY, WITH LONG-TERM CURRENT USE OF INSULIN: Chronic | ICD-10-CM

## 2023-06-06 NOTE — PROGRESS NOTES
"     [facility] Cardiology OP Consult Note    Kimberly Chun  [id number]  2023    Referred By: Maria Luz Longoria MD    Chief Complaint: Chest pain    History of Present Illness:   Mrs. Kimberly Chun is a 58 y.o. female who presents to the Cardiology Clinic for evaluation of chest pain.  The patient has a past medical history significant for type 2 diabetes mellitus, GERD, COPD with ongoing tobacco use, and prior CVA.  She does not have any significant past cardiac history.  She reports she underwent a heart cath \"many years ago\" which was reportedly unremarkable at that time.  She presents the cardiology clinic today for evaluation of chest pain.  The patient reports approximately 3 weeks ago she had an episode of chest discomfort located over her left clavicle.  Since that time, the chest pain has been slowly improving.  She denies any associated nausea, vomiting, or diaphoresis.  She denies any significant worsening of her pain with exertion.  She does have exertional dyspnea, which is in the setting of underlying COPD without recent worsening of her dyspnea.  She denies any significant associated palpitations.  No history of orthopnea, PND, or significant lower extremity swelling.  No other specific complaints today.    Past Cardiac Testin. Last Coronary Angio: Remote, reportedly no CAD  2. Prior Stress Testing: Remote, records unavailable  3. Last Echo: None  4. Prior Holter Monitor: None    Review of Systems:   Review of Systems   Constitutional:  Negative for activity change, appetite change, chills, diaphoresis, fatigue, fever, unexpected weight gain and unexpected weight loss.   Eyes:  Negative for blurred vision and double vision.   Respiratory:  Positive for shortness of breath. Negative for cough, chest tightness and wheezing.    Cardiovascular:  Positive for chest pain. Negative for palpitations and leg swelling.   Gastrointestinal:  Negative for abdominal pain, anal " bleeding, blood in stool and GERD.   Endocrine: Negative for cold intolerance and heat intolerance.   Genitourinary:  Negative for hematuria.   Neurological:  Negative for dizziness, syncope, weakness and light-headedness.   Hematological:  Does not bruise/bleed easily.   Psychiatric/Behavioral:  Negative for depressed mood and stress. The patient is not nervous/anxious.      Past Medical History:   Past Medical History:   Diagnosis Date    Anxiety     Arthritis     Bladder cancer 2016    Cataract     COPD (chronic obstructive pulmonary disease)     COVID-19 vaccine series completed     with booster,  moderna    Dysphagia     Fibromyalgia     Fracture     RIGHT LEG, RIGHT ANKLE, MULTIPLE TOES    Full dentures     GERD (gastroesophageal reflux disease)     Ovarian cyst     Recurrent urinary tract infection     Sciatica     Seasonal allergies     Statin intolerance     Stroke 2021    Type 1 diabetes     Type 2 diabetes mellitus     Wears glasses        Past Surgical History:   Past Surgical History:   Procedure Laterality Date    BACK SURGERY  08/25/2020    CATARACT EXTRACTION W/ INTRAOCULAR LENS IMPLANT Right 5/6/2022    Procedure: CATARACT PHACO EXTRACTION WITH INTRAOCULAR LENS IMPLANT RIGHT;  Surgeon: Eugene Avilez MD;  Location: [facility] OR;  Service: Ophthalmology;  Laterality: Right;    CATARACT EXTRACTION W/ INTRAOCULAR LENS IMPLANT Left 6/20/2022    Procedure: CATARACT PHACO EXTRACTION WITH INTRAOCULAR LENS IMPLANT LEFT;  Surgeon: Ayden Obrien MD;  Location: [facility] OR;  Service: Ophthalmology;  Laterality: Left;    CYSTOSCOPY BLADDER BIOPSY  2016    ENDOSCOPY N/A 7/3/2017    Procedure: ESOPHAGOGASTRODUODENOSCOPY WITH BIOPSY;  Surgeon: Lindy Bey MD;  Location: [facility] ENDOSCOPY;  Service:     HYSTERECTOMY      TONSILLECTOMY         Family History:   Family History   Problem Relation Age of Onset    Arthritis Mother     Hypertension Mother     Heart attack Mother     Osteoporosis  Mother     Arthritis Father     Diabetes Father     Hypertension Father     Lung cancer Father     Ovarian cancer Sister     Throat cancer Brother     Breast cancer Paternal Grandmother     Drug abuse Daughter     Stroke Other        Social History:   Social History     Socioeconomic History    Marital status:    Tobacco Use    Smoking status: Every Day     Packs/day: 1.00     Years: 40.00     Pack years: 40.00     Types: Cigarettes     Passive exposure: Past    Smokeless tobacco: Never   Vaping Use    Vaping Use: Never used   Substance and Sexual Activity    Alcohol use: No    Drug use: No    Sexual activity: Defer       Medications:     Current Outpatient Medications:     albuterol (PROVENTIL) (2.5 MG/3ML) 0.083% nebulizer solution, Take 2.5 mg by nebulization Every 6 (Six) Hours As Needed for Wheezing or Shortness of Air., Disp: 180 each, Rfl: 3    albuterol sulfate HFA (ProAir HFA) 108 (90 Base) MCG/ACT inhaler, Inhale 2 puffs Every 6 (Six) Hours As Needed for Wheezing or Shortness of Air., Disp: 54 g, Rfl: 3    Alcohol Swabs (Alcohol Pads) 70 % pads, 1 pad 4 (Four) Times a Day. Check sugars fasting and 2 hours after meals., Disp: 100 each, Rfl: 12    aspirin 81 MG EC tablet, Take 1 tablet by mouth Daily., Disp: 90 tablet, Rfl: 3    B-D ULTRA-FINE 33 LANCETS misc, 1 Units 4 (Four) Times a Day. Check sugars fasting and 2 hours after meals., Disp: 100 each, Rfl: 12    Breo Ellipta 200-25 MCG/INH inhaler, Inhale 1 puff Daily., Disp: 90 each, Rfl: 3    Cholecalciferol (Vitamin D3) 1.25 MG (61471 UT) capsule, Take 1 capsule by mouth Every 7 (Seven) Days., Disp: 12 capsule, Rfl: 0    clopidogrel (PLAVIX) 75 MG tablet, TAKE 1 TABLET EVERY DAY, Disp: 90 tablet, Rfl: 3    Continuous Blood Gluc Sensor (Dexcom G7 Sensor) misc, 1 each Every 10 (Ten) Days., Disp: 3 each, Rfl: 11    Continuous Blood Gluc Sensor (FreeStyle Andree Sensor System), 1 each Every 14 (Fourteen) Days., Disp: 2 each, Rfl: 6    Cyanocobalamin  (VITAMIN B-12 PO), Take  by mouth., Disp: , Rfl:     cyclobenzaprine (FLEXERIL) 10 MG tablet, Take 1 tablet by mouth 3 (Three) Times a Day As Needed for Muscle Spasms., Disp: 270 tablet, Rfl: 3    Droplet Pen Needles 32G X 4 MM misc, USE TO INJECT INSULIN 4X DAY, Disp: 400 each, Rfl: 1    esomeprazole (nexIUM) 40 MG capsule, Take 1 capsule by mouth Every Morning Before Breakfast., Disp: 30 capsule, Rfl: 2    FOLIC ACID PO, Take  by mouth., Disp: , Rfl:     gabapentin (NEURONTIN) 800 MG tablet, Take 1 tablet by mouth 3 (Three) Times a Day. Indications: Neuropathic Pain, Disp: 270 tablet, Rfl: 1    glucose blood test strip, Check sugars fasting and 2 hours after meals (4x a day due to hyperglycemia), Disp: 100 each, Rfl: 12    glucose blood test strip, Check sugars fasting and 2 hours after meals., Disp: 100 each, Rfl: 12    glucose monitor monitoring kit, 1 each As Needed (diabetes). Check sugars fasting and 2 hours after meals., Disp: 1 each, Rfl: 0    Gvoke HypoPen 2-Pack 0.5 MG/0.1ML solution auto-injector, INJECT 0.5 MG UNDER THE SKIN INTO THE APPROPRIATE AREA AS DIRECTED ONE TIME AS NEEDED FOR HYPOGLYCEMIA, Disp: 0.2 mL, Rfl: 0    Insulin Glargine (Lantus SoloStar) 100 UNIT/ML injection pen, Inject 70 Units under the skin into the appropriate area as directed Every Night., Disp: 63 mL, Rfl: 3    Januvia 100 MG tablet, TAKE 1 TABLET EVERY DAY FOR TYPE 2 DIABETES, Disp: 90 tablet, Rfl: 3    lisinopril (PRINIVIL,ZESTRIL) 5 MG tablet, TAKE 1 TABLET EVERY DAY FOR KIDNEY PROTECTION DUE TO DIABETES MELLITUS, Disp: 90 tablet, Rfl: 3    LORazepam (ATIVAN) 1 MG tablet, Take 0.5-1 tablets by mouth 2 (Two) Times a Day As Needed (severe anxiety)., Disp: 180 tablet, Rfl: 1    metFORMIN (GLUCOPHAGE) 1000 MG tablet, TAKE 1 TABLET TWICE A DAY WITH MEALS FOR TYPE 2 DIABETES, Disp: 180 tablet, Rfl: 3    montelukast (SINGULAIR) 10 MG tablet, Take 1 tablet by mouth Daily., Disp: , Rfl:     NovoLOG FlexPen 100 UNIT/ML solution  "pen-injector sc pen, INJECT 20 UNITS UNDER THE SKIN INTO THE APPROPRIATE AREA AS DIRECTED 3  TIMES A DAY WITH MEALS., Disp: 60 mL, Rfl: 1    oxyCODONE-acetaminophen (Percocet) 5-325 MG per tablet, Take 1 tablet by mouth 2 (Two) Times a Day As Needed for Severe Pain. Use sparingly, Disp: 60 tablet, Rfl: 0    rosuvastatin (Crestor) 10 MG tablet, Take 1 tablet by mouth Every Night. To lower cholesterol and risk of stroke, Disp: 90 tablet, Rfl: 3    vitamin B-6 (PYRIDOXINE) 25 MG tablet, Take 1 tablet by mouth Daily., Disp: 90 tablet, Rfl: 1    Allergies:   Allergies   Allergen Reactions    Iodinated Contrast Media Hives    Trulicity [Dulaglutide] GI Intolerance       Physical Exam:  Vital Signs:   Vitals:    06/06/23 1036   BP: 126/68   BP Location: Right arm   Patient Position: Sitting   Pulse: 91   Resp: 18   SpO2: 98%   Weight: 86.2 kg (190 lb)   Height: 162.6 cm (64\")       Physical Exam  Constitutional:       General: She is not in acute distress.     Appearance: Normal appearance. She is well-developed. She is not diaphoretic.   HENT:      Head: Normocephalic and atraumatic.   Eyes:      General: No scleral icterus.     Pupils: Pupils are equal, round, and reactive to light.   Neck:      Trachea: No tracheal deviation.   Cardiovascular:      Rate and Rhythm: Normal rate and regular rhythm.      Heart sounds: Normal heart sounds. No murmur heard.    No friction rub. No gallop.      Comments: Normal JVD.  Pulmonary:      Effort: Pulmonary effort is normal. No respiratory distress.      Breath sounds: Normal breath sounds. No stridor. No wheezing or rales.   Chest:      Chest wall: No tenderness.   Abdominal:      General: Bowel sounds are normal. There is no distension.      Palpations: Abdomen is soft.      Tenderness: There is no abdominal tenderness. There is no guarding or rebound.   Musculoskeletal:         General: No swelling. Normal range of motion.      Cervical back: Neck supple. No tenderness. "   Lymphadenopathy:      Cervical: No cervical adenopathy.   Skin:     General: Skin is warm and dry.      Findings: No erythema.   Neurological:      General: No focal deficit present.      Mental Status: She is alert and oriented to person, place, and time.   Psychiatric:         Mood and Affect: Mood normal.         Behavior: Behavior normal.       Results Review:   I reviewed the patient's new clinical results.  I personally viewed and interpreted the patient's EKG/Telemetry data      ECG 12 Lead    Date/Time: 6/6/2023 10:54 AM  Performed by: Ramin Nelson MD  Authorized by: Ramin Nelson MD   Comparison: compared with previous ECG   Similar to previous ECG  Rhythm: sinus rhythm  Rate: normal  QRS axis: normal    Clinical impression: normal ECG        Assessment / Plan:     1. Chest pain  -- No significant past cardiac history, however the patient has multiple cardiovascular risk factors  -- Remote coronary angiogram reportedly without significant obstructive disease  -- Recent episode of chest pain atypical in character, no association with exertion  -- ECG today she shows no evidence of acute or prior ischemia  -- Given chest pain in the setting of cardiovascular risk factors, will proceed with pharmacologic MPS (inability to exercise) to further rule out ischemia contributing to current symptoms  -- Follow-up as needed, pending results of MPS    2. Type 2 diabetes mellitus   -- Uncontrolled, hemoglobin A1c 9.1% in 5/23  --Management per PCP    3. Tobacco abuse  -- Complete cessation advised        Follow Up:   Return if symptoms worsen or fail to improve.      Thank you for allowing me to participate in the care of your patient. Please to not hesitate to contact me with additional questions or concerns.     DORINA Nelson MD  Interventional Cardiology   06/06/2023  10:37 EDT

## 2023-06-07 ENCOUNTER — PATIENT ROUNDING (BHMG ONLY) (OUTPATIENT)
Dept: CARDIOLOGY | Facility: CLINIC | Age: 58
End: 2023-06-07
Payer: MEDICARE

## 2023-06-07 NOTE — PROGRESS NOTES
[facility] Cardiology welcome email and rounding message has been sent to patient via [employer].

## 2023-06-16 ENCOUNTER — TELEPHONE (OUTPATIENT)
Dept: UROLOGY | Facility: CLINIC | Age: 58
End: 2023-06-16

## 2023-06-16 NOTE — TELEPHONE ENCOUNTER
Hub staff attempted to follow warm transfer process and was unsuccessful     Caller: Kimberly Chun    Relationship to patient: Self    Best call back number: [phone]    Patient is needing: RECEIVED A CALL FROM PT. SHE CALLED TO CANCEL CYSTO/ SUMMER SCHEDULED IS TOO BUSY AND WOULD LIKE TO R/S SOMETIME IN THE FALL. PT STATED SHE WILL CALL US BACK TO R/S CYSTO. THANK YOU.

## 2023-08-14 ENCOUNTER — OFFICE VISIT (OUTPATIENT)
Dept: INTERNAL MEDICINE | Facility: CLINIC | Age: 58
End: 2023-08-14
Payer: MEDICARE

## 2023-08-14 VITALS
DIASTOLIC BLOOD PRESSURE: 78 MMHG | OXYGEN SATURATION: 97 % | TEMPERATURE: 97 F | HEART RATE: 90 BPM | SYSTOLIC BLOOD PRESSURE: 120 MMHG | HEIGHT: 64 IN | RESPIRATION RATE: 16 BRPM | BODY MASS INDEX: 32.71 KG/M2 | WEIGHT: 191.6 LBS

## 2023-08-14 DIAGNOSIS — G89.29 CHRONIC RIGHT SHOULDER PAIN: ICD-10-CM

## 2023-08-14 DIAGNOSIS — E11.42 DIABETIC PERIPHERAL NEUROPATHY ASSOCIATED WITH TYPE 2 DIABETES MELLITUS: ICD-10-CM

## 2023-08-14 DIAGNOSIS — M54.40 CHRONIC MIDLINE LOW BACK PAIN WITH SCIATICA, SCIATICA LATERALITY UNSPECIFIED: ICD-10-CM

## 2023-08-14 DIAGNOSIS — Z79.4 TYPE 2 DIABETES MELLITUS WITH HYPERGLYCEMIA, WITH LONG-TERM CURRENT USE OF INSULIN: Primary | ICD-10-CM

## 2023-08-14 DIAGNOSIS — E11.42 TYPE 2 DIABETES MELLITUS WITH DIABETIC POLYNEUROPATHY, WITH LONG-TERM CURRENT USE OF INSULIN: Chronic | ICD-10-CM

## 2023-08-14 DIAGNOSIS — Z79.4 TYPE 2 DIABETES MELLITUS WITH DIABETIC POLYNEUROPATHY, WITH LONG-TERM CURRENT USE OF INSULIN: Chronic | ICD-10-CM

## 2023-08-14 DIAGNOSIS — M25.511 CHRONIC RIGHT SHOULDER PAIN: ICD-10-CM

## 2023-08-14 DIAGNOSIS — G89.29 CHRONIC MIDLINE LOW BACK PAIN WITH SCIATICA, SCIATICA LATERALITY UNSPECIFIED: ICD-10-CM

## 2023-08-14 DIAGNOSIS — E11.65 TYPE 2 DIABETES MELLITUS WITH HYPERGLYCEMIA, WITH LONG-TERM CURRENT USE OF INSULIN: Primary | ICD-10-CM

## 2023-08-14 LAB
EXPIRATION DATE: NORMAL
HBA1C MFR BLD: 9 %
Lab: NORMAL

## 2023-08-14 PROCEDURE — 3052F HG A1C>EQUAL 8.0%<EQUAL 9.0%: CPT | Performed by: FAMILY MEDICINE

## 2023-08-14 PROCEDURE — 83036 HEMOGLOBIN GLYCOSYLATED A1C: CPT | Performed by: FAMILY MEDICINE

## 2023-08-14 PROCEDURE — 1160F RVW MEDS BY RX/DR IN RCRD: CPT | Performed by: FAMILY MEDICINE

## 2023-08-14 PROCEDURE — 3046F HEMOGLOBIN A1C LEVEL >9.0%: CPT | Performed by: FAMILY MEDICINE

## 2023-08-14 PROCEDURE — 99214 OFFICE O/P EST MOD 30 MIN: CPT | Performed by: FAMILY MEDICINE

## 2023-08-14 PROCEDURE — 1159F MED LIST DOCD IN RCRD: CPT | Performed by: FAMILY MEDICINE

## 2023-08-14 RX ORDER — OXYCODONE HYDROCHLORIDE AND ACETAMINOPHEN 5; 325 MG/1; MG/1
1 TABLET ORAL 2 TIMES DAILY PRN
Qty: 60 TABLET | Refills: 0 | Status: SHIPPED | OUTPATIENT
Start: 2023-08-14

## 2023-08-14 RX ORDER — GABAPENTIN 800 MG/1
800 TABLET ORAL 3 TIMES DAILY
Qty: 270 TABLET | Refills: 1 | Status: SHIPPED | OUTPATIENT
Start: 2023-08-14

## 2023-08-14 NOTE — PROGRESS NOTES
"Chief Complaint  Diabetes (3 month follow up )    Subjective        Kimberly Chun presents to [facility] PRIMARY [facility]ARE  History of Present Illness  Sugars 70s-120s fasting. Still on metformin but wants to come off of it at some point. Rosa has started school back for this year, 4th grade! Has stress test and other appointments rescheduled, easier with her being back in school.    Objective   Vital Signs:  /78 (BP Location: Left arm, Patient Position: Sitting, Cuff Size: Adult)   Pulse 90   Temp 97 øF (36.1 øC) (Temporal)   Resp 16   Ht 162.6 cm (64\")   Wt 86.9 kg (191 lb 9.6 oz)   SpO2 97%   BMI 32.89 kg/mý   Estimated body mass index is 32.89 kg/mý as calculated from the following:    Height as of this encounter: 162.6 cm (64\").    Weight as of this encounter: 86.9 kg (191 lb 9.6 oz).               Physical Exam  Vitals and nursing note reviewed.   Constitutional:       General: She is not in acute distress.     Appearance: Normal appearance. She is well-developed and well-groomed. She is obese. She is not ill-appearing, toxic-appearing or diaphoretic.   HENT:      Head: Normocephalic and atraumatic. Hair is normal.      Right Ear: Hearing, tympanic membrane, ear canal and external ear normal.      Left Ear: Hearing, tympanic membrane, ear canal and external ear normal.      Nose: Nose normal.      Mouth/Throat:      Mouth: Mucous membranes are moist.   Eyes:      General: Lids are normal. Gaze aligned appropriately. No scleral icterus.        Right eye: No discharge.         Left eye: No discharge.      Extraocular Movements: Extraocular movements intact.      Conjunctiva/sclera: Conjunctivae normal.      Pupils: Pupils are equal, round, and reactive to light.   Neck:      Thyroid: No thyromegaly.      Trachea: Trachea and phonation normal. No tracheal deviation.   Cardiovascular:      Rate and Rhythm: Normal rate and regular rhythm.      Heart sounds: Normal heart sounds. No " murmur heard.    No friction rub. No gallop.   Pulmonary:      Effort: Pulmonary effort is normal.      Breath sounds: Normal breath sounds and air entry.   Abdominal:      General: Bowel sounds are normal. There is no distension.      Palpations: Abdomen is soft. Abdomen is not rigid. There is no mass.      Tenderness: There is no abdominal tenderness. There is no guarding or rebound.   Musculoskeletal:         General: No tenderness or deformity.      Cervical back: Neck supple.      Right lower leg: No edema.      Left lower leg: No edema.   Skin:     General: Skin is warm.      Capillary Refill: Capillary refill takes less than 2 seconds.      Coloration: Skin is not cyanotic, jaundiced or pale.      Findings: No erythema or rash.      Nails: There is no clubbing.   Neurological:      General: No focal deficit present.      Mental Status: She is alert and oriented to person, place, and time.      Cranial Nerves: No cranial nerve deficit.      Motor: No tremor, atrophy, abnormal muscle tone or seizure activity.      Gait: Gait is intact. Gait normal.   Psychiatric:         Attention and Perception: Attention and perception normal.         Mood and Affect: Mood and affect normal.         Speech: Speech normal.         Behavior: Behavior normal. Behavior is cooperative.         Thought Content: Thought content normal.         Cognition and Memory: Cognition and memory normal.         Judgment: Judgment normal.      Result Review :  The following data was reviewed by: Maria Luz Longoria MD on 08/14/2023:  Progress Notes by Lorenza Crain DO (07/18/2023 11:30)   Lab Results   Component Value Date    HGBA1C 9.0 08/14/2023    HGBA1C 9.1 05/01/2023    HGBA1C 9.1 (H) 01/20/2023                     Assessment and Plan   Diagnoses and all orders for this visit:    1. Type 2 diabetes mellitus with hyperglycemia, with long-term current use of insulin (Primary)  -     POC Glycosylated Hemoglobin (Hb A1C)    2.  Chronic right shoulder pain  Comments:  use narcotic sparingly, deanna reviewed and appropriate  Orders:  -     oxyCODONE-acetaminophen (Percocet) 5-325 MG per tablet; Take 1 tablet by mouth 2 (Two) Times a Day As Needed for Severe Pain. Use sparingly  Dispense: 60 tablet; Refill: 0    3. Diabetic peripheral neuropathy associated with type 2 diabetes mellitus  -     gabapentin (NEURONTIN) 800 MG tablet; Take 1 tablet by mouth 3 (Three) Times a Day. Indications: Neuropathic Pain  Dispense: 270 tablet; Refill: 1    4. Type 2 diabetes mellitus with diabetic polyneuropathy, with long-term current use of insulin  -     gabapentin (NEURONTIN) 800 MG tablet; Take 1 tablet by mouth 3 (Three) Times a Day. Indications: Neuropathic Pain  Dispense: 270 tablet; Refill: 1    5. Chronic midline low back pain with sciatica, sciatica laterality unspecified  -     gabapentin (NEURONTIN) 800 MG tablet; Take 1 tablet by mouth 3 (Three) Times a Day. Indications: Neuropathic Pain  Dispense: 270 tablet; Refill: 1        Follow up with endocrinology. Discussed basal and bolus insulin, her fasting sugars are  range, continue basal insulin current level. Discussed adjusting up sliding scale somewhat. Continue metformin.        Follow Up   Return in about 3 months (around 11/14/2023) for Controlled Rx Follow Up. (Toxassure or other UDS next visit due to controlled rx use)    Patient was given instructions and counseling regarding her condition or for health maintenance advice. Please see specific information pulled into the AVS if appropriate.

## 2023-08-21 DIAGNOSIS — K21.9 GASTROESOPHAGEAL REFLUX DISEASE, UNSPECIFIED WHETHER ESOPHAGITIS PRESENT: ICD-10-CM

## 2023-08-21 RX ORDER — ESOMEPRAZOLE MAGNESIUM 40 MG/1
CAPSULE, DELAYED RELEASE ORAL
Qty: 90 CAPSULE | Refills: 1 | Status: SHIPPED | OUTPATIENT
Start: 2023-08-21

## 2023-08-21 NOTE — TELEPHONE ENCOUNTER
Rx Refill Note  Requested Prescriptions     Pending Prescriptions Disp Refills    esomeprazole (nexIUM) 40 MG capsule [Pharmacy Med Name: ESOMEPRAZOLE MAGNESIUM 40 MG Capsule Delayed Release] 90 capsule 0     Sig: TAKE 1 CAPSULE EVERY MORNING BEFORE BREAKFAST      Last office visit with prescribing clinician: 8/14/2023   Last telemedicine visit with prescribing clinician: Visit date not found   Next office visit with prescribing clinician: 12/11/2023   Last lab 03/2023

## 2023-08-22 ENCOUNTER — HOSPITAL ENCOUNTER (OUTPATIENT)
Dept: MAMMOGRAPHY | Facility: HOSPITAL | Age: 58
Discharge: HOME | End: 2023-08-22
Admitting: FAMILY MEDICINE
Payer: MEDICARE

## 2023-08-22 DIAGNOSIS — Z12.31 ENCOUNTER FOR SCREENING MAMMOGRAM FOR BREAST CANCER: ICD-10-CM

## 2023-08-22 PROCEDURE — 77067 SCR MAMMO BI INCL CAD: CPT

## 2023-08-22 PROCEDURE — 77063 BREAST TOMOSYNTHESIS BI: CPT

## 2023-09-09 RX ORDER — CALCIUM CITRATE/VITAMIN D3 200MG-6.25
TABLET ORAL
Qty: 400 EACH | Refills: 2 | Status: SHIPPED | OUTPATIENT
Start: 2023-09-09

## 2023-09-18 ENCOUNTER — LAB (OUTPATIENT)
Dept: LAB | Facility: HOSPITAL | Age: 58
End: 2023-09-18
Payer: MEDICARE

## 2023-09-18 ENCOUNTER — OFFICE VISIT (OUTPATIENT)
Dept: ONCOLOGY | Facility: CLINIC | Age: 58
End: 2023-09-18
Payer: MEDICARE

## 2023-09-18 VITALS
HEART RATE: 75 BPM | DIASTOLIC BLOOD PRESSURE: 68 MMHG | HEIGHT: 64 IN | OXYGEN SATURATION: 99 % | SYSTOLIC BLOOD PRESSURE: 155 MMHG | BODY MASS INDEX: 32.78 KG/M2 | WEIGHT: 192 LBS | RESPIRATION RATE: 16 BRPM | TEMPERATURE: 97.5 F

## 2023-09-18 DIAGNOSIS — D47.2 MGUS (MONOCLONAL GAMMOPATHY OF UNKNOWN SIGNIFICANCE): ICD-10-CM

## 2023-09-18 DIAGNOSIS — D47.3 ESSENTIAL THROMBOCYTOSIS: ICD-10-CM

## 2023-09-18 DIAGNOSIS — D47.2 MGUS (MONOCLONAL GAMMOPATHY OF UNKNOWN SIGNIFICANCE): Primary | ICD-10-CM

## 2023-09-18 LAB
ALBUMIN SERPL-MCNC: 4 G/DL (ref 3.5–5.2)
ALBUMIN/GLOB SERPL: 1.5 G/DL
ALP SERPL-CCNC: 83 U/L (ref 39–117)
ALT SERPL W P-5'-P-CCNC: 5 U/L (ref 1–33)
ANION GAP SERPL CALCULATED.3IONS-SCNC: 9.9 MMOL/L (ref 5–15)
AST SERPL-CCNC: 13 U/L (ref 1–32)
B2 MICROGLOB SERPL-MCNC: 2 MG/L (ref 0.8–2.2)
BASOPHILS # BLD AUTO: 0.07 10*3/MM3 (ref 0–0.2)
BASOPHILS NFR BLD AUTO: 0.6 % (ref 0–1.5)
BILIRUB SERPL-MCNC: 0.2 MG/DL (ref 0–1.2)
BUN SERPL-MCNC: 9 MG/DL (ref 6–20)
BUN/CREAT SERPL: 9.2 (ref 7–25)
CALCIUM SPEC-SCNC: 9.6 MG/DL (ref 8.6–10.5)
CHLORIDE SERPL-SCNC: 101 MMOL/L (ref 98–107)
CO2 SERPL-SCNC: 23.1 MMOL/L (ref 22–29)
CREAT SERPL-MCNC: 0.98 MG/DL (ref 0.57–1)
DEPRECATED RDW RBC AUTO: 41.3 FL (ref 37–54)
EGFRCR SERPLBLD CKD-EPI 2021: 66.6 ML/MIN/1.73
EOSINOPHIL # BLD AUTO: 0.33 10*3/MM3 (ref 0–0.4)
EOSINOPHIL NFR BLD AUTO: 2.8 % (ref 0.3–6.2)
ERYTHROCYTE [DISTWIDTH] IN BLOOD BY AUTOMATED COUNT: 12.7 % (ref 12.3–15.4)
GLOBULIN UR ELPH-MCNC: 2.6 GM/DL
GLUCOSE SERPL-MCNC: 492 MG/DL (ref 65–99)
HCT VFR BLD AUTO: 42 % (ref 34–46.6)
HGB BLD-MCNC: 13.2 G/DL (ref 12–15.9)
IMM GRANULOCYTES # BLD AUTO: 0.03 10*3/MM3 (ref 0–0.05)
IMM GRANULOCYTES NFR BLD AUTO: 0.3 % (ref 0–0.5)
LYMPHOCYTES # BLD AUTO: 5.3 10*3/MM3 (ref 0.7–3.1)
LYMPHOCYTES NFR BLD AUTO: 45.7 % (ref 19.6–45.3)
MCH RBC QN AUTO: 28.1 PG (ref 26.6–33)
MCHC RBC AUTO-ENTMCNC: 31.4 G/DL (ref 31.5–35.7)
MCV RBC AUTO: 89.4 FL (ref 79–97)
MONOCYTES # BLD AUTO: 0.58 10*3/MM3 (ref 0.1–0.9)
MONOCYTES NFR BLD AUTO: 5 % (ref 5–12)
NEUTROPHILS NFR BLD AUTO: 45.6 % (ref 42.7–76)
NEUTROPHILS NFR BLD AUTO: 5.28 10*3/MM3 (ref 1.7–7)
NRBC BLD AUTO-RTO: 0 /100 WBC (ref 0–0.2)
PLATELET # BLD AUTO: 489 10*3/MM3 (ref 140–450)
PMV BLD AUTO: 9.9 FL (ref 6–12)
POTASSIUM SERPL-SCNC: 5.2 MMOL/L (ref 3.5–5.2)
PROT SERPL-MCNC: 6.6 G/DL (ref 6–8.5)
RBC # BLD AUTO: 4.7 10*6/MM3 (ref 3.77–5.28)
SODIUM SERPL-SCNC: 134 MMOL/L (ref 136–145)
WBC NRBC COR # BLD: 11.59 10*3/MM3 (ref 3.4–10.8)

## 2023-09-18 PROCEDURE — 83521 IG LIGHT CHAINS FREE EACH: CPT

## 2023-09-18 PROCEDURE — 36415 COLL VENOUS BLD VENIPUNCTURE: CPT

## 2023-09-18 PROCEDURE — 82232 ASSAY OF BETA-2 PROTEIN: CPT

## 2023-09-18 PROCEDURE — 84165 PROTEIN E-PHORESIS SERUM: CPT

## 2023-09-18 PROCEDURE — 82784 ASSAY IGA/IGD/IGG/IGM EACH: CPT

## 2023-09-18 PROCEDURE — 85025 COMPLETE CBC W/AUTO DIFF WBC: CPT

## 2023-09-18 PROCEDURE — 80053 COMPREHEN METABOLIC PANEL: CPT

## 2023-09-18 PROCEDURE — 86334 IMMUNOFIX E-PHORESIS SERUM: CPT

## 2023-09-18 NOTE — PROGRESS NOTES
Hematology and Oncology [place]  Office number [phone]    Fax number [phone]    Follow up     Date: 2023     Patient Name: Kimberly Chun  MRN: [id number]  : 1965    Referring Physician: Dr. Maria Luz Longoria    Chief Complaint: Leukocytosis; elevated free light chains follow up    History of Present Illness: Kimberly Chun is a pleasant 58 y.o. female who presents today for evaluation of leukocytosis and elevated free light chains.    She had COVID infection in 2022 and reports persistent fatigue and dyspnea on exertion since she then developed bronchitis and was treated with antibiotics in November as well as another round of antibiotics and steroids in December.  She followed up with her PCP and had lab work because of her ongoing symptoms that prompted this referral.  She was also found to have an elevated A1c to which she currently is attributing her ongoing symptoms.    She has a notable past medical history of low-grade noninvasive bladder cancer  s/p biopsy and fulguration in 2016 with Dr. Lemos.  She did not require any intravesicular therapy, radiation, or chemotherapy.  Last cystoscopy . No longer on surveillance.     Prior CVA on ASA/Plavix  No VTE.   No MI or stents.   No blood transfusions  No autoimmune or chronic infections.  Not getting routine mammograms. No prior colonoscopy.   S/p SHOLA/BSO for bleeding  Type I and type 2 diabetes.    She has an extensive family history of malignancy as outlined below:  Brother head and neck cancer.   Sister had ovarian cancer and  at 36  Dad lung cancer  Paternal grandmother breast cancer in her 60s.   No one has had genetic testing  She is not undergoing any routine cancer screening and she currently declines this.  She is a current every day smoker.  1 pack/day since the age of 29.     Interval history:   Had COVID 3 weeks ago, treated with mucinex.    Had COPD exacerbation 23 for which she took  doxycyline + prednisone. Breathing is better now.     Past Medical History:   Past Medical History:   Diagnosis Date    Anxiety     Arthritis     Bladder cancer 2016    Cataract     COPD (chronic obstructive pulmonary disease)     COVID-19 vaccine series completed     with booster,  moderna    Dysphagia     Fibromyalgia     Fracture     RIGHT LEG, RIGHT ANKLE, MULTIPLE TOES    Full dentures     GERD (gastroesophageal reflux disease)     Hyperlipidemia     Ovarian cyst     Recurrent urinary tract infection     Sciatica     Seasonal allergies     Statin intolerance     Stroke 2021    Type 1 diabetes     Type 2 diabetes mellitus     Wears glasses        Past Surgical History:   Past Surgical History:   Procedure Laterality Date    BACK SURGERY  08/25/2020    CATARACT EXTRACTION W/ INTRAOCULAR LENS IMPLANT Right 05/06/2022    Procedure: CATARACT PHACO EXTRACTION WITH INTRAOCULAR LENS IMPLANT RIGHT;  Surgeon: Eugene Avilez MD;  Location: [facility] OR;  Service: Ophthalmology;  Laterality: Right;    CATARACT EXTRACTION W/ INTRAOCULAR LENS IMPLANT Left 06/20/2022    Procedure: CATARACT PHACO EXTRACTION WITH INTRAOCULAR LENS IMPLANT LEFT;  Surgeon: Ayden Obrien MD;  Location: [facility] OR;  Service: Ophthalmology;  Laterality: Left;    CYSTOSCOPY BLADDER BIOPSY  2016    ENDOSCOPY N/A 07/03/2017    Procedure: ESOPHAGOGASTRODUODENOSCOPY WITH BIOPSY;  Surgeon: Lindy Bey MD;  Location: [facility] ENDOSCOPY;  Service:     HYSTERECTOMY      OOPHORECTOMY      TONSILLECTOMY         Family History:   Family History   Problem Relation Age of Onset    Arthritis Mother     Hypertension Mother     Heart attack Mother     Osteoporosis Mother     Arthritis Father     Diabetes Father     Hypertension Father     Lung cancer Father     Ovarian cancer Sister     Throat cancer Brother     Breast cancer Paternal Grandmother     Drug abuse Daughter     Stroke Other        Social History:   Social History     Socioeconomic  History    Marital status:     Number of children: 2   Tobacco Use    Smoking status: Every Day     Packs/day: 0.50     Years: 40.00     Pack years: 20.00     Types: Cigarettes     Passive exposure: Past    Smokeless tobacco: Never   Vaping Use    Vaping Use: Never used   Substance and Sexual Activity    Alcohol use: No    Drug use: No    Sexual activity: Defer       Medications:     Current Outpatient Medications:     albuterol (PROVENTIL) (2.5 MG/3ML) 0.083% nebulizer solution, Take 2.5 mg by nebulization Every 6 (Six) Hours As Needed for Wheezing or Shortness of Air., Disp: 180 each, Rfl: 3    albuterol sulfate HFA (ProAir HFA) 108 (90 Base) MCG/ACT inhaler, Inhale 2 puffs Every 6 (Six) Hours As Needed for Wheezing or Shortness of Air., Disp: 54 g, Rfl: 3    Alcohol Swabs (Alcohol Pads) 70 % pads, 1 pad 4 (Four) Times a Day. Check sugars fasting and 2 hours after meals., Disp: 100 each, Rfl: 12    aspirin 81 MG EC tablet, Take 1 tablet by mouth Daily., Disp: 90 tablet, Rfl: 3    B-D ULTRA-FINE 33 LANCETS misc, 1 Units 4 (Four) Times a Day. Check sugars fasting and 2 hours after meals., Disp: 100 each, Rfl: 12    Breo Ellipta 200-25 MCG/INH inhaler, Inhale 1 puff Daily., Disp: 90 each, Rfl: 3    Cholecalciferol (Vitamin D3) 1.25 MG (62304 UT) capsule, Take 1 capsule by mouth Every 7 (Seven) Days., Disp: 12 capsule, Rfl: 0    clopidogrel (PLAVIX) 75 MG tablet, TAKE 1 TABLET EVERY DAY, Disp: 90 tablet, Rfl: 3    Cyanocobalamin (VITAMIN B-12 PO), Take  by mouth., Disp: , Rfl:     cyclobenzaprine (FLEXERIL) 10 MG tablet, Take 1 tablet by mouth 3 (Three) Times a Day As Needed for Muscle Spasms., Disp: 270 tablet, Rfl: 3    Droplet Pen Needles 32G X 4 MM misc, USE TO INJECT INSULIN 4X DAY, Disp: 400 each, Rfl: 1    esomeprazole (nexIUM) 40 MG capsule, TAKE 1 CAPSULE EVERY MORNING BEFORE BREAKFAST, Disp: 90 capsule, Rfl: 1    fluticasone (FLONASE) 50 MCG/ACT nasal spray, 1 spray into the nostril(s) as directed by  provider Daily., Disp: 11.1 mL, Rfl: 0    FOLIC ACID PO, Take  by mouth., Disp: , Rfl:     gabapentin (NEURONTIN) 800 MG tablet, Take 1 tablet by mouth 3 (Three) Times a Day. Indications: Neuropathic Pain, Disp: 270 tablet, Rfl: 1    glucose blood (True Metrix Blood Glucose Test) test strip, TEST SUGARS FASTING AND 2 HOURS AFTER MEALS (4X A DAY DUE TO HYPERGLYCEMIA), Disp: 400 each, Rfl: 2    glucose monitor monitoring kit, 1 each As Needed (diabetes). Check sugars fasting and 2 hours after meals., Disp: 1 each, Rfl: 0    Gvoke HypoPen 2-Pack 0.5 MG/0.1ML solution auto-injector, INJECT 0.5 MG UNDER THE SKIN INTO THE APPROPRIATE AREA AS DIRECTED ONE TIME AS NEEDED FOR HYPOGLYCEMIA, Disp: 0.2 mL, Rfl: 0    insulin aspart (NovoLOG FlexPen) 100 UNIT/ML solution pen-injector sc pen, 6-14 units before meals plus 2:50>150, MDD 60 units, Disp: 60 mL, Rfl: 1    Insulin Glargine (Lantus SoloStar) 100 UNIT/ML injection pen, Inject 60 Units under the skin into the appropriate area as directed Every Night., Disp: 60 mL, Rfl: 3    lisinopril (PRINIVIL,ZESTRIL) 5 MG tablet, TAKE 1 TABLET EVERY DAY FOR KIDNEY PROTECTION DUE TO DIABETES MELLITUS, Disp: 90 tablet, Rfl: 3    LORazepam (ATIVAN) 1 MG tablet, Take 0.5-1 tablets by mouth 2 (Two) Times a Day As Needed (severe anxiety)., Disp: 180 tablet, Rfl: 1    metFORMIN (GLUCOPHAGE) 1000 MG tablet, TAKE 1 TABLET TWICE A DAY WITH MEALS FOR TYPE 2 DIABETES, Disp: 180 tablet, Rfl: 3    oxyCODONE-acetaminophen (Percocet) 5-325 MG per tablet, Take 1 tablet by mouth 2 (Two) Times a Day As Needed for Severe Pain. Use sparingly, Disp: 60 tablet, Rfl: 0    vitamin B-6 (PYRIDOXINE) 25 MG tablet, Take 1 tablet by mouth Daily., Disp: 90 tablet, Rfl: 1    Allergies:   Allergies   Allergen Reactions    Iodinated Contrast Media Hives    Trulicity [Dulaglutide] GI Intolerance       Objective     Vital Signs:   Vitals:    09/18/23 1153   BP: 155/68   Pulse: 75   Resp: 16   Temp: 97.5 °F (36.4 °C)  "  TempSrc: Temporal   SpO2: 99%   Weight: 87.1 kg (192 lb)   Height: 162.6 cm (64\")   PainSc: 0-No pain    Body mass index is 32.96 kg/m².   Pain Score    09/18/23 1153   PainSc: 0-No pain       Physical Exam:   General: No acute distress. Well appearing   HEENT: Normocephalic, atraumatic. Sclera anicteric.   Neck: supple, no adenopathy.   Cardiovascular: regular rate and rhythm. No murmurs.   Respiratory: Normal rate. Clear to auscultation bilaterally.  Abdomen: Soft, nontender, non distended with normoactive bowel sounds.   Lymph: no cervical, supraclavicular or axillary adenopathy.  Neuro: Alert and oriented x 3. No focal deficits.   Ext: Symmetric, no swelling.   Psych: Euthymic      Laboratory/Imaging Reviewed:   Lab on 09/18/2023   Component Date Value Ref Range Status    IgG 09/18/2023 756  586 - 1602 mg/dL Final    IgA 09/18/2023 140  87 - 352 mg/dL Final    IgM 09/18/2023 65  26 - 217 mg/dL Final    Total Protein 09/18/2023 6.3  6.0 - 8.5 g/dL Final    Albumin 09/18/2023 3.5  2.9 - 4.4 g/dL Final    Alpha-1-Globulin 09/18/2023 0.3  0.0 - 0.4 g/dL Final    Alpha-2-Globulin 09/18/2023 0.8  0.4 - 1.0 g/dL Final    Beta Globulin 09/18/2023 1.0  0.7 - 1.3 g/dL Final    Gamma Globulin 09/18/2023 0.7  0.4 - 1.8 g/dL Final    M-Neptali 09/18/2023 Not Observed  Not Observed g/dL Final    Globulin 09/18/2023 2.8  2.2 - 3.9 g/dL Final    A/G Ratio 09/18/2023 1.3  0.7 - 1.7 Final    Immunofixation Reflex, Serum 09/18/2023 Comment   Final    No monoclonality detected.    Please note 09/18/2023 Comment   Final    Protein electrophoresis scan will follow via computer, mail, or   delivery.    Free Light Chain, Kappa 09/18/2023 23.1 (H)  3.3 - 19.4 mg/L Final    Free Lambda Light Chains 09/18/2023 16.1  5.7 - 26.3 mg/L Final    Kappa/Lambda Ratio 09/18/2023 1.43  0.26 - 1.65 Final    Glucose 09/18/2023 492 (C)  65 - 99 mg/dL Final    BUN 09/18/2023 9  6 - 20 mg/dL Final    Creatinine 09/18/2023 0.98  0.57 - 1.00 mg/dL " Final    Sodium 09/18/2023 134 (L)  136 - 145 mmol/L Final    Potassium 09/18/2023 5.2  3.5 - 5.2 mmol/L Final    Chloride 09/18/2023 101  98 - 107 mmol/L Final    CO2 09/18/2023 23.1  22.0 - 29.0 mmol/L Final    Calcium 09/18/2023 9.6  8.6 - 10.5 mg/dL Final    Total Protein 09/18/2023 6.6  6.0 - 8.5 g/dL Final    Albumin 09/18/2023 4.0  3.5 - 5.2 g/dL Final    ALT (SGPT) 09/18/2023 5  1 - 33 U/L Final    AST (SGOT) 09/18/2023 13  1 - 32 U/L Final    Alkaline Phosphatase 09/18/2023 83  39 - 117 U/L Final    Total Bilirubin 09/18/2023 0.2  0.0 - 1.2 mg/dL Final    Globulin 09/18/2023 2.6  gm/dL Final    A/G Ratio 09/18/2023 1.5  g/dL Final    BUN/Creatinine Ratio 09/18/2023 9.2  7.0 - 25.0 Final    Anion Gap 09/18/2023 9.9  5.0 - 15.0 mmol/L Final    eGFR 09/18/2023 66.6  >60.0 mL/min/1.73 Final    Beta-2 Microglobulin 09/18/2023 2.0  0.8 - 2.2 mg/L Final    WBC 09/18/2023 11.59 (H)  3.40 - 10.80 10*3/mm3 Final    RBC 09/18/2023 4.70  3.77 - 5.28 10*6/mm3 Final    Hemoglobin 09/18/2023 13.2  12.0 - 15.9 g/dL Final    Hematocrit 09/18/2023 42.0  34.0 - 46.6 % Final    MCV 09/18/2023 89.4  79.0 - 97.0 fL Final    MCH 09/18/2023 28.1  26.6 - 33.0 pg Final    MCHC 09/18/2023 31.4 (L)  31.5 - 35.7 g/dL Final    RDW 09/18/2023 12.7  12.3 - 15.4 % Final    RDW-SD 09/18/2023 41.3  37.0 - 54.0 fl Final    MPV 09/18/2023 9.9  6.0 - 12.0 fL Final    Platelets 09/18/2023 489 (H)  140 - 450 10*3/mm3 Final    Neutrophil % 09/18/2023 45.6  42.7 - 76.0 % Final    Lymphocyte % 09/18/2023 45.7 (H)  19.6 - 45.3 % Final    Monocyte % 09/18/2023 5.0  5.0 - 12.0 % Final    Eosinophil % 09/18/2023 2.8  0.3 - 6.2 % Final    Basophil % 09/18/2023 0.6  0.0 - 1.5 % Final    Immature Grans % 09/18/2023 0.3  0.0 - 0.5 % Final    Neutrophils, Absolute 09/18/2023 5.28  1.70 - 7.00 10*3/mm3 Final    Lymphocytes, Absolute 09/18/2023 5.30 (H)  0.70 - 3.10 10*3/mm3 Final    Monocytes, Absolute 09/18/2023 0.58  0.10 - 0.90 10*3/mm3 Final     Eosinophils, Absolute 09/18/2023 0.33  0.00 - 0.40 10*3/mm3 Final    Basophils, Absolute 09/18/2023 0.07  0.00 - 0.20 10*3/mm3 Final    Immature Grans, Absolute 09/18/2023 0.03  0.00 - 0.05 10*3/mm3 Final    nRBC 09/18/2023 0.0  0.0 - 0.2 /100 WBC Final       Mammo Screening Digital Tomosynthesis Bilateral With CAD    Result Date: 8/22/2023  Narrative: EXAMINATION: MAMMO SCREENING DIGITAL TOMOSYNTHESIS BILATERAL W CAD-  CLINICAL INDICATION:  Routine screening.  TECHNIQUE: Bilateral CC and MLO views were obtained. The study was read with the assistance of CAD.  Tomosynthesis was performed.  COMPARISON: None, baseline  DENSITY:  There are scattered areas of fibroglandular density  FINDINGS:  There are no spiculated masses, areas of distortion or suspicious calcifications.      Impression: No mammographic evidence of malignancy   BI-RADS CATEGORY: 1 , NEGATIVE.    RECOMMENDED FOLLOW-UP:  Annual screening mammography.    NOTES: Mammography does not detect approximately 10-15% of breast cancers. Physical examination of the breasts by a physician and regular monthly breast self examinations are integral parts of breast cancer screening.   A normal mammogram does not exclude breast cancer if there is an abnormal finding on physical examination. When clinically indicated, a biopsy should not be postponed because of a normal mammogram report.   Please allow this report to serve as a order for additional imaging follow-up  [place]. [address]  NOTE: If a biopsy is performed on this patient, a copy of the pathology report would be appreciated.   This report was signed and finalized on 8/22/2023 11:40 AM by Aristeo Mason MD.       Comment: Neutrophilic leukocytosis and mild lymphocytosis. Cells are   mature. Reactive changes are present.   Component  Ref Range & Units 1 mo ago   Ferritin  15 - 150 ng/mL 20    Resulting Agency [facility]        Component  Ref Range & Units 1 mo ago    TIBC  250 - 450 ug/dL 366    UIBC  131 - 425 ug/dL 273    Iron  27 - 159 ug/dL 93    Iron Saturation  15 - 55 % 25      Component  Ref Range & Units 1 mo ago  (1/30/23) 1 mo ago  (1/20/23) 1 yr ago  (8/23/21) 1 yr ago  (5/6/21) 2 yr ago  (2/4/21) 4 yr ago  (12/20/18) 5 yr ago  (5/9/17)   IgG  586 - 1,602 mg/dL 801          IgA  87 - 352 mg/dL 142          IgM  26 - 217 mg/dL 60          Total Protein  6.0 - 8.5 g/dL 6.5  6.8  6.4  6.3  6.4  6.9 R  6.8 R    Albumin  2.9 - 4.4 g/dL 3.6  4.4 R  4.00 R  3.50 R  4.10 R  4.40 R  4.10 R    Alpha-1-Globulin  0.0 - 0.4 g/dL 0.3          Alpha-2-Globulin  0.4 - 1.0 g/dL 0.8          Beta Globulin  0.7 - 1.3 g/dL 1.1          Gamma Globulin  0.4 - 1.8 g/dL 0.8          M-Neptali  Not Observed g/dL Not Observed          Globulin  2.2 - 3.9 g/dL 2.9  2.4 R  2.4 R    2.5 R  2.7 R    A/G Ratio  0.7 - 1.7 1.3  1.8 R  1.7 R  1.3 R  1.8 R  1.8 R  1.5 R    Immunofixation Reflex, Serum Comment          Comment: No monoclonality detected.   Please note Comment          Comment: Protein electrophoresis scan will follow via computer, mail, or    delivery.    Free Light Chain, Kappa  3.3 - 19.4 mg/L 20.8 High           Free Lambda Light Chains  5.7 - 26.3 mg/L 14.9          Kappa/Lambda Ratio  0.26 - 1.65 1.40          Resulting Agency [facility]           Component      Latest Ref Rng 9/18/2023   WBC      3.40 - 10.80 10*3/mm3 11.59 (H)    RBC      3.77 - 5.28 10*6/mm3 4.70    Hemoglobin      12.0 - 15.9 g/dL 13.2    Hematocrit      34.0 - 46.6 % 42.0    MCV      79.0 - 97.0 fL 89.4    MCH      26.6 - 33.0 pg 28.1    MCHC      31.5 - 35.7 g/dL 31.4 (L)    RDW      12.3 - 15.4 % 12.7    RDW-SD      37.0 - 54.0 fl 41.3    MPV      6.0 - 12.0 fL 9.9    Platelets      140 - 450 10*3/mm3 489 (H)    Neutrophil Rel %      42.7 - 76.0 % 45.6    Lymphocyte Rel %      19.6 - 45.3 % 45.7 (H)    Monocyte Rel %      5.0 - 12.0 % 5.0    Eosinophil Rel %      0.3 - 6.2 % 2.8    Basophil Rel %       0.0 - 1.5 % 0.6    Immature Granulocyte Rel %      0.0 - 0.5 % 0.3    Neutrophils Absolute      1.70 - 7.00 10*3/mm3 5.28    Lymphocytes Absolute      0.70 - 3.10 10*3/mm3 5.30 (H)    Monocytes Absolute      0.10 - 0.90 10*3/mm3 0.58    Eosinophils Absolute      0.00 - 0.40 10*3/mm3 0.33    Basophils Absolute      0.00 - 0.20 10*3/mm3 0.07    Immature Grans, Absolute      0.00 - 0.05 10*3/mm3 0.03    nRBC      0.0 - 0.2 /100 WBC 0.0    Glucose      65 - 99 mg/dL 492 (HH)    BUN      6 - 20 mg/dL 9    Creatinine      0.57 - 1.00 mg/dL 0.98    Sodium      136 - 145 mmol/L 134 (L)    Potassium      3.5 - 5.2 mmol/L 5.2    Chloride      98 - 107 mmol/L 101    CO2      22.0 - 29.0 mmol/L 23.1    Calcium      8.6 - 10.5 mg/dL 9.6    Total Protein      6.0 - 8.5 g/dL 6.3    Total Protein      6.0 - 8.5 g/dL 6.6    Albumin      2.9 - 4.4 g/dL 3.5    Albumin      3.5 - 5.2 g/dL 4.0    ALT (SGPT)      1 - 33 U/L 5    AST (SGOT)      1 - 32 U/L 13    Alkaline Phosphatase      39 - 117 U/L 83    Total Bilirubin      0.0 - 1.2 mg/dL 0.2    Globulin      gm/dL 2.6    A/G Ratio      0.7 - 1.7  1.3    A/G Ratio      g/dL 1.5    BUN/Creatinine Ratio      7.0 - 25.0  9.2    Anion Gap      5.0 - 15.0 mmol/L 9.9    eGFR      >60.0 mL/min/1.73 66.6    IgG      586 - 1602 mg/dL 756    IgA      87 - 352 mg/dL 140    IgM      26 - 217 mg/dL 65    Alpha-1-Globulin      0.0 - 0.4 g/dL 0.3    Alpha-2-Globulin      0.4 - 1.0 g/dL 0.8    Beta Globulin      0.7 - 1.3 g/dL 1.0    Gamma Globulin      0.4 - 1.8 g/dL 0.7    M-Neptali      Not Observed g/dL Not Observed    Globulin      2.2 - 3.9 g/dL 2.8    Immunofixation Reflex, Serum Comment    Please note Comment    Kappa FLC      3.3 - 19.4 mg/L 23.1 (H)    Free Lambda Light Chains      5.7 - 26.3 mg/L 16.1    Kappa/Lambda Ratio      0.26 - 1.65  1.43    Beta-2 Microglobulin      0.8 - 2.2 mg/L 2.0           Assessment / Plan      Assessment/Plan:   1.  Mild leukocytosis  We discussed the  potential causes of an elevated white count including inflammation, infection, or bone marrow disease.  Cigarette use can cause a chronic inflammation and is commonly associated with a chronic elevation of the white count.  Chronic inflammatory diseases such as rheumatoid arthritis or other autoimmune diseases can cause a leukocytosis.  Bone marrow diseases are less common but include myeloproliferative disorders like chronic myelogenous leukemia or polycythemia vera.  -I reviewed her available CBCs dating back several years, and this is a intermittent, albeit relatively frequent finding.  Her blood smear had no concerning features. She has had two infections in the last few months which make interpretation difficult but she has a persistent elevation in ALC on last 3 checks and new thrombocytosis. I note prior borderline iron levels Will have additional labs ordered as follows:   -Flow cytometry, iron battery ferritin. If iron normal consider MPN screen    Orders Placed This Encounter   Procedures    Comprehensive Metabolic Panel    Beta 2 Microglobulin, Serum    GENA,PE and FLC, Serum    CBC & Differential      2.  Free light chain MGUS   -We reviewed the diagnosis of MGUS including the incidence, prognosis, and possibility of transformation to a bloodstream malignancy such as myeloma. I did reassure him that transformation to symptomatic MM is rare and rate of transformation in the MGUS population can be estimated at 1% per year.   -I recommended a full laboratory panel to confirm the diagnosis of MGUS, including immunofixation and baseline urinary studies which we will obtain in 6 months assuming that the asymmetric light chain elevation persists.  If free light chains normalized, no further work-up is indicated.  -She currently has no myeloma defining features such as hypercalcemia, kidney disease, known bone lesions or anemia.    3.  Personal and family history of malignancy  -I recommended that she proceed  with a baseline mammogram, colonoscopy or Cologuard testing, and screening lung CT.  She currently declines but will let me know if she reconsiders.  In the context of her borderline ferritin,FOBT was obtained and negative  -I also offered her genetic counseling appointment but she declined.      Follow Up:   6 mo      Abby Peng MD  Hematology and Oncology     Time spent on the day of service was 45 minutes inclusive of time before, during, and after office visit on record review, medically appropriate history and physical, counseling patient, ordering tests, documenting in the medical record, and communicating with referring provider.

## 2023-09-19 LAB
ALBUMIN SERPL ELPH-MCNC: 3.5 G/DL (ref 2.9–4.4)
ALBUMIN/GLOB SERPL: 1.3 {RATIO} (ref 0.7–1.7)
ALPHA1 GLOB SERPL ELPH-MCNC: 0.3 G/DL (ref 0–0.4)
ALPHA2 GLOB SERPL ELPH-MCNC: 0.8 G/DL (ref 0.4–1)
B-GLOBULIN SERPL ELPH-MCNC: 1 G/DL (ref 0.7–1.3)
GAMMA GLOB SERPL ELPH-MCNC: 0.7 G/DL (ref 0.4–1.8)
GLOBULIN SER-MCNC: 2.8 G/DL (ref 2.2–3.9)
IGA SERPL-MCNC: 140 MG/DL (ref 87–352)
IGG SERPL-MCNC: 756 MG/DL (ref 586–1602)
IGM SERPL-MCNC: 65 MG/DL (ref 26–217)
INTERPRETATION SERPL IEP-IMP: ABNORMAL
KAPPA LC FREE SER-MCNC: 23.1 MG/L (ref 3.3–19.4)
KAPPA LC FREE/LAMBDA FREE SER: 1.43 {RATIO} (ref 0.26–1.65)
LABORATORY COMMENT REPORT: ABNORMAL
LAMBDA LC FREE SERPL-MCNC: 16.1 MG/L (ref 5.7–26.3)
M PROTEIN SERPL ELPH-MCNC: ABNORMAL G/DL
PROT SERPL-MCNC: 6.3 G/DL (ref 6–8.5)

## 2023-09-19 NOTE — PROGRESS NOTES
BG is very elevated. Looks like she is on insulin. Please make sure it has been better since this was drawn yesterday and she is following up.

## 2023-09-20 ENCOUNTER — TELEPHONE (OUTPATIENT)
Dept: ENDOCRINOLOGY | Facility: CLINIC | Age: 58
End: 2023-09-20

## 2023-09-20 ENCOUNTER — TELEPHONE (OUTPATIENT)
Dept: ONCOLOGY | Facility: CLINIC | Age: 58
End: 2023-09-20
Payer: MEDICARE

## 2023-09-20 NOTE — TELEPHONE ENCOUNTER
Caller: Kimberly Chun    Relationship: Self    Best call back number: [phone]    What is the best time to reach you: ANYTIME    Who are you requesting to speak with (clinical staff, provider, specific staff member): CHER FAUSTIN    What was the call regarding: PATIENT RETURNING CALL FOR CHER FAUSTIN - TRIED TO W/T TO OFFICE BUT WAS UNSUCCESSFUL.

## 2023-09-20 NOTE — TELEPHONE ENCOUNTER
Patient stated blood glucose about 2 hours ago was 208. She stated it has been more elevated in the recent days and is calling her endocrinologist to let them know and to get in to see them.

## 2023-09-20 NOTE — TELEPHONE ENCOUNTER
Increase lantus to 65.   Change NovoLog to 10 units with small meal, 14 units with mid sized meal, 18 units with larger high carb meal PLUS 2: 50 greater than 150.

## 2023-09-20 NOTE — TELEPHONE ENCOUNTER
Spoke with patient.  She states her blood sugars have been running high.  It has been running 200 and above.  Monday she saw her oncologist and it was 491.  It was too high to read Monday night.  Has been staying 300-600.  She states she has been taking 4-8 insulin injections daily.  She denies being sick recently except she did have COVID 3 weeks ago.  Is not taking any new medications.  Currently taking Lantus 60 units daily and she uses a sliding scale of the Novolog.  She is taking about 10-15 units at a time of Novolog.  Blood sugar at time of call was 91.

## 2023-09-20 NOTE — TELEPHONE ENCOUNTER
PT CALLED STATING HER BS HAS BEEN RUNNING CLOSE  ALL WEEK. SHE REQUESTED A CALL BACK TO CONSULT. PT STATED HER BS  AROUND 1:00pm. SHE STATED SHE HAS BEEN INJECTING 5x A DAY. SHE REQUESTED A CALL BACK TO CONSULT.

## 2023-09-20 NOTE — TELEPHONE ENCOUNTER
----- Message from Abby Peng MD sent at 9/19/2023  4:35 PM EDT -----  BG is very elevated. Looks like she is on insulin. Please make sure it has been better since this was drawn yesterday and she is following up.

## 2023-09-25 ENCOUNTER — TELEPHONE (OUTPATIENT)
Dept: ONCOLOGY | Facility: CLINIC | Age: 58
End: 2023-09-25

## 2023-09-28 ENCOUNTER — TELEPHONE (OUTPATIENT)
Dept: ONCOLOGY | Facility: CLINIC | Age: 58
End: 2023-09-28
Payer: MEDICARE

## 2023-09-28 ENCOUNTER — PROCEDURE VISIT (OUTPATIENT)
Dept: UROLOGY | Facility: CLINIC | Age: 58
End: 2023-09-28
Payer: MEDICARE

## 2023-09-28 DIAGNOSIS — Z85.51 HX OF BLADDER CANCER: ICD-10-CM

## 2023-09-28 DIAGNOSIS — C67.9: Primary | ICD-10-CM

## 2023-09-28 NOTE — PROGRESS NOTES
Preprocedure diagnosis  History of bladder cancer, previously followed with Dr. Lemos.  Last cystoscopy was in 2021.  Last + TUR was in 2016.     Postprocedure diagnosis  No evidence of any recurrence.    Procedure  Flexible Cystourethroscopy    Attending surgeon  Manuel Saavedra MD    Anesthesia  2% lidocaine jelly intraurethrally    Complications  None    Indications  58 y.o. female undergoing a flexible cystoscopy for the above mentioned indications.    Informed consent was obtained.      Findings  Cystoscopy revealed one right and left ureteral orifice in the normal anatomic position, normal bladder mucosa and no tumors, masses or stones.      Procedure  The patient was placed in supine position and prepped and draped in sterile fashion with lidocaine jelly per urethra for anesthesia.  A timeout was performed.  The 14F flexible cystoscope was lubricated and gently placed through the urethra and into the bladder.  The bladder was completely visualized.  The cystoscope was retroflexed and the bladder neck visualized.  The scope was withdrawn and the procedure terminated.  The patient tolerated the procedure well.        I recommended she continue to have yearly urinalyses with Dr. Longoria.  She should return as needed if she ever has any gross blood in her urine or positive urinalysis.

## 2023-09-28 NOTE — TELEPHONE ENCOUNTER
Called patient and clarified that the next set of labs are due in 8-12 weeks per Dr. Peng and she can complete prior to next follow up.  Patient stated she was driving when this nurse spoke to her last with MD recommendations and she had forgotten next labs not due now.  She verbalized understanding and that she knows about next follow up with MD.

## 2023-09-28 NOTE — TELEPHONE ENCOUNTER
----- Message from Musa Castillo PCT sent at 9/28/2023 11:09 AM EDT -----  Patient here stating she is needing more lab work, I read the notes but does she need to have those drawn in op lab today or like it says in the note 8-12 weeks.     Please call to advise     Kimberly   [phone]

## 2023-12-20 ENCOUNTER — LAB (OUTPATIENT)
Dept: LAB | Facility: HOSPITAL | Age: 58
End: 2023-12-20
Payer: MEDICARE

## 2023-12-20 DIAGNOSIS — D47.2 MGUS (MONOCLONAL GAMMOPATHY OF UNKNOWN SIGNIFICANCE): ICD-10-CM

## 2023-12-20 DIAGNOSIS — D47.3 ESSENTIAL THROMBOCYTOSIS: ICD-10-CM

## 2023-12-20 LAB
BASOPHILS # BLD AUTO: 0.08 10*3/MM3 (ref 0–0.2)
BASOPHILS NFR BLD AUTO: 0.6 % (ref 0–1.5)
DEPRECATED RDW RBC AUTO: 39.8 FL (ref 37–54)
EOSINOPHIL # BLD AUTO: 0.46 10*3/MM3 (ref 0–0.4)
EOSINOPHIL NFR BLD AUTO: 3.5 % (ref 0.3–6.2)
ERYTHROCYTE [DISTWIDTH] IN BLOOD BY AUTOMATED COUNT: 12.8 % (ref 12.3–15.4)
FERRITIN SERPL-MCNC: 21.4 NG/ML (ref 13–150)
HCT VFR BLD AUTO: 41.7 % (ref 34–46.6)
HGB BLD-MCNC: 13.3 G/DL (ref 12–15.9)
IMM GRANULOCYTES # BLD AUTO: 0.03 10*3/MM3 (ref 0–0.05)
IMM GRANULOCYTES NFR BLD AUTO: 0.2 % (ref 0–0.5)
LYMPHOCYTES # BLD AUTO: 5.5 10*3/MM3 (ref 0.7–3.1)
LYMPHOCYTES NFR BLD AUTO: 42.3 % (ref 19.6–45.3)
MCH RBC QN AUTO: 27.3 PG (ref 26.6–33)
MCHC RBC AUTO-ENTMCNC: 31.9 G/DL (ref 31.5–35.7)
MCV RBC AUTO: 85.6 FL (ref 79–97)
MONOCYTES # BLD AUTO: 0.77 10*3/MM3 (ref 0.1–0.9)
MONOCYTES NFR BLD AUTO: 5.9 % (ref 5–12)
NEUTROPHILS NFR BLD AUTO: 47.5 % (ref 42.7–76)
NEUTROPHILS NFR BLD AUTO: 6.15 10*3/MM3 (ref 1.7–7)
NRBC BLD AUTO-RTO: 0 /100 WBC (ref 0–0.2)
PLATELET # BLD AUTO: 441 10*3/MM3 (ref 140–450)
PMV BLD AUTO: 9.9 FL (ref 6–12)
RBC # BLD AUTO: 4.87 10*6/MM3 (ref 3.77–5.28)
WBC NRBC COR # BLD AUTO: 12.99 10*3/MM3 (ref 3.4–10.8)

## 2023-12-20 PROCEDURE — 82728 ASSAY OF FERRITIN: CPT

## 2023-12-20 PROCEDURE — 85025 COMPLETE CBC W/AUTO DIFF WBC: CPT

## 2023-12-20 PROCEDURE — 36415 COLL VENOUS BLD VENIPUNCTURE: CPT

## 2023-12-22 ENCOUNTER — OFFICE VISIT (OUTPATIENT)
Dept: INTERNAL MEDICINE | Facility: CLINIC | Age: 58
End: 2023-12-22
Payer: MEDICARE

## 2023-12-22 VITALS
HEIGHT: 64 IN | OXYGEN SATURATION: 98 % | RESPIRATION RATE: 16 BRPM | WEIGHT: 193.4 LBS | BODY MASS INDEX: 33.02 KG/M2 | HEART RATE: 92 BPM | TEMPERATURE: 97 F | SYSTOLIC BLOOD PRESSURE: 128 MMHG | DIASTOLIC BLOOD PRESSURE: 74 MMHG

## 2023-12-22 DIAGNOSIS — M54.40 CHRONIC MIDLINE LOW BACK PAIN WITH SCIATICA, SCIATICA LATERALITY UNSPECIFIED: ICD-10-CM

## 2023-12-22 DIAGNOSIS — J44.9 CHRONIC OBSTRUCTIVE PULMONARY DISEASE, UNSPECIFIED COPD TYPE: ICD-10-CM

## 2023-12-22 DIAGNOSIS — E11.65 TYPE 2 DIABETES MELLITUS WITH HYPERGLYCEMIA, WITH LONG-TERM CURRENT USE OF INSULIN: Primary | ICD-10-CM

## 2023-12-22 DIAGNOSIS — M25.511 CHRONIC RIGHT SHOULDER PAIN: ICD-10-CM

## 2023-12-22 DIAGNOSIS — Z51.81 MEDICATION MONITORING ENCOUNTER: ICD-10-CM

## 2023-12-22 DIAGNOSIS — E55.9 VITAMIN D DEFICIENCY: ICD-10-CM

## 2023-12-22 DIAGNOSIS — Z79.4 TYPE 2 DIABETES MELLITUS WITH DIABETIC POLYNEUROPATHY, WITH LONG-TERM CURRENT USE OF INSULIN: Chronic | ICD-10-CM

## 2023-12-22 DIAGNOSIS — K21.9 GASTROESOPHAGEAL REFLUX DISEASE, UNSPECIFIED WHETHER ESOPHAGITIS PRESENT: ICD-10-CM

## 2023-12-22 DIAGNOSIS — Z23 NEED FOR INFLUENZA VACCINATION: ICD-10-CM

## 2023-12-22 DIAGNOSIS — G89.29 CHRONIC MIDLINE LOW BACK PAIN WITH SCIATICA, SCIATICA LATERALITY UNSPECIFIED: ICD-10-CM

## 2023-12-22 DIAGNOSIS — G89.29 CHRONIC RIGHT SHOULDER PAIN: ICD-10-CM

## 2023-12-22 DIAGNOSIS — E11.42 TYPE 2 DIABETES MELLITUS WITH DIABETIC POLYNEUROPATHY, WITH LONG-TERM CURRENT USE OF INSULIN: Chronic | ICD-10-CM

## 2023-12-22 DIAGNOSIS — I67.2 CEREBRAL ATHEROSCLEROSIS: ICD-10-CM

## 2023-12-22 DIAGNOSIS — Z79.4 TYPE 2 DIABETES MELLITUS WITH HYPERGLYCEMIA, WITH LONG-TERM CURRENT USE OF INSULIN: Primary | ICD-10-CM

## 2023-12-22 DIAGNOSIS — E11.42 DIABETIC PERIPHERAL NEUROPATHY ASSOCIATED WITH TYPE 2 DIABETES MELLITUS: ICD-10-CM

## 2023-12-22 DIAGNOSIS — F41.9 ANXIETY: ICD-10-CM

## 2023-12-22 LAB
ASSESSMENT OF LEUKOCYTES: NORMAL
CLINICAL INFO: NORMAL
EXPIRATION DATE: ABNORMAL
EXPIRATION DATE: NORMAL
GATING STRATEGY: NORMAL
HBA1C MFR BLD: 9.5 % (ref 4.5–5.7)
IMMUNOPHENOTYPING STUDY: NORMAL
LABORATORY COMMENT REPORT: NORMAL
Lab: ABNORMAL
Lab: NORMAL
PATH INTERP SPEC-IMP: NORMAL
PATHOLOGIST NAME: NORMAL
POC CREATININE URINE: 10
POC MICROALBUMIN URINE: 10
SPECIMEN SOURCE: NORMAL
VIABLE CELLS NFR SPEC: NORMAL %

## 2023-12-22 RX ORDER — LORAZEPAM 1 MG/1
.5-1 TABLET ORAL 2 TIMES DAILY PRN
Qty: 180 TABLET | Refills: 1 | Status: SHIPPED | OUTPATIENT
Start: 2023-12-22

## 2023-12-22 RX ORDER — ESOMEPRAZOLE MAGNESIUM 40 MG/1
40 CAPSULE, DELAYED RELEASE ORAL
Qty: 90 CAPSULE | Refills: 1 | Status: SHIPPED | OUTPATIENT
Start: 2023-12-22

## 2023-12-22 RX ORDER — ASPIRIN 81 MG/1
81 TABLET ORAL DAILY
Qty: 90 TABLET | Refills: 3 | Status: SHIPPED | OUTPATIENT
Start: 2023-12-22

## 2023-12-22 RX ORDER — CLOPIDOGREL BISULFATE 75 MG/1
75 TABLET ORAL DAILY
Qty: 90 TABLET | Refills: 3 | Status: SHIPPED | OUTPATIENT
Start: 2023-12-22

## 2023-12-22 RX ORDER — ALBUTEROL SULFATE 2.5 MG/3ML
2.5 SOLUTION RESPIRATORY (INHALATION) EVERY 6 HOURS PRN
Qty: 180 EACH | Refills: 3 | Status: SHIPPED | OUTPATIENT
Start: 2023-12-22

## 2023-12-22 RX ORDER — ALBUTEROL SULFATE 90 UG/1
2 AEROSOL, METERED RESPIRATORY (INHALATION) EVERY 6 HOURS PRN
Qty: 54 G | Refills: 3 | Status: SHIPPED | OUTPATIENT
Start: 2023-12-22

## 2023-12-22 RX ORDER — GABAPENTIN 800 MG/1
800 TABLET ORAL 3 TIMES DAILY
Qty: 270 TABLET | Refills: 1 | Status: SHIPPED | OUTPATIENT
Start: 2023-12-22

## 2023-12-22 RX ORDER — FLUTICASONE FUROATE AND VILANTEROL 200; 25 UG/1; UG/1
1 POWDER RESPIRATORY (INHALATION)
Qty: 90 EACH | Refills: 3 | Status: SHIPPED | OUTPATIENT
Start: 2023-12-22

## 2023-12-22 RX ORDER — OXYCODONE HYDROCHLORIDE AND ACETAMINOPHEN 5; 325 MG/1; MG/1
1 TABLET ORAL 2 TIMES DAILY PRN
Qty: 60 TABLET | Refills: 0 | Status: SHIPPED | OUTPATIENT
Start: 2023-12-22

## 2023-12-22 NOTE — PROGRESS NOTES
"Chief Complaint  Diabetes (3 month follow up)    Subjective        Kimberly Chun presents to [facility]  History of Present Illness  Admits she's not doing well with taking care of herself.  Had to cancel an endo appointment due to brother being in hospital could not take her.  Continues to struggle with pain.   Takes meds for anxiety and pain as needed, does not take then go drive.      Objective   Vital Signs:  /74 (BP Location: Left arm, Patient Position: Sitting, Cuff Size: Adult)   Pulse 92   Temp 97 °F (36.1 °C) (Temporal)   Resp 16   Ht 162.6 cm (64\")   Wt 87.7 kg (193 lb 6.4 oz)   SpO2 98%   BMI 33.20 kg/m²   Estimated body mass index is 33.2 kg/m² as calculated from the following:    Height as of this encounter: 162.6 cm (64\").    Weight as of this encounter: 87.7 kg (193 lb 6.4 oz).               Physical Exam  Vitals and nursing note reviewed.   Constitutional:       General: She is not in acute distress.     Appearance: Normal appearance. She is well-developed and well-groomed. She is obese. She is not ill-appearing, toxic-appearing or diaphoretic.   HENT:      Head: Normocephalic and atraumatic.      Right Ear: Hearing normal.      Left Ear: Hearing normal.   Eyes:      General: Lids are normal. No scleral icterus.     Extraocular Movements: Extraocular movements intact.   Neck:      Trachea: Phonation normal.   Cardiovascular:      Rate and Rhythm: Normal rate and regular rhythm.   Pulmonary:      Effort: Pulmonary effort is normal.      Breath sounds: Normal breath sounds.   Musculoskeletal:      Cervical back: Neck supple.   Skin:     Coloration: Skin is not jaundiced or pale.   Neurological:      General: No focal deficit present.      Mental Status: She is alert and oriented to person, place, and time.      Motor: Motor function is intact.   Psychiatric:         Attention and Perception: Attention and perception normal.         Mood and Affect: Mood " and affect normal.         Speech: Speech normal.         Behavior: Behavior normal. Behavior is cooperative.         Thought Content: Thought content normal.         Cognition and Memory: Cognition and memory normal.         Judgment: Judgment normal.        Result Review :  The following data was reviewed by: Maria Luz Longoria MD on 12/22/2023:  Lab Results   Component Value Date    HGBA1C 9.5 (A) 12/22/2023    HGBA1C 9.0 08/14/2023    HGBA1C 9.1 05/01/2023                   Assessment and Plan   Diagnoses and all orders for this visit:    1. Type 2 diabetes mellitus with hyperglycemia, with long-term current use of insulin (Primary)  -     POC Glycosylated Hemoglobin (Hb A1C)  -     POC Microalbumin    2. Type 2 diabetes mellitus with diabetic polyneuropathy, with long-term current use of insulin  -     gabapentin (NEURONTIN) 800 MG tablet; Take 1 tablet by mouth 3 (Three) Times a Day. Indications: Neuropathic Pain  Dispense: 270 tablet; Refill: 1    3. Diabetic peripheral neuropathy associated with type 2 diabetes mellitus  -     ToxAssure Flex 22, Urine - Urine, Random Void  -     gabapentin (NEURONTIN) 800 MG tablet; Take 1 tablet by mouth 3 (Three) Times a Day. Indications: Neuropathic Pain  Dispense: 270 tablet; Refill: 1    4. Anxiety  -     ToxAssure Flex 22, Urine - Urine, Random Void  -     LORazepam (ATIVAN) 1 MG tablet; Take 0.5-1 tablets by mouth 2 (Two) Times a Day As Needed (severe anxiety).  Dispense: 180 tablet; Refill: 1    5. Vitamin D deficiency  -     Cholecalciferol 25 MCG (1000 UT) capsule; Take 1 capsule by mouth Daily. For vitamin D deficiency.  Dispense: 90 capsule; Refill: 3    6. Chronic right shoulder pain  Comments:  use narcotic sparingly, deanna reviewed and appropriate  Orders:  -     oxyCODONE-acetaminophen (Percocet) 5-325 MG per tablet; Take 1 tablet by mouth 2 (Two) Times a Day As Needed for Severe Pain. Use sparingly  Dispense: 60 tablet; Refill: 0    7. Chronic midline low  back pain with sciatica, sciatica laterality unspecified  -     gabapentin (NEURONTIN) 800 MG tablet; Take 1 tablet by mouth 3 (Three) Times a Day. Indications: Neuropathic Pain  Dispense: 270 tablet; Refill: 1    8. Gastroesophageal reflux disease, unspecified whether esophagitis present  -     esomeprazole (nexIUM) 40 MG capsule; Take 1 capsule by mouth Every Morning Before Breakfast.  Dispense: 90 capsule; Refill: 1    9. Cerebral atherosclerosis  -     clopidogrel (PLAVIX) 75 MG tablet; Take 1 tablet by mouth Daily.  Dispense: 90 tablet; Refill: 3  -     aspirin 81 MG EC tablet; Take 1 tablet by mouth Daily.  Dispense: 90 tablet; Refill: 3    10. Chronic obstructive pulmonary disease, unspecified COPD type  -     Fluticasone Furoate-Vilanterol (Breo Ellipta) 200-25 MCG/ACT inhaler; Inhale 1 puff Daily.  Dispense: 90 each; Refill: 3  -     albuterol sulfate HFA (ProAir HFA) 108 (90 Base) MCG/ACT inhaler; Inhale 2 puffs Every 6 (Six) Hours As Needed for Wheezing or Shortness of Air.  Dispense: 54 g; Refill: 3  -     albuterol (PROVENTIL) (2.5 MG/3ML) 0.083% nebulizer solution; Take 2.5 mg by nebulization Every 6 (Six) Hours As Needed for Wheezing or Shortness of Air.  Dispense: 180 each; Refill: 3    11. Need for influenza vaccination  -     Fluzone (or Fluarix & Flulaval for VFC) >6 Mos ([id number])    12. Medication monitoring encounter  -     ToxAssure Flex 22, Urine - Urine, Random Void      Stressed need to follow up with endocrinology as diabetes mellitus remains out of control. Goal A1C under 7.0.        Follow Up   Return in about 4 months (around 5/6/2024) for Medicare Wellness, sooner if needed.  Patient was given instructions and counseling regarding her condition or for health maintenance advice. Please see specific information pulled into the AVS if appropriate.

## 2023-12-24 LAB
1OH-MIDAZOLAM UR QL SCN: NORMAL
6MAM UR QL SCN: NEGATIVE NG/ML
7AMINOCLONAZEPAM/CREAT UR: NORMAL
A-OH ALPRAZ/CREAT UR: NORMAL
A-OH-TRIAZOLAM/CREAT UR CFM: NORMAL NG/MG{CREAT}
ACP UR QL CFM: NORMAL
ALPRAZ/CREAT UR CFM: NORMAL NG/MG{CREAT}
AMPHETAMINES UR QL SCN: NEGATIVE NG/ML
APAP UR QL SCN: NORMAL
BARBITURATES UR QL SCN: NEGATIVE NG/ML
BENZODIAZ SCN METH UR: NORMAL
BUPRENORPHINE UR QL SCN: NORMAL
BUPRENORPHINE/CREAT UR: NORMAL
CARISOPRODOL UR QL: NEGATIVE NG/ML
CLONAZEPAM/CREAT UR CFM: NORMAL NG/MG{CREAT}
COCAINE+BZE UR QL SCN: NEGATIVE NG/ML
CREAT UR-MCNC: NORMAL MG/DL
D-METHORPHAN UR-MCNC: NORMAL NG/ML
D-METHORPHAN+LEVORPHANOL UR QL: NORMAL
DESALKYLFLURAZ/CREAT UR: NORMAL NG/MG{CREAT}
DIAZEPAM/CREAT UR: NORMAL NG/MG{CREAT}
ETHANOL UR QL SCN: NEGATIVE G/DL
ETHANOL UR QL SCN: NEGATIVE NG/ML
FENTANYL CTO UR SCN-MCNC: NORMAL NG/ML
FENTANYL/CREAT UR: NORMAL
FLUNITRAZEPAM UR QL SCN: NORMAL
GABAPENTIN UR-MCNC: NORMAL UG/ML
HYPNOTICS UR QL SCN: NORMAL
KETAMINE UR QL: NORMAL
LORAZEPAM/CREAT UR: NORMAL
MEPERIDINE UR QL SCN: NEGATIVE NG/ML
METHADONE UR QL SCN: NEGATIVE NG/ML
METHADONE+METAB UR QL SCN: NEGATIVE NG/ML
MIDAZOLAM/CREAT UR CFM: NORMAL NG/MG{CREAT}
MISCELLANEOUS, UR: NORMAL
NORBUPRENORPHINE/CREAT UR: NORMAL
NORDIAZEPAM/CREAT UR: NORMAL
NORFENTANYL/CREAT UR: NORMAL
NORFLUNITRAZEPAM UR-MCNC: NORMAL NG/ML
NORKETAMINE UR-MCNC: NORMAL UG/ML
OPIATES UR SCN-MCNC: NEGATIVE NG/ML
OTHER HALLUCINOGENS UR: NEGATIVE
OXAZEPAM/CREAT UR: NORMAL
OXYCODONE CTO UR SCN-MCNC: NEGATIVE NG/ML
PCP UR QL SCN: NEGATIVE NG/ML
PRESCRIBED MEDICATIONS: NORMAL
PRESCRIBED MEDICATIONS: NORMAL
PROPOXYPH UR QL SCN: NEGATIVE NG/ML
TAPENTADOL CTO UR SCN-MCNC: NEGATIVE NG/ML
TEMAZEPAM/CREAT UR: NORMAL
TRAMADOL UR QL SCN: NEGATIVE NG/ML
ZALEPLON UR-MCNC: NORMAL NG/ML
ZOLPIDEM PHENYL-4-CARB UR QL SCN: NORMAL
ZOLPIDEM UR QL SCN: NORMAL
ZOPICLONE-N-OXIDE UR-MCNC: NORMAL NG/ML

## 2023-12-27 LAB
1OH-MIDAZOLAM UR QL SCN: NOT DETECTED NG/MG CREAT
6MAM UR QL SCN: NEGATIVE NG/ML
7AMINOCLONAZEPAM/CREAT UR: NOT DETECTED NG/MG CREAT
A-OH ALPRAZ/CREAT UR: NOT DETECTED NG/MG CREAT
A-OH-TRIAZOLAM/CREAT UR CFM: NOT DETECTED NG/MG CREAT
ACP UR QL CFM: NOT DETECTED
ALPRAZ/CREAT UR CFM: NOT DETECTED NG/MG CREAT
AMPHETAMINES UR QL SCN: NEGATIVE NG/ML
APAP UR QL SCN: ABNORMAL UG/ML
APAP UR QL: NORMAL
APAP UR-MCNC: PRESENT UG/ML
BARBITURATES UR QL SCN: NEGATIVE NG/ML
BENZODIAZ SCN METH UR: ABNORMAL
BUPRENORPHINE UR QL SCN: NEGATIVE
BUPRENORPHINE/CREAT UR: NOT DETECTED NG/MG CREAT
CARISOPRODOL UR QL: NEGATIVE NG/ML
CLONAZEPAM/CREAT UR CFM: NOT DETECTED NG/MG CREAT
COCAINE+BZE UR QL SCN: NEGATIVE NG/ML
CREAT UR-MCNC: 19 MG/DL
D-METHORPHAN UR-MCNC: NOT DETECTED NG/ML
D-METHORPHAN+LEVORPHANOL UR QL: NOT DETECTED
DESALKYLFLURAZ/CREAT UR: NOT DETECTED NG/MG CREAT
DIAZEPAM/CREAT UR: NOT DETECTED NG/MG CREAT
ETHANOL UR QL SCN: NEGATIVE G/DL
ETHANOL UR QL SCN: NEGATIVE NG/ML
FENTANYL CTO UR SCN-MCNC: NEGATIVE NG/ML
FENTANYL/CREAT UR: NOT DETECTED NG/MG CREAT
FLUNITRAZEPAM UR QL SCN: NOT DETECTED NG/MG CREAT
GABAPENTIN UR CFM-MCNC: PRESENT NG/ML
GABAPENTIN UR QL CFM: NORMAL
GABAPENTIN UR-MCNC: ABNORMAL UG/ML
HYPNOTICS UR QL SCN: NEGATIVE
KETAMINE UR QL: NOT DETECTED
LORAZEPAM/CREAT UR: 532 NG/MG CREAT
MEPERIDINE UR QL SCN: NEGATIVE NG/ML
METHADONE UR QL SCN: NEGATIVE NG/ML
METHADONE+METAB UR QL SCN: NEGATIVE NG/ML
MIDAZOLAM/CREAT UR CFM: NOT DETECTED NG/MG CREAT
MISCELLANEOUS, UR: NEGATIVE
NORBUPRENORPHINE/CREAT UR: NOT DETECTED NG/MG CREAT
NORDIAZEPAM/CREAT UR: NOT DETECTED NG/MG CREAT
NORFENTANYL/CREAT UR: NOT DETECTED NG/MG CREAT
NORFLUNITRAZEPAM UR-MCNC: NOT DETECTED NG/MG CREAT
NORKETAMINE UR-MCNC: NOT DETECTED UG/ML
OPIATES UR SCN-MCNC: NEGATIVE NG/ML
OTHER HALLUCINOGENS UR: NEGATIVE
OXAZEPAM/CREAT UR: NOT DETECTED NG/MG CREAT
OXYCODONE CTO UR SCN-MCNC: NEGATIVE NG/ML
PCP UR QL SCN: NEGATIVE NG/ML
PRESCRIBED MEDICATIONS: ABNORMAL
PRESCRIBED MEDICATIONS: ABNORMAL
PROPOXYPH UR QL SCN: NEGATIVE NG/ML
TAPENTADOL CTO UR SCN-MCNC: NEGATIVE NG/ML
TEMAZEPAM/CREAT UR: NOT DETECTED NG/MG CREAT
TRAMADOL UR QL SCN: NEGATIVE NG/ML
ZALEPLON UR-MCNC: NOT DETECTED NG/ML
ZOLPIDEM PHENYL-4-CARB UR QL SCN: NOT DETECTED
ZOLPIDEM UR QL SCN: NOT DETECTED
ZOPICLONE-N-OXIDE UR-MCNC: NOT DETECTED NG/ML

## 2024-02-05 ENCOUNTER — OFFICE VISIT (OUTPATIENT)
Dept: ONCOLOGY | Facility: CLINIC | Age: 59
End: 2024-02-05
Payer: MEDICARE

## 2024-02-05 VITALS
DIASTOLIC BLOOD PRESSURE: 70 MMHG | RESPIRATION RATE: 16 BRPM | OXYGEN SATURATION: 99 % | SYSTOLIC BLOOD PRESSURE: 169 MMHG | WEIGHT: 194 LBS | HEART RATE: 78 BPM | HEIGHT: 64 IN | TEMPERATURE: 97.3 F | BODY MASS INDEX: 33.12 KG/M2

## 2024-02-05 DIAGNOSIS — D47.2 MGUS (MONOCLONAL GAMMOPATHY OF UNKNOWN SIGNIFICANCE): Primary | ICD-10-CM

## 2024-02-05 DIAGNOSIS — E61.1 LOW SERUM IRON: ICD-10-CM

## 2024-05-02 ENCOUNTER — OFFICE VISIT (OUTPATIENT)
Dept: INTERNAL MEDICINE | Facility: CLINIC | Age: 59
End: 2024-05-02
Payer: MEDICARE

## 2024-05-02 VITALS
SYSTOLIC BLOOD PRESSURE: 124 MMHG | OXYGEN SATURATION: 97 % | HEART RATE: 87 BPM | BODY MASS INDEX: 33.7 KG/M2 | DIASTOLIC BLOOD PRESSURE: 72 MMHG | HEIGHT: 64 IN | TEMPERATURE: 97.4 F | WEIGHT: 197.4 LBS | RESPIRATION RATE: 16 BRPM

## 2024-05-02 DIAGNOSIS — R01.1 NEWLY RECOGNIZED MURMUR: ICD-10-CM

## 2024-05-02 DIAGNOSIS — G89.29 CHRONIC RIGHT SHOULDER PAIN: ICD-10-CM

## 2024-05-02 DIAGNOSIS — E55.9 VITAMIN D DEFICIENCY: ICD-10-CM

## 2024-05-02 DIAGNOSIS — M54.40 CHRONIC MIDLINE LOW BACK PAIN WITH SCIATICA, SCIATICA LATERALITY UNSPECIFIED: ICD-10-CM

## 2024-05-02 DIAGNOSIS — Z79.4 TYPE 2 DIABETES MELLITUS WITH DIABETIC POLYNEUROPATHY, WITH LONG-TERM CURRENT USE OF INSULIN: ICD-10-CM

## 2024-05-02 DIAGNOSIS — R79.9 ABNORMAL BLOOD CHEMISTRY: ICD-10-CM

## 2024-05-02 DIAGNOSIS — K21.9 GASTROESOPHAGEAL REFLUX DISEASE, UNSPECIFIED WHETHER ESOPHAGITIS PRESENT: ICD-10-CM

## 2024-05-02 DIAGNOSIS — G89.29 CHRONIC MIDLINE LOW BACK PAIN WITH SCIATICA, SCIATICA LATERALITY UNSPECIFIED: ICD-10-CM

## 2024-05-02 DIAGNOSIS — Z87.891 PERSONAL HISTORY OF TOBACCO USE, PRESENTING HAZARDS TO HEALTH: ICD-10-CM

## 2024-05-02 DIAGNOSIS — R53.82 CHRONIC FATIGUE: ICD-10-CM

## 2024-05-02 DIAGNOSIS — Z79.4 TYPE 2 DIABETES MELLITUS WITH HYPERGLYCEMIA, WITH LONG-TERM CURRENT USE OF INSULIN: ICD-10-CM

## 2024-05-02 DIAGNOSIS — Z51.81 MEDICATION MONITORING ENCOUNTER: ICD-10-CM

## 2024-05-02 DIAGNOSIS — E04.9 THYROID ENLARGEMENT: ICD-10-CM

## 2024-05-02 DIAGNOSIS — Z87.39 HISTORY OF DISLOCATION OF SHOULDER: ICD-10-CM

## 2024-05-02 DIAGNOSIS — M25.511 CHRONIC RIGHT SHOULDER PAIN: ICD-10-CM

## 2024-05-02 DIAGNOSIS — E66.09 CLASS 1 OBESITY DUE TO EXCESS CALORIES WITH SERIOUS COMORBIDITY AND BODY MASS INDEX (BMI) OF 33.0 TO 33.9 IN ADULT: ICD-10-CM

## 2024-05-02 DIAGNOSIS — I20.89 ANGINAL EQUIVALENT: ICD-10-CM

## 2024-05-02 DIAGNOSIS — E11.42 DIABETIC PERIPHERAL NEUROPATHY ASSOCIATED WITH TYPE 2 DIABETES MELLITUS: ICD-10-CM

## 2024-05-02 DIAGNOSIS — Z00.00 MEDICARE ANNUAL WELLNESS VISIT, SUBSEQUENT: Primary | ICD-10-CM

## 2024-05-02 DIAGNOSIS — E11.65 TYPE 2 DIABETES MELLITUS WITH HYPERGLYCEMIA, WITH LONG-TERM CURRENT USE OF INSULIN: ICD-10-CM

## 2024-05-02 DIAGNOSIS — I67.2 CEREBRAL ATHEROSCLEROSIS: ICD-10-CM

## 2024-05-02 DIAGNOSIS — Z72.0 TOBACCO USE: ICD-10-CM

## 2024-05-02 DIAGNOSIS — M25.611 DECREASED RANGE OF MOTION OF RIGHT SHOULDER: ICD-10-CM

## 2024-05-02 DIAGNOSIS — E11.42 TYPE 2 DIABETES MELLITUS WITH DIABETIC POLYNEUROPATHY, WITH LONG-TERM CURRENT USE OF INSULIN: ICD-10-CM

## 2024-05-02 RX ORDER — PROCHLORPERAZINE 25 MG/1
SUPPOSITORY RECTAL
Qty: 3 EACH | Refills: 11 | Status: SHIPPED | OUTPATIENT
Start: 2024-05-02

## 2024-05-02 RX ORDER — ESOMEPRAZOLE MAGNESIUM 40 MG/1
40 CAPSULE, DELAYED RELEASE ORAL
Qty: 90 CAPSULE | Refills: 1 | Status: SHIPPED | OUTPATIENT
Start: 2024-05-02

## 2024-05-02 RX ORDER — NITROGLYCERIN 0.4 MG/1
0.4 TABLET SUBLINGUAL
Qty: 30 TABLET | Refills: 5 | Status: SHIPPED | OUTPATIENT
Start: 2024-05-02

## 2024-05-02 RX ORDER — OXYCODONE HYDROCHLORIDE AND ACETAMINOPHEN 5; 325 MG/1; MG/1
1 TABLET ORAL 2 TIMES DAILY PRN
Qty: 30 TABLET | Refills: 0 | Status: SHIPPED | OUTPATIENT
Start: 2024-05-02

## 2024-05-02 RX ORDER — GABAPENTIN 800 MG/1
800 TABLET ORAL 3 TIMES DAILY
Qty: 270 TABLET | Refills: 1 | Status: SHIPPED | OUTPATIENT
Start: 2024-05-02

## 2024-05-02 RX ORDER — LISINOPRIL 5 MG/1
5 TABLET ORAL NIGHTLY
Qty: 90 TABLET | Refills: 3 | Status: SHIPPED | OUTPATIENT
Start: 2024-05-02

## 2024-05-02 RX ORDER — MELOXICAM 15 MG/1
15 TABLET ORAL DAILY PRN
Qty: 90 TABLET | Refills: 3 | Status: SHIPPED | OUTPATIENT
Start: 2024-05-02

## 2024-05-02 RX ORDER — PROCHLORPERAZINE 25 MG/1
1 SUPPOSITORY RECTAL ONCE
Qty: 1 EACH | Refills: 0 | Status: SHIPPED | OUTPATIENT
Start: 2024-05-02 | End: 2024-05-02

## 2024-05-02 NOTE — PROGRESS NOTES
The ABCs of the Annual Wellness Visit  Subsequent Medicare Wellness Visit    Subjective    Kimberly Chun is a 59 y.o. female who presents for a Subsequent Medicare Wellness Visit.    The following portions of the patient's history were reviewed and   updated as appropriate: allergies, current medications, past family history, past medical history, past social history, past surgical history, and problem list.    Compared to one year ago, the patient feels her physical   health is the same.    Compared to one year ago, the patient feels her mental   health is the same.    Recent Hospitalizations:  She was not admitted to the hospital during the last year.       Current Medical Providers:  Patient Care Team:  Maria Luz Longoria MD as PCP - General (Family Medicine)  Shelley Curran MD as Consulting Physician (Ophthalmology)  Evert Jones MD (Orthopedic Surgery)  Lorenza Crain DO as Consulting Physician (Endocrinology)    Outpatient Medications Prior to Visit   Medication Sig Dispense Refill   • albuterol (PROVENTIL) (2.5 MG/3ML) 0.083% nebulizer solution Take 2.5 mg by nebulization Every 6 (Six) Hours As Needed for Wheezing or Shortness of Air. 180 each 3   • albuterol sulfate HFA (ProAir HFA) 108 (90 Base) MCG/ACT inhaler Inhale 2 puffs Every 6 (Six) Hours As Needed for Wheezing or Shortness of Air. 54 g 3   • aspirin 81 MG EC tablet Take 1 tablet by mouth Daily. 90 tablet 3   • B-D ULTRA-FINE 33 LANCETS misc 1 Units 4 (Four) Times a Day. Check sugars fasting and 2 hours after meals. 100 each 12   • Cholecalciferol 25 MCG (1000 UT) capsule Take 1 capsule by mouth Daily. For vitamin D deficiency. 90 capsule 3   • clopidogrel (PLAVIX) 75 MG tablet Take 1 tablet by mouth Daily. 90 tablet 3   • Cyanocobalamin (VITAMIN B-12 PO) Take  by mouth.     • Droplet Pen Needles 32G X 4 MM misc USE TO INJECT INSULIN 4X  each 1   • Fluticasone Furoate-Vilanterol (Breo Ellipta) 200-25 MCG/ACT  inhaler Inhale 1 puff Daily. 90 each 3   • FOLIC ACID PO Take  by mouth.     • glucose blood (True Metrix Blood Glucose Test) test strip TEST SUGARS FASTING AND 2 HOURS AFTER MEALS (4X A DAY DUE TO HYPERGLYCEMIA) 400 each 2   • Gvoke HypoPen 2-Pack 0.5 MG/0.1ML solution auto-injector INJECT 0.5 MG UNDER THE SKIN INTO THE APPROPRIATE AREA AS DIRECTED ONE TIME AS NEEDED FOR HYPOGLYCEMIA 0.2 mL 0   • insulin aspart (NovoLOG FlexPen) 100 UNIT/ML solution pen-injector sc pen 6-14 units before meals plus 2:50>150, MDD 60 units 60 mL 1   • Insulin Glargine (Lantus SoloStar) 100 UNIT/ML injection pen Inject 60 Units under the skin into the appropriate area as directed Every Night. 60 mL 3   • LORazepam (ATIVAN) 1 MG tablet Take 0.5-1 tablets by mouth 2 (Two) Times a Day As Needed (severe anxiety). 180 tablet 1   • vitamin B-6 (PYRIDOXINE) 25 MG tablet Take 1 tablet by mouth Daily. 90 tablet 1   • Alcohol Swabs (Alcohol Pads) 70 % pads 1 pad 4 (Four) Times a Day. Check sugars fasting and 2 hours after meals. 100 each 12   • esomeprazole (nexIUM) 40 MG capsule Take 1 capsule by mouth Every Morning Before Breakfast. 90 capsule 1   • gabapentin (NEURONTIN) 800 MG tablet Take 1 tablet by mouth 3 (Three) Times a Day. Indications: Neuropathic Pain 270 tablet 1   • lisinopril (PRINIVIL,ZESTRIL) 5 MG tablet TAKE 1 TABLET EVERY DAY FOR KIDNEY PROTECTION DUE TO DIABETES MELLITUS 90 tablet 3   • metFORMIN (GLUCOPHAGE) 1000 MG tablet TAKE 1 TABLET TWICE A DAY WITH MEALS FOR TYPE 2 DIABETES 180 tablet 3   • oxyCODONE-acetaminophen (Percocet) 5-325 MG per tablet Take 1 tablet by mouth 2 (Two) Times a Day As Needed for Severe Pain. Use sparingly 60 tablet 0     No facility-administered medications prior to visit.       Opioid medication/s are on active medication list.  and I have evaluated her active treatment plan and pain score trends (see table).  Vitals:    05/02/24 1032   PainSc:   8   PainLoc: Shoulder     I have reviewed the chart  "for potential of high risk medication and harmful drug interactions in the elderly.          Aspirin is on active medication list. Aspirin use is indicated based on review of current medical condition/s. Pros and cons of this therapy have been discussed today. Benefits of this medication outweigh potential harm.  Patient has been encouraged to continue taking this medication.  .      Patient Active Problem List   Diagnosis   • Anxiety   • Cerebral atherosclerosis   • Gastroesophageal reflux disease   • Chronic obstructive pulmonary disease   • Osteoarthritis   • Dyssomnia   • Hematuria   • Primary cancer of bladder   • Tobacco abuse   • Diabetic peripheral neuropathy associated with type 2 diabetes mellitus   • Chronic fatigue   • Gastritis determined by biopsy   • Type 2 diabetes mellitus with diabetic polyneuropathy, with long-term current use of insulin   • Type 2 diabetes mellitus with hyperglycemia, with long-term current use of insulin   • Chronic midline low back pain with sciatica   • Nuclear sclerotic cataract of right eye   • Combined forms of age-related cataract of left eye     Advance Care Planning   Advance Care Planning     Advance Directive is not on file.  ACP discussion was held with the patient during this visit. Patient does not have an advance directive, declines further assistance.     Objective    Vitals:    05/02/24 1032   BP: 124/72   BP Location: Left arm   Patient Position: Sitting   Cuff Size: Adult   Pulse: 87   Resp: 16   Temp: 97.4 °F (36.3 °C)   TempSrc: Temporal   SpO2: 97%   Weight: 89.5 kg (197 lb 6.4 oz)   Height: 162.6 cm (64\")   PainSc:   8   PainLoc: Shoulder     Estimated body mass index is 33.88 kg/m² as calculated from the following:    Height as of this encounter: 162.6 cm (64\").    Weight as of this encounter: 89.5 kg (197 lb 6.4 oz).    BMI is >= 30 and <35. (Class 1 Obesity). The following options were offered after discussion;: weight loss educational material (shared " in after visit summary)      Does the patient have evidence of cognitive impairment? No          HEALTH RISK ASSESSMENT    Smoking Status:  Social History     Tobacco Use   Smoking Status Every Day   • Current packs/day: 0.50   • Average packs/day: 0.5 packs/day for 40.0 years (20.0 ttl pk-yrs)   • Types: Cigarettes   • Passive exposure: Past   Smokeless Tobacco Never     Alcohol Consumption:  Social History     Substance and Sexual Activity   Alcohol Use No     Fall Risk Screen:    ANNAADI Fall Risk Assessment was completed, and patient is at MODERATE risk for falls. Assessment completed on:2024    Depression Screenin/2/2024    10:31 AM   PHQ-2/PHQ-9 Depression Screening   Little Interest or Pleasure in Doing Things 0-->not at all   Feeling Down, Depressed or Hopeless 0-->not at all   PHQ-9: Brief Depression Severity Measure Score 0       Health Habits and Functional and Cognitive Screenin/2/2024    10:27 AM   Functional & Cognitive Status   Do you have difficulty preparing food and eating? No   Do you have difficulty bathing yourself, getting dressed or grooming yourself? No   Do you have difficulty using the toilet? No   Do you have difficulty moving around from place to place? No   Do you have trouble with steps or getting out of a bed or a chair? Yes   Current Diet Well Balanced Diet   Dental Exam Up to date   Eye Exam Not up to date   Exercise (times per week) 3 times per week   Current Exercises Include Other   Do you need help using the phone?  No   Are you deaf or do you have serious difficulty hearing?  No   Do you need help to go to places out of walking distance? No   Do you need help shopping? No   Do you need help preparing meals?  No   Do you need help with housework?  No   Do you need help with laundry? No   Do you need help taking your medications? No   Do you need help managing money? No   Do you ever drive or ride in a car without wearing a seat belt? No   Have you felt  unusual stress, anger or loneliness in the last month? No   Who do you live with? Child   If you need help, do you have trouble finding someone available to you? No   Have you been bothered in the last four weeks by sexual problems? No   Do you have difficulty concentrating, remembering or making decisions? Yes       Age-appropriate Screening Schedule:  Refer to the list below for future screening recommendations based on patient's age, sex and/or medical conditions. Orders for these recommended tests are listed in the plan section. The patient has been provided with a written plan.    Health Maintenance   Topic Date Due   • DIABETIC EYE EXAM  09/26/2023   • LIPID PANEL  01/20/2024   • LUNG CANCER SCREENING  03/09/2024   • HEMOGLOBIN A1C  06/22/2024   • ZOSTER VACCINE (1 of 2) 05/02/2024 (Originally 4/1/2014)   • COVID-19 Vaccine (4 - 2023-24 season) 07/22/2024 (Originally 9/1/2023)   • RSV Vaccine - Adults (1 - 1-dose 60+ series) 05/02/2025 (Originally 4/1/2024)   • DIABETIC FOOT EXAM  07/18/2024   • INFLUENZA VACCINE  08/01/2024   • URINE MICROALBUMIN  12/22/2024   • TDAP/TD VACCINES (2 - Td or Tdap) 02/05/2025   • ANNUAL WELLNESS VISIT  05/02/2025   • BMI FOLLOWUP  05/02/2025   • MAMMOGRAM  08/22/2025   • COLORECTAL CANCER SCREENING  08/22/2028   • HEPATITIS C SCREENING  Completed   • Pneumococcal Vaccine 0-64  Completed   • PAP SMEAR  Discontinued                  CMS Preventative Services Quick Reference  Risk Factors Identified During Encounter  Chronic Pain: NSAIDs per medication orders.  Orthopedics Referral Ordered  The above risks/problems have been discussed with the patient.  Pertinent information has been shared with the patient in the After Visit Summary.  An After Visit Summary and PPPS were made available to the patient.    Follow Up:   Next Medicare Wellness visit to be scheduled in 1 year.       Additional E&M Note during same encounter follows:  Patient has multiple medical problems which are  "significant and separately identifiable that require additional work above and beyond the Medicare Wellness Visit.      Chief Complaint  Medicare Wellness-subsequent (AWV and preventive care ) and Diabetes    Subjective        Patient comes in today for follow-up.  She says she is probably due for \"everything\".  She plans on keeping her upcoming appointment with endocrinology later this month.  She wants a Dexcom and she is not sure why she does not have 1.  Would like prescription sent.  She is not up-to-date on eye exam, this has been encouraged.    She is having a lot of fatigue and overall does not feel well.  She has attributed it to her uncontrolled diabetes but admits she does have chest pain.  She was post to have a stress test last year with Dr. Nelson but she thinks she had to cancel it because she did not have childcare for her granddaughter who she is the sole guardian for.     Continues to have pain in the right shoulder and her range of motion is worsening over time.  She is unable to reach behind her and can reach up but with much pain.  Hesitant to have any kind of surgery but pain is very bothersome to her.  Takes narcotic pain reliever on occasion.  Reports she will need a refill before she comes back next time, still has some pills at home.  Would like to have some ibuprofen 800 mg as possible.  She has taken meloxicam in the past.      Kimberly Chun is also being seen today for follow up of chronic conditions.         Objective   Vital Signs:  /72 (BP Location: Left arm, Patient Position: Sitting, Cuff Size: Adult)   Pulse 87   Temp 97.4 °F (36.3 °C) (Temporal)   Resp 16   Ht 162.6 cm (64\")   Wt 89.5 kg (197 lb 6.4 oz)   SpO2 97%   BMI 33.88 kg/m²     Physical Exam  Vitals and nursing note reviewed.   Constitutional:       General: She is not in acute distress.     Appearance: Normal appearance. She is well-developed and well-groomed. She is not ill-appearing, toxic-appearing or " diaphoretic.   HENT:      Head: Normocephalic and atraumatic.      Right Ear: Hearing normal.      Left Ear: Hearing normal.   Eyes:      General: Lids are normal. No scleral icterus.     Extraocular Movements: Extraocular movements intact.   Neck:      Trachea: Phonation normal.     Cardiovascular:      Rate and Rhythm: Normal rate and regular rhythm.      Heart sounds: Murmur heard.   Pulmonary:      Effort: Pulmonary effort is normal.      Breath sounds: Normal breath sounds.   Musculoskeletal:      Right shoulder: Tenderness present. Decreased range of motion.      Cervical back: Neck supple.   Skin:     Coloration: Skin is not jaundiced or pale.   Neurological:      General: No focal deficit present.      Mental Status: She is alert and oriented to person, place, and time.      Motor: Motor function is intact.   Psychiatric:         Attention and Perception: Attention and perception normal.         Mood and Affect: Mood and affect normal.         Speech: Speech normal.         Behavior: Behavior normal. Behavior is cooperative.         Thought Content: Thought content normal.         Cognition and Memory: Cognition and memory normal.         Judgment: Judgment normal.          The following data was reviewed by: Maria Luz Longoria MD on 05/02/2024:  Progress Notes by Abby Peng MD (02/05/2024 08:45)  (follow up scheduled 6/3/24; labs ordered 2/5/24 not yet completed)    Progress Notes by Lorenza Crain DO (07/18/2023 11:30) (endocrinology, follow up scheduled 5/16/24)  Lab Results   Component Value Date    HGBA1C 9.5 (A) 12/22/2023    HGBA1C 9.0 08/14/2023    HGBA1C 9.1 05/01/2023      Lab Results   Component Value Date    CHLPL 201 (H) 01/20/2023    TRIG 159 (H) 01/20/2023    HDL 35 (L) 01/20/2023     (H) 01/20/2023               Assessment and Plan   Diagnoses and all orders for this visit:    1. Medicare annual wellness visit, subsequent (Primary)    2. Type 2 diabetes mellitus with  hyperglycemia, with long-term current use of insulin  -     Continuous Glucose Sensor (Dexcom G6 Sensor); Use Every 10 (Ten) Days. Apply as directed  Dispense: 3 each; Refill: 11  -     Continuous Glucose Transmitter (Dexcom G6 Transmitter) misc; Use 1 each 1 (One) Time for 1 dose.  Dispense: 1 each; Refill: 0  -     Comprehensive Metabolic Panel  -     Lipid Panel  -     TSH+Free T4  -     Hemoglobin A1c  -     metFORMIN (GLUCOPHAGE) 1000 MG tablet; Take 1 tablet twice a day with meals for type 2 diabetes  Dispense: 180 tablet; Refill: 3  -     lisinopril (PRINIVIL,ZESTRIL) 5 MG tablet; Take 1 tablet by mouth Every Night. For kidney protection from diabetes mellitus .  Dispense: 90 tablet; Refill: 3    3. Type 2 diabetes mellitus with diabetic polyneuropathy, with long-term current use of insulin  -     metFORMIN (GLUCOPHAGE) 1000 MG tablet; Take 1 tablet twice a day with meals for type 2 diabetes  Dispense: 180 tablet; Refill: 3  -     lisinopril (PRINIVIL,ZESTRIL) 5 MG tablet; Take 1 tablet by mouth Every Night. For kidney protection from diabetes mellitus .  Dispense: 90 tablet; Refill: 3  -     gabapentin (NEURONTIN) 800 MG tablet; Take 1 tablet by mouth 3 (Three) Times a Day. Indications: Neuropathic Pain  Dispense: 270 tablet; Refill: 1    4. Diabetic peripheral neuropathy associated with type 2 diabetes mellitus  -     gabapentin (NEURONTIN) 800 MG tablet; Take 1 tablet by mouth 3 (Three) Times a Day. Indications: Neuropathic Pain  Dispense: 270 tablet; Refill: 1    5. Anginal equivalent  -     Ambulatory Referral to Cardiology  -     nitroglycerin (Nitrostat) 0.4 MG SL tablet; Place 1 tablet under the tongue Every 5 (Five) Minutes As Needed for Chest Pain. Take no more than 3 doses in 15 minutes.  Dispense: 30 tablet; Refill: 5    6. Newly recognized murmur  -     Ambulatory Referral to Cardiology    7. Personal history of tobacco use, presenting hazards to health  -      CT Chest Low Dose Cancer Screening  WO; Future    8. Tobacco use  -      CT Chest Low Dose Cancer Screening WO; Future    9. Chronic right shoulder pain  -     oxyCODONE-acetaminophen (Percocet) 5-325 MG per tablet; Take 1 tablet by mouth 2 (Two) Times a Day As Needed for Severe Pain. Use sparingly  Dispense: 30 tablet; Refill: 0  -     meloxicam (MOBIC) 15 MG tablet; Take 1 tablet by mouth Daily As Needed (joint pain).  Dispense: 90 tablet; Refill: 3  -     Ambulatory Referral to Orthopedic Surgery    10. Decreased range of motion of right shoulder  -     Ambulatory Referral to Orthopedic Surgery    11. History of dislocation of shoulder  -     Ambulatory Referral to Orthopedic Surgery    12. Thyroid enlargement  -     TSH+Free T4  -     US Thyroid; Future    13. Class 1 obesity due to excess calories with serious comorbidity and body mass index (BMI) of 33.0 to 33.9 in adult  -     Vitamin D,25-Hydroxy    14. Chronic fatigue  -     Vitamin B12 & Folate  -     Comprehensive Metabolic Panel  -     CBC & Differential  -     TSH+Free T4    15. Vitamin D deficiency  -     Vitamin D,25-Hydroxy    16. Cerebral atherosclerosis  -     Lipid Panel    17. Chronic midline low back pain with sciatica, sciatica laterality unspecified  -     gabapentin (NEURONTIN) 800 MG tablet; Take 1 tablet by mouth 3 (Three) Times a Day. Indications: Neuropathic Pain  Dispense: 270 tablet; Refill: 1    18. Gastroesophageal reflux disease, unspecified whether esophagitis present  -     esomeprazole (nexIUM) 40 MG capsule; Take 1 capsule by mouth Every Morning Before Breakfast. As needed for heartburn  Dispense: 90 capsule; Refill: 1    19. Abnormal blood chemistry  -     Vitamin B12 & Folate  -     Comprehensive Metabolic Panel  -     CBC & Differential  -     Vitamin D,25-Hydroxy  -     Lipid Panel  -     TSH+Free T4  -     Hemoglobin A1c    20. Medication monitoring encounter  -     Vitamin B12 & Folate  -     Comprehensive Metabolic Panel  -     CBC & Differential  -      Vitamin D,25-Hydroxy  -     Lipid Panel  -     TSH+Free T4  -     Hemoglobin A1c      Counseling/anticipatory guidance/risk factor interventions for age provided. See AVS for further information.     Strongly suggested the patient see cardiology and have thorough cardiac workup.  Samples of aspirin 81 mg daily provided, take one a day until she can get in with cardiology.  Discussed use of nitroglycerin.    Increased risk of frozen shoulder given diabetes.  She is already showing decreased range of motion on the right shoulder.  Recommend she get in with orthopedics for further evaluation.  May be a candidate for injection but she would need to get her sugar levels better controlled.  She did not seem very interested in physical therapy for the shoulder.  Refill meloxicam which she has taken in the past, take this instead of ibuprofen.  Use pain medicine very sparingly.    Strongly suggest that she keep her appointment with endocrinology.  Ordered Dexcom 6 but if it is not covered I asked her to discuss further with endocrinology.  She is not a candidate for Jardiance or similar given history of bladder cancer.  Needs to get her eyes examined.     I spent 42 minutes caring for Kimberly on this date of service. This time includes time spent by me in the following activities:preparing for the visit, reviewing tests, obtaining and/or reviewing a separately obtained history, performing a medically appropriate examination and/or evaluation , counseling and educating the patient/family/caregiver, ordering medications, tests, or procedures, referring and communicating with other health care professionals , and documenting information in the medical record    I spent 32 minutes on the separately reported service of 10215. This time is not included in the time used to support the E/M service also reported today.     Follow Up   Return in about 3 months (around 8/5/2024) for follow up chronic conditions.  Patient was given  instructions and counseling regarding her condition or for health maintenance advice. Please see specific information pulled into the AVS if appropriate.

## 2024-05-02 NOTE — PATIENT INSTRUCTIONS
Medicare Wellness  Personal Prevention Plan of Service     Date of Office Visit:    Encounter Provider:  Maria Luz Longoria MD  Place of Service:  [facility]  Patient Name: Kimberly Chun  :  1965    As part of the Medicare Wellness portion of your visit today, we are providing you with this personalized preventive plan of services (PPPS). This plan is based upon recommendations of the [place] Preventive Services Task Force (USPSTF) and the Advisory Committee on Immunization Practices (ACIP).    This lists the preventive care services that should be considered, and provides dates of when you are due. Items listed as completed are up-to-date and do not require any further intervention.    Health Maintenance   Topic Date Due    ZOSTER VACCINE (1 of 2) Never done    COVID-19 Vaccine ( season) 2023    DIABETIC EYE EXAM  2023    LIPID PANEL  2024    LUNG CANCER SCREENING  2024    RSV Vaccine - Adults (1 - 1-dose 60+ series) Never done    ANNUAL WELLNESS VISIT  2024    BMI FOLLOWUP  2024    HEMOGLOBIN A1C  2024    DIABETIC FOOT EXAM  2024    INFLUENZA VACCINE  2024    URINE MICROALBUMIN  2024    TDAP/TD VACCINES (2 - Td or Tdap) 2025    MAMMOGRAM  2025    COLORECTAL CANCER SCREENING  2028    HEPATITIS C SCREENING  Completed    Pneumococcal Vaccine 0-64  Completed    PAP SMEAR  Discontinued       No orders of the defined types were placed in this encounter.      No follow-ups on file.        Health Maintenance for Postmenopausal Women    Menopause is a normal process in which your ability to get pregnant comes to an end. This process happens slowly over many months or years, usually between the ages of 48 and 55. Menopause is complete when you have missed your menstrual period for 12 months.    It is important to talk with your health care provider about some of the most common  conditions that affect women after menopause (postmenopausal women). These include heart disease, cancer, and bone loss (osteoporosis). Adopting a healthy lifestyle and getting preventive care can help to promote your health and wellness. The actions you take can also lower your chances of developing some of these common conditions.    What are the signs and symptoms of menopause?  During menopause, you may have the following symptoms:  Hot flashes. These can be moderate or severe.  Night sweats.  Decrease in sex drive.  Mood swings.  Headaches.  Tiredness (fatigue).  Irritability.  Memory problems.  Problems falling asleep or staying asleep.    Talk with your health care provider about treatment options for your symptoms.    Do I need hormone replacement therapy?  Hormone replacement therapy is effective in treating symptoms that are caused by menopause, such as hot flashes and night sweats.  Hormone replacement carries certain risks, especially as you become older. If you are thinking about using estrogen or estrogen with progestin, discuss the benefits and risks with your health care provider.    How can I reduce my risk for heart disease and stroke?  The risk of heart disease, heart attack, and stroke increases as you age. One of the causes may be a change in the body's hormones during menopause. This can affect how your body uses dietary fats, triglycerides, and cholesterol. Heart attack and stroke are medical emergencies. There are many things that you can do to help prevent heart disease and stroke.  Watch your blood pressure  High blood pressure causes heart disease and increases the risk of stroke. This is more likely to develop in people who have high blood pressure readings or are overweight.  Have your blood pressure checked:  Every 3-5 years if you are 18-39 years of age.  Every year if you are 40 years old or older.    Eat a healthy diet    Eat a diet that includes plenty of vegetables, fruits, low-fat  dairy products, and lean protein.  Do not eat a lot of foods that are high in solid fats, added sugars, or sodium.    Get regular exercise  Get regular exercise. This is one of the most important things you can do for your health. Most adults should:  Try to exercise for at least 150 minutes each week. The exercise should increase your heart rate and make you sweat (moderate-intensity exercise).  Try to do strengthening exercises at least twice each week. Do these in addition to the moderate-intensity exercise.  Spend less time sitting. Even light physical activity can be beneficial.    Other tips  Work with your health care provider to achieve or maintain a healthy weight.  Do not use any products that contain nicotine or tobacco. These products include cigarettes, chewing tobacco, and vaping devices, such as e-cigarettes. If you need help quitting, ask your health care provider.  Know your numbers. Ask your health care provider to check your cholesterol and your blood sugar (glucose). Continue to have your blood tested as directed by your health care provider.    Do I need screening for cancer?  Depending on your health history and family history, you may need to have cancer screenings at different stages of your life. This may include screening for:  Breast cancer.  Cervical cancer.  Lung cancer.  Colorectal cancer.    What is my risk for osteoporosis?  After menopause, you may be at increased risk for osteoporosis. Osteoporosis is a condition in which bone destruction happens more quickly than new bone creation. To help prevent osteoporosis or the bone fractures that can happen because of osteoporosis, you may take the following actions:  If you are 19-50 years old, get at least 1,000 mg of calcium and at least 600 international units (IU) of vitamin D per day.  If you are older than age 50 but younger than age 70, get at least 1,200 mg of calcium and at least 600 international units (IU) of vitamin D per  day.  If you are older than age 70, get at least 1,200 mg of calcium and at least 800 international units (IU) of vitamin D per day.    Smoking and drinking excessive alcohol increase the risk of osteoporosis. Eat foods that are rich in calcium and vitamin D, and do weight-bearing exercises several times each week as directed by your health care provider.    How does menopause affect my mental health?  Depression may occur at any age, but it is more common as you become older. Common symptoms of depression include:  Feeling depressed.  Changes in sleep patterns.  Changes in appetite or eating patterns.  Feeling an overall lack of motivation or enjoyment of activities that you previously enjoyed.  Frequent crying spells.    Talk with your health care provider if you think that you are experiencing any of these symptoms.    General instructions  See your health care provider for regular wellness exams and vaccines. This may include:  Scheduling regular health, dental, and eye exams.  Getting and maintaining your vaccines. These include:  Influenza vaccine. Get this vaccine each year before the flu season begins.  Pneumonia vaccine (Prevnar 20)  Shingles vaccine (Shingrix, ages 50 and older)  Tetanus, diphtheria, and pertussis (Tdap) booster vaccine (every 10 years)  Respiratory Syncytial Virus (RSV) vaccine (ages 60 and older)    Your health care provider may also recommend other immunizations.    Tell your health care provider if you have ever been abused or do not feel safe at home.    Summary  Menopause is a normal process in which your ability to get pregnant comes to an end.  This condition causes hot flashes, night sweats, decreased interest in sex, mood swings, headaches, or lack of sleep.  Treatment for this condition may include hormone replacement therapy.  Take actions to keep yourself healthy, including exercising regularly, eating a healthy diet, watching your weight, and checking your blood pressure and  blood sugar levels.  Get screened for cancer and depression. Make sure that you are up to date with all your vaccines.    This information is not intended to replace advice given to you by your health care provider. Make sure you discuss any questions you have with your health care provider.  Document Revised: 05/09/2022 Document Reviewed: 05/09/2022  [employer] Patient Education © 2023 [employer]  Updated 2/29/24 TC

## 2024-05-03 LAB
25(OH)D3+25(OH)D2 SERPL-MCNC: 39.2 NG/ML (ref 30–100)
ALBUMIN SERPL-MCNC: 4.1 G/DL (ref 3.5–5.2)
ALBUMIN/GLOB SERPL: 2.1 G/DL
ALP SERPL-CCNC: 96 U/L (ref 39–117)
ALT SERPL-CCNC: 6 U/L (ref 1–33)
AST SERPL-CCNC: 8 U/L (ref 1–32)
BASOPHILS # BLD AUTO: 0.06 10*3/MM3 (ref 0–0.2)
BASOPHILS NFR BLD AUTO: 0.6 % (ref 0–1.5)
BILIRUB SERPL-MCNC: <0.2 MG/DL (ref 0–1.2)
BUN SERPL-MCNC: 11 MG/DL (ref 8–23)
BUN/CREAT SERPL: 13.9 (ref 7–25)
CALCIUM SERPL-MCNC: 9.2 MG/DL (ref 8.6–10.5)
CHLORIDE SERPL-SCNC: 103 MMOL/L (ref 98–107)
CHOLEST SERPL-MCNC: 183 MG/DL (ref 0–200)
CO2 SERPL-SCNC: 22.5 MMOL/L (ref 22–29)
CREAT SERPL-MCNC: 0.79 MG/DL (ref 0.57–1)
EGFRCR SERPLBLD CKD-EPI 2021: 85.8 ML/MIN/1.73
EOSINOPHIL # BLD AUTO: 0.42 10*3/MM3 (ref 0–0.4)
EOSINOPHIL NFR BLD AUTO: 4 % (ref 0.3–6.2)
ERYTHROCYTE [DISTWIDTH] IN BLOOD BY AUTOMATED COUNT: 13.1 % (ref 12.3–15.4)
FOLATE SERPL-MCNC: 18 NG/ML (ref 4.78–24.2)
GLOBULIN SER CALC-MCNC: 2 GM/DL
GLUCOSE SERPL-MCNC: 319 MG/DL (ref 65–99)
HBA1C MFR BLD: 9.8 % (ref 4.8–5.6)
HCT VFR BLD AUTO: 41 % (ref 34–46.6)
HDLC SERPL-MCNC: 46 MG/DL (ref 40–60)
HGB BLD-MCNC: 13.3 G/DL (ref 12–15.9)
IMM GRANULOCYTES # BLD AUTO: 0.03 10*3/MM3 (ref 0–0.05)
IMM GRANULOCYTES NFR BLD AUTO: 0.3 % (ref 0–0.5)
LDLC SERPL CALC-MCNC: 113 MG/DL (ref 0–100)
LYMPHOCYTES # BLD AUTO: 4.61 10*3/MM3 (ref 0.7–3.1)
LYMPHOCYTES NFR BLD AUTO: 44.1 % (ref 19.6–45.3)
MCH RBC QN AUTO: 28.1 PG (ref 26.6–33)
MCHC RBC AUTO-ENTMCNC: 32.4 G/DL (ref 31.5–35.7)
MCV RBC AUTO: 86.7 FL (ref 79–97)
MONOCYTES # BLD AUTO: 0.52 10*3/MM3 (ref 0.1–0.9)
MONOCYTES NFR BLD AUTO: 5 % (ref 5–12)
NEUTROPHILS # BLD AUTO: 4.81 10*3/MM3 (ref 1.7–7)
NEUTROPHILS NFR BLD AUTO: 46 % (ref 42.7–76)
NRBC BLD AUTO-RTO: 0.2 /100 WBC (ref 0–0.2)
PLATELET # BLD AUTO: 420 10*3/MM3 (ref 140–450)
POTASSIUM SERPL-SCNC: 4.8 MMOL/L (ref 3.5–5.2)
PROT SERPL-MCNC: 6.1 G/DL (ref 6–8.5)
RBC # BLD AUTO: 4.73 10*6/MM3 (ref 3.77–5.28)
SODIUM SERPL-SCNC: 138 MMOL/L (ref 136–145)
T4 FREE SERPL-MCNC: 1.14 NG/DL (ref 0.93–1.7)
TRIGL SERPL-MCNC: 132 MG/DL (ref 0–150)
TSH SERPL DL<=0.005 MIU/L-ACNC: 1.35 UIU/ML (ref 0.27–4.2)
VIT B12 SERPL-MCNC: 1300 PG/ML (ref 211–946)
VLDLC SERPL CALC-MCNC: 24 MG/DL (ref 5–40)
WBC # BLD AUTO: 10.45 10*3/MM3 (ref 3.4–10.8)

## 2024-05-06 ENCOUNTER — TELEPHONE (OUTPATIENT)
Dept: INTERNAL MEDICINE | Facility: CLINIC | Age: 59
End: 2024-05-06
Payer: MEDICARE

## 2024-05-08 ENCOUNTER — PRIOR AUTHORIZATION (OUTPATIENT)
Dept: INTERNAL MEDICINE | Facility: CLINIC | Age: 59
End: 2024-05-08
Payer: MEDICARE

## 2024-05-08 DIAGNOSIS — E11.65 TYPE 2 DIABETES MELLITUS WITH HYPERGLYCEMIA, WITH LONG-TERM CURRENT USE OF INSULIN: Primary | ICD-10-CM

## 2024-05-08 DIAGNOSIS — E11.42 TYPE 2 DIABETES MELLITUS WITH DIABETIC POLYNEUROPATHY, WITH LONG-TERM CURRENT USE OF INSULIN: ICD-10-CM

## 2024-05-08 DIAGNOSIS — Z79.4 TYPE 2 DIABETES MELLITUS WITH HYPERGLYCEMIA, WITH LONG-TERM CURRENT USE OF INSULIN: Primary | ICD-10-CM

## 2024-05-08 DIAGNOSIS — Z79.4 TYPE 2 DIABETES MELLITUS WITH DIABETIC POLYNEUROPATHY, WITH LONG-TERM CURRENT USE OF INSULIN: ICD-10-CM

## 2024-05-10 RX ORDER — KETOROLAC TROMETHAMINE 30 MG/ML
1 INJECTION, SOLUTION INTRAMUSCULAR; INTRAVENOUS DAILY
Qty: 1 EACH | Refills: 0 | Status: SHIPPED | OUTPATIENT
Start: 2024-05-10

## 2024-05-10 RX ORDER — BLOOD-GLUCOSE SENSOR
1 EACH MISCELLANEOUS
Qty: 2 EACH | Refills: 11 | Status: SHIPPED | OUTPATIENT
Start: 2024-05-10

## 2024-05-13 NOTE — PROGRESS NOTES
"             [facility] Follow Up Note    Kimberly Chun  [id number]  05/14/2024    Primary Care Provider: Maria Luz Longoria MD   Referring Provider: Maria Luz Longoria MD    Chief Complaint: Murmur, chest pain    History of Present Illness:   Mrs. Kimberly Chun is a 59 y.o. female who presents to the Cardiology Clinic for follow up of heart murmur and chest pain.  The patient has a past medical history significant for type 2 diabetes mellitus, GERD, COPD with ongoing tobacco use, and prior CVA. She does not have any significant past cardiac history. She reports she underwent a heart cath \"many years ago\" which was reportedly unremarkable at that time.  She initially presented to the cardiology clinic in 6/23 with chest pain.  A subsequent stress test was ordered, but never completed by the patient.  She presents today for evaluation.  The patient reports a 2-3 week history of chest pain that can occur in the middle/right/left chest and can radiate to back, up her neck, and down both arms.  She also reports musculoskeletal pain and is unable to differentiate this from chest pain.  She reports that this comfort could also be related to GERD for which she takes Nexium with \"some\" relief.  These episodes have no clear correlation to exertion and can last seconds to minutes.  These episodes can occur multiple times a day in a waxing/waning fashion and has resulted in her limiting her activities.  Can resolve with rest, but may return when she resumes activity.  She reports the discomfort can be sharp or dull/achy.  At times 2-3/10 on the pain scale, but other times 7-8/10.  She reports being short of breath, worse with exertion.  She reports sounding \"rattly\" at night when she tries to breath.  She reports that she has to have \"air moving all the time to breathe better\" (fans blowing).  She reports her mom had a heart attack at 62 and notes that her mom had \"shoulder and back " "pain\" at the time as well.  She started smoking at age 6 and smokes \"as much as I can\" (~2 packs?).  She has been unable to tolerate statins in the past (atorvastatin, rosuvastatin) secondary to myalgia.  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  Finally, the patient reports a swollen, fluid-filled \"boil\" on her labia x's 2-3 days.  She reports that it was in a smaller [place] shape until she squeezed it this morning; now it has \"spread out long-ways\" along her labial fold.  The patient states she called her PCP office, but was unable to secure a same day appointment.  She offers no other complaints or concerns at this time.    Past Cardiac Testin. Last Coronary Angio: Remote, reportedly no CAD  2. Prior Stress Testing: Remote, records unavailable  3. Last Echo: None  4. Prior Holter Monitor: None    Review of Systems:   Review of Systems  As Noted in HPI.   I have reviewed and confirmed the accuracy of the ROS as documented by the MA/LPN/RN HECTOR Ordaz    I have reviewed and/or updated the patient's past medical, past surgical, family, social history, problem list and allergies as appropriate.     Medications:     Current Outpatient Medications:     albuterol (PROVENTIL) (2.5 MG/3ML) 0.083% nebulizer solution, Take 2.5 mg by nebulization Every 6 (Six) Hours As Needed for Wheezing or Shortness of Air., Disp: 180 each, Rfl: 3    albuterol sulfate HFA (ProAir HFA) 108 (90 Base) MCG/ACT inhaler, Inhale 2 puffs Every 6 (Six) Hours As Needed for Wheezing or Shortness of Air., Disp: 54 g, Rfl: 3    aspirin 81 MG EC tablet, Take 1 tablet by mouth Daily., Disp: 90 tablet, Rfl: 3    B-D ULTRA-FINE 33 LANCETS misc, 1 Units 4 (Four) Times a Day. Check sugars fasting and 2 hours after meals., Disp: 100 each, Rfl: 12    Cholecalciferol 25 MCG (1000 UT) capsule, Take 1 capsule by mouth Daily. For vitamin D deficiency., Disp: 90 capsule, Rfl: 3    " clopidogrel (PLAVIX) 75 MG tablet, Take 1 tablet by mouth Daily., Disp: 90 tablet, Rfl: 3    Continuous Glucose  (FreeStyle [place]ree 3 [place]) device, Use 1 each Daily., Disp: 1 each, Rfl: 0    Continuous Glucose Sensor (FreeStyle Andree 3 Sensor) misc, Use 1 Application Every 14 (Fourteen) Days., Disp: 2 each, Rfl: 11    Cyanocobalamin (VITAMIN B-12 PO), Take  by mouth., Disp: , Rfl:     Droplet Pen Needles 32G X 4 MM misc, USE TO INJECT INSULIN 4X DAY, Disp: 400 each, Rfl: 1    esomeprazole (nexIUM) 40 MG capsule, Take 1 capsule by mouth Every Morning Before Breakfast. As needed for heartburn, Disp: 90 capsule, Rfl: 1    Fluticasone Furoate-Vilanterol (Breo Ellipta) 200-25 MCG/ACT inhaler, Inhale 1 puff Daily., Disp: 90 each, Rfl: 3    FOLIC ACID PO, Take  by mouth., Disp: , Rfl:     gabapentin (NEURONTIN) 800 MG tablet, Take 1 tablet by mouth 3 (Three) Times a Day. Indications: Neuropathic Pain, Disp: 270 tablet, Rfl: 1    glucose blood (True Metrix Blood Glucose Test) test strip, TEST SUGARS FASTING AND 2 HOURS AFTER MEALS (4X A DAY DUE TO HYPERGLYCEMIA), Disp: 400 each, Rfl: 2    Gvoke HypoPen 2-Pack 0.5 MG/0.1ML solution auto-injector, INJECT 0.5 MG UNDER THE SKIN INTO THE APPROPRIATE AREA AS DIRECTED ONE TIME AS NEEDED FOR HYPOGLYCEMIA, Disp: 0.2 mL, Rfl: 0    insulin aspart (NovoLOG FlexPen) 100 UNIT/ML solution pen-injector sc pen, 10 units before meals plus 2:50>150, MDD 60 units, Disp: 60 mL, Rfl: 1    Insulin Glargine (Lantus SoloStar) 100 UNIT/ML injection pen, Inject 60 Units under the skin into the appropriate area as directed Every Night., Disp: 60 mL, Rfl: 1    lisinopril (PRINIVIL,ZESTRIL) 5 MG tablet, Take 1 tablet by mouth Every Night. For kidney protection from diabetes mellitus ., Disp: 90 tablet, Rfl: 3    LORazepam (ATIVAN) 1 MG tablet, Take 0.5-1 tablets by mouth 2 (Two) Times a Day As Needed (severe anxiety)., Disp: 180 tablet, Rfl: 1    meloxicam (MOBIC) 15 MG tablet, Take 1 tablet by  "mouth Daily As Needed (joint pain)., Disp: 90 tablet, Rfl: 3    metFORMIN (GLUCOPHAGE) 1000 MG tablet, Take 1 tablet twice a day with meals for type 2 diabetes, Disp: 180 tablet, Rfl: 3    nitroglycerin (Nitrostat) 0.4 MG SL tablet, Place 1 tablet under the tongue Every 5 (Five) Minutes As Needed for Chest Pain. Take no more than 3 doses in 15 minutes., Disp: 30 tablet, Rfl: 5    oxyCODONE-acetaminophen (Percocet) 5-325 MG per tablet, Take 1 tablet by mouth 2 (Two) Times a Day As Needed for Severe Pain. Use sparingly, Disp: 30 tablet, Rfl: 0    vitamin B-6 (PYRIDOXINE) 25 MG tablet, Take 1 tablet by mouth Daily., Disp: 90 tablet, Rfl: 1    Physical Exam:  Vital Signs:   Vitals:    05/17/24 0912   BP: 122/72   BP Location: Right arm   Patient Position: Sitting   Cuff Size: Adult   Pulse: 96   Resp: 18   SpO2: 98%   Weight: 87.9 kg (193 lb 12.8 oz)   Height: 162.6 cm (64\")     Body mass index is 33.27 kg/m².    Physical Exam  Vitals and nursing note reviewed.   Constitutional:       General: She is not in acute distress.     Appearance: She is obese.   HENT:      Head: Normocephalic and atraumatic.   Neck:      Trachea: Trachea normal.   Cardiovascular:      Rate and Rhythm: Normal rate and regular rhythm.      Pulses: Normal pulses.      Heart sounds: Normal heart sounds. No murmur heard.     No friction rub. No gallop.   Pulmonary:      Effort: Pulmonary effort is normal.      Breath sounds: Wheezing present.   Musculoskeletal:      Cervical back: Neck supple.      Right lower leg: No edema.      Left lower leg: No edema.   Skin:     General: Skin is warm and dry.   Neurological:      Mental Status: She is alert and oriented to person, place, and time.   Psychiatric:         Mood and Affect: Mood normal.         Behavior: Behavior normal. Behavior is cooperative.         Thought Content: Thought content does not include suicidal ideation.         Results Review:   I reviewed the patient's new clinical " "results.    Procedures    Assessment / Plan:   Diagnoses and all orders for this visit:    1. Chest pain, unspecified type (Primary)  Multiple risk factors for CAD  Plan for stress test to rule out ischemia (pt states she is unable to walk on a treadmill secondary to hx of CVA and COPD)    2. Hyperlipidemia LDL goal <70  5/24,   Patient has \"failed\" atorvastatin and rosuvastatin secondary to myalgia  TRIAL pravastatin  If patient unable to tolerate this, will consider PCSK9 inhibitor    3. SOB (shortness of breath)  In the setting of COPD with ongoing heavy tobacco use  Plan for echocardiogram to assess heart structure and function  Proceed with MPS as previously described to rule out ischemia    4. Type 2 diabetes mellitus with insulin use  5/24, HgA1c 9.8    5. Hypertension  Acceptable blood pressure today    6. History of CVA  TRIAL statin therapy to see if patient can tolerate this.  If not, consider, PCSK9 inhibitor.    7. Tobacco use  Patient declines smoking cessation Rx/referral to smoking cessation clinic at this time.  She understands that this option remains available as long as she is a patient of this office.    8. Presumed abscess of genital area  Rx for clindamycin  Patient advised to use epsom salt bath for comfort, no more squeezing, essential need for follow-up with PCP, OB/GYN, urgent care.  We discussed the signs and symptoms of sepsis or worsening infection.  She verbalized understanding of the serious infection risk based on location and uncontrolled diabetes.  She assures me she will seek follow-up care.    Preventative Cardiology:   Tobacco Cessation: Cessation Counseling Provided    Advance Care Planning: ACP discussion was declined by the patient. Patient does not have an advance directive, declines further assistance.     Follow Up:   Return in about 1 month (around 6/17/2024) for Follow-up with Dr. Nelson.      Thank you for allowing me to participate in the care of your patient. " Please do not hesitate to contact me with additional questions or concerns.     Dalia Sandoval, HECTOR

## 2024-05-16 ENCOUNTER — OFFICE VISIT (OUTPATIENT)
Dept: ENDOCRINOLOGY | Facility: CLINIC | Age: 59
End: 2024-05-16
Payer: MEDICARE

## 2024-05-16 VITALS
BODY MASS INDEX: 32.78 KG/M2 | HEART RATE: 95 BPM | HEIGHT: 64 IN | WEIGHT: 192 LBS | SYSTOLIC BLOOD PRESSURE: 130 MMHG | DIASTOLIC BLOOD PRESSURE: 62 MMHG | OXYGEN SATURATION: 98 %

## 2024-05-16 DIAGNOSIS — E78.00 PURE HYPERCHOLESTEROLEMIA: ICD-10-CM

## 2024-05-16 DIAGNOSIS — E10.65 TYPE 1 DIABETES MELLITUS WITH HYPERGLYCEMIA: Primary | ICD-10-CM

## 2024-05-16 DIAGNOSIS — N89.8 VAGINAL LESION: ICD-10-CM

## 2024-05-16 LAB
EXPIRATION DATE: ABNORMAL
GLUCOSE BLDC GLUCOMTR-MCNC: 291 MG/DL (ref 70–130)
Lab: ABNORMAL

## 2024-05-16 RX ORDER — INSULIN GLARGINE 100 [IU]/ML
60 INJECTION, SOLUTION SUBCUTANEOUS NIGHTLY
Qty: 60 ML | Refills: 1 | Status: SHIPPED | OUTPATIENT
Start: 2024-05-16

## 2024-05-16 RX ORDER — INSULIN ASPART 100 [IU]/ML
INJECTION, SOLUTION INTRAVENOUS; SUBCUTANEOUS
Qty: 60 ML | Refills: 1 | Status: SHIPPED | OUTPATIENT
Start: 2024-05-16

## 2024-05-16 NOTE — PROGRESS NOTES
"Chief Complaint   Patient presents with    Diabetes     Type 1 diabetes mellitus with hyperglycemia            HPI   Kimberly Chun is a 59 y.o. female had concerns including Diabetes (Type 1 diabetes mellitus with hyperglycemia/).    She is checking blood sugars 4-6 times per day. Not using CGM now. Was getting through the pharmacy and hasn't tried to get through DME.     This week Bgs are 300s-600s.   Current medications for diabetes include metformin 1000 mg BID, lantus 60 units once daily, novolog 5-15 units twice daily. Only eats once daily.     Isn't on a statin. In the past was on atorvastatin and rosuvastatin and had myalgia.Having chest pain on occasion. Will see cardiology.      Has a nodule in the vaginal opening. Hasn't discussed with PCP - started two days ago and has doubled in size overnight.     The following portions of the patient's history were reviewed and updated as appropriate: allergies, current medications, past family history, past medical history, past social history, past surgical history, and problem list.      Review of Systems   Constitutional: Negative.    Endocrine:        See HPI   Genitourinary:  Positive for vaginal pain.        Physical Exam  Vitals reviewed.   Constitutional:       Appearance: Normal appearance. She is obese.      Comments: Body mass index is 32.96 kg/m².   Cardiovascular:      Rate and Rhythm: Normal rate.   Pulmonary:      Effort: Pulmonary effort is normal.   Neurological:      General: No focal deficit present.      Mental Status: She is alert. Mental status is at baseline.   Psychiatric:         Mood and Affect: Mood normal.         Behavior: Behavior normal.        /62 (BP Location: Right arm, Patient Position: Sitting, Cuff Size: Adult)   Pulse 95   Ht 162.6 cm (64\")   Wt 87.1 kg (192 lb)   SpO2 98%   BMI 32.96 kg/m²      Labs and imaging    CMP:  Lab Results   Component Value Date    BUN 11 05/02/2024    CREATININE 0.79 05/02/2024    EGFR " "66.6 09/18/2023    BCR 13.9 05/02/2024     05/02/2024    K 4.8 05/02/2024    CO2 22.5 05/02/2024    CALCIUM 9.2 05/02/2024    ALBUMIN 4.1 05/02/2024    BILITOT <0.2 05/02/2024    ALKPHOS 96 05/02/2024    AST 8 05/02/2024    ALT 6 05/02/2024     Lipid Panel:  Lab Results   Component Value Date    CHOL 175 02/04/2021    TRIG 132 05/02/2024    HDL 46 05/02/2024    VLDL 24 05/02/2024     (H) 05/02/2024     HbA1c:  Lab Results   Component Value Date    HGBA1C 9.80 (H) 05/02/2024    HGBA1C 9.5 (A) 12/22/2023     Glucose:  Lab Results   Component Value Date    POCGLU 291 (A) 05/16/2024     TSH:  Lab Results   Component Value Date    TSH 1.350 05/02/2024     No results found for: \"CPEPTIDE\"    Assessment and plan  Diagnoses and all orders for this visit:    1. Type 1 diabetes mellitus with hyperglycemia (Primary)  Diagnosed age 19.  Uncontrolled with significant hyperglycemia and occasional hypoglycemia.  A1c 9.8 today.  She is not on enough prandial insulin.  Dose increases were advised since her last visit here but patient could not remember.  Complicated by neuropathy.  Continue metformin for now.  Unclear if she is benefiting from this med.  History of GI intolerance from GLP-1 receptor agonist seem unlikely to be covered with diagnosis of type 1 diabetes.  Check C-peptide and [place] antibody today.  Consider pump if c-peptide low.   Continue lantus 60 units daily.   Increase novolog base dose to 10 units and ADD 2:50>150 correction. Take every 3-4 hours as needed.   Will work to get CGM through DME. Sample dexcom 7 was given to the pt today.  Labs are up-to-date from 5/2/24.MF UTD from 7/2023. Ophtho exam should be updated yearly and will request the patient have the report sent here.  Call the office if BG's are persistently greater than 200.  -     POC Glucose, Blood  -     C-Peptide  -     Glutamic Acid Decarboxylase    2. Pure hypercholesterolemia  LDL above goal.  History of intolerance to atorvastatin " and rosuvastatin.  She has pending evaluation with cardiology.  Consider alternate statin.    3. Vaginal lesion  Developed 2 days ago.  Sounds suspicious for abscess.  May be contributing to hyperglycemia.  Check with PCPs office, may need urgent care.       Return in about 3 months (around 8/16/2024) for next scheduled follow up. The patient was instructed to contact the clinic with any interval questions or concerns.    Electronically signed by: Lorenza Crain DO   Endocrinologist    Please note that portions of this note were completed with a voice recognition program.

## 2024-05-16 NOTE — PATIENT INSTRUCTIONS
Continue lantus 60 units once daily.   Take novolog 10 units before meals plus extra if your blood sugar is high.   If BG is 150-199: extra 2 units  -249: extra 4 units  -299: extra 6 units  -349: extra 8 units  +: extra 10 units.     Patients with diabetes should have a yearly eye exam. Please be sure to schedule this at least once yearly and have the eye exam report faxed to [phone].    Take the insulin correction scale ever 3-4 hours as needed for high glucose levels.

## 2024-05-17 ENCOUNTER — TELEPHONE (OUTPATIENT)
Dept: INTERNAL MEDICINE | Facility: CLINIC | Age: 59
End: 2024-05-17

## 2024-05-17 ENCOUNTER — OFFICE VISIT (OUTPATIENT)
Dept: CARDIOLOGY | Facility: CLINIC | Age: 59
End: 2024-05-17
Payer: MEDICARE

## 2024-05-17 VITALS
OXYGEN SATURATION: 98 % | RESPIRATION RATE: 18 BRPM | HEART RATE: 96 BPM | WEIGHT: 193.8 LBS | DIASTOLIC BLOOD PRESSURE: 72 MMHG | HEIGHT: 64 IN | SYSTOLIC BLOOD PRESSURE: 122 MMHG | BODY MASS INDEX: 33.09 KG/M2

## 2024-05-17 DIAGNOSIS — E78.5 HYPERLIPIDEMIA LDL GOAL <70: ICD-10-CM

## 2024-05-17 DIAGNOSIS — R07.9 CHEST PAIN, UNSPECIFIED TYPE: Primary | ICD-10-CM

## 2024-05-17 DIAGNOSIS — R06.02 SOB (SHORTNESS OF BREATH): ICD-10-CM

## 2024-05-17 PROCEDURE — 99214 OFFICE O/P EST MOD 30 MIN: CPT | Performed by: NURSE PRACTITIONER

## 2024-05-17 RX ORDER — PRAVASTATIN SODIUM 40 MG
40 TABLET ORAL DAILY
Qty: 90 TABLET | Refills: 3 | Status: SHIPPED | OUTPATIENT
Start: 2024-05-17

## 2024-05-17 RX ORDER — PRAVASTATIN SODIUM 40 MG
40 TABLET ORAL DAILY
Qty: 30 TABLET | Refills: 0 | Status: SHIPPED | OUTPATIENT
Start: 2024-05-17 | End: 2024-05-17

## 2024-05-17 RX ORDER — CLINDAMYCIN HYDROCHLORIDE 150 MG/1
150 CAPSULE ORAL 3 TIMES DAILY
Qty: 30 CAPSULE | Refills: 0 | Status: SHIPPED | OUTPATIENT
Start: 2024-05-17

## 2024-05-17 NOTE — TELEPHONE ENCOUNTER
Caller: Kimberly Chun    Relationship to patient: Self    Best call back number: [phone]    Chief complaint: PATIENT HAD A BUG IN HER EAR THAT SHE WOULD LIKE REMOVED    Type of visit: OFFICE VISIT    Requested date: TODAY

## 2024-05-28 ENCOUNTER — TELEPHONE (OUTPATIENT)
Dept: CARDIOLOGY | Facility: CLINIC | Age: 59
End: 2024-05-28
Payer: MEDICARE

## 2024-05-28 ENCOUNTER — OFFICE VISIT (OUTPATIENT)
Dept: INTERNAL MEDICINE | Facility: CLINIC | Age: 59
End: 2024-05-28
Payer: MEDICARE

## 2024-05-28 VITALS
RESPIRATION RATE: 16 BRPM | TEMPERATURE: 97 F | WEIGHT: 197 LBS | HEIGHT: 64 IN | OXYGEN SATURATION: 99 % | BODY MASS INDEX: 33.63 KG/M2 | SYSTOLIC BLOOD PRESSURE: 138 MMHG | DIASTOLIC BLOOD PRESSURE: 72 MMHG | HEART RATE: 89 BPM

## 2024-05-28 DIAGNOSIS — R10.2 VULVAR PAIN: ICD-10-CM

## 2024-05-28 DIAGNOSIS — Z79.4 TYPE 2 DIABETES MELLITUS WITH HYPERGLYCEMIA, WITH LONG-TERM CURRENT USE OF INSULIN: ICD-10-CM

## 2024-05-28 DIAGNOSIS — N90.7 VULVAR CYST: Primary | ICD-10-CM

## 2024-05-28 DIAGNOSIS — E11.65 TYPE 2 DIABETES MELLITUS WITH HYPERGLYCEMIA, WITH LONG-TERM CURRENT USE OF INSULIN: ICD-10-CM

## 2024-05-28 PROCEDURE — 1125F AMNT PAIN NOTED PAIN PRSNT: CPT | Performed by: FAMILY MEDICINE

## 2024-05-28 PROCEDURE — 99213 OFFICE O/P EST LOW 20 MIN: CPT | Performed by: FAMILY MEDICINE

## 2024-05-28 PROCEDURE — 1159F MED LIST DOCD IN RCRD: CPT | Performed by: FAMILY MEDICINE

## 2024-05-28 PROCEDURE — G2211 COMPLEX E/M VISIT ADD ON: HCPCS | Performed by: FAMILY MEDICINE

## 2024-05-28 PROCEDURE — 1160F RVW MEDS BY RX/DR IN RCRD: CPT | Performed by: FAMILY MEDICINE

## 2024-05-28 PROCEDURE — 3046F HEMOGLOBIN A1C LEVEL >9.0%: CPT | Performed by: FAMILY MEDICINE

## 2024-05-28 NOTE — TELEPHONE ENCOUNTER
Patient called stating she has finished her antibiotic and her abscess has decreased in size but is still present. She states she has had improvement in her symptoms. She is requesting advice on how to move forward. I advised her to call her PCP per your last note as she has not done this at this point. Can you please advise.

## 2024-05-28 NOTE — TELEPHONE ENCOUNTER
As we discussed last week, it is essential that she be seen for this by a PCP/urgent care/OB/GYN.  We will continue to follow her for cardiac needs.

## 2024-05-28 NOTE — TELEPHONE ENCOUNTER
I spoke with the pt and she understood verbal instructions and has an appointment set up for 2:15.

## 2024-05-28 NOTE — PROGRESS NOTES
"Chief Complaint  Mass (On the left side of the vagina, popped up first on 05/17, antibiotic and soaking is helping however it is still there and is not gone )    Subjective        Kimberly Chun presents to [facility]  History of Present Illness  Improved with antibiotic from cardiology but still there and painful.  Has been sitting in warm water and epsom salt  Expects referral to gyn  Wonders if another antibiotic would help/fix?      Objective   Vital Signs:  /72 (BP Location: Left arm, Patient Position: Sitting, Cuff Size: Adult)   Pulse 89   Temp 97 °F (36.1 °C) (Temporal)   Resp 16   Ht 162.6 cm (64\")   Wt 89.4 kg (197 lb)   SpO2 99%   BMI 33.81 kg/m²   Estimated body mass index is 33.81 kg/m² as calculated from the following:    Height as of this encounter: 162.6 cm (64\").    Weight as of this encounter: 89.4 kg (197 lb).               Physical Exam  Vitals and nursing note reviewed. Exam conducted with a chaperone present.   Constitutional:       Appearance: Normal appearance. She is not toxic-appearing.   Pulmonary:      Effort: Pulmonary effort is normal.   Genitourinary:     Exam position: Prone.      Labia:         Right: No tenderness or lesion.         Left: Tenderness and lesion present.           Comments: Natasha JOYCE MA, chaperone  Psychiatric:         Mood and Affect: Mood is anxious.        Result Review :    The following data was reviewed by: Maria Luz Longoria MD on 05/28/2024:  Office Visit with Dalia Sandoval APRN (05/17/2024)  --treated by cardiology provider for \"presumed abscess of genital area\" with clindamycin.    Progress Notes by Lorenza Crain DO (05/16/2024 11:00) -- mentions a \"vaginal lesion\" that developed two days prior           Assessment and Plan     Diagnoses and all orders for this visit:    1. Vulvar cyst (Primary)  -     Ambulatory Referral to Obstetrics / Gynecology    2. Vulvar pain  -     Ambulatory Referral to " Obstetrics / Gynecology    3. Type 2 diabetes mellitus with hyperglycemia, with long-term current use of insulin  -     Ambulatory Referral to Obstetrics / Gynecology      Reassured patient this lesion does not appear to be a dangerous infection sometimes seen with diabetes mellitus in the genital area but she is at increased risk due to her uncontrolled diabetes mellitus (follow up with endocrinology, wearing CGM). She is not on Jardiance or similar (has history of bladder cancer). Continue sitz baths (warm water, epsom salt), do not try to pop lesion further, see gyn to have further evaluation/treatment. Likely needs drained. Another antibiotic not likely to help, not prescribing.        Follow Up     Return for As scheduled previously.  Patient was given instructions and counseling regarding her condition or for health maintenance advice. Please see specific information pulled into the AVS if appropriate.

## 2024-06-03 ENCOUNTER — OFFICE VISIT (OUTPATIENT)
Dept: ONCOLOGY | Facility: CLINIC | Age: 59
End: 2024-06-03
Payer: MEDICARE

## 2024-06-03 VITALS
DIASTOLIC BLOOD PRESSURE: 55 MMHG | HEIGHT: 64 IN | TEMPERATURE: 98 F | RESPIRATION RATE: 16 BRPM | HEART RATE: 87 BPM | OXYGEN SATURATION: 98 % | WEIGHT: 198.7 LBS | BODY MASS INDEX: 33.92 KG/M2 | SYSTOLIC BLOOD PRESSURE: 136 MMHG

## 2024-06-03 DIAGNOSIS — D47.2 MGUS (MONOCLONAL GAMMOPATHY OF UNKNOWN SIGNIFICANCE): Primary | ICD-10-CM

## 2024-06-03 PROCEDURE — 1125F AMNT PAIN NOTED PAIN PRSNT: CPT | Performed by: INTERNAL MEDICINE

## 2024-06-03 PROCEDURE — 99214 OFFICE O/P EST MOD 30 MIN: CPT | Performed by: INTERNAL MEDICINE

## 2024-06-03 NOTE — PROGRESS NOTES
Hematology and Oncology [place]  Office number [phone]    Fax number [phone]    Follow up     Date: 24    Patient Name: Kimberly Chun  MRN: [id number]  : 1965    Referring Physician: Dr. Maria Luz Longoria    Chief Complaint: Leukocytosis; elevated free light chains follow up    History of Present Illness: Kimberly Chun is a pleasant 59 y.o. female who presents today for evaluation of leukocytosis and elevated free light chains.    She had COVID infection in 2022 and reports persistent fatigue and dyspnea on exertion since she then developed bronchitis and was treated with antibiotics in November as well as another round of antibiotics and steroids in December.  She followed up with her PCP and had lab work because of her ongoing symptoms that prompted this referral.  She was also found to have an elevated A1c to which she currently is attributing her ongoing symptoms.    She has a notable past medical history of low-grade noninvasive bladder cancer  s/p biopsy and fulguration in 2016 with Dr. Lemos.  She did not require any intravesicular therapy, radiation, or chemotherapy.  Last cystoscopy . No longer on surveillance.     Prior CVA on ASA/Plavix  No VTE.   No MI or stents.   No blood transfusions  No autoimmune or chronic infections.  Not getting routine mammograms. No prior colonoscopy.   S/p SHOLA/BSO for bleeding  Type I and type 2 diabetes.    She has an extensive family history of malignancy as outlined below:  Brother head and neck cancer.   Sister had ovarian cancer and  at 36  Dad lung cancer  Paternal grandmother breast cancer in her 60s.   No one has had genetic testing  She is not undergoing any routine cancer screening and she currently declines this.  She is a current every day smoker.  1 pack/day since the age of 29.     Interval history:   Had symptomatic hypoglycemia today on way into appointment. Feels better now s/p crackers and juice. This has  been happening frequently and her regimen has been being adjusted by endocrinologist, wearing continuous glucose monitor. Had a spot on labia. Saw Dr. Longoria for this, diagnosed with Bartholin cyst, has not yet seen gynecology. Antibiotics helped but still bothersome.     Past Medical History:   Past Medical History:   Diagnosis Date    Anxiety     Arthritis     Bladder cancer 2016    Cataract     COPD (chronic obstructive pulmonary disease)     COVID-19 vaccine series completed     with booster,  moderna    Dysphagia     Fibromyalgia     Fracture     RIGHT LEG, RIGHT ANKLE, MULTIPLE TOES    Full dentures     GERD (gastroesophageal reflux disease)     Hyperlipidemia     Ovarian cyst     Recurrent urinary tract infection     Sciatica     Seasonal allergies     Statin intolerance     Stroke 2021    Type 1 diabetes     Type 2 diabetes mellitus     Wears glasses        Past Surgical History:   Past Surgical History:   Procedure Laterality Date    BACK SURGERY  08/25/2020    CATARACT EXTRACTION W/ INTRAOCULAR LENS IMPLANT Right 05/06/2022    Procedure: CATARACT PHACO EXTRACTION WITH INTRAOCULAR LENS IMPLANT RIGHT;  Surgeon: Eugene Avilez MD;  Location: [facility] OR;  Service: Ophthalmology;  Laterality: Right;    CATARACT EXTRACTION W/ INTRAOCULAR LENS IMPLANT Left 06/20/2022    Procedure: CATARACT PHACO EXTRACTION WITH INTRAOCULAR LENS IMPLANT LEFT;  Surgeon: Ayden Obrien MD;  Location: [facility] OR;  Service: Ophthalmology;  Laterality: Left;    CYSTOSCOPY BLADDER BIOPSY  2016    ENDOSCOPY N/A 07/03/2017    Procedure: ESOPHAGOGASTRODUODENOSCOPY WITH BIOPSY;  Surgeon: Lindy Bey MD;  Location: [facility] ENDOSCOPY;  Service:     HYSTERECTOMY      OOPHORECTOMY      TONSILLECTOMY         Family History:   Family History   Problem Relation Age of Onset    Arthritis Mother     Hypertension Mother     Heart attack Mother     Osteoporosis Mother     Arthritis Father     Diabetes Father     Hypertension  Father     Lung cancer Father     Ovarian cancer Sister     Throat cancer Brother     Breast cancer Paternal Grandmother     Drug abuse Daughter     Stroke Other        Social History:   Social History     Socioeconomic History    Marital status:     Number of children: 2   Tobacco Use    Smoking status: Every Day     Current packs/day: 0.50     Average packs/day: 0.5 packs/day for 40.0 years (20.0 ttl pk-yrs)     Types: Cigarettes     Passive exposure: Past    Smokeless tobacco: Never   Vaping Use    Vaping status: Never Used   Substance and Sexual Activity    Alcohol use: No    Drug use: No    Sexual activity: Defer       Medications:     Current Outpatient Medications:     albuterol (PROVENTIL) (2.5 MG/3ML) 0.083% nebulizer solution, Take 2.5 mg by nebulization Every 6 (Six) Hours As Needed for Wheezing or Shortness of Air., Disp: 180 each, Rfl: 3    albuterol sulfate HFA (ProAir HFA) 108 (90 Base) MCG/ACT inhaler, Inhale 2 puffs Every 6 (Six) Hours As Needed for Wheezing or Shortness of Air., Disp: 54 g, Rfl: 3    aspirin 81 MG EC tablet, Take 1 tablet by mouth Daily., Disp: 90 tablet, Rfl: 3    B-D ULTRA-FINE 33 LANCETS misc, 1 Units 4 (Four) Times a Day. Check sugars fasting and 2 hours after meals., Disp: 100 each, Rfl: 12    Cholecalciferol 25 MCG (1000 UT) capsule, Take 1 capsule by mouth Daily. For vitamin D deficiency., Disp: 90 capsule, Rfl: 3    clopidogrel (PLAVIX) 75 MG tablet, Take 1 tablet by mouth Daily., Disp: 90 tablet, Rfl: 3    Continuous Glucose  (FreeStyle Andree 3 [place]) device, Use 1 each Daily., Disp: 1 each, Rfl: 0    Continuous Glucose Sensor (FreeStyle Andree 3 Sensor) misc, Use 1 Application Every 14 (Fourteen) Days., Disp: 2 each, Rfl: 11    Cyanocobalamin (VITAMIN B-12 PO), Take  by mouth., Disp: , Rfl:     Droplet Pen Needles 32G X 4 MM misc, USE TO INJECT INSULIN 4X DAY, Disp: 400 each, Rfl: 1    esomeprazole (nexIUM) 40 MG capsule, Take 1 capsule by mouth Every  Morning Before Breakfast. As needed for heartburn, Disp: 90 capsule, Rfl: 1    Fluticasone Furoate-Vilanterol (Breo Ellipta) 200-25 MCG/ACT inhaler, Inhale 1 puff Daily., Disp: 90 each, Rfl: 3    FOLIC ACID PO, Take  by mouth., Disp: , Rfl:     gabapentin (NEURONTIN) 800 MG tablet, Take 1 tablet by mouth 3 (Three) Times a Day. Indications: Neuropathic Pain, Disp: 270 tablet, Rfl: 1    glucose blood (True Metrix Blood Glucose Test) test strip, TEST SUGARS FASTING AND 2 HOURS AFTER MEALS (4X A DAY DUE TO HYPERGLYCEMIA), Disp: 400 each, Rfl: 2    Gvoke HypoPen 2-Pack 0.5 MG/0.1ML solution auto-injector, INJECT 0.5 MG UNDER THE SKIN INTO THE APPROPRIATE AREA AS DIRECTED ONE TIME AS NEEDED FOR HYPOGLYCEMIA, Disp: 0.2 mL, Rfl: 0    insulin aspart (NovoLOG FlexPen) 100 UNIT/ML solution pen-injector sc pen, 10 units before meals plus 2:50>150, MDD 60 units, Disp: 60 mL, Rfl: 1    Insulin Glargine (Lantus SoloStar) 100 UNIT/ML injection pen, Inject 60 Units under the skin into the appropriate area as directed Every Night., Disp: 60 mL, Rfl: 1    lisinopril (PRINIVIL,ZESTRIL) 5 MG tablet, Take 1 tablet by mouth Every Night. For kidney protection from diabetes mellitus ., Disp: 90 tablet, Rfl: 3    LORazepam (ATIVAN) 1 MG tablet, Take 0.5-1 tablets by mouth 2 (Two) Times a Day As Needed (severe anxiety)., Disp: 180 tablet, Rfl: 1    meloxicam (MOBIC) 15 MG tablet, Take 1 tablet by mouth Daily As Needed (joint pain)., Disp: 90 tablet, Rfl: 3    metFORMIN (GLUCOPHAGE) 1000 MG tablet, Take 1 tablet twice a day with meals for type 2 diabetes, Disp: 180 tablet, Rfl: 3    nitroglycerin (Nitrostat) 0.4 MG SL tablet, Place 1 tablet under the tongue Every 5 (Five) Minutes As Needed for Chest Pain. Take no more than 3 doses in 15 minutes., Disp: 30 tablet, Rfl: 5    oxyCODONE-acetaminophen (Percocet) 5-325 MG per tablet, Take 1 tablet by mouth 2 (Two) Times a Day As Needed for Severe Pain. Use sparingly, Disp: 30 tablet, Rfl: 0     "pravastatin (PRAVACHOL) 40 MG tablet, TAKE 1 TABLET BY MOUTH DAILY, Disp: 90 tablet, Rfl: 3    vitamin B-6 (PYRIDOXINE) 25 MG tablet, Take 1 tablet by mouth Daily., Disp: 90 tablet, Rfl: 1    Allergies:   Allergies   Allergen Reactions    Iodinated Contrast Media Hives    Trulicity [Dulaglutide] GI Intolerance    Atorvastatin Myalgia    Rosuvastatin Myalgia       Objective     Vital Signs:   Vitals:    06/03/24 1109   BP: 136/55   Pulse: 87   Resp: 16   Temp: 98 °F (36.7 °C)   SpO2: 98%   Weight: 90.1 kg (198 lb 11.2 oz)   Height: 162.6 cm (64.02\")   PainSc:   4    Body mass index is 34.09 kg/m².   Pain Score    06/03/24 1109   PainSc:   4       Physical Exam:   General: No acute distress. Well appearing   HEENT: Normocephalic, atraumatic. Sclera anicteric.   Neck: supple, no adenopathy.   Cardiovascular: regular rate and rhythm. No murmurs.   Respiratory: Normal rate. Clear to auscultation bilaterally.  Abdomen: Soft, nontender, non distended with normoactive bowel sounds.   Lymph: no cervical, supraclavicular or axillary adenopathy.  Neuro: Alert and oriented x 3. No focal deficits.   Ext: Symmetric, no swelling.     Laboratory/Imaging Reviewed:   Comment: Neutrophilic leukocytosis and mild lymphocytosis. Cells are   mature. Reactive changes are present.   Component  Ref Range & Units 1 mo ago   Ferritin  15 - 150 ng/mL 20    Resulting Agency [facility]        Component  Ref Range & Units 1 mo ago   TIBC  250 - 450 ug/dL 366    UIBC  131 - 425 ug/dL 273    Iron  27 - 159 ug/dL 93    Iron Saturation  15 - 55 % 25      Component  Ref Range & Units 1 mo ago  (1/30/23) 1 mo ago  (1/20/23) 1 yr ago  (8/23/21) 1 yr ago  (5/6/21) 2 yr ago  (2/4/21) 4 yr ago  (12/20/18) 5 yr ago  (5/9/17)   IgG  586 - 1,602 mg/dL 801          IgA  87 - 352 mg/dL 142          IgM  26 - 217 mg/dL 60          Total Protein  6.0 - 8.5 g/dL 6.5  6.8  6.4  6.3  6.4  6.9 R  6.8 R    Albumin  2.9 - 4.4 g/dL 3.6  4.4 R  4.00 R  3.50 R  4.10 R  4.40 R "  4.10 R    Alpha-1-Globulin  0.0 - 0.4 g/dL 0.3          Alpha-2-Globulin  0.4 - 1.0 g/dL 0.8          Beta Globulin  0.7 - 1.3 g/dL 1.1          Gamma Globulin  0.4 - 1.8 g/dL 0.8          M-Neptali  Not Observed g/dL Not Observed          Globulin  2.2 - 3.9 g/dL 2.9  2.4 R  2.4 R    2.5 R  2.7 R    A/G Ratio  0.7 - 1.7 1.3  1.8 R  1.7 R  1.3 R  1.8 R  1.8 R  1.5 R    Immunofixation Reflex, Serum Comment          Comment: No monoclonality detected.   Please note Comment          Comment: Protein electrophoresis scan will follow via computer, mail, or    delivery.    Free Light Chain, Kappa  3.3 - 19.4 mg/L 20.8 High           Free Lambda Light Chains  5.7 - 26.3 mg/L 14.9          Kappa/Lambda Ratio  0.26 - 1.65 1.40          Resulting Agency [facility]           Component 2 mo ago   Flow Interpretation Comment   Comment: No significant immunophenotypic abnormality detected.   Clinical Information Comment   Comment: Monoclonal gammopathy  Essential thrombocythemia  A recent CBC was not available for review at the time this report was  prepared.   Specimen Type Comment   Comment: Peripheral blood   Assessment of Leukocytes Comment   Comment: No monoclonal B cell population is detected.  kappa:lambda ratio 1.5  There is no loss of, or aberrant expression of, the pan T cell antigens to  suggest a neoplastic T cell process.  CD4:CD8 ratio 2.6  No circulating blasts are detected.  There is no immunophenotypic  evidence of abnormal myeloid maturation.  No CD38+ plasma cell population is detected.   Viability Comment   Comment: 75%  Cell viability in this sample is sufficient for analysis but is less than  optimal.   Gating Strategy Comment   Comment: 8 color analysis with CD45/SSC gating    Technical-Analysis performed at [facility], [address] [place] [address], [place]  [address]   Director:  Desmond Noel, [facility]   Phenotype Chart Comment   Comment: CD2       Normal         CD3       Normal  CD4        Normal         CD5       Normal  CD7       Normal         CD8       Normal  CD10      Normal         CD11b     Normal  CD13      Normal         CD14      Normal  CD16      Normal         CD19      Normal  CD20      Normal         CD33      Normal  CD34      Normal         CD38      Normal  CD45      Normal         CD56      Normal  CD57      Normal              Normal  HLA-DR    Normal         KAPPA     Normal  LAMBDA    Normal         CD64      Normal   Resulting Path Name Comment   Comment: cM Pires M.D.   Comment Comment   Comment: Each antibody in this assay was utilized to assess for potential  abnormalities of studied cell populations or to characterize  identified abnormalities.  This test was developed and its performance characteristics determined  by [facility].  It has not been cleared or approved by the U.S. Food and  Drug Administration.  The FDA has determined that such clearance or approval is not  necessary. This test is used for clinical purposes.  It should not be  regarded as investigational or for research.                  Component  Ref Range & Units 1 mo ago  (5/2/24) 5 mo ago  (12/20/23) 8 mo ago  (9/18/23) 1 yr ago  (3/20/23) 1 yr ago  (1/30/23) 1 yr ago  (1/20/23) 1 yr ago  (9/14/22)   WBC  3.40 - 10.80 10*3/mm3 10.45 12.99 High  11.59 High  11.37 High  Comment R, CM 12.2 High  R 11.1 High  R   RBC  3.77 - 5.28 10*6/mm3 4.73 4.87 4.70 4.85 Comment R, CM 4.88 R 4.50 R   Hemoglobin  12.0 - 15.9 g/dL 13.3 13.3 13.2 13.6 13.3 R 13.7 R 12.7 R   Hematocrit  34.0 - 46.6 % 41.0 41.7 42.0 40.6 41.6 41.8 39.0 R   MCV  79.0 - 97.0 fL 86.7 85.6 89.4 83.7 86 R 86 R 86.7 R   MCH  26.6 - 33.0 pg 28.1 27.3 28.1 28.0 27.5 28.1 28.2 R   MCHC  31.5 - 35.7 g/dL 32.4 31.9 31.4 Low  33.5 32.0 32.8 32.6 R   RDW  12.3 - 15.4 % 13.1 12.8 12.7 12.7 13.2 R 13.1 R 14.1 R   Platelets  140 - 450 10*3/mm3 420 441 489 High  420 Comment R,  High  R 515 High  R   Neutrophil Rel %  42.7 - 76.0 % 46.0 47.5 45.6  43.7 50 R  56.1 R   Lymphocyte Rel %  19.6 - 45.3 % 44.1 42.3 45.7 High  47.2 High  40 R  33.4 R   Monocyte Rel %  5.0 - 12.0 % 5.0 5.9 5.0 5.1 6 R  6.3 R       Assessment / Plan      Assessment/Plan:   1.  Mild leukocytosis  We discussed the potential causes of an elevated white count including inflammation, infection, or bone marrow disease.  Cigarette use can cause a chronic inflammation and is commonly associated with a chronic elevation of the white count.  Chronic inflammatory diseases such as rheumatoid arthritis or other autoimmune diseases can cause a leukocytosis.  Bone marrow diseases are less common but include myeloproliferative disorders like chronic myelogenous leukemia or polycythemia vera.  -I reviewed her available CBCs dating back several years, and this is a intermittent, albeit relatively frequent finding.  Her blood smear had no concerning features. She has h/o intermittent infections  which make interpretation difficult but she has a persistent elevation in ALC I sent a flow cytometry which was normal. Most recent several CBCs reviewed and as WBC has now normalized as has platelet, I cancelled her JAK2 reflex cascade.   Follow up with repeat labs including MAC 2 reflex     2.  Free light chain MGUS   -We reviewed the diagnosis of MGUS including the incidence, prognosis, and possibility of transformation to a bloodstream malignancy such as myeloma. I did reassure him that transformation to symptomatic MM is rare and rate of transformation in the MGUS population can be estimated at 1% per year.   -I recommended a full laboratory panel to confirm the diagnosis of MGUS, including immunofixation and baseline urinary studies which we will obtain in 6 months assuming that the asymmetric light chain elevation persists.  If free light chains normalized, no further work-up is indicated. As she is just completing antibiotics, will defer this to her follow up visit in Sept.     3.  Personal and family  history of malignancy  -I recommended that she proceed with a baseline mammogram, colonoscopy or Cologuard testing, and screening lung CT. She has also discussed with PCP    Follow Up:   3 mo with repeat labs     Abby Peng MD  Hematology and Oncology

## 2024-06-18 ENCOUNTER — TELEPHONE (OUTPATIENT)
Dept: CARDIOLOGY | Facility: CLINIC | Age: 59
End: 2024-06-18
Payer: MEDICARE

## 2024-06-18 NOTE — TELEPHONE ENCOUNTER
Spoke to patient provided the phone number for scheduling so they can help her with her R/S needs.     Phone number given : [phone]      Patient verbalized understanding    Unresponsive AMS

## 2024-06-18 NOTE — TELEPHONE ENCOUNTER
Hub staff attempted to follow warm transfer process and was unsuccessful     Caller: Kimberly Chun    Relationship to patient: Self    Best call back number: [phone]    Patient is needing: PATIENT IS NEEDING TO HAVE HER ECHO ON 6-25-24 RESCHEDULED. . PATIENT CAN COME EARLIER THAT DAY  AROUND 10 AM  OR LATE AFTERNOON 4-5 PM. . IF NOT ANOTHER DAY. PLEASE REACH OUT TO PATIENT.

## 2024-06-25 ENCOUNTER — OFFICE VISIT (OUTPATIENT)
Dept: OBSTETRICS AND GYNECOLOGY | Facility: CLINIC | Age: 59
End: 2024-06-25
Payer: MEDICARE

## 2024-06-25 VITALS — WEIGHT: 195 LBS | BODY MASS INDEX: 33.29 KG/M2 | HEIGHT: 64 IN

## 2024-06-25 DIAGNOSIS — B37.2 YEAST DERMATITIS: Primary | ICD-10-CM

## 2024-06-25 PROCEDURE — 99202 OFFICE O/P NEW SF 15 MIN: CPT | Performed by: OBSTETRICS & GYNECOLOGY

## 2024-06-25 RX ORDER — NYSTATIN 100000 [USP'U]/G
POWDER TOPICAL 2 TIMES DAILY
Qty: 60 G | Refills: 5 | Status: SHIPPED | OUTPATIENT
Start: 2024-06-25

## 2024-06-25 RX ORDER — FLUCONAZOLE 150 MG/1
TABLET ORAL
Qty: 2 TABLET | Refills: 0 | Status: SHIPPED | OUTPATIENT
Start: 2024-06-25

## 2024-06-27 ENCOUNTER — PATIENT ROUNDING (BHMG ONLY) (OUTPATIENT)
Dept: OBSTETRICS AND GYNECOLOGY | Facility: CLINIC | Age: 59
End: 2024-06-27
Payer: MEDICARE

## 2024-06-27 NOTE — PROGRESS NOTES
June 27, 2024    Hello, may I speak with Kimberly Chun?    My name is Анна      I am  with [facility]  [address], [address]  [place] [address] [phone].    Before we get started may I verify your date of birth? 1965    I am calling to officially welcome you to our practice and ask about your recent visit. Is this a good time to talk? yes    Tell me about your visit with us. What things went well?  Patient states that everything went fine.       We're always looking for ways to make our patients' experiences even better. Do you have recommendations on ways we may improve?  no    Overall were you satisfied with your first visit to our practice? yes       I appreciate you taking the time to speak with me today. Is there anything else I can do for you? no      Thank you, and have a great day.

## 2024-07-09 NOTE — PROGRESS NOTES
"GYN Office Visit  Subjective   Chief Complaint   Patient presents with    Vaginal Cyst     Infected hair that became infected, took antibiotics and has gotten better. Still has irritation        Kimberly Chun is a 59 y.o. year old  presenting to be seen for vulvar irritation.    She recently had a vulvar cyst that became infected.  She took antibiotics and it improved, but she now has irritation on the outside of the vulva.       Objective   Ht 162.6 cm (64\")   Wt 88.5 kg (195 lb)   BMI 33.47 kg/m²     Physical Exam:  Reassuring physical exam overall.  No obvious cyst or abscess.  She does have irritation in the bilateral groins and at the opening of the vagina.     Assessment   Vulvovaginal candidiasis    We reviewed her situation, symptoms and exam findings in detail today.  The cyst/abscess appears to be improved after her round of antibiotics.  Unfortunately, I believe the yeast infection has set in after the antibiotics.  Nystatin powder and Diflucan were ordered today.  She will let our clinic know if these do not improve her symptoms.  Call/return precautions reviewed.  All questions answered.    Coding/billing based on time: 15 total minutes were required for this encounter.    Wan Evans MD  Obstetrics and Gynecology  [facility]   "

## 2024-07-11 ENCOUNTER — HOSPITAL ENCOUNTER (OUTPATIENT)
Dept: ULTRASOUND IMAGING | Facility: HOSPITAL | Age: 59
Discharge: HOME | End: 2024-07-11
Admitting: FAMILY MEDICINE
Payer: MEDICARE

## 2024-07-11 DIAGNOSIS — E04.9 THYROID ENLARGEMENT: ICD-10-CM

## 2024-07-11 PROCEDURE — 76536 US EXAM OF HEAD AND NECK: CPT

## 2024-07-12 ENCOUNTER — TELEPHONE (OUTPATIENT)
Dept: INTERNAL MEDICINE | Facility: CLINIC | Age: 59
End: 2024-07-12
Payer: MEDICARE

## 2024-07-12 DIAGNOSIS — R13.10 DYSPHAGIA, UNSPECIFIED TYPE: Primary | ICD-10-CM

## 2024-07-12 DIAGNOSIS — R12 HEARTBURN: ICD-10-CM

## 2024-07-12 NOTE — TELEPHONE ENCOUNTER
----- Message from Ashley APONTE sent at 7/12/2024  3:14 PM EDT -----  Patient notified and states understanding. Patient states US tech mention that this could be her Esophagus and patient would like a referral to GI for EGD.

## 2024-07-12 NOTE — TELEPHONE ENCOUNTER
Ultrasound was ordered due to concern for thyroid enlargement. An esophageal issue would not cause that.     Trouble swallowing?  Heartburn?

## 2024-07-15 DIAGNOSIS — E11.42 TYPE 2 DIABETES MELLITUS WITH DIABETIC POLYNEUROPATHY, WITH LONG-TERM CURRENT USE OF INSULIN: ICD-10-CM

## 2024-07-15 DIAGNOSIS — Z79.4 TYPE 2 DIABETES MELLITUS WITH DIABETIC POLYNEUROPATHY, WITH LONG-TERM CURRENT USE OF INSULIN: ICD-10-CM

## 2024-07-15 DIAGNOSIS — E11.42 DIABETIC PERIPHERAL NEUROPATHY ASSOCIATED WITH TYPE 2 DIABETES MELLITUS: ICD-10-CM

## 2024-07-15 DIAGNOSIS — G89.29 CHRONIC MIDLINE LOW BACK PAIN WITH SCIATICA, SCIATICA LATERALITY UNSPECIFIED: ICD-10-CM

## 2024-07-15 DIAGNOSIS — M54.40 CHRONIC MIDLINE LOW BACK PAIN WITH SCIATICA, SCIATICA LATERALITY UNSPECIFIED: ICD-10-CM

## 2024-07-15 RX ORDER — GABAPENTIN 800 MG/1
800 TABLET ORAL 3 TIMES DAILY
Qty: 270 TABLET | Refills: 1 | Status: SHIPPED | OUTPATIENT
Start: 2024-07-15

## 2024-07-15 NOTE — TELEPHONE ENCOUNTER
Rx Refill Note  Requested Prescriptions     Pending Prescriptions Disp Refills    gabapentin (NEURONTIN) 800 MG tablet 270 tablet 1     Sig: Take 1 tablet by mouth 3 (Three) Times a Day. Indications: Neuropathic Pain      Last office visit with prescribing clinician: 5/28/2024   Last telemedicine visit with prescribing clinician: Visit date not found   Next office visit with prescribing clinician: 8/5/2024       Indra Lopez MA  07/15/24, 13:20 EDT

## 2024-07-15 NOTE — TELEPHONE ENCOUNTER
Caller: Kimberly Chun    Relationship: Self    Best call back number: [phone]    Requested Prescriptions:   Requested Prescriptions     Pending Prescriptions Disp Refills    gabapentin (NEURONTIN) 800 MG tablet 270 tablet 1     Sig: Take 1 tablet by mouth 3 (Three) Times a Day. Indications: Neuropathic Pain        Pharm[facility] where request should be sent[id number] - [place], [place] - [address] [facility] [address] [place]  - [phone]  - [phone] FX     Last office visit with prescribing clinician: 5/28/2024   Last telemedicine visit with prescribing clinician: Visit date not found   Next office visit with prescribing clinician: 8/5/2024         Does the patient have less than a 3 day supply:  [x] Yes  [] No    Would you like a call back once the refill request has been completed: [] Yes [] No    If the office needs to give you a call back, can they leave a voicemail: [] Yes [x] No    Shantal Acharya   07/15/24 09:05 EDT

## 2024-08-05 ENCOUNTER — OFFICE VISIT (OUTPATIENT)
Dept: INTERNAL MEDICINE | Facility: CLINIC | Age: 59
End: 2024-08-05
Payer: MEDICARE

## 2024-08-05 VITALS
HEIGHT: 64 IN | TEMPERATURE: 96.5 F | OXYGEN SATURATION: 98 % | BODY MASS INDEX: 33.8 KG/M2 | DIASTOLIC BLOOD PRESSURE: 70 MMHG | SYSTOLIC BLOOD PRESSURE: 136 MMHG | RESPIRATION RATE: 17 BRPM | HEART RATE: 75 BPM | WEIGHT: 198 LBS

## 2024-08-05 DIAGNOSIS — Z85.51 HISTORY OF BLADDER CANCER: ICD-10-CM

## 2024-08-05 DIAGNOSIS — M25.511 CHRONIC RIGHT SHOULDER PAIN: ICD-10-CM

## 2024-08-05 DIAGNOSIS — F41.9 ANXIETY: ICD-10-CM

## 2024-08-05 DIAGNOSIS — E11.65 TYPE 2 DIABETES MELLITUS WITH HYPERGLYCEMIA, WITH LONG-TERM CURRENT USE OF INSULIN: Primary | Chronic | ICD-10-CM

## 2024-08-05 DIAGNOSIS — Z79.4 TYPE 2 DIABETES MELLITUS WITH HYPERGLYCEMIA, WITH LONG-TERM CURRENT USE OF INSULIN: Primary | Chronic | ICD-10-CM

## 2024-08-05 DIAGNOSIS — K21.9 GASTROESOPHAGEAL REFLUX DISEASE, UNSPECIFIED WHETHER ESOPHAGITIS PRESENT: ICD-10-CM

## 2024-08-05 DIAGNOSIS — I20.89 ANGINAL EQUIVALENT: ICD-10-CM

## 2024-08-05 DIAGNOSIS — G89.29 CHRONIC RIGHT SHOULDER PAIN: ICD-10-CM

## 2024-08-05 DIAGNOSIS — N89.8 VAGINAL ITCHING: ICD-10-CM

## 2024-08-05 LAB
EXPIRATION DATE: ABNORMAL
HBA1C MFR BLD: 8.6 % (ref 4.5–5.7)
Lab: ABNORMAL

## 2024-08-05 PROCEDURE — G2211 COMPLEX E/M VISIT ADD ON: HCPCS | Performed by: FAMILY MEDICINE

## 2024-08-05 PROCEDURE — 1160F RVW MEDS BY RX/DR IN RCRD: CPT | Performed by: FAMILY MEDICINE

## 2024-08-05 PROCEDURE — 83036 HEMOGLOBIN GLYCOSYLATED A1C: CPT | Performed by: FAMILY MEDICINE

## 2024-08-05 PROCEDURE — 1126F AMNT PAIN NOTED NONE PRSNT: CPT | Performed by: FAMILY MEDICINE

## 2024-08-05 PROCEDURE — 99214 OFFICE O/P EST MOD 30 MIN: CPT | Performed by: FAMILY MEDICINE

## 2024-08-05 PROCEDURE — 1159F MED LIST DOCD IN RCRD: CPT | Performed by: FAMILY MEDICINE

## 2024-08-05 PROCEDURE — 3052F HG A1C>EQUAL 8.0%<EQUAL 9.0%: CPT | Performed by: FAMILY MEDICINE

## 2024-08-05 RX ORDER — ESOMEPRAZOLE MAGNESIUM 40 MG/1
40 CAPSULE, DELAYED RELEASE ORAL
Qty: 90 CAPSULE | Refills: 1 | Status: SHIPPED | OUTPATIENT
Start: 2024-08-05

## 2024-08-05 RX ORDER — LORAZEPAM 1 MG/1
.5-1 TABLET ORAL 2 TIMES DAILY PRN
Qty: 60 TABLET | Refills: 1 | Status: SHIPPED | OUTPATIENT
Start: 2024-08-05

## 2024-08-05 RX ORDER — OXYCODONE HYDROCHLORIDE AND ACETAMINOPHEN 5; 325 MG/1; MG/1
1 TABLET ORAL DAILY PRN
Qty: 30 TABLET | Refills: 0 | Status: SHIPPED | OUTPATIENT
Start: 2024-08-05

## 2024-08-05 RX ORDER — FLUCONAZOLE 150 MG/1
TABLET ORAL
Qty: 2 TABLET | Refills: 0 | Status: SHIPPED | OUTPATIENT
Start: 2024-08-05

## 2024-08-05 NOTE — ASSESSMENT & PLAN NOTE
Diabetes is improving with treatment.   Continue current treatment regimen.  Emphasized need to see endocrinology as scheduled, VERY IMPORTANT  Diabetes will be reassessed in 3 months

## 2024-08-05 NOTE — PROGRESS NOTES
"Chief Complaint  Diabetes (3 month follow up)    Subjective        Kimberly Chun presents to [facility]  History of Present Illness  In range 60% of time  Scheduled with endocrinology.     Heartburn  Managed some with PPI  Cannot go without  Scheduled to see specialist to discuss upper scope  Was told to do this by radiology tech when she had ultrasound done on thyroid.   Diabetes  She presents for her follow-up diabetic visit. She has type 2 diabetes mellitus. Diabetic complications include peripheral neuropathy. Risk factors for coronary artery disease include diabetes mellitus, post-menopausal, sedentary lifestyle, stress and tobacco exposure. Current diabetic treatment includes insulin injections and oral agent (monotherapy). Her weight is stable. An ACE inhibitor/angiotensin II receptor blocker is being taken.       Objective   Vital Signs:  /70   Pulse 75   Temp 96.5 °F (35.8 °C)   Resp 17   Ht 162.6 cm (64\")   Wt 89.8 kg (198 lb)   SpO2 98%   BMI 33.99 kg/m²   Estimated body mass index is 33.99 kg/m² as calculated from the following:    Height as of this encounter: 162.6 cm (64\").    Weight as of this encounter: 89.8 kg (198 lb).               Physical Exam  Vitals and nursing note reviewed.   Constitutional:       General: She is not in acute distress.     Appearance: Normal appearance. She is well-developed and well-groomed. She is obese. She is not ill-appearing, toxic-appearing or diaphoretic.   HENT:      Head: Normocephalic and atraumatic.      Right Ear: Hearing normal.      Left Ear: Hearing normal.   Eyes:      General: Lids are normal. No scleral icterus.     Extraocular Movements: Extraocular movements intact.   Neck:      Trachea: Phonation normal.   Cardiovascular:      Rate and Rhythm: Normal rate and regular rhythm.   Pulmonary:      Effort: Pulmonary effort is normal.      Breath sounds: Normal breath sounds.   Musculoskeletal:      Cervical " back: Neck supple.   Skin:     Coloration: Skin is not jaundiced or pale.   Neurological:      General: No focal deficit present.      Mental Status: She is alert and oriented to person, place, and time.      Motor: Motor function is intact.   Psychiatric:         Attention and Perception: Attention and perception normal.         Mood and Affect: Mood and affect normal.         Speech: Speech normal.         Behavior: Behavior normal. Behavior is cooperative.         Thought Content: Thought content normal.         Cognition and Memory: Cognition and memory normal.         Judgment: Judgment normal.        Result Review :    The following data was reviewed by: Maria Luz Longoria MD on 08/05/2024:  Progress Notes by Dalia Sandoval APRN (05/17/2024 09:30)     Lab Results   Component Value Date    HGBA1C 8.6 (A) 08/05/2024    HGBA1C 9.80 (H) 05/02/2024    HGBA1C 9.5 (A) 12/22/2023   Scheduled to see endocrinology on 9/16/24 (Dr. Crain)    Progress Notes by Abby Peng MD (06/03/2024 11:15) (follow up with Dr. Peng scheduled for 9/9/24)             Assessment and Plan     Diagnoses and all orders for this visit:    1. Type 2 diabetes mellitus with hyperglycemia, with long-term current use of insulin (Primary)  Assessment & Plan:  Diabetes is improving with treatment.   Continue current treatment regimen.  Emphasized need to see endocrinology as scheduled, VERY IMPORTANT  Diabetes will be reassessed in 3 months    Orders:  -     POC Glycosylated Hemoglobin (Hb A1C)    2. Gastroesophageal reflux disease, unspecified whether esophagitis present  Assessment & Plan:  Continue PPI. Scheduled to see gastroenterology, discuss EGD.    Orders:  -     esomeprazole (nexIUM) 40 MG capsule; Take 1 capsule by mouth Every Morning Before Breakfast. As needed for heartburn  Dispense: 90 capsule; Refill: 1    3. Anginal equivalent  Comments:  strongly encouraged completion of cardiac work up as presviously ordered    4.  Vaginal itching  Comments:  if symptoms fail to improve she was told to follow up with ob/gyn  Orders:  -     fluconazole (Diflucan) 150 MG tablet; TAKE ONE TAB PO X ONCE, CAN REPEAT IN 4 DAYS IF NEEDED FOR YEAST INFECTION  Dispense: 2 tablet; Refill: 0    5. Anxiety  -     LORazepam (ATIVAN) 1 MG tablet; Take 0.5-1 tablets by mouth 2 (Two) Times a Day As Needed (severe anxiety (use sparingly)).  Dispense: 60 tablet; Refill: 1    6. Chronic right shoulder pain  -     oxyCODONE-acetaminophen (Percocet) 5-325 MG per tablet; Take 1 tablet by mouth Daily As Needed for Severe Pain. Use sparingly  Dispense: 30 tablet; Refill: 0    7. History of bladder cancer  Comments:  for diabetes she cannot take jardiance or similar medicines             Follow Up     Return in about 3 months (around 11/5/2024) for follow up chronic conditions.  Patient was given instructions and counseling regarding her condition or for health maintenance advice. Please see specific information pulled into the AVS if appropriate.

## 2024-08-06 ENCOUNTER — PATIENT OUTREACH (OUTPATIENT)
Dept: CASE MANAGEMENT | Facility: OTHER | Age: 59
End: 2024-08-06
Payer: MEDICARE

## 2024-08-06 DIAGNOSIS — E11.42 TYPE 2 DIABETES MELLITUS WITH DIABETIC POLYNEUROPATHY, WITH LONG-TERM CURRENT USE OF INSULIN: Primary | Chronic | ICD-10-CM

## 2024-08-06 DIAGNOSIS — J44.9 CHRONIC OBSTRUCTIVE PULMONARY DISEASE, UNSPECIFIED COPD TYPE: ICD-10-CM

## 2024-08-06 DIAGNOSIS — Z79.4 TYPE 2 DIABETES MELLITUS WITH DIABETIC POLYNEUROPATHY, WITH LONG-TERM CURRENT USE OF INSULIN: Primary | Chronic | ICD-10-CM

## 2024-08-06 NOTE — OUTREACH NOTE
AMBULATORY CASE MANAGEMENT NOTE    Names and Relationships of Patient/Support Persons: Contact: Kimberly Chun; Relationship: Self -     Patient Outreach    AMB CM outreach. Patient was identified for pro active outreach based on A1c of 8.6. Purpose and potential benefits of Chronic Care Management program explained. Patient expressed appreciation for the outreach but declined participation at this time.    Reports compliance with medications and insulin dosage. Utilizes the CGM/Dexcom for BG monitoring and performs finger sticks when to check accuracy when result is out of range. She is followed by Endocrinology and has been discussing use of an insulin pump with them. She is interested in learning how to insulin dose match for carbohydrate intake. States that she knew the carbohydrate count of certain items but has forgotten. She is agreeable to having education on carbohydrate counting mailed to her but declined additional follow up with CM for further education.     Encouraged her to prepare a list of questions for the endocrinology visit regarding the insulin pump and dose matching. Reinforced contacting AMB CM should she want additional diet or disease education related to diabetes or other chronic conditions.    Send Education    AMB CM to mail requested information on carbohydrate counting along with contact information for CM.      Education Documentation  Insulin Dose Matched to Carbohydrate Intake, taught by Sarah Gaines, RN at 8/6/2024 10:33 AM.  Learner: Patient  Readiness: Acceptance  Method: Explanation, Teach Back  Response: Verbalizes Understanding, Needs Reinforcement    Carbohydrate Counting, taught by Sarah Gaines, RN at 8/6/2024 10:33 AM.  Learner: Patient  Readiness: Acceptance  Method: Explanation, Teach Back  Response: Verbalizes Understanding, Needs Reinforcement    Carbohydrate-Containing Foods, taught by Sarah Gaines RN at 8/6/2024 10:33 AM.  Learner: Patient  Readiness:  Acceptance  Method: Explanation, Teach Back  Response: Verbalizes Understanding, Needs Reinforcement    Insulin Therapy, taught by Sarah Gaines, RN at 8/6/2024 10:33 AM.  Learner: Patient  Readiness: Acceptance  Method: Explanation, Teach Back  Response: Verbalizes Understanding, Needs Reinforcement    A1C Goal, taught by Sarah Gaines, RN at 8/6/2024 10:33 AM.  Learner: Patient  Readiness: Acceptance  Method: Explanation, Teach Back  Response: Verbalizes Understanding, Needs Reinforcement    Frequency of Testing, taught by Sarah Gaines, RN at 8/6/2024 10:33 AM.  Learner: Patient  Readiness: Acceptance  Method: Explanation, Teach Back  Response: Verbalizes Understanding, Needs Reinforcement          Sarah KNOX  Ambulatory Case Management    8/6/2024, 10:34 EDT

## 2024-08-07 DIAGNOSIS — M25.511 ARTHRALGIA OF RIGHT SHOULDER REGION: Primary | ICD-10-CM

## 2024-08-08 ENCOUNTER — PATIENT OUTREACH (OUTPATIENT)
Dept: CASE MANAGEMENT | Facility: OTHER | Age: 59
End: 2024-08-08
Payer: MEDICARE

## 2024-08-08 ENCOUNTER — OFFICE VISIT (OUTPATIENT)
Dept: ORTHOPEDIC SURGERY | Facility: CLINIC | Age: 59
End: 2024-08-08
Payer: MEDICARE

## 2024-08-08 VITALS
DIASTOLIC BLOOD PRESSURE: 76 MMHG | WEIGHT: 198.3 LBS | HEIGHT: 64 IN | BODY MASS INDEX: 33.86 KG/M2 | SYSTOLIC BLOOD PRESSURE: 128 MMHG

## 2024-08-08 DIAGNOSIS — J44.9 CHRONIC OBSTRUCTIVE PULMONARY DISEASE, UNSPECIFIED COPD TYPE: ICD-10-CM

## 2024-08-08 DIAGNOSIS — M19.011 ARTHRITIS OF RIGHT ACROMIOCLAVICULAR JOINT: ICD-10-CM

## 2024-08-08 DIAGNOSIS — E11.42 TYPE 2 DIABETES MELLITUS WITH DIABETIC POLYNEUROPATHY, WITH LONG-TERM CURRENT USE OF INSULIN: Primary | ICD-10-CM

## 2024-08-08 DIAGNOSIS — Z79.4 TYPE 2 DIABETES MELLITUS WITH DIABETIC POLYNEUROPATHY, WITH LONG-TERM CURRENT USE OF INSULIN: Primary | ICD-10-CM

## 2024-08-08 DIAGNOSIS — M75.41 IMPINGEMENT SYNDROME OF RIGHT SHOULDER: ICD-10-CM

## 2024-08-08 DIAGNOSIS — R20.2 ARM PARESTHESIA, RIGHT: ICD-10-CM

## 2024-08-08 DIAGNOSIS — M25.511 CHRONIC RIGHT SHOULDER PAIN: Primary | ICD-10-CM

## 2024-08-08 DIAGNOSIS — G89.29 CHRONIC RIGHT SHOULDER PAIN: Primary | ICD-10-CM

## 2024-08-08 RX ORDER — LIDOCAINE HYDROCHLORIDE 20 MG/ML
2 INJECTION, SOLUTION INFILTRATION; PERINEURAL
Status: COMPLETED | OUTPATIENT
Start: 2024-08-08 | End: 2024-08-08

## 2024-08-08 RX ORDER — METHYLPREDNISOLONE ACETATE 40 MG/ML
40 INJECTION, SUSPENSION INTRA-ARTICULAR; INTRALESIONAL; INTRAMUSCULAR; SOFT TISSUE
Status: COMPLETED | OUTPATIENT
Start: 2024-08-08 | End: 2024-08-08

## 2024-08-08 RX ADMIN — LIDOCAINE HYDROCHLORIDE 2 ML: 20 INJECTION, SOLUTION INFILTRATION; PERINEURAL at 10:29

## 2024-08-08 RX ADMIN — METHYLPREDNISOLONE ACETATE 40 MG: 40 INJECTION, SUSPENSION INTRA-ARTICULAR; INTRALESIONAL; INTRAMUSCULAR; SOFT TISSUE at 10:29

## 2024-08-08 NOTE — OUTREACH NOTE
AMBULATORY CASE MANAGEMENT NOTE    Names and Relationships of Patient/Support Persons: Contact: Kimberly Chun; Relationship: Self -     Send Education    AMB CM mailed the following education to the confirmed address on file; Chronic Care Management brochure, business card, Meal Planning and Carb Counting, Planning Healthy Meals, Reading a Nutrition Label from [employer] and Carbohydrate Counting from [employer].    Sarah KNOX  Ambulatory Case Management    8/8/2024, 14:14 EDT

## 2024-08-08 NOTE — PROGRESS NOTES
Office Note     Name: Kimberly Chun    : 1965     MRN: [id number]     Chief Complaint  Pain of the Right Shoulder (States she has been having pain for a few months, no known injury. States she has pain all the way to her hand and numbness in her fingers.)    Subjective     History of Present Illness:  Kimberly Chun is a 59 y.o. female presenting today for evaluation for chronic right shoulder pain.  The pain is present throughout the shoulder and radiates into her neck and down her arm, across her shoulder blades.  She does have focal pain at the AC joint which radiates into her collarbone.  She does admit to paresthesias, mostly in her fingers but occasionally throughout the entire RUE.  She does admit to chronic, daily pain for various reasons including OA, previous back injury, and fibromyalgia.  She takes hydrocodone PRN for pain, meloxicam daily.  She does admit to previous back injury including vertebral fracture and lumbar DDD.  Patient states that she had back surgery with Dr. Jones in approximately , states that she had rods and screws put in her lumbar spine.  Laying flat does improve symptoms, pain exacerbated with overhead activities and reaching behind her back.  She notes that she feels significantly weaker than she was a few years ago.  Does not admit to chronic neck pain or previous injuries or surgeries to the neck.  RHD, retired.      Review of Systems   Constitutional:  Negative for fever.   HENT:  Negative for dental problem and voice change.    Eyes:  Negative for visual disturbance.   Respiratory:  Negative for shortness of breath.    Cardiovascular:  Negative for chest pain.   Gastrointestinal:  Negative for abdominal pain.   Genitourinary:  Negative for dysuria.   Musculoskeletal:  Positive for arthralgias (right shoulder). Negative for gait problem and joint swelling.   Skin:  Negative for rash.   Neurological:  Positive for numbness (right hand). Negative for speech  difficulty.   Hematological:  Does not bruise/bleed easily.   Psychiatric/Behavioral:  Negative for confusion.         Past Medical History:   Diagnosis Date    Abnormal Pap smear of cervix     Anxiety     Arthritis     Bladder cancer 2016    Cataract     COPD (chronic obstructive pulmonary disease)     COVID-19 vaccine series completed     with booster,  moderna    Dysphagia     Fibromyalgia     Fracture     RIGHT LEG, RIGHT ANKLE, MULTIPLE TOES    Full dentures     GERD (gastroesophageal reflux disease)     Gestational diabetes     Hyperlipidemia     Migraine     Osteoarthritis     Osteoporosis     Ovarian cyst     Recurrent urinary tract infection     Sciatica     Seasonal allergies     Statin intolerance     Stroke 2021    TIA (transient ischemic attack)     Type 1 diabetes     Type 2 diabetes mellitus     Varicella     Wears glasses         Past Surgical History:   Procedure Laterality Date    BACK SURGERY  08/25/2020    CARPAL TUNNEL RELEASE Bilateral     done about 30 years ago    CATARACT EXTRACTION W/ INTRAOCULAR LENS IMPLANT Right 05/06/2022    Procedure: CATARACT PHACO EXTRACTION WITH INTRAOCULAR LENS IMPLANT RIGHT;  Surgeon: Eugene Avilez MD;  Location: [facility] OR;  Service: Ophthalmology;  Laterality: Right;    CATARACT EXTRACTION W/ INTRAOCULAR LENS IMPLANT Left 06/20/2022    Procedure: CATARACT PHACO EXTRACTION WITH INTRAOCULAR LENS IMPLANT LEFT;  Surgeon: Ayden Obrien MD;  Location: [facility] OR;  Service: Ophthalmology;  Laterality: Left;    CYSTOSCOPY BLADDER BIOPSY  2016    ENDOSCOPY N/A 07/03/2017    Procedure: ESOPHAGOGASTRODUODENOSCOPY WITH BIOPSY;  Surgeon: Lindy Bey MD;  Location: [facility] ENDOSCOPY;  Service:     HYSTERECTOMY      OOPHORECTOMY      TONSILLECTOMY         Family History   Problem Relation Age of Onset    Arthritis Mother     Hypertension Mother     Heart attack Mother     Osteoporosis Mother     Arthritis Father     Diabetes Father     Hypertension  Father     Lung cancer Father     Ovarian cancer Sister     Throat cancer Brother     Breast cancer Paternal Grandmother     Drug abuse Daughter     Stroke Other        Social History     Socioeconomic History    Marital status:     Number of children: 2   Tobacco Use    Smoking status: Every Day     Current packs/day: 0.50     Average packs/day: 0.5 packs/day for 40.0 years (20.0 ttl pk-yrs)     Types: Cigarettes     Passive exposure: Past    Smokeless tobacco: Never   Vaping Use    Vaping status: Never Used   Substance and Sexual Activity    Alcohol use: No    Drug use: No    Sexual activity: Not Currently     Birth control/protection: Abstinence         Current Outpatient Medications:     albuterol (PROVENTIL) (2.5 MG/3ML) 0.083% nebulizer solution, Take 2.5 mg by nebulization Every 6 (Six) Hours As Needed for Wheezing or Shortness of Air., Disp: 180 each, Rfl: 3    albuterol sulfate HFA (ProAir HFA) 108 (90 Base) MCG/ACT inhaler, Inhale 2 puffs Every 6 (Six) Hours As Needed for Wheezing or Shortness of Air., Disp: 54 g, Rfl: 3    aspirin 81 MG EC tablet, Take 1 tablet by mouth Daily., Disp: 90 tablet, Rfl: 3    B-D ULTRA-FINE 33 LANCETS misc, 1 Units 4 (Four) Times a Day. Check sugars fasting and 2 hours after meals., Disp: 100 each, Rfl: 12    Cholecalciferol 25 MCG (1000 UT) capsule, Take 1 capsule by mouth Daily. For vitamin D deficiency., Disp: 90 capsule, Rfl: 3    clopidogrel (PLAVIX) 75 MG tablet, Take 1 tablet by mouth Daily., Disp: 90 tablet, Rfl: 3    Continuous Glucose  (FreeStyle Andree 3 [place]) device, Use 1 each Daily., Disp: 1 each, Rfl: 0    Continuous Glucose Sensor (FreeStyle Andree 3 Sensor) misc, Use 1 Application Every 14 (Fourteen) Days., Disp: 2 each, Rfl: 11    Cyanocobalamin (VITAMIN B-12 PO), Take  by mouth., Disp: , Rfl:     Droplet Pen Needles 32G X 4 MM misc, USE TO INJECT INSULIN 4X DAY, Disp: 400 each, Rfl: 1    esomeprazole (nexIUM) 40 MG capsule, Take 1 capsule by  mouth Every Morning Before Breakfast. As needed for heartburn, Disp: 90 capsule, Rfl: 1    fluconazole (Diflucan) 150 MG tablet, TAKE ONE TAB PO X ONCE, CAN REPEAT IN 4 DAYS IF NEEDED FOR YEAST INFECTION, Disp: 2 tablet, Rfl: 0    Fluticasone Furoate-Vilanterol (Breo Ellipta) 200-25 MCG/ACT inhaler, Inhale 1 puff Daily., Disp: 90 each, Rfl: 3    FOLIC ACID PO, Take  by mouth., Disp: , Rfl:     gabapentin (NEURONTIN) 800 MG tablet, Take 1 tablet by mouth 3 (Three) Times a Day. Indications: Neuropathic Pain, Disp: 270 tablet, Rfl: 1    glucose blood (True Metrix Blood Glucose Test) test strip, TEST SUGARS FASTING AND 2 HOURS AFTER MEALS (4X A DAY DUE TO HYPERGLYCEMIA), Disp: 400 each, Rfl: 2    Gvoke HypoPen 2-Pack 0.5 MG/0.1ML solution auto-injector, INJECT 0.5 MG UNDER THE SKIN INTO THE APPROPRIATE AREA AS DIRECTED ONE TIME AS NEEDED FOR HYPOGLYCEMIA, Disp: 0.2 mL, Rfl: 0    insulin aspart (NovoLOG FlexPen) 100 UNIT/ML solution pen-injector sc pen, 10 units before meals plus 2:50>150, MDD 60 units, Disp: 60 mL, Rfl: 1    Insulin Glargine (Lantus SoloStar) 100 UNIT/ML injection pen, Inject 60 Units under the skin into the appropriate area as directed Every Night., Disp: 60 mL, Rfl: 1    lisinopril (PRINIVIL,ZESTRIL) 5 MG tablet, Take 1 tablet by mouth Every Night. For kidney protection from diabetes mellitus ., Disp: 90 tablet, Rfl: 3    LORazepam (ATIVAN) 1 MG tablet, Take 0.5-1 tablets by mouth 2 (Two) Times a Day As Needed (severe anxiety (use sparingly))., Disp: 60 tablet, Rfl: 1    meloxicam (MOBIC) 15 MG tablet, Take 1 tablet by mouth Daily As Needed (joint pain)., Disp: 90 tablet, Rfl: 3    metFORMIN (GLUCOPHAGE) 1000 MG tablet, Take 1 tablet twice a day with meals for type 2 diabetes, Disp: 180 tablet, Rfl: 3    nitroglycerin (Nitrostat) 0.4 MG SL tablet, Place 1 tablet under the tongue Every 5 (Five) Minutes As Needed for Chest Pain. Take no more than 3 doses in 15 minutes., Disp: 30 tablet, Rfl: 5     "nystatin (MYCOSTATIN) 249852 UNIT/GM powder, Apply  topically to the appropriate area as directed 2 (Two) Times a Day., Disp: 60 g, Rfl: 5    oxyCODONE-acetaminophen (Percocet) 5-325 MG per tablet, Take 1 tablet by mouth Daily As Needed for Severe Pain. Use sparingly, Disp: 30 tablet, Rfl: 0    pravastatin (PRAVACHOL) 40 MG tablet, TAKE 1 TABLET BY MOUTH DAILY, Disp: 90 tablet, Rfl: 3    vitamin B-6 (PYRIDOXINE) 25 MG tablet, Take 1 tablet by mouth Daily., Disp: 90 tablet, Rfl: 1    Allergies   Allergen Reactions    Iodinated Contrast Media Hives    Trulicity [Dulaglutide] GI Intolerance    Atorvastatin Myalgia    Rosuvastatin Myalgia           Objective   /76   Ht 162.6 cm (64.02\")   Wt 89.9 kg (198 lb 4.8 oz)   BMI 34.02 kg/m²            Physical Exam  Right Shoulder Exam     Range of Motion   Active abduction:  100   Extension:  30   External rotation:  70   Forward flexion:  140   Internal rotation 0 degrees:  L4     Muscle Strength   Abduction: 3/5   Internal rotation: 3/5   External rotation: 3/5   Supraspinatus: 3/5   Subscapularis: 3/5   Biceps: 3/5     Tests   Apprehension: negative  Sanders test: positive  Cross arm: positive  Impingement: positive  Drop arm: negative    Other   Erythema: absent  Scars: absent  Sensation: normal  Pulse: present         Extremity DVT signs are negative by clinical screen.     Independent Review of Radiographic Studies:    Three-view plain films of the right shoulder were done in office today for evaluation of pain and joint condition, no comparison views available.  No acute osseous abnormalities are seen.  There are mild degenerative changes noted at the AC joint and glenohumeral joint.  Subacromial space is within normal limits.    Large Joint Arthrocentesis: R subacromial bursa  Date/Time: 8/8/2024 10:29 AM  Consent given by: patient  Site marked: site marked  Timeout: Immediately prior to procedure a time out was called to verify the correct patient, procedure, " equipment, support staff and site/side marked as required   Supporting Documentation  Indications: pain   Procedure Details  Location: shoulder - R subacromial bursa  Preparation: Patient was prepped and draped in the usual sterile fashion  Needle size: 22 G  Medications administered: 40 mg methylPREDNISolone acetate 40 MG/ML; 2 mL lidocaine 2%  Patient tolerance: patient tolerated the procedure well with no immediate complications      Assessment and Plan   Diagnoses and all orders for this visit:    1. Chronic right shoulder pain (Primary)  -     EMG & Nerve Conduction Test  -     Ambulatory Referral to Physical Therapy    2. Arthritis of right acromioclavicular joint    3. Impingement syndrome of right shoulder  -     Ambulatory Referral to Physical Therapy    4. Arm paresthesia, right  -     EMG & Nerve Conduction Test       Discussion of orthopedic goals  Orthopedic activities reviewed and patient expressed appreciation  Regular exercise as tolerated  Risk, benefits, and merits of treatment alternatives reviewed with the patient and questions answered  Patient guided on mobility and conditioning exercises  Ice, heat, and/or modalities as beneficial  Call or notify for any adverse effect from injection therapy  Physical therapy referral given  Reduced physical activity as appropriate and avoid offending activity  Weight bearing parameters reviewed  Counseling on diet, nutrition, fitness exercise, and weight reduction goals    Recommendations/Plan:  Exercise, medications, injections, other patient advice, and return appointment as noted.  Patient is encouraged to call or return for any issues or concerns.    I suspect the patient's right chronic shoulder pain is multifactorial including inherent shoulder pathology from impingement syndrome and AC joint arthritis as well as referred pain and paresthesias of the right arm most likely from nerve impingement occurring in the C-spine.  Recalled scapular winging may be  due to palsy of the long thoracic nerve, again probably from nerve root impingement in the C-spine.  Patient notes that she has been established with Dr. Jones in the past and I advised that it would be more appropriate for her to follow-up with Dr. Jones concerning her suspected C-spine pathology as we do not treat the neck or back here.  A course of physical therapy was ordered for her right shoulder.  A bilateral upper extremity EMG was ordered to evaluate the paresthesias in her right arm and scapular winging.  Patient states that she cannot have an MRI of her neck because Dr. Jones told her that it would pull the metal out of her lower back.  Patient was given a cortisone injection into the right subacromial space for hopeful symptomatic relief.  Since she is already established with Dr. Jones and he will most likely be treating her C-spine I advised that it may be easier for her to follow Dr. Jones chronic shoulder pain as well.    Return for Continue care with Dr. Jones in September .  Patient agreeable to call or return sooner for any concerns.

## 2024-08-09 ENCOUNTER — TELEPHONE (OUTPATIENT)
Dept: ENDOCRINOLOGY | Facility: CLINIC | Age: 59
End: 2024-08-09
Payer: MEDICARE

## 2024-08-09 NOTE — TELEPHONE ENCOUNTER
Returned call, pt is taking 60U Lantus daily and typically 5-10U Novolog with meals depending on carb intake.   Pt reports fasting BG was 300 today, she took 12U Novolog as correction and it is slowly coming down, dropped from 250 to 230 while on the phone.   Pt is wearing Dexcom but is not paired for sharing, provided directions to set up [employer] but she is not confident she will be able to get this done today due to poor internet connection at home.  Please advised re insulin adjustments for steroid injections.

## 2024-08-09 NOTE — TELEPHONE ENCOUNTER
PATIENT IS CALLING WITH CONCERNS OF HIGH BLOOD SUGARS DUE TO HAVING CORTISONE INJECTION TO HER SHOULDER YESTERDAY. SHE STATES AFTER GETTING THE INJECTION THE BLOOD SUGARS WHEN UP  AND IS TAKING BOTH LONG ACTING AND SHORT ACTING INSULINS. SHE NEEDS TO BE ADVISED ON WHAT TO DUE ABOUT HER BLOOD SUGARS AND TAKING INSULIN DURING HIGH BLOOD SUGAR ISSUES DUE TO THE STEROID INJECTION. PATIENTS NUMBER [phone]

## 2024-08-09 NOTE — TELEPHONE ENCOUNTER
Chart reviewed.  Notably, patient is taking less mealtime insulin at baseline today than was advised at last visit.      However, as noted, recent hyperglycemia is likely secondary to steroid injection.  Generally, this will improve within a few days.  I would recommend she increase her usual dose of NovoLog at mealtimes by 2 units through the weekend (7 to 12 units pending carb intake).  She may also utilize the previously provided correctional scale for NovoLog of 2 units per 50 greater than 150 every 3-4 hours as needed until glucose returns to baseline.  She should be sure to stay well-hydrated while glucose is elevated.  I would also recommend moderating carbohydrate intake.  If she were to develop nausea, vomiting or other acute symptoms in the setting of hyperglycemia she must seek care urgently.

## 2024-08-13 ENCOUNTER — LAB (OUTPATIENT)
Dept: LAB | Facility: HOSPITAL | Age: 59
End: 2024-08-13
Payer: MEDICARE

## 2024-08-13 DIAGNOSIS — D47.2 MGUS (MONOCLONAL GAMMOPATHY OF UNKNOWN SIGNIFICANCE): ICD-10-CM

## 2024-08-13 DIAGNOSIS — I67.2 CEREBRAL ATHEROSCLEROSIS: ICD-10-CM

## 2024-08-13 LAB
ALBUMIN SERPL-MCNC: 4.3 G/DL (ref 3.5–5.2)
ALBUMIN/GLOB SERPL: 1.7 G/DL
ALP SERPL-CCNC: 97 U/L (ref 39–117)
ALT SERPL W P-5'-P-CCNC: 6 U/L (ref 1–33)
ANION GAP SERPL CALCULATED.3IONS-SCNC: 12.1 MMOL/L (ref 5–15)
AST SERPL-CCNC: 11 U/L (ref 1–32)
B2 MICROGLOB SERPL-MCNC: 1.8 MG/L (ref 0.8–2.2)
BASOPHILS # BLD AUTO: 0.13 10*3/MM3 (ref 0–0.2)
BASOPHILS NFR BLD AUTO: 1.1 % (ref 0–1.5)
BILIRUB SERPL-MCNC: 0.2 MG/DL (ref 0–1.2)
BUN SERPL-MCNC: 14 MG/DL (ref 8–23)
BUN/CREAT SERPL: 16.1 (ref 7–25)
CALCIUM SPEC-SCNC: 9.5 MG/DL (ref 8.6–10.5)
CHLORIDE SERPL-SCNC: 104 MMOL/L (ref 98–107)
CO2 SERPL-SCNC: 22.9 MMOL/L (ref 22–29)
CREAT SERPL-MCNC: 0.87 MG/DL (ref 0.57–1)
DEPRECATED RDW RBC AUTO: 41.8 FL (ref 37–54)
EGFRCR SERPLBLD CKD-EPI 2021: 76.4 ML/MIN/1.73
EOSINOPHIL # BLD AUTO: 0.52 10*3/MM3 (ref 0–0.4)
EOSINOPHIL NFR BLD AUTO: 4.3 % (ref 0.3–6.2)
ERYTHROCYTE [DISTWIDTH] IN BLOOD BY AUTOMATED COUNT: 12.9 % (ref 12.3–15.4)
GLOBULIN UR ELPH-MCNC: 2.5 GM/DL
GLUCOSE SERPL-MCNC: 159 MG/DL (ref 65–99)
HCT VFR BLD AUTO: 41.1 % (ref 34–46.6)
HGB BLD-MCNC: 13.7 G/DL (ref 12–15.9)
IMM GRANULOCYTES # BLD AUTO: 0.04 10*3/MM3 (ref 0–0.05)
IMM GRANULOCYTES NFR BLD AUTO: 0.3 % (ref 0–0.5)
LYMPHOCYTES # BLD AUTO: 4.98 10*3/MM3 (ref 0.7–3.1)
LYMPHOCYTES NFR BLD AUTO: 41.5 % (ref 19.6–45.3)
MCH RBC QN AUTO: 29.5 PG (ref 26.6–33)
MCHC RBC AUTO-ENTMCNC: 33.3 G/DL (ref 31.5–35.7)
MCV RBC AUTO: 88.4 FL (ref 79–97)
MONOCYTES # BLD AUTO: 0.62 10*3/MM3 (ref 0.1–0.9)
MONOCYTES NFR BLD AUTO: 5.2 % (ref 5–12)
NEUTROPHILS NFR BLD AUTO: 47.6 % (ref 42.7–76)
NEUTROPHILS NFR BLD AUTO: 5.72 10*3/MM3 (ref 1.7–7)
NRBC BLD AUTO-RTO: 0 /100 WBC (ref 0–0.2)
PLATELET # BLD AUTO: 381 10*3/MM3 (ref 140–450)
PMV BLD AUTO: 9.9 FL (ref 6–12)
POTASSIUM SERPL-SCNC: 4.7 MMOL/L (ref 3.5–5.2)
PROT SERPL-MCNC: 6.8 G/DL (ref 6–8.5)
RBC # BLD AUTO: 4.65 10*6/MM3 (ref 3.77–5.28)
SODIUM SERPL-SCNC: 139 MMOL/L (ref 136–145)
WBC NRBC COR # BLD AUTO: 12.01 10*3/MM3 (ref 3.4–10.8)

## 2024-08-13 PROCEDURE — 80053 COMPREHEN METABOLIC PANEL: CPT

## 2024-08-13 PROCEDURE — 84165 PROTEIN E-PHORESIS SERUM: CPT

## 2024-08-13 PROCEDURE — 83521 IG LIGHT CHAINS FREE EACH: CPT

## 2024-08-13 PROCEDURE — 82784 ASSAY IGA/IGD/IGG/IGM EACH: CPT

## 2024-08-13 PROCEDURE — 86334 IMMUNOFIX E-PHORESIS SERUM: CPT

## 2024-08-13 PROCEDURE — 36415 COLL VENOUS BLD VENIPUNCTURE: CPT

## 2024-08-13 PROCEDURE — 82232 ASSAY OF BETA-2 PROTEIN: CPT

## 2024-08-13 PROCEDURE — 85025 COMPLETE CBC W/AUTO DIFF WBC: CPT

## 2024-08-13 NOTE — TELEPHONE ENCOUNTER
Caller: Kimberly Chun    Relationship: Self    Best call back number: [phone]      Requested Prescriptions:   Requested Prescriptions     Pending Prescriptions Disp Refills    clopidogrel (PLAVIX) 75 MG tablet 90 tablet 3     Sig: Take 1 tablet by mouth Daily.        Pharmacy where request should be sent: [facility] #[id number] - [place], [place] - [address] [facility] [address]  - [phone]  - [phone] FX     Last office visit with prescribing clinician: 8/5/2024   Last telemedicine visit with prescribing clinician: Visit date not found   Next office visit with prescribing clinician: 11/5/2024     Additional details provided by patient:     Does the patient have less than a 3 day supply:  [x] Yes  [] No    Would you like a call back once the refill request has been completed: [x] Yes [] No    If the office needs to give you a call back, can they leave a voicemail: [x] Yes [] No    Shantal Harrell Rep   08/13/24 09:38 EDT

## 2024-08-15 LAB
ALBUMIN SERPL ELPH-MCNC: 3.6 G/DL (ref 2.9–4.4)
ALBUMIN/GLOB SERPL: 1.4 {RATIO} (ref 0.7–1.7)
ALPHA1 GLOB SERPL ELPH-MCNC: 0.2 G/DL (ref 0–0.4)
ALPHA2 GLOB SERPL ELPH-MCNC: 0.8 G/DL (ref 0.4–1)
B-GLOBULIN SERPL ELPH-MCNC: 0.9 G/DL (ref 0.7–1.3)
GAMMA GLOB SERPL ELPH-MCNC: 0.7 G/DL (ref 0.4–1.8)
GLOBULIN SER-MCNC: 2.7 G/DL (ref 2.2–3.9)
IGA SERPL-MCNC: 141 MG/DL (ref 87–352)
IGG SERPL-MCNC: 791 MG/DL (ref 586–1602)
IGM SERPL-MCNC: 57 MG/DL (ref 26–217)
INTERPRETATION SERPL IEP-IMP: ABNORMAL
KAPPA LC FREE SER-MCNC: 20.7 MG/L (ref 3.3–19.4)
KAPPA LC FREE/LAMBDA FREE SER: 1.38 {RATIO} (ref 0.26–1.65)
LABORATORY COMMENT REPORT: ABNORMAL
LAMBDA LC FREE SERPL-MCNC: 15 MG/L (ref 5.7–26.3)
M PROTEIN SERPL ELPH-MCNC: ABNORMAL G/DL
PROT SERPL-MCNC: 6.3 G/DL (ref 6–8.5)

## 2024-08-15 NOTE — TELEPHONE ENCOUNTER
Caller: Kibmerly Chun    Relationship: Self    Best call back number: [phone]     Requested Prescriptions:   Requested Prescriptions     Pending Prescriptions Disp Refills    clopidogrel (PLAVIX) 75 MG tablet 90 tablet 3     Sig: Take 1 tablet by mouth Daily.        Pharmacy where request should be sent: [facility] [id number] [place] - [address] [facility] [address]  - [phone]  - [phone] FX     Last office visit with prescribing clinician: 8/5/2024   Last telemedicine visit with prescribing clinician: Visit date not found   Next office visit with prescribing clinician: 11/5/2024     Additional details provided by patient: PATIENT IS COMPLETELY OUT OF THIS MEDICATION.    Does the patient have less than a 3 day supply:  [x] Yes  [] No    Would you like a call back once the refill request has been completed: [] Yes [x] No    If the office needs to give you a call back, can they leave a voicemail: [] Yes [x] No    Shantal Olivera Rep   08/15/24 14:50 EDT

## 2024-08-16 RX ORDER — CLOPIDOGREL BISULFATE 75 MG/1
75 TABLET ORAL DAILY
Qty: 90 TABLET | Refills: 3 | Status: SHIPPED | OUTPATIENT
Start: 2024-08-16

## 2024-08-23 NOTE — TELEPHONE ENCOUNTER
d/c due to not efficacious   [] : septum deviated to the right [Normal] : mucosa is normal [Midline] : trachea located in midline position [de-identified] :  mildly inflamed turbinates

## 2024-08-28 ENCOUNTER — PROCEDURE VISIT (OUTPATIENT)
Dept: ORTHOPEDIC SURGERY | Facility: CLINIC | Age: 59
End: 2024-08-28
Payer: MEDICARE

## 2024-08-28 DIAGNOSIS — M79.601 RIGHT UPPER LIMB PAIN: Primary | ICD-10-CM

## 2024-08-28 DIAGNOSIS — R20.2 PARESTHESIA: ICD-10-CM

## 2024-08-28 DIAGNOSIS — G56.03 CARPAL TUNNEL SYNDROME, BILATERAL: ICD-10-CM

## 2024-09-03 ENCOUNTER — HOSPITAL ENCOUNTER (OUTPATIENT)
Dept: PHYSICAL THERAPY | Facility: HOSPITAL | Age: 59
Setting detail: THERAPIES SERIES
Discharge: HOME | End: 2024-09-03
Payer: MEDICARE

## 2024-09-03 ENCOUNTER — OFFICE VISIT (OUTPATIENT)
Dept: ORTHOPEDIC SURGERY | Facility: CLINIC | Age: 59
End: 2024-09-03
Payer: MEDICARE

## 2024-09-03 VITALS
BODY MASS INDEX: 33.29 KG/M2 | SYSTOLIC BLOOD PRESSURE: 120 MMHG | WEIGHT: 195 LBS | HEIGHT: 64 IN | DIASTOLIC BLOOD PRESSURE: 70 MMHG

## 2024-09-03 DIAGNOSIS — M19.011 ARTHRITIS OF RIGHT ACROMIOCLAVICULAR JOINT: ICD-10-CM

## 2024-09-03 DIAGNOSIS — G56.03 CARPAL TUNNEL SYNDROME, BILATERAL: ICD-10-CM

## 2024-09-03 DIAGNOSIS — M75.41 IMPINGEMENT SYNDROME OF RIGHT SHOULDER: Primary | ICD-10-CM

## 2024-09-03 PROCEDURE — 99213 OFFICE O/P EST LOW 20 MIN: CPT | Performed by: STUDENT IN AN ORGANIZED HEALTH CARE EDUCATION/TRAINING PROGRAM

## 2024-09-03 PROCEDURE — 97110 THERAPEUTIC EXERCISES: CPT | Performed by: PHYSICAL THERAPIST

## 2024-09-03 PROCEDURE — 97162 PT EVAL MOD COMPLEX 30 MIN: CPT | Performed by: PHYSICAL THERAPIST

## 2024-09-03 NOTE — PROGRESS NOTES
Office Note     Name: Kimberly Chun    : 1965     MRN: [id number]     Chief Complaint  Follow-up of the Right Shoulder (Here to review EMG results. )    Subjective     History of Present Illness:  Kimberly Chun is a 59 y.o. female presenting today for right shoulder follow-up and to discuss results of recent bilateral upper extremity EMG.  EMG results did reveal borderline to mild carpal tunnel syndrome at or about the wrists bilaterally.  It did not reveal any cervical nerve root impingement or other neuropathy.  Patient states that her shoulder pain has been significantly improved although she does still have some degree of pain.  She is scheduled to start physical therapy today.  Overall patient states that she is very satisfied with the results of her treatment.  She denies any new or worsening symptoms.    Review of Systems     Past Medical History:   Diagnosis Date    Abnormal Pap smear of cervix     Anxiety     Arthritis     Bladder cancer 2016    Cataract     COPD (chronic obstructive pulmonary disease)     COVID-19 vaccine series completed     with booster,  moderna    Dysphagia     Fibromyalgia     Fracture     RIGHT LEG, RIGHT ANKLE, MULTIPLE TOES    Full dentures     GERD (gastroesophageal reflux disease)     Gestational diabetes     Hyperlipidemia     Migraine     Osteoarthritis     Osteoporosis     Ovarian cyst     Recurrent urinary tract infection     Sciatica     Seasonal allergies     Statin intolerance     Stroke     TIA (transient ischemic attack)     Type 1 diabetes     Type 2 diabetes mellitus     Varicella     Wears glasses         Past Surgical History:   Procedure Laterality Date    BACK SURGERY  2020    CARPAL TUNNEL RELEASE Bilateral     done about 30 years ago    CATARACT EXTRACTION W/ INTRAOCULAR LENS IMPLANT Right 2022    Procedure: CATARACT PHACO EXTRACTION WITH INTRAOCULAR LENS IMPLANT RIGHT;  Surgeon: Eugene Avilez MD;  Location: [facility];   Service: Ophthalmology;  Laterality: Right;    CATARACT EXTRACTION W/ INTRAOCULAR LENS IMPLANT Left 06/20/2022    Procedure: CATARACT PHACO EXTRACTION WITH INTRAOCULAR LENS IMPLANT LEFT;  Surgeon: Ayden Obrien MD;  Location: [facility] OR;  Service: Ophthalmology;  Laterality: Left;    CYSTOSCOPY BLADDER BIOPSY  2016    ENDOSCOPY N/A 07/03/2017    Procedure: ESOPHAGOGASTRODUODENOSCOPY WITH BIOPSY;  Surgeon: Lindy Bey MD;  Location: [facility] ENDOSCOPY;  Service:     HYSTERECTOMY      OOPHORECTOMY      TONSILLECTOMY         Family History   Problem Relation Age of Onset    Arthritis Mother     Hypertension Mother     Heart attack Mother     Osteoporosis Mother     Arthritis Father     Diabetes Father     Hypertension Father     Lung cancer Father     Ovarian cancer Sister     Throat cancer Brother     Breast cancer Paternal Grandmother     Drug abuse Daughter     Stroke Other        Social History     Socioeconomic History    Marital status:     Number of children: 2   Tobacco Use    Smoking status: Every Day     Current packs/day: 0.50     Average packs/day: 0.5 packs/day for 40.0 years (20.0 ttl pk-yrs)     Types: Cigarettes     Passive exposure: Past    Smokeless tobacco: Never   Vaping Use    Vaping status: Never Used   Substance and Sexual Activity    Alcohol use: No    Drug use: No    Sexual activity: Not Currently     Birth control/protection: Abstinence         Current Outpatient Medications:     albuterol (PROVENTIL) (2.5 MG/3ML) 0.083% nebulizer solution, Take 2.5 mg by nebulization Every 6 (Six) Hours As Needed for Wheezing or Shortness of Air., Disp: 180 each, Rfl: 3    albuterol sulfate HFA (ProAir HFA) 108 (90 Base) MCG/ACT inhaler, Inhale 2 puffs Every 6 (Six) Hours As Needed for Wheezing or Shortness of Air., Disp: 54 g, Rfl: 3    aspirin 81 MG EC tablet, Take 1 tablet by mouth Daily., Disp: 90 tablet, Rfl: 3    B-D ULTRA-FINE 33 LANCETS misc, 1 Units 4 (Four) Times a Day.  Check sugars fasting and 2 hours after meals., Disp: 100 each, Rfl: 12    Cholecalciferol 25 MCG (1000 UT) capsule, Take 1 capsule by mouth Daily. For vitamin D deficiency., Disp: 90 capsule, Rfl: 3    clopidogrel (PLAVIX) 75 MG tablet, Take 1 tablet by mouth Daily., Disp: 90 tablet, Rfl: 3    Continuous Glucose  (FreeStyle Andree 3 Huron) device, Use 1 each Daily., Disp: 1 each, Rfl: 0    Continuous Glucose Sensor (FreeStyle Andree 3 Sensor) misc, Use 1 Application Every 14 (Fourteen) Days., Disp: 2 each, Rfl: 11    Cyanocobalamin (VITAMIN B-12 PO), Take  by mouth., Disp: , Rfl:     Droplet Pen Needles 32G X 4 MM misc, USE TO INJECT INSULIN 4X DAY, Disp: 400 each, Rfl: 1    esomeprazole (nexIUM) 40 MG capsule, Take 1 capsule by mouth Every Morning Before Breakfast. As needed for heartburn, Disp: 90 capsule, Rfl: 1    fluconazole (Diflucan) 150 MG tablet, TAKE ONE TAB PO X ONCE, CAN REPEAT IN 4 DAYS IF NEEDED FOR YEAST INFECTION, Disp: 2 tablet, Rfl: 0    Fluticasone Furoate-Vilanterol (Breo Ellipta) 200-25 MCG/ACT inhaler, Inhale 1 puff Daily., Disp: 90 each, Rfl: 3    FOLIC ACID PO, Take  by mouth., Disp: , Rfl:     gabapentin (NEURONTIN) 800 MG tablet, Take 1 tablet by mouth 3 (Three) Times a Day. Indications: Neuropathic Pain, Disp: 270 tablet, Rfl: 1    glucose blood (True Metrix Blood Glucose Test) test strip, TEST SUGARS FASTING AND 2 HOURS AFTER MEALS (4X A DAY DUE TO HYPERGLYCEMIA), Disp: 400 each, Rfl: 2    Gvoke HypoPen 2-Pack 0.5 MG/0.1ML solution auto-injector, INJECT 0.5 MG UNDER THE SKIN INTO THE APPROPRIATE AREA AS DIRECTED ONE TIME AS NEEDED FOR HYPOGLYCEMIA, Disp: 0.2 mL, Rfl: 0    insulin aspart (NovoLOG FlexPen) 100 UNIT/ML solution pen-injector sc pen, 10 units before meals plus 2:50>150, MDD 60 units, Disp: 60 mL, Rfl: 1    Insulin Glargine (Lantus SoloStar) 100 UNIT/ML injection pen, Inject 60 Units under the skin into the appropriate area as directed Every Night., Disp: 60 mL, Rfl: 1    " lisinopril (PRINIVIL,ZESTRIL) 5 MG tablet, Take 1 tablet by mouth Every Night. For kidney protection from diabetes mellitus ., Disp: 90 tablet, Rfl: 3    LORazepam (ATIVAN) 1 MG tablet, Take 0.5-1 tablets by mouth 2 (Two) Times a Day As Needed (severe anxiety (use sparingly))., Disp: 60 tablet, Rfl: 1    meloxicam (MOBIC) 15 MG tablet, Take 1 tablet by mouth Daily As Needed (joint pain)., Disp: 90 tablet, Rfl: 3    metFORMIN (GLUCOPHAGE) 1000 MG tablet, Take 1 tablet twice a day with meals for type 2 diabetes, Disp: 180 tablet, Rfl: 3    nitroglycerin (Nitrostat) 0.4 MG SL tablet, Place 1 tablet under the tongue Every 5 (Five) Minutes As Needed for Chest Pain. Take no more than 3 doses in 15 minutes., Disp: 30 tablet, Rfl: 5    nystatin (MYCOSTATIN) 243542 UNIT/GM powder, Apply  topically to the appropriate area as directed 2 (Two) Times a Day., Disp: 60 g, Rfl: 5    oxyCODONE-acetaminophen (Percocet) 5-325 MG per tablet, Take 1 tablet by mouth Daily As Needed for Severe Pain. Use sparingly, Disp: 30 tablet, Rfl: 0    pravastatin (PRAVACHOL) 40 MG tablet, TAKE 1 TABLET BY MOUTH DAILY, Disp: 90 tablet, Rfl: 3    vitamin B-6 (PYRIDOXINE) 25 MG tablet, Take 1 tablet by mouth Daily., Disp: 90 tablet, Rfl: 1    Allergies   Allergen Reactions    Iodinated Contrast Media Hives    Trulicity [Dulaglutide] GI Intolerance    Atorvastatin Myalgia    Rosuvastatin Myalgia           Objective   /70   Ht 162.6 cm (64.02\")   Wt 88.5 kg (195 lb)   BMI 33.45 kg/m²            Physical Exam  Ortho Exam       Independent Review of Radiographic Studies:    No new imaging done today.    Procedures    Assessment and Plan   Diagnoses and all orders for this visit:    1. Impingement syndrome of right shoulder (Primary)    2. Arthritis of right acromioclavicular joint    3. Carpal tunnel syndrome, bilateral       Discussion of orthopedic goals  Orthopedic activities reviewed and patient expressed appreciation  Regular exercise as " tolerated  Risk, benefits, and merits of treatment alternatives reviewed with the patient and questions answered  Patient guided on mobility and conditioning exercises  Ice, heat, and/or modalities as beneficial  Physical therapy ongoing  Reduced physical activity as appropriate and avoid offending activity  Weight bearing parameters reviewed  Counseling on diet, nutrition, fitness exercise, and weight reduction goals    Recommendations/Plan:  Exercise, medications, injections, other patient advice, and return appointment as noted.  Patient is encouraged to call or return for any issues or concerns.    Continue current treatment plan for right shoulder.  Advised to wear carpal tunnel braces on both wrists at night for the next 3 to 4 weeks to see if that improves symptoms.  Follow-up in approximately 10 weeks for repeat cortisone injection.  Advised patient that we can do a higher milligram next time if needed.  Consider 60 mg to 80 mg of methylprednisone or 40 kenalog for next injection.  Otherwise follow-up as needed.    Return in about 10 weeks (around 11/12/2024) for Recheck.  Patient agreeable to call or return sooner for any concerns.

## 2024-11-04 NOTE — PROGRESS NOTES
"Chief Complaint  Diabetes    Subjective        Kimberly Chun presents to [facility]  History of Present Illness  Last visit we discussed seeing gastroenterology for EGD and getting stress test through cardiology.  She feels symptoms related to both are better, feels fine she says.  Has taken in a 3 year old boy to care for along with Rosa.  Diabetes  She presents for her follow-up diabetic visit. She has type 2 diabetes mellitus. Diabetic complications include peripheral neuropathy. Risk factors for coronary artery disease include diabetes mellitus, post-menopausal, sedentary lifestyle, stress and tobacco exposure. Current diabetic treatment includes insulin injections and oral agent (monotherapy). Her weight is stable. An ACE inhibitor/angiotensin II receptor blocker is being taken.  Eye exam is not current.   Additional comments: Jardiance and similar contraindicated due to history of bladder cancer.  Patient using basal and bolus insulin and feels our machine is not accurate, A1C lower she believes.      Objective   Vital Signs:  /80   Pulse 84   Temp 96.9 °F (36.1 °C)   Ht 162.6 cm (64.02\")   Wt 90 kg (198 lb 6.4 oz)   SpO2 97%   BMI 34.03 kg/m²   Estimated body mass index is 34.03 kg/m² as calculated from the following:    Height as of this encounter: 162.6 cm (64.02\").    Weight as of this encounter: 90 kg (198 lb 6.4 oz).            Physical Exam  Vitals and nursing note reviewed.   Constitutional:       General: She is not in acute distress.     Appearance: Normal appearance. She is well-developed and well-groomed. She is not ill-appearing, toxic-appearing or diaphoretic.   HENT:      Head: Normocephalic and atraumatic.      Right Ear: Hearing normal.      Left Ear: Hearing normal.   Eyes:      General: Lids are normal. No scleral icterus.     Extraocular Movements: Extraocular movements intact.   Neck:      Trachea: Phonation normal.   Cardiovascular:      Rate " and Rhythm: Normal rate and regular rhythm.   Pulmonary:      Effort: Pulmonary effort is normal.   Musculoskeletal:      Cervical back: Neck supple.   Skin:     Coloration: Skin is not jaundiced or pale.   Neurological:      General: No focal deficit present.      Mental Status: She is alert and oriented to person, place, and time.      Motor: Motor function is intact.   Psychiatric:         Attention and Perception: Attention and perception normal.         Mood and Affect: Mood and affect normal.         Speech: Speech normal.         Behavior: Behavior normal. Behavior is cooperative.         Thought Content: Thought content normal.         Cognition and Memory: Cognition and memory normal.         Judgment: Judgment normal.        Result Review :  The following data was reviewed by: Maria Luz Longoria MD on 11/05/2024:  Lab Results   Component Value Date    HGBA1C 8.6 (A) 11/05/2024    HGBA1C 8.6 (A) 08/05/2024    HGBA1C 9.80 (H) 05/02/2024      ToxAssure Flex 22, Urine - Urine, Random Void (12/22/2023 14:10)          Assessment and Plan   Diagnoses and all orders for this visit:    1. Type 2 diabetes mellitus with hyperglycemia, with long-term current use of insulin (Primary)  Assessment & Plan:  Remains uncontrolled A1C goal <7  Continue insulin, metformin  Needs to follow up with endocrinology. Patient heard there was an option in [place] with [facility], referral placed, but if this is not an option I suggest she try to get in with [facility] sooner than scheduled  Encouraged yearly eye exam  Encouraged patient to take care of herself--now has two little ones to care for.    Orders:  -     Ambulatory Referral to Endocrinology    2. Type 2 diabetes mellitus with diabetic polyneuropathy, with long-term current use of insulin  -     POC Glycosylated Hemoglobin (Hb A1C)  -     COMPLIANCE DRUG ANALYSIS, NO THC -  -     gabapentin (NEURONTIN) 800 MG tablet; Take 1 tablet by mouth 3 (Three) Times a Day.  Indications: Neuropathic Pain  Dispense: 270 tablet; Refill: 1  -     Ambulatory Referral to Endocrinology    3. Diabetic peripheral neuropathy associated with type 2 diabetes mellitus  -     gabapentin (NEURONTIN) 800 MG tablet; Take 1 tablet by mouth 3 (Three) Times a Day. Indications: Neuropathic Pain  Dispense: 270 tablet; Refill: 1    4. Chronic midline low back pain with sciatica, sciatica laterality unspecified  -     gabapentin (NEURONTIN) 800 MG tablet; Take 1 tablet by mouth 3 (Three) Times a Day. Indications: Neuropathic Pain  Dispense: 270 tablet; Refill: 1    5. Anxiety  -     COMPLIANCE DRUG ANALYSIS, NO THC -  -     LORazepam (ATIVAN) 1 MG tablet; Take 0.5-1 tablets by mouth 2 (Two) Times a Day As Needed (severe anxiety (use sparingly)).  Dispense: 60 tablet; Refill: 1    6. Vitamin D deficiency  -     Cholecalciferol 25 MCG (1000 UT) capsule; Take 1 capsule by mouth Daily. For vitamin D deficiency.  Dispense: 90 capsule; Refill: 3    7. Medication monitoring encounter  -     COMPLIANCE DRUG ANALYSIS, NO THC -    8. Influenza vaccine needed  -     Fluzone >6mos    9. Vitamin deficiency  Comments:  patient stopped her vitamins, encouraged her to at least stay on vitamin D; will need b12, folate, b6 rechecked next labs    10. Tobacco abuse  Assessment & Plan:  History of stroke and bladder cancer. Cessation ideal. Patient is trying to cut down.               Follow Up   Return in about 3 months (around 2/7/2025) for Diabetes follow up.  Patient was given instructions and counseling regarding her condition or for health maintenance advice. Please see specific information pulled into the AVS if appropriate.

## 2024-11-05 ENCOUNTER — OFFICE VISIT (OUTPATIENT)
Dept: INTERNAL MEDICINE | Facility: CLINIC | Age: 59
End: 2024-11-05
Payer: MEDICARE

## 2024-11-05 VITALS
HEART RATE: 84 BPM | SYSTOLIC BLOOD PRESSURE: 122 MMHG | WEIGHT: 198.4 LBS | TEMPERATURE: 96.9 F | BODY MASS INDEX: 33.87 KG/M2 | HEIGHT: 64 IN | DIASTOLIC BLOOD PRESSURE: 80 MMHG | OXYGEN SATURATION: 97 %

## 2024-11-05 DIAGNOSIS — E55.9 VITAMIN D DEFICIENCY: ICD-10-CM

## 2024-11-05 DIAGNOSIS — E11.42 DIABETIC PERIPHERAL NEUROPATHY ASSOCIATED WITH TYPE 2 DIABETES MELLITUS: ICD-10-CM

## 2024-11-05 DIAGNOSIS — E11.42 TYPE 2 DIABETES MELLITUS WITH DIABETIC POLYNEUROPATHY, WITH LONG-TERM CURRENT USE OF INSULIN: Chronic | ICD-10-CM

## 2024-11-05 DIAGNOSIS — F41.9 ANXIETY: ICD-10-CM

## 2024-11-05 DIAGNOSIS — E56.9 VITAMIN DEFICIENCY: ICD-10-CM

## 2024-11-05 DIAGNOSIS — Z23 INFLUENZA VACCINE NEEDED: ICD-10-CM

## 2024-11-05 DIAGNOSIS — Z72.0 TOBACCO ABUSE: ICD-10-CM

## 2024-11-05 DIAGNOSIS — M54.40 CHRONIC MIDLINE LOW BACK PAIN WITH SCIATICA, SCIATICA LATERALITY UNSPECIFIED: ICD-10-CM

## 2024-11-05 DIAGNOSIS — G89.29 CHRONIC MIDLINE LOW BACK PAIN WITH SCIATICA, SCIATICA LATERALITY UNSPECIFIED: ICD-10-CM

## 2024-11-05 DIAGNOSIS — E11.65 TYPE 2 DIABETES MELLITUS WITH HYPERGLYCEMIA, WITH LONG-TERM CURRENT USE OF INSULIN: Primary | ICD-10-CM

## 2024-11-05 DIAGNOSIS — Z79.4 TYPE 2 DIABETES MELLITUS WITH HYPERGLYCEMIA, WITH LONG-TERM CURRENT USE OF INSULIN: Primary | ICD-10-CM

## 2024-11-05 DIAGNOSIS — Z79.4 TYPE 2 DIABETES MELLITUS WITH DIABETIC POLYNEUROPATHY, WITH LONG-TERM CURRENT USE OF INSULIN: Chronic | ICD-10-CM

## 2024-11-05 DIAGNOSIS — Z51.81 MEDICATION MONITORING ENCOUNTER: ICD-10-CM

## 2024-11-05 LAB
EXPIRATION DATE: ABNORMAL
HBA1C MFR BLD: 8.6 % (ref 4.5–5.7)
Lab: ABNORMAL

## 2024-11-05 PROCEDURE — G0008 ADMIN INFLUENZA VIRUS VAC: HCPCS | Performed by: FAMILY MEDICINE

## 2024-11-05 PROCEDURE — 3052F HG A1C>EQUAL 8.0%<EQUAL 9.0%: CPT | Performed by: FAMILY MEDICINE

## 2024-11-05 PROCEDURE — 1160F RVW MEDS BY RX/DR IN RCRD: CPT | Performed by: FAMILY MEDICINE

## 2024-11-05 PROCEDURE — 90656 IIV3 VACC NO PRSV 0.5 ML IM: CPT | Performed by: FAMILY MEDICINE

## 2024-11-05 PROCEDURE — 1126F AMNT PAIN NOTED NONE PRSNT: CPT | Performed by: FAMILY MEDICINE

## 2024-11-05 PROCEDURE — 1159F MED LIST DOCD IN RCRD: CPT | Performed by: FAMILY MEDICINE

## 2024-11-05 PROCEDURE — 99214 OFFICE O/P EST MOD 30 MIN: CPT | Performed by: FAMILY MEDICINE

## 2024-11-05 PROCEDURE — 83036 HEMOGLOBIN GLYCOSYLATED A1C: CPT | Performed by: FAMILY MEDICINE

## 2024-11-05 RX ORDER — GABAPENTIN 800 MG/1
800 TABLET ORAL 3 TIMES DAILY
Qty: 270 TABLET | Refills: 1 | Status: SHIPPED | OUTPATIENT
Start: 2024-11-05

## 2024-11-05 RX ORDER — LORAZEPAM 1 MG/1
.5-1 TABLET ORAL 2 TIMES DAILY PRN
Qty: 60 TABLET | Refills: 1 | Status: SHIPPED | OUTPATIENT
Start: 2024-11-05

## 2024-11-05 RX ORDER — MULTIPLE VITAMINS W/ MINERALS TAB 9MG-400MCG
1 TAB ORAL DAILY
COMMUNITY

## 2024-11-05 NOTE — ASSESSMENT & PLAN NOTE
Remains uncontrolled A1C goal <7  Continue insulin, metformin  Needs to follow up with endocrinology. Patient heard there was an option in [place] with [facility], referral placed, but if this is not an option I suggest she try to get in with [facility] sooner than scheduled  Encouraged yearly eye exam  Encouraged patient to take care of herself--now has two little ones to care for.

## 2024-11-13 LAB — DRUGS UR: NORMAL

## 2024-11-15 DIAGNOSIS — Z79.4 TYPE 2 DIABETES MELLITUS WITH DIABETIC POLYNEUROPATHY, WITH LONG-TERM CURRENT USE OF INSULIN: ICD-10-CM

## 2024-11-15 DIAGNOSIS — E11.42 TYPE 2 DIABETES MELLITUS WITH DIABETIC POLYNEUROPATHY, WITH LONG-TERM CURRENT USE OF INSULIN: ICD-10-CM

## 2024-11-15 DIAGNOSIS — G89.29 CHRONIC RIGHT SHOULDER PAIN: ICD-10-CM

## 2024-11-15 DIAGNOSIS — M25.511 CHRONIC RIGHT SHOULDER PAIN: ICD-10-CM

## 2024-11-15 DIAGNOSIS — Z79.4 TYPE 2 DIABETES MELLITUS WITH HYPERGLYCEMIA, WITH LONG-TERM CURRENT USE OF INSULIN: ICD-10-CM

## 2024-11-15 DIAGNOSIS — E11.65 TYPE 2 DIABETES MELLITUS WITH HYPERGLYCEMIA, WITH LONG-TERM CURRENT USE OF INSULIN: ICD-10-CM

## 2024-11-15 RX ORDER — OXYCODONE AND ACETAMINOPHEN 5; 325 MG/1; MG/1
1 TABLET ORAL DAILY PRN
Qty: 30 TABLET | Refills: 0 | Status: SHIPPED | OUTPATIENT
Start: 2024-11-15

## 2024-11-15 RX ORDER — LISINOPRIL 5 MG/1
5 TABLET ORAL NIGHTLY
Qty: 90 TABLET | Refills: 3 | Status: SHIPPED | OUTPATIENT
Start: 2024-11-15

## 2024-11-15 NOTE — TELEPHONE ENCOUNTER
UDS: 11/5/24  No CSA on file      Rx Refill Note  Requested Prescriptions     Pending Prescriptions Disp Refills    oxyCODONE-acetaminophen (Percocet) 5-325 MG per tablet 30 tablet 0     Sig: Take 1 tablet by mouth Daily As Needed for Severe Pain. Use sparingly     Signed Prescriptions Disp Refills    metFORMIN (GLUCOPHAGE) 1000 MG tablet 180 tablet 3     Sig: Take 1 tablet twice a day with meals for type 2 diabetes     Authorizing Provider: MARCELL MANCILLA     Ordering User: LYNETTE LIVINGSTON    lisinopril (PRINIVIL,ZESTRIL) 5 MG tablet 90 tablet 3     Sig: Take 1 tablet by mouth Every Night. For kidney protection from diabetes mellitus .     Authorizing Provider: MARCELL MANCILLA     Ordering User: LYNETTE LIVINGSTON      Last office visit with prescribing clinician: 11/5/2024   Last telemedicine visit with prescribing clinician: Visit date not found   Next office visit with prescribing clinician: 2/7/2025                         Lynette Livingston MA  11/15/24, 17:04 EST

## 2024-11-15 NOTE — TELEPHONE ENCOUNTER
Caller: Kimberly Chun       Best call back number: [phone]     Requested Prescriptions:   Requested Prescriptions     Pending Prescriptions Disp Refills    oxyCODONE-acetaminophen (Percocet) 5-325 MG per tablet 30 tablet 0     Sig: Take 1 tablet by mouth Daily As Needed for Severe Pain. Use sparingly    metFORMIN (GLUCOPHAGE) 1000 MG tablet 180 tablet 3     Sig: Take 1 tablet twice a day with meals for type 2 diabetes    lisinopril (PRINIVIL,ZESTRIL) 5 MG tablet 90 tablet 3     Sig: Take 1 tablet by mouth Every Night. For kidney protection from diabetes mellitus .        Pharmacy where request should be sent: [id number] - [place], [place] - [address] [facility] [address]  - [phone]  - [phone] FX     Last office visit with prescribing clinician: 11/5/2024   Last telemedicine visit with prescribing clinician: Visit date not found   Next office visit with prescribing clinician: 2/7/2025     PATIENT OUT OF MEDICATION

## 2024-11-20 ENCOUNTER — EXTERNAL PBMM DATA (OUTPATIENT)
Dept: PHARMACY | Facility: OTHER | Age: 59
End: 2024-11-20
Payer: MEDICARE

## 2024-12-30 ENCOUNTER — POP HEALTH PHARMACY (OUTPATIENT)
Dept: PHARMACY | Facility: OTHER | Age: 59
End: 2024-12-30
Payer: MEDICARE

## 2025-01-07 DIAGNOSIS — M25.561 ARTHRALGIA OF BOTH KNEES: Primary | ICD-10-CM

## 2025-01-07 DIAGNOSIS — M25.562 ARTHRALGIA OF BOTH KNEES: Primary | ICD-10-CM

## 2025-02-22 DIAGNOSIS — K21.9 GASTROESOPHAGEAL REFLUX DISEASE, UNSPECIFIED WHETHER ESOPHAGITIS PRESENT: ICD-10-CM

## 2025-02-22 RX ORDER — ESOMEPRAZOLE MAGNESIUM 40 MG/1
40 CAPSULE, DELAYED RELEASE ORAL
Qty: 90 CAPSULE | Refills: 1 | Status: SHIPPED | OUTPATIENT
Start: 2025-02-22

## 2025-02-25 LAB — HBA1C MFR BLD: 9.2 %

## 2025-02-25 NOTE — PROGRESS NOTES
"Chief Complaint  Diabetes (Albuterol MDI, Breo, lorazepam, Nystatin, oxycodone, gabapentin and vitamin d refills. ), Cough, Nasal Congestion, and Fatigue (Covid exposure. Onset Wed. of last week. )    Subjective        Kimberly Chun presents to [facility]  History of Present Illness  Going to endocrinology at [facility]. Excited as she's seeing improvements in sugars, feeling better. Feels \"off rollercoaster\". Eye exam is not up to date.    Held baby last week that had covid    Taking care of three children now. Ativan continues to help her \"nerves\"    Taking oxycodone sparingly for severe pain      Objective   Vital Signs:  /76   Pulse 86   Temp 97 °F (36.1 °C)   Ht 162.6 cm (64.02\")   Wt 92.1 kg (203 lb)   SpO2 98%   BMI 34.82 kg/m²   Estimated body mass index is 34.82 kg/m² as calculated from the following:    Height as of this encounter: 162.6 cm (64.02\").    Weight as of this encounter: 92.1 kg (203 lb).            Physical Exam  Vitals and nursing note reviewed.   Constitutional:       General: She is not in acute distress.     Appearance: Normal appearance. She is well-developed and well-groomed. She is obese. She is not ill-appearing, toxic-appearing or diaphoretic.      Interventions: Face mask in place.   HENT:      Head: Normocephalic and atraumatic.      Right Ear: Hearing normal.      Left Ear: Hearing normal.   Eyes:      General: Lids are normal. No scleral icterus.     Extraocular Movements: Extraocular movements intact.   Neck:      Trachea: Phonation normal.   Cardiovascular:      Rate and Rhythm: Normal rate and regular rhythm.   Pulmonary:      Effort: Pulmonary effort is normal.      Breath sounds: Normal breath sounds.   Musculoskeletal:      Cervical back: Neck supple.   Skin:     Coloration: Skin is not jaundiced or pale.   Neurological:      General: No focal deficit present.      Mental Status: She is alert and oriented to person, place, " and time.      Motor: Motor function is intact.   Psychiatric:         Attention and Perception: Attention and perception normal.         Mood and Affect: Mood and affect normal.         Speech: Speech normal.         Behavior: Behavior normal. Behavior is cooperative.         Thought Content: Thought content normal.         Cognition and Memory: Cognition and memory normal.         Judgment: Judgment normal.        Result Review :  The following data was reviewed by: Maria Luz Longoria MD on 03/04/2025:  ENDOCRINOLOGY - [facility] by [facility] (02/25/2025 00:00)   Office Visit on 03/04/2025   Component Date Value Ref Range Status    External Hemoglobin A1C 02/25/2025 9.2 (H)   Final    [facility] note    SARS Antigen 03/04/2025 Detected (A)  Not Detected, Presumptive Negative Final    Influenza A Antigen EYAL 03/04/2025 Not Detected  Not Detected Final    Influenza B Antigen EYAL 03/04/2025 Not Detected  Not Detected Final    Internal Control 03/04/2025 Passed  Passed Final    Lot Number 03/04/2025 [id number]   Final    Expiration Date 03/04/2025 11/27/2025   Final      COMPLIANCE DRUG ANALYSIS, NO THC - (11/05/2024 13:50)       Office Visit on 03/04/2025   Component Date Value Ref Range Status    External Hemoglobin A1C 02/25/2025 9.2 (H)   Final    [facility] note    SARS Antigen 03/04/2025 Detected (A)  Not Detected, Presumptive Negative Final    Influenza A Antigen EYAL 03/04/2025 Not Detected  Not Detected Final    Influenza B Antigen EYAL 03/04/2025 Not Detected  Not Detected Final    Internal Control 03/04/2025 Passed  Passed Final    Lot Number 03/04/2025 [id number]   Final    Expiration Date 03/04/2025 11/27/2025   Final           Assessment and Plan   Diagnoses and all orders for this visit:    1. Acute cough (Primary)  -     POCT SARS-CoV-2 Antigen EYAL + Flu    2. Nasal congestion  -     POCT SARS-CoV-2 Antigen EYAL + Flu    3. COVID-19 virus detected  Comments:  discussed, symptomatic  treatments    4. Type 1 diabetes mellitus with diabetic polyneuropathy  Assessment & Plan:  Follow up with [facility] endocrinology as scheduled.  Requesting labs they did at 2/25 appointment  Encouraged eye exam    Orders:  -     gabapentin (NEURONTIN) 800 MG tablet; Take 1 tablet by mouth 3 (Three) Times a Day. Indications: Neuropathic Pain  Dispense: 270 tablet; Refill: 1    5. Chronic obstructive pulmonary disease, unspecified COPD type  -     albuterol sulfate HFA (ProAir HFA) 108 (90 Base) MCG/ACT inhaler; Inhale 2 puffs Every 6 (Six) Hours As Needed for Wheezing or Shortness of Air.  Dispense: 54 g; Refill: 3  -     Fluticasone Furoate-Vilanterol (Breo Ellipta) 200-25 MCG/ACT inhaler; Inhale 1 puff Daily.  Dispense: 90 each; Refill: 3    6. Anxiety  -     LORazepam (ATIVAN) 1 MG tablet; Take 0.5-1 tablets by mouth 2 (Two) Times a Day As Needed (severe anxiety (use sparingly)).  Dispense: 60 tablet; Refill: 3    7. Chronic right shoulder pain  -     oxyCODONE-acetaminophen (Percocet) 5-325 MG per tablet; Take 1 tablet by mouth Daily As Needed for Severe Pain. Use sparingly  Dispense: 16 tablet; Refill: 0    8. Chronic midline low back pain with sciatica, sciatica laterality unspecified  Assessment & Plan:  Still has oxycodone on hand. Refill sent use sparingly and do not mix with lorazepam. UDS next visit.    Orders:  -     oxyCODONE-acetaminophen (Percocet) 5-325 MG per tablet; Take 1 tablet by mouth Daily As Needed for Severe Pain. Use sparingly  Dispense: 16 tablet; Refill: 0  -     gabapentin (NEURONTIN) 800 MG tablet; Take 1 tablet by mouth 3 (Three) Times a Day. Indications: Neuropathic Pain  Dispense: 270 tablet; Refill: 1    9. Vitamin D deficiency  -     Cholecalciferol 25 MCG (1000 UT) capsule; Take 1 capsule by mouth Daily. For vitamin D deficiency.  Dispense: 90 capsule; Refill: 3             Follow Up   Return in about 4 months (around 7/14/2025) for Medicare Wellness.  Patient was given  instructions and counseling regarding her condition or for health maintenance advice. Please see specific information pulled into the AVS if appropriate.

## 2025-03-04 ENCOUNTER — OFFICE VISIT (OUTPATIENT)
Dept: INTERNAL MEDICINE | Facility: CLINIC | Age: 60
End: 2025-03-04
Payer: MEDICARE

## 2025-03-04 VITALS
OXYGEN SATURATION: 98 % | WEIGHT: 203 LBS | DIASTOLIC BLOOD PRESSURE: 76 MMHG | HEIGHT: 64 IN | HEART RATE: 86 BPM | BODY MASS INDEX: 34.66 KG/M2 | SYSTOLIC BLOOD PRESSURE: 120 MMHG | TEMPERATURE: 97 F

## 2025-03-04 DIAGNOSIS — F41.9 ANXIETY: ICD-10-CM

## 2025-03-04 DIAGNOSIS — M25.511 CHRONIC RIGHT SHOULDER PAIN: ICD-10-CM

## 2025-03-04 DIAGNOSIS — G89.29 CHRONIC MIDLINE LOW BACK PAIN WITH SCIATICA, SCIATICA LATERALITY UNSPECIFIED: ICD-10-CM

## 2025-03-04 DIAGNOSIS — U07.1 COVID-19 VIRUS DETECTED: ICD-10-CM

## 2025-03-04 DIAGNOSIS — G89.29 CHRONIC RIGHT SHOULDER PAIN: ICD-10-CM

## 2025-03-04 DIAGNOSIS — E10.42 TYPE 1 DIABETES MELLITUS WITH DIABETIC POLYNEUROPATHY: ICD-10-CM

## 2025-03-04 DIAGNOSIS — R09.81 NASAL CONGESTION: ICD-10-CM

## 2025-03-04 DIAGNOSIS — M54.40 CHRONIC MIDLINE LOW BACK PAIN WITH SCIATICA, SCIATICA LATERALITY UNSPECIFIED: ICD-10-CM

## 2025-03-04 DIAGNOSIS — E55.9 VITAMIN D DEFICIENCY: ICD-10-CM

## 2025-03-04 DIAGNOSIS — J44.9 CHRONIC OBSTRUCTIVE PULMONARY DISEASE, UNSPECIFIED COPD TYPE: ICD-10-CM

## 2025-03-04 DIAGNOSIS — R05.1 ACUTE COUGH: Primary | ICD-10-CM

## 2025-03-04 PROBLEM — E10.65 TYPE 1 DIABETES MELLITUS WITH HYPERGLYCEMIA: Status: ACTIVE | Noted: 2018-12-22

## 2025-03-04 LAB
EXPIRATION DATE: ABNORMAL
FLUAV AG UPPER RESP QL IA.RAPID: NOT DETECTED
FLUBV AG UPPER RESP QL IA.RAPID: NOT DETECTED
INTERNAL CONTROL: ABNORMAL
Lab: ABNORMAL
SARS-COV-2 AG UPPER RESP QL IA.RAPID: DETECTED

## 2025-03-04 RX ORDER — FLUTICASONE FUROATE AND VILANTEROL 200; 25 UG/1; UG/1
1 POWDER RESPIRATORY (INHALATION)
Qty: 90 EACH | Refills: 3 | Status: SHIPPED | OUTPATIENT
Start: 2025-03-04

## 2025-03-04 RX ORDER — ACYCLOVIR 400 MG/1
TABLET ORAL
COMMUNITY

## 2025-03-04 RX ORDER — METFORMIN HYDROCHLORIDE 500 MG/1
500 TABLET, EXTENDED RELEASE ORAL 2 TIMES DAILY
COMMUNITY
Start: 2025-03-04

## 2025-03-04 RX ORDER — GABAPENTIN 800 MG/1
800 TABLET ORAL 3 TIMES DAILY
Qty: 270 TABLET | Refills: 1 | Status: SHIPPED | OUTPATIENT
Start: 2025-03-04

## 2025-03-04 RX ORDER — ALBUTEROL SULFATE 90 UG/1
2 INHALANT RESPIRATORY (INHALATION) EVERY 6 HOURS PRN
Qty: 54 G | Refills: 3 | Status: SHIPPED | OUTPATIENT
Start: 2025-03-04

## 2025-03-04 RX ORDER — OXYCODONE AND ACETAMINOPHEN 5; 325 MG/1; MG/1
1 TABLET ORAL DAILY PRN
Qty: 16 TABLET | Refills: 0 | Status: SHIPPED | OUTPATIENT
Start: 2025-03-04

## 2025-03-04 RX ORDER — LORAZEPAM 1 MG/1
.5-1 TABLET ORAL 2 TIMES DAILY PRN
Qty: 60 TABLET | Refills: 3 | Status: SHIPPED | OUTPATIENT
Start: 2025-03-04

## 2025-03-04 NOTE — ASSESSMENT & PLAN NOTE
Follow up with [facility] endocrinology as scheduled.  Requesting labs they did at 2/25 appointment  Encouraged eye exam

## 2025-03-04 NOTE — ASSESSMENT & PLAN NOTE
Still has oxycodone on hand. Refill sent use sparingly and do not mix with lorazepam. UDS next visit.

## 2025-03-20 ENCOUNTER — TELEPHONE (OUTPATIENT)
Dept: INTERNAL MEDICINE | Facility: CLINIC | Age: 60
End: 2025-03-20
Payer: MEDICARE

## 2025-03-20 RX ORDER — FLUCONAZOLE 100 MG/1
100 TABLET ORAL DAILY
Qty: 3 TABLET | Refills: 0 | Status: SHIPPED | OUTPATIENT
Start: 2025-03-20

## 2025-03-20 NOTE — TELEPHONE ENCOUNTER
Patient requests Diflucan due to yeast infection to skin folds. Stated that she has recently had some high blood sugars. [facility] ([place]). Dr. Longoria out of office this week.

## 2025-04-10 ENCOUNTER — OFFICE VISIT (OUTPATIENT)
Dept: ORTHOPEDIC SURGERY | Facility: CLINIC | Age: 60
End: 2025-04-10
Payer: MEDICARE

## 2025-04-10 VITALS
SYSTOLIC BLOOD PRESSURE: 120 MMHG | WEIGHT: 199.2 LBS | HEIGHT: 64 IN | DIASTOLIC BLOOD PRESSURE: 78 MMHG | BODY MASS INDEX: 34.01 KG/M2

## 2025-04-10 DIAGNOSIS — M17.12 PRIMARY OSTEOARTHRITIS OF LEFT KNEE: ICD-10-CM

## 2025-04-10 DIAGNOSIS — M11.261 CHONDROCALCINOSIS OF RIGHT KNEE: ICD-10-CM

## 2025-04-10 DIAGNOSIS — M25.561 CHRONIC PAIN OF BOTH KNEES: Primary | ICD-10-CM

## 2025-04-10 DIAGNOSIS — G89.29 CHRONIC PAIN OF BOTH KNEES: Primary | ICD-10-CM

## 2025-04-10 DIAGNOSIS — M25.562 CHRONIC PAIN OF BOTH KNEES: Primary | ICD-10-CM

## 2025-04-10 RX ORDER — INSULIN LISPRO 100 [IU]/ML
INJECTION, SOLUTION INTRAVENOUS; SUBCUTANEOUS
COMMUNITY
Start: 2025-03-03

## 2025-04-10 RX ORDER — LIDOCAINE HYDROCHLORIDE 20 MG/ML
2 INJECTION, SOLUTION INFILTRATION; PERINEURAL
Status: COMPLETED | OUTPATIENT
Start: 2025-04-10 | End: 2025-04-10

## 2025-04-10 RX ORDER — METHYLPREDNISOLONE ACETATE 40 MG/ML
40 INJECTION, SUSPENSION INTRA-ARTICULAR; INTRALESIONAL; INTRAMUSCULAR; SOFT TISSUE
Status: COMPLETED | OUTPATIENT
Start: 2025-04-10 | End: 2025-04-10

## 2025-04-10 RX ADMIN — METHYLPREDNISOLONE ACETATE 40 MG: 40 INJECTION, SUSPENSION INTRA-ARTICULAR; INTRALESIONAL; INTRAMUSCULAR; SOFT TISSUE at 11:26

## 2025-04-10 RX ADMIN — METHYLPREDNISOLONE ACETATE 40 MG: 40 INJECTION, SUSPENSION INTRA-ARTICULAR; INTRALESIONAL; INTRAMUSCULAR; SOFT TISSUE at 11:25

## 2025-04-10 RX ADMIN — LIDOCAINE HYDROCHLORIDE 2 ML: 20 INJECTION, SOLUTION INFILTRATION; PERINEURAL at 11:26

## 2025-04-10 RX ADMIN — LIDOCAINE HYDROCHLORIDE 2 ML: 20 INJECTION, SOLUTION INFILTRATION; PERINEURAL at 11:25

## 2025-04-10 NOTE — PROGRESS NOTES
Office Note     Name: Kimberly Chun    : 1965     MRN: [id number]     Chief Complaint  Pain of the Left Knee (States she has been having pain since December, the left is worse than the right. No known injury. ) and Pain of the Right Knee    Subjective     History of Present Illness:  Kimberly Chun is a 60 y.o. female presenting today for the evaluation of chronic and progressive bilateral knee pain ongoing for the past 4 to 5 months.  Patient denies any inciting injury or event.  She states most of her pain is in the medial compartment and posterior knee with the left knee being worse than the right.  Her symptoms are exacerbated by prolonged walking and standing and stairs.  She also notes significant stiffness in her knee after prolonged laying or sitting.  She denies any instability.  She denies lower extremity paresthesias.  She does admit to previous right proximal fibula fracture but denies any previous injuries to the knees.  She has been self treating with prescribed meloxicam which does not really help.  She has not any other treatment for her knees thus far.      Review of Systems   Musculoskeletal:  Positive for arthralgias (bilateral knee).        Past Medical History:   Diagnosis Date    Abnormal Pap smear of cervix     Anxiety     Arthritis     Bladder cancer 2016    Cataract     COPD (chronic obstructive pulmonary disease)     COVID-19 vaccine series completed     with booster,  moderna    Dysphagia     Fibromyalgia     Fracture     RIGHT LEG, RIGHT ANKLE, MULTIPLE TOES    Full dentures     GERD (gastroesophageal reflux disease)     Gestational diabetes     Hyperlipidemia     Migraine     Osteoarthritis     Osteoporosis     Ovarian cyst     Recurrent urinary tract infection     Sciatica     Seasonal allergies     Statin intolerance     Stroke     TIA (transient ischemic attack)     Type 1 diabetes     Type 2 diabetes mellitus     Varicella     Wears glasses         Past Surgical  History:   Procedure Laterality Date    BACK SURGERY  08/25/2020    CARPAL TUNNEL RELEASE Bilateral     done about 30 years ago    CATARACT EXTRACTION W/ INTRAOCULAR LENS IMPLANT Right 05/06/2022    Procedure: CATARACT PHACO EXTRACTION WITH INTRAOCULAR LENS IMPLANT RIGHT;  Surgeon: Eugene Avilez MD;  Location: [facility] OR;  Service: Ophthalmology;  Laterality: Right;    CATARACT EXTRACTION W/ INTRAOCULAR LENS IMPLANT Left 06/20/2022    Procedure: CATARACT PHACO EXTRACTION WITH INTRAOCULAR LENS IMPLANT LEFT;  Surgeon: Ayden Obrien MD;  Location: [facility] OR;  Service: Ophthalmology;  Laterality: Left;    CYSTOSCOPY BLADDER BIOPSY  2016    ENDOSCOPY N/A 07/03/2017    Procedure: ESOPHAGOGASTRODUODENOSCOPY WITH BIOPSY;  Surgeon: Lindy Bey MD;  Location: [facility] ENDOSCOPY;  Service:     HYSTERECTOMY      OOPHORECTOMY      TONSILLECTOMY         Family History   Problem Relation Age of Onset    Arthritis Mother     Hypertension Mother     Heart attack Mother     Osteoporosis Mother     Arthritis Father     Diabetes Father     Hypertension Father     Lung cancer Father     Ovarian cancer Sister     Throat cancer Brother     Breast cancer Paternal Grandmother     Drug abuse Daughter     Stroke Other        Social History     Socioeconomic History    Marital status:     Number of children: 2   Tobacco Use    Smoking status: Every Day     Current packs/day: 0.50     Average packs/day: 0.5 packs/day for 40.0 years (20.0 ttl pk-yrs)     Types: Cigarettes     Passive exposure: Past    Smokeless tobacco: Never   Vaping Use    Vaping status: Never Used   Substance and Sexual Activity    Alcohol use: No    Drug use: No    Sexual activity: Not Currently     Birth control/protection: Abstinence         Current Outpatient Medications:     HumaLOG KwikPen 100 UNIT/ML solution pen-injector, , Disp: , Rfl:     albuterol (PROVENTIL) (2.5 MG/3ML) 0.083% nebulizer solution, Take 2.5 mg by nebulization  Every 6 (Six) Hours As Needed for Wheezing or Shortness of Air., Disp: 180 each, Rfl: 3    albuterol sulfate HFA (ProAir HFA) 108 (90 Base) MCG/ACT inhaler, Inhale 2 puffs Every 6 (Six) Hours As Needed for Wheezing or Shortness of Air., Disp: 54 g, Rfl: 3    aspirin 81 MG EC tablet, Take 1 tablet by mouth Daily., Disp: 90 tablet, Rfl: 3    Cholecalciferol 25 MCG (1000 UT) capsule, Take 1 capsule by mouth Daily. For vitamin D deficiency., Disp: 90 capsule, Rfl: 3    clopidogrel (PLAVIX) 75 MG tablet, Take 1 tablet by mouth Daily., Disp: 90 tablet, Rfl: 3    Continuous Glucose  (Dexcom G7 ) device, Use., Disp: , Rfl:     Droplet Pen Needles 32G X 4 MM misc, USE TO INJECT INSULIN 4X DAY, Disp: 400 each, Rfl: 1    esomeprazole (nexIUM) 40 MG capsule, TAKE 1 CAPSULE BY MOUTH EVERY MORNING BEFORE BREAKFAST AS NEEDED FOR HEARTBURN, Disp: 90 capsule, Rfl: 1    fluconazole (Diflucan) 100 MG tablet, Take 1 tablet by mouth Daily., Disp: 3 tablet, Rfl: 0    Fluticasone Furoate-Vilanterol (Breo Ellipta) 200-25 MCG/ACT inhaler, Inhale 1 puff Daily., Disp: 90 each, Rfl: 3    gabapentin (NEURONTIN) 800 MG tablet, Take 1 tablet by mouth 3 (Three) Times a Day. Indications: Neuropathic Pain, Disp: 270 tablet, Rfl: 1    insulin aspart (NovoLOG FlexPen) 100 UNIT/ML solution pen-injector sc pen, 10 units before meals plus 2:50>150, MDD 60 units, Disp: 60 mL, Rfl: 1    Insulin Glargine (Lantus SoloStar) 100 UNIT/ML injection pen, Inject 60 Units under the skin into the appropriate area as directed Every Night. (Patient taking differently: Inject 60 Units under the skin into the appropriate area as directed Every Night. 55 units), Disp: 60 mL, Rfl: 1    lisinopril (PRINIVIL,ZESTRIL) 5 MG tablet, Take 1 tablet by mouth Every Night. For kidney protection from diabetes mellitus ., Disp: 90 tablet, Rfl: 3    LORazepam (ATIVAN) 1 MG tablet, Take 0.5-1 tablets by mouth 2 (Two) Times a Day As Needed (severe anxiety (use  "sparingly))., Disp: 60 tablet, Rfl: 3    meloxicam (MOBIC) 15 MG tablet, Take 1 tablet by mouth Daily As Needed (joint pain)., Disp: 90 tablet, Rfl: 3    metFORMIN ER (GLUCOPHAGE-XR) 500 MG 24 hr tablet, Take 1 tablet by mouth 2 (Two) Times a Day., Disp: , Rfl:     multivitamin with minerals tablet tablet, Take 1 tablet by mouth Daily., Disp: , Rfl:     nitroglycerin (Nitrostat) 0.4 MG SL tablet, Place 1 tablet under the tongue Every 5 (Five) Minutes As Needed for Chest Pain. Take no more than 3 doses in 15 minutes., Disp: 30 tablet, Rfl: 5    nystatin (MYCOSTATIN) 504102 UNIT/GM powder, Apply  topically to the appropriate area as directed 2 (Two) Times a Day., Disp: 60 g, Rfl: 5    oxyCODONE-acetaminophen (Percocet) 5-325 MG per tablet, Take 1 tablet by mouth Daily As Needed for Severe Pain. Use sparingly, Disp: 16 tablet, Rfl: 0    Allergies   Allergen Reactions    Iodinated Contrast Media Hives    Trulicity [Dulaglutide] GI Intolerance    Atorvastatin Myalgia    Rosuvastatin Myalgia           Objective   /78   Ht 162.6 cm (64.02\")   Wt 90.4 kg (199 lb 3.2 oz)   BMI 34.17 kg/m²            Physical Exam  Musculoskeletal:      Right knee: No effusion.      Instability Tests: Medial Ana test positive. Lateral Ana test negative.      Left knee: Effusion present.      Instability Tests: Medial Ana test positive. Lateral Ana test negative.       Right Knee Exam     Tenderness   The patient is experiencing tenderness in the medial joint line (Posterior knee).    Range of Motion   The patient has normal right knee ROM.    Tests   Ana:  Medial - positive Lateral - negative  Varus: negative Valgus: negative  Lachman:  Anterior - negative    Posterior - negative  Drawer:  Anterior - negative    Posterior - negative    Other   Erythema: absent  Sensation: normal  Pulse: present  Swelling: none  Effusion: no effusion present      Left Knee Exam     Tenderness   The patient is experiencing " tenderness in the medial joint line (Posterior knee).    Range of Motion   The patient has normal left knee ROM.    Tests   Ana:  Medial - positive Lateral - negative  Varus: negative Valgus: negative  Lachman:  Anterior - negative    Posterior - negative  Drawer:  Anterior - negative     Posterior - negative    Other   Erythema: absent  Sensation: normal  Pulse: present  Swelling: none  Effusion: effusion present    Comments:  Mild fusion is noted in the left anterior joint lines.  No Baker's cyst bilaterally.  She has pain with Ana testing bilaterally though there is no click or mechanical locking noted.  Distal neurovascular checks within normal limits.           Extremity DVT signs are negative by clinical screen.     Independent Review of Radiographic Studies:    2 view plain films of both knees were taken in the office today, for the evaluation of pain and joint condition, no comparison views available.  No acute osseous abnormalities are seen.  Right knee shows evidence of mild medial joint space narrowing and moderate patellofemoral joint space narrowing without significant osteophyte formation.  There are calcifications seen in the medial and lateral compartments of the knee which I suspect represents chondrocalcinosis versus extruded calcified meniscus body.  The left knee shows moderate narrowing of the medial compartment and mild narrowing of the patellofemoral compartment without significant osteophyte formation.    Large Joint Arthrocentesis: R knee  Date/Time: 4/10/2025 11:25 AM  Consent given by: patient  Site marked: site marked  Timeout: Immediately prior to procedure a time out was called to verify the correct patient, procedure, equipment, support staff and site/side marked as required   Supporting Documentation  Indications: pain   Procedure Details  Location: knee - R knee  Preparation: Patient was prepped and draped in the usual sterile fashion  Needle size: 22 G  Approach:  anterolateral  Medications administered: 40 mg methylPREDNISolone acetate 40 MG/ML; 2 mL lidocaine 2%  Patient tolerance: patient tolerated the procedure well with no immediate complications      Large Joint Arthrocentesis: L knee  Date/Time: 4/10/2025 11:26 AM  Consent given by: patient  Site marked: site marked  Timeout: Immediately prior to procedure a time out was called to verify the correct patient, procedure, equipment, support staff and site/side marked as required   Supporting Documentation  Indications: pain   Procedure Details  Location: knee - L knee  Preparation: Patient was prepped and draped in the usual sterile fashion  Needle size: 22 G  Approach: anterolateral  Medications administered: 40 mg methylPREDNISolone acetate 40 MG/ML; 2 mL lidocaine 2%  Patient tolerance: patient tolerated the procedure well with no immediate complications          Assessment and Plan   Diagnoses and all orders for this visit:    1. Chronic pain of both knees (Primary)    2. Primary osteoarthritis of left knee    3. Chondrocalcinosis of right knee       Discussion of orthopedic goals  Orthopedic activities reviewed and patient expressed appreciation  Risk, benefits, and merits of treatment alternatives reviewed with the patient and questions answered  Patient guided on mobility and conditioning exercises  Ice, heat, and/or modalities as needed and beneficial  Elevate leg for residual swelling  Advised patient to call or notify the office for any adverse effect from injection therapy  Patient was counseled and provided with a HEP.   Reduced physical activity as appropriate and avoid aggravating activities.   Weight bearing parameters reviewed.   Take prescribed medications as instructed and only as tolerated.    Recommendations/Plan:  Exercise, medications, injections, other patient advice, and return appointment as noted.  Patient and/or guardian(s) were encouraged to call or return to the office for any issues or  concerns.    Patient displaying signs and symptoms consistent with osteoarthritis of both knees.  Today she was treated with cortisone injections in the both knees and provided and counseled with a home exercise plan.  Also advised to RICE heat, activity modification as needed.  Encouraged her to continue with meloxicam as needed and tolerated.  Advise follow-up with me in 3 months for repeat injections if requested.  If insufficient pain relief with cortisone injections consider adding viscosupplementation.    Return in about 3 months (around 7/10/2025), or if symptoms worsen or fail to improve, for Recheck.

## 2025-04-25 ENCOUNTER — OFFICE VISIT (OUTPATIENT)
Dept: OBSTETRICS AND GYNECOLOGY | Facility: CLINIC | Age: 60
End: 2025-04-25
Payer: MEDICARE

## 2025-04-25 VITALS
BODY MASS INDEX: 33.32 KG/M2 | WEIGHT: 195.2 LBS | SYSTOLIC BLOOD PRESSURE: 116 MMHG | DIASTOLIC BLOOD PRESSURE: 70 MMHG | HEIGHT: 64 IN

## 2025-04-25 DIAGNOSIS — N90.7 VULVAR CYST: Primary | ICD-10-CM

## 2025-04-25 PROCEDURE — 99213 OFFICE O/P EST LOW 20 MIN: CPT | Performed by: OBSTETRICS & GYNECOLOGY

## 2025-04-25 PROCEDURE — 99459 PELVIC EXAMINATION: CPT | Performed by: OBSTETRICS & GYNECOLOGY

## 2025-04-25 RX ORDER — FLUCONAZOLE 150 MG/1
TABLET ORAL
Qty: 5 TABLET | Refills: 0 | Status: SHIPPED | OUTPATIENT
Start: 2025-04-25

## 2025-04-25 RX ORDER — SULFAMETHOXAZOLE AND TRIMETHOPRIM 800; 160 MG/1; MG/1
1 TABLET ORAL 2 TIMES DAILY
Qty: 10 TABLET | Refills: 0 | Status: SHIPPED | OUTPATIENT
Start: 2025-04-25 | End: 2025-04-30

## 2025-04-30 NOTE — PROGRESS NOTES
"GYN Office Visit  Subjective   Chief Complaint   Patient presents with    Vaginal Pain     C/o a knot in her vaginal opening, says it is sore to touch.       Kimberly Chun is a 60 y.o. year old  presenting for f/u vulvar irritation + cyst.    She has a recurrent vulvar cyst that bothers her from time to time.  She usually experiences resolution with antibiotics. She has irritation on the outside of the vulva as well.       Objective   /70   Ht 162.6 cm (64.02\")   Wt 88.5 kg (195 lb 3.2 oz)   BMI 33.49 kg/m²     Physical Exam:  Small vulvar cyst noted with some irritation     Assessment   Vulvar cyst  Vulvar irritation  Diabetes mellitus    We reviewed her situation in detail today.  Start Bactrim and use Diflucan for yeast prophylaxis.  Call/return precautions reviewed.  All questions answered.    Coding/billing based on time: 20 total minutes were required for this encounter.    Wan Evans MD  Obstetrics and Gynecology  [facility]   "

## 2025-05-08 ENCOUNTER — TELEPHONE (OUTPATIENT)
Dept: INTERNAL MEDICINE | Facility: CLINIC | Age: 60
End: 2025-05-08
Payer: MEDICARE

## 2025-05-08 RX ORDER — SILVER SULFADIAZINE 10 MG/G
1 CREAM TOPICAL 2 TIMES DAILY
COMMUNITY
End: 2025-05-08 | Stop reason: SDUPTHER

## 2025-05-08 RX ORDER — SILVER SULFADIAZINE 10 MG/G
1 CREAM TOPICAL 2 TIMES DAILY
Qty: 30 G | Refills: 0 | Status: SHIPPED | OUTPATIENT
Start: 2025-05-08

## 2025-05-08 NOTE — TELEPHONE ENCOUNTER
Caller: Kimberly Chun    Relationship: Self    Best call back number: [phone]    What medication are you requesting:     SILVADENE CREAM    What are your current symptoms:     BURNED ARM    How long have you been experiencing symptoms:     LAST NIGHT    If a prescription is needed, what is your preferred pharmacy and phone number: [id number] - [place], [place] - [address] AT [facility] [address]  - [phone]  - [phone] FX

## 2025-05-15 RX ORDER — ACYCLOVIR 400 MG/1
TABLET ORAL
Qty: 3 EACH | Refills: 11 | OUTPATIENT
Start: 2025-05-15

## 2025-05-15 NOTE — TELEPHONE ENCOUNTER
Patient's last office visit was 05/16/2024.  Patient must schedule and keep appointment for updated refills.

## 2025-06-25 ENCOUNTER — TELEPHONE (OUTPATIENT)
Dept: CASE MANAGEMENT | Facility: CLINIC | Age: 60
End: 2025-06-25
Payer: MEDICARE

## 2025-06-25 NOTE — TELEPHONE ENCOUNTER
ACM attempted proactive Outreach call. No answer left voicemail for patient to return call at [phone]

## 2025-06-26 ENCOUNTER — PATIENT OUTREACH (OUTPATIENT)
Dept: CASE MANAGEMENT | Facility: CLINIC | Age: 60
End: 2025-06-26
Payer: MEDICARE

## 2025-06-26 DIAGNOSIS — J44.9 CHRONIC OBSTRUCTIVE PULMONARY DISEASE, UNSPECIFIED COPD TYPE: Primary | ICD-10-CM

## 2025-06-26 DIAGNOSIS — E11.42 TYPE 2 DIABETES MELLITUS WITH DIABETIC POLYNEUROPATHY, WITH LONG-TERM CURRENT USE OF INSULIN: ICD-10-CM

## 2025-06-26 DIAGNOSIS — Z79.4 TYPE 2 DIABETES MELLITUS WITH DIABETIC POLYNEUROPATHY, WITH LONG-TERM CURRENT USE OF INSULIN: ICD-10-CM

## 2025-06-26 NOTE — OUTREACH NOTE
AMBULATORY CASE MANAGEMENT NOTE    Names and Relationships of Patient/Support Persons: Contact: Kimberly Chun; Relationship: Self -     ACM called and spoke with patient. Identified self and roll. Explained and offered CCM program and patient declined at this time. Encouraged patient to reach out in future if needs arise.         Genoveva JOYCE  Ambulatory Case Management    6/26/2025, 09:40 EDT

## 2025-07-29 ENCOUNTER — OFFICE VISIT (OUTPATIENT)
Dept: INTERNAL MEDICINE | Facility: CLINIC | Age: 60
End: 2025-07-29
Payer: MEDICARE

## 2025-07-29 VITALS
HEART RATE: 92 BPM | BODY MASS INDEX: 33.46 KG/M2 | WEIGHT: 196 LBS | DIASTOLIC BLOOD PRESSURE: 68 MMHG | OXYGEN SATURATION: 97 % | SYSTOLIC BLOOD PRESSURE: 128 MMHG | TEMPERATURE: 96.9 F | HEIGHT: 64 IN

## 2025-07-29 DIAGNOSIS — R79.9 ABNORMAL BLOOD CHEMISTRY: ICD-10-CM

## 2025-07-29 DIAGNOSIS — F41.9 ANXIETY: ICD-10-CM

## 2025-07-29 DIAGNOSIS — J30.89 ENVIRONMENTAL AND SEASONAL ALLERGIES: ICD-10-CM

## 2025-07-29 DIAGNOSIS — Z78.9 STATIN INTOLERANCE: ICD-10-CM

## 2025-07-29 DIAGNOSIS — E10.42 TYPE 1 DIABETES MELLITUS WITH DIABETIC POLYNEUROPATHY: ICD-10-CM

## 2025-07-29 DIAGNOSIS — B37.2 YEAST DERMATITIS: ICD-10-CM

## 2025-07-29 DIAGNOSIS — E10.65 TYPE 1 DIABETES MELLITUS WITH HYPERGLYCEMIA: ICD-10-CM

## 2025-07-29 DIAGNOSIS — M79.10 MYALGIA, UNSPECIFIED SITE: ICD-10-CM

## 2025-07-29 DIAGNOSIS — Z51.81 MEDICATION MONITORING ENCOUNTER: ICD-10-CM

## 2025-07-29 DIAGNOSIS — Z53.20 SCREENING MAMMOGRAPHY DECLINED: ICD-10-CM

## 2025-07-29 DIAGNOSIS — I20.89 ANGINAL EQUIVALENT: ICD-10-CM

## 2025-07-29 DIAGNOSIS — Z00.01 ENCOUNTER FOR MEDICARE ANNUAL EXAMINATION WITH ABNORMAL FINDINGS: ICD-10-CM

## 2025-07-29 DIAGNOSIS — R53.82 CHRONIC FATIGUE: ICD-10-CM

## 2025-07-29 DIAGNOSIS — Z13.6 ENCOUNTER FOR SCREENING FOR CARDIOVASCULAR DISORDERS: ICD-10-CM

## 2025-07-29 DIAGNOSIS — G89.29 CHRONIC RIGHT SHOULDER PAIN: ICD-10-CM

## 2025-07-29 DIAGNOSIS — M25.511 CHRONIC RIGHT SHOULDER PAIN: ICD-10-CM

## 2025-07-29 DIAGNOSIS — K21.9 GASTROESOPHAGEAL REFLUX DISEASE, UNSPECIFIED WHETHER ESOPHAGITIS PRESENT: ICD-10-CM

## 2025-07-29 DIAGNOSIS — Z53.20 LUNG CANCER SCREENING DECLINED BY PATIENT: ICD-10-CM

## 2025-07-29 DIAGNOSIS — Z00.00 MEDICARE ANNUAL WELLNESS VISIT, SUBSEQUENT: Primary | ICD-10-CM

## 2025-07-29 DIAGNOSIS — G89.29 CHRONIC MIDLINE LOW BACK PAIN WITH SCIATICA, SCIATICA LATERALITY UNSPECIFIED: ICD-10-CM

## 2025-07-29 DIAGNOSIS — J44.9 CHRONIC OBSTRUCTIVE PULMONARY DISEASE, UNSPECIFIED COPD TYPE: ICD-10-CM

## 2025-07-29 DIAGNOSIS — I67.2 CEREBRAL ATHEROSCLEROSIS: ICD-10-CM

## 2025-07-29 DIAGNOSIS — M54.40 CHRONIC MIDLINE LOW BACK PAIN WITH SCIATICA, SCIATICA LATERALITY UNSPECIFIED: ICD-10-CM

## 2025-07-29 LAB
EXPIRATION DATE: ABNORMAL
EXPIRATION DATE: NORMAL
HBA1C MFR BLD: 7.9 % (ref 4.5–5.7)
Lab: ABNORMAL
Lab: NORMAL
POC ALBUMIN, URINE: 10 MG/L
POC CREATININE, URINE: 50 MG/DL
POC URINE ALB/CREA RATIO: <30

## 2025-07-29 PROCEDURE — G0439 PPPS, SUBSEQ VISIT: HCPCS | Performed by: FAMILY MEDICINE

## 2025-07-29 PROCEDURE — 82044 UR ALBUMIN SEMIQUANTITATIVE: CPT | Performed by: FAMILY MEDICINE

## 2025-07-29 PROCEDURE — 3051F HG A1C>EQUAL 7.0%<8.0%: CPT | Performed by: FAMILY MEDICINE

## 2025-07-29 PROCEDURE — 1160F RVW MEDS BY RX/DR IN RCRD: CPT | Performed by: FAMILY MEDICINE

## 2025-07-29 PROCEDURE — 99396 PREV VISIT EST AGE 40-64: CPT | Performed by: FAMILY MEDICINE

## 2025-07-29 PROCEDURE — 83036 HEMOGLOBIN GLYCOSYLATED A1C: CPT | Performed by: FAMILY MEDICINE

## 2025-07-29 PROCEDURE — 1159F MED LIST DOCD IN RCRD: CPT | Performed by: FAMILY MEDICINE

## 2025-07-29 PROCEDURE — 82570 ASSAY OF URINE CREATININE: CPT | Performed by: FAMILY MEDICINE

## 2025-07-29 PROCEDURE — 99214 OFFICE O/P EST MOD 30 MIN: CPT | Performed by: FAMILY MEDICINE

## 2025-07-29 PROCEDURE — 1125F AMNT PAIN NOTED PAIN PRSNT: CPT | Performed by: FAMILY MEDICINE

## 2025-07-29 PROCEDURE — 1170F FXNL STATUS ASSESSED: CPT | Performed by: FAMILY MEDICINE

## 2025-07-29 RX ORDER — MELOXICAM 15 MG/1
15 TABLET ORAL DAILY PRN
Qty: 90 TABLET | Refills: 3 | Status: SHIPPED | OUTPATIENT
Start: 2025-07-29

## 2025-07-29 RX ORDER — FLUTICASONE FUROATE AND VILANTEROL 200; 25 UG/1; UG/1
1 POWDER RESPIRATORY (INHALATION)
Qty: 90 EACH | Refills: 3 | Status: SHIPPED | OUTPATIENT
Start: 2025-07-29

## 2025-07-29 RX ORDER — ACYCLOVIR 400 MG/1
TABLET ORAL
COMMUNITY
Start: 2025-05-20

## 2025-07-29 RX ORDER — INSULIN LISPRO 100 [IU]/ML
INJECTION, SOLUTION INTRAVENOUS; SUBCUTANEOUS
COMMUNITY
Start: 2025-06-02

## 2025-07-29 RX ORDER — NITROGLYCERIN 0.4 MG/1
0.4 TABLET SUBLINGUAL
Qty: 30 TABLET | Refills: 5 | Status: SHIPPED | OUTPATIENT
Start: 2025-07-29

## 2025-07-29 RX ORDER — CETIRIZINE HYDROCHLORIDE 10 MG/1
10 TABLET ORAL DAILY
Qty: 90 TABLET | Refills: 3 | Status: SHIPPED | OUTPATIENT
Start: 2025-07-29

## 2025-07-29 RX ORDER — LORAZEPAM 1 MG/1
.5-1 TABLET ORAL 2 TIMES DAILY PRN
Qty: 60 TABLET | Refills: 5 | Status: SHIPPED | OUTPATIENT
Start: 2025-07-29

## 2025-07-29 RX ORDER — ALBUTEROL SULFATE 90 UG/1
2 INHALANT RESPIRATORY (INHALATION) EVERY 6 HOURS PRN
Qty: 54 G | Refills: 3 | Status: SHIPPED | OUTPATIENT
Start: 2025-07-29

## 2025-07-29 RX ORDER — OXYCODONE AND ACETAMINOPHEN 5; 325 MG/1; MG/1
1 TABLET ORAL DAILY PRN
Qty: 16 TABLET | Refills: 0 | Status: SHIPPED | OUTPATIENT
Start: 2025-07-29

## 2025-07-29 RX ORDER — ESOMEPRAZOLE MAGNESIUM 40 MG/1
40 CAPSULE, DELAYED RELEASE ORAL
Qty: 90 CAPSULE | Refills: 3 | Status: SHIPPED | OUTPATIENT
Start: 2025-07-29

## 2025-07-29 RX ORDER — ALBUTEROL SULFATE 0.83 MG/ML
2.5 SOLUTION RESPIRATORY (INHALATION) EVERY 6 HOURS PRN
Qty: 180 EACH | Refills: 3 | Status: SHIPPED | OUTPATIENT
Start: 2025-07-29

## 2025-07-29 RX ORDER — NYSTATIN 100000 [USP'U]/G
POWDER TOPICAL 2 TIMES DAILY
Qty: 60 G | Refills: 5 | Status: SHIPPED | OUTPATIENT
Start: 2025-07-29

## 2025-07-29 RX ORDER — LISINOPRIL 5 MG/1
5 TABLET ORAL NIGHTLY
Qty: 90 TABLET | Refills: 3 | Status: SHIPPED | OUTPATIENT
Start: 2025-07-29

## 2025-07-29 RX ORDER — CLOPIDOGREL BISULFATE 75 MG/1
75 TABLET ORAL DAILY
Qty: 90 TABLET | Refills: 3 | Status: SHIPPED | OUTPATIENT
Start: 2025-07-29

## 2025-07-29 RX ORDER — GABAPENTIN 800 MG/1
800 TABLET ORAL 3 TIMES DAILY
Qty: 270 TABLET | Refills: 1 | Status: SHIPPED | OUTPATIENT
Start: 2025-07-29

## 2025-07-29 NOTE — PATIENT INSTRUCTIONS
Medicare Wellness  Personal Prevention Plan of Service     Date of Office Visit:    Encounter Provider:  Maria Luz Longoria MD  Place of Service:  [facility]  Patient Name: Kimberly Chun  :  1965    As part of the Medicare Wellness portion of your visit today, we are providing you with this personalized preventive plan of services (PPPS). This plan is based upon recommendations of the [place] Preventive Services Task Force (USPSTF) and the Advisory Committee on Immunization Practices (ACIP).    This lists the preventive care services that should be considered, and provides dates of when you are due. Items listed as completed are up-to-date and do not require any further intervention.    Health Maintenance   Topic Date Due    URINE MICROALBUMIN-CREATININE RATIO (uACR)  Never done    DIABETIC EYE EXAM  2023    LUNG CANCER SCREENING  2024    TDAP/TD VACCINES (2 - Td or Tdap) 2025    ANNUAL WELLNESS VISIT  2025    LIPID PANEL  2025    MAMMOGRAM  2025    HEMOGLOBIN A1C  2025    ZOSTER VACCINE (1 of 2) 2025 (Originally 2014)    COVID-19 Vaccine (2024- season) 2026 (Originally 2024)    INFLUENZA VACCINE  10/01/2025    DIABETIC FOOT EXAM  2026    COLORECTAL CANCER SCREENING  2026    HEPATITIS C SCREENING  Completed    Pneumococcal Vaccine 50+  Completed       Orders Placed This Encounter   Procedures    Urine Drug Screen - Urine, Clean Catch     Release to patient:   Routine Release [[id number]]    POC Glycosylated Hemoglobin (Hb A1C)     Release to patient:   Routine Release [[id number]]    POC Albumin/Creatinine Ratio Urine     Release to patient:   Routine Release [[id number]]       Return in about 6 months (around 2026) for Controlled Rx Follow Up.        Health Maintenance for Postmenopausal Women    Menopause is a normal process in which your ability to get pregnant comes to an end.  This process happens slowly over many months or years, usually between the ages of 48 and 55. Menopause is complete when you have missed your menstrual period for 12 months.    It is important to talk with your health care provider about some of the most common conditions that affect women after menopause (postmenopausal women). These include heart disease, cancer, and bone loss (osteoporosis). Adopting a healthy lifestyle and getting preventive care can help to promote your health and wellness. The actions you take can also lower your chances of developing some of these common conditions.    What are the signs and symptoms of menopause?  During menopause, you may have the following symptoms:  Hot flashes. These can be moderate or severe.  Night sweats.  Decrease in sex drive.  Mood swings.  Headaches.  Tiredness (fatigue).  Irritability.  Memory problems.  Problems falling asleep or staying asleep.    Talk with your health care provider about treatment options for your symptoms.    Do I need hormone replacement therapy?  Hormone replacement therapy is effective in treating symptoms that are caused by menopause, such as hot flashes and night sweats.  Hormone replacement carries certain risks, especially as you become older. If you are thinking about using estrogen or estrogen with progestin, discuss the benefits and risks with your health care provider.    How can I reduce my risk for heart disease and stroke?  The risk of heart disease, heart attack, and stroke increases as you age. One of the causes may be a change in the body's hormones during menopause. This can affect how your body uses dietary fats, triglycerides, and cholesterol. Heart attack and stroke are medical emergencies. There are many things that you can do to help prevent heart disease and stroke.  Watch your blood pressure  High blood pressure causes heart disease and increases the risk of stroke. This is more likely to develop in people who have  high blood pressure readings or are overweight.  Have your blood pressure checked:  Every 3-5 years if you are 18-39 years of age.  Every year if you are 40 years old or older.    Eat a healthy diet    Eat a diet that includes plenty of vegetables, fruits, low-fat dairy products, and lean protein.  Do not eat a lot of foods that are high in solid fats, added sugars, or sodium.    Get regular exercise  Get regular exercise. This is one of the most important things you can do for your health. Most adults should:  Try to exercise for at least 150 minutes each week. The exercise should increase your heart rate and make you sweat (moderate-intensity exercise).  Try to do strengthening exercises at least twice each week. Do these in addition to the moderate-intensity exercise.  Spend less time sitting. Even light physical activity can be beneficial.    Other tips  Work with your health care provider to achieve or maintain a healthy weight.  Do not use any products that contain nicotine or tobacco. These products include cigarettes, chewing tobacco, and vaping devices, such as e-cigarettes. If you need help quitting, ask your health care provider.  Know your numbers. Ask your health care provider to check your cholesterol and your blood sugar (glucose). Continue to have your blood tested as directed by your health care provider.    Do I need screening for cancer?  Depending on your health history and family history, you may need to have cancer screenings at different stages of your life. This may include screening for:  Breast cancer.  Cervical cancer.  Lung cancer.  Colorectal cancer.    What is my risk for osteoporosis?  After menopause, you may be at increased risk for osteoporosis. Osteoporosis is a condition in which bone destruction happens more quickly than new bone creation. To help prevent osteoporosis or the bone fractures that can happen because of osteoporosis, you may take the following actions:  If you are  19-50 years old, get at least 1,000 mg of calcium and at least 600 international units (IU) of vitamin D per day.  If you are older than age 50 but younger than age 70, get at least 1,200 mg of calcium and at least 600 international units (IU) of vitamin D per day.  If you are older than age 70, get at least 1,200 mg of calcium and at least 800 international units (IU) of vitamin D per day.    Smoking and drinking excessive alcohol increase the risk of osteoporosis. Eat foods that are rich in calcium and vitamin D, and do weight-bearing exercises several times each week as directed by your health care provider.    How does menopause affect my mental health?  Depression may occur at any age, but it is more common as you become older. Common symptoms of depression include:  Feeling depressed.  Changes in sleep patterns.  Changes in appetite or eating patterns.  Feeling an overall lack of motivation or enjoyment of activities that you previously enjoyed.  Frequent crying spells.    Talk with your health care provider if you think that you are experiencing any of these symptoms.    General instructions  See your health care provider for regular wellness exams and vaccines. This may include:  Scheduling regular health, dental, and eye exams.  Getting and maintaining your vaccines. These include:  Influenza vaccine. Get this vaccine each year before the flu season begins.  Pneumonia vaccine (Prevnar 20)  Shingles vaccine (Shingrix, ages 50 and older)  Tetanus, diphtheria, and pertussis (Tdap) booster vaccine (every 10 years)  Respiratory Syncytial Virus (RSV) vaccine (ages 60 and older)    Your health care provider may also recommend other immunizations.    Tell your health care provider if you have ever been abused or do not feel safe at home.    Summary  Menopause is a normal process in which your ability to get pregnant comes to an end.  This condition causes hot flashes, night sweats, decreased interest in sex, mood  swings, headaches, or lack of sleep.  Treatment for this condition may include hormone replacement therapy.  Take actions to keep yourself healthy, including exercising regularly, eating a healthy diet, watching your weight, and checking your blood pressure and blood sugar levels.  Get screened for cancer and depression. Make sure that you are up to date with all your vaccines.    This information is not intended to replace advice given to you by your health care provider. Make sure you discuss any questions you have with your health care provider.  Document Revised: 05/09/2022 Document Reviewed: 05/09/2022  [employer] Patient Education © 2023 [employer]  Updated 2/29/24 TC

## 2025-07-29 NOTE — PROGRESS NOTES
Subjective   The ABCs of the Annual Wellness Visit  Medicare Wellness Visit      Kimberly Chun is a 60 y.o. patient who presents for a Medicare Wellness Visit.    The following portions of the patient's history were reviewed and   updated as appropriate: allergies, current medications, past family history, past medical history, past social history, past surgical history, and problem list.    Compared to one year ago, the patient's physical   health is the same.  Compared to one year ago, the patient's mental   health is the same.    Recent Hospitalizations:  She was not admitted to the hospital during the last year.     Current Medical Providers:  Patient Care Team:  Maria Luz Longoria MD as PCP - General (Family Medicine)  Shelley Curran MD as Consulting Physician (Ophthalmology)  Evert Jones MD (Orthopedic Surgery)  Lorenza Crain DO as Consulting Physician (Endocrinology)  Lottie Fox (Endocrinology)    Outpatient Medications Prior to Visit   Medication Sig Dispense Refill    aspirin 81 MG EC tablet Take 1 tablet by mouth Daily. 90 tablet 3    Cholecalciferol 25 MCG (1000 UT) capsule Take 1 capsule by mouth Daily. For vitamin D deficiency. 90 capsule 3    Continuous Glucose  (Dexcom G7 ) device Use.      Continuous Glucose Sensor (Dexcom G7 Sensor) misc       fluconazole (Diflucan) 150 MG tablet 1 po now and repeat in 3 d. 5 tablet 0    HumaLOG 100 UNIT/ML injection For use in insulin pump (max daily dose 100 units)      multivitamin with minerals tablet tablet Take 1 tablet by mouth Daily.      silver sulfadiazine (SILVADENE, SSD) 1 % cream Apply 1 Application topically to the appropriate area as directed 2 (Two) Times a Day. 30 g 0    albuterol (PROVENTIL) (2.5 MG/3ML) 0.083% nebulizer solution Take 2.5 mg by nebulization Every 6 (Six) Hours As Needed for Wheezing or Shortness of Air. 180 each 3    albuterol sulfate HFA (ProAir HFA) 108 (90 Base) MCG/ACT  inhaler Inhale 2 puffs Every 6 (Six) Hours As Needed for Wheezing or Shortness of Air. 54 g 3    clopidogrel (PLAVIX) 75 MG tablet Take 1 tablet by mouth Daily. 90 tablet 3    Droplet Pen Needles 32G X 4 MM misc USE TO INJECT INSULIN 4X  each 1    esomeprazole (nexIUM) 40 MG capsule TAKE 1 CAPSULE BY MOUTH EVERY MORNING BEFORE BREAKFAST AS NEEDED FOR HEARTBURN 90 capsule 1    Fluticasone Furoate-Vilanterol (Breo Ellipta) 200-25 MCG/ACT inhaler Inhale 1 puff Daily. 90 each 3    gabapentin (NEURONTIN) 800 MG tablet Take 1 tablet by mouth 3 (Three) Times a Day. Indications: Neuropathic Pain 270 tablet 1    lisinopril (PRINIVIL,ZESTRIL) 5 MG tablet Take 1 tablet by mouth Every Night. For kidney protection from diabetes mellitus . 90 tablet 3    LORazepam (ATIVAN) 1 MG tablet Take 0.5-1 tablets by mouth 2 (Two) Times a Day As Needed (severe anxiety (use sparingly)). 60 tablet 3    meloxicam (MOBIC) 15 MG tablet Take 1 tablet by mouth Daily As Needed (joint pain). 90 tablet 3    nitroglycerin (Nitrostat) 0.4 MG SL tablet Place 1 tablet under the tongue Every 5 (Five) Minutes As Needed for Chest Pain. Take no more than 3 doses in 15 minutes. 30 tablet 5    nystatin (MYCOSTATIN) 772806 UNIT/GM powder Apply  topically to the appropriate area as directed 2 (Two) Times a Day. 60 g 5    oxyCODONE-acetaminophen (Percocet) 5-325 MG per tablet Take 1 tablet by mouth Daily As Needed for Severe Pain. Use sparingly 16 tablet 0    HumaLOG KwikPen 100 UNIT/ML solution pen-injector  (Patient not taking: Reported on 7/29/2025)      insulin aspart (NovoLOG FlexPen) 100 UNIT/ML solution pen-injector sc pen 10 units before meals plus 2:50>150, MDD 60 units (Patient not taking: Reported on 7/29/2025) 60 mL 1    Insulin Glargine (Lantus SoloStar) 100 UNIT/ML injection pen Inject 60 Units under the skin into the appropriate area as directed Every Night. (Patient not taking: Reported on 7/29/2025) 60 mL 1    metFORMIN ER (GLUCOPHAGE-XR)  "500 MG 24 hr tablet Take 1 tablet by mouth 2 (Two) Times a Day. (Patient not taking: Reported on 7/29/2025)       No facility-administered medications prior to visit.     Opioid medication/s are on active medication list.  and I have evaluated her active treatment plan and pain score trends (see table).  Vitals:    07/29/25 1057   PainSc: 4      I have reviewed the chart for potential of high risk medication and harmful drug interactions in the elderly.        Aspirin is on active medication list. Aspirin use is indicated based on review of current medical condition/s. Pros and cons of this therapy have been discussed today. Benefits of this medication outweigh potential harm.  Patient has been encouraged to continue taking this medication.  .      Patient Active Problem List   Diagnosis    Anxiety    Cerebral atherosclerosis    Gastroesophageal reflux disease    Chronic obstructive pulmonary disease    Osteoarthritis    Dyssomnia    Hematuria    Primary cancer of bladder    Tobacco abuse    Chronic fatigue    Gastritis determined by biopsy    Type 1 diabetes mellitus with diabetic polyneuropathy    Type 1 diabetes mellitus with hyperglycemia    Chronic midline low back pain with sciatica    Nuclear sclerotic cataract of right eye    Combined forms of age-related cataract of left eye    History of bladder cancer    Vitamin D deficiency    Lung cancer screening declined by patient    Screening mammography declined    Myalgia, unspecified site    Statin intolerance     Advance Care Planning Advance Directive is not on file.  ACP discussion was declined by the patient. Patient does not have an advance directive, declines further assistance.            Objective   Vitals:    07/29/25 1057   BP: 128/68   Pulse: 92   Temp: 96.9 °F (36.1 °C)   SpO2: 97%   Weight: 88.9 kg (196 lb)   Height: 162.6 cm (64.02\")   PainSc: 4        Estimated body mass index is 33.62 kg/m² as calculated from the following:    Height as of this " "encounter: 162.6 cm (64.02\").    Weight as of this encounter: 88.9 kg (196 lb).              Does the patient have evidence of cognitive impairment? Possible--patient doesn't feel as sharp since previous stroke  Lab Results   Component Value Date    HGBA1C 7.9 (A) 2025                                                                                                Health  Risk Assessment    Smoking Status:  Social History     Tobacco Use   Smoking Status Every Day    Current packs/day: 0.50    Average packs/day: 0.5 packs/day for 40.0 years (20.0 ttl pk-yrs)    Types: Cigarettes    Passive exposure: Past   Smokeless Tobacco Never     Alcohol Consumption:  Social History     Substance and Sexual Activity   Alcohol Use No       Fall Risk Screen  STEADI Fall Risk Assessment was completed, and patient is at LOW risk for falls.Assessment completed on:2025    Depression Screening   Little interest or pleasure in doing things? Not at all   Feeling down, depressed, or hopeless? Not at all   PHQ-2 Total Score 0      Health Habits and Functional and Cognitive Screenin/29/2025    10:51 AM   Functional & Cognitive Status   Do you have difficulty preparing food and eating? No   Do you have difficulty bathing yourself, getting dressed or grooming yourself? No   Do you have difficulty using the toilet? No   Do you have difficulty moving around from place to place? No   Do you have trouble with steps or getting out of a bed or a chair? Yes   Current Diet Limited Junk Food   Dental Exam Other   Eye Exam Not up to date   Exercise (times per week) 7 times per week   Current Exercises Include Walking;Other;Dancing   Do you need help using the phone?  No   Are you deaf or do you have serious difficulty hearing?  No   Do you need help to go to places out of walking distance? No   Do you need help shopping? No   Do you need help preparing meals?  No   Do you need help with housework?  No   Do you need help with laundry? " No   Do you need help taking your medications? No   Do you need help managing money? No   Do you ever drive or ride in a car without wearing a seat belt? No   Have you felt unusual fatigue (could be tiredness), stress, anger or loneliness in the last month? No   Who do you live with? Child   If you need help, do you have trouble finding someone available to you? No   Have you been bothered in the last four weeks by sexual problems? No   Do you have difficulty concentrating, remembering or making decisions? No           Age-appropriate Screening Schedule:  Refer to the list below for future screening recommendations based on patient's age, sex and/or medical conditions. Orders for these recommended tests are listed in the plan section. The patient has been provided with a written plan.    Health Maintenance List  Health Maintenance   Topic Date Due    LIPID PANEL  05/02/2025    ZOSTER VACCINE (1 of 2) 08/12/2025 (Originally 4/1/2014)    DIABETIC EYE EXAM  09/16/2025 (Originally 9/26/2023)    COVID-19 Vaccine (4 - 2024-25 season) 01/01/2026 (Originally 9/1/2024)    TDAP/TD VACCINES (2 - Td or Tdap) 01/25/2026 (Originally 2/5/2025)    MAMMOGRAM  07/29/2026 (Originally 8/22/2025)    LUNG CANCER SCREENING  07/29/2026 (Originally 3/9/2024)    INFLUENZA VACCINE  10/01/2025    HEMOGLOBIN A1C  01/29/2026    DIABETIC FOOT EXAM  06/02/2026    ANNUAL WELLNESS VISIT  07/29/2026    URINE MICROALBUMIN-CREATININE RATIO (uACR)  07/29/2026    COLORECTAL CANCER SCREENING  12/28/2026    HEPATITIS C SCREENING  Completed    Pneumococcal Vaccine 50+  Completed                                                                                                                                                CMS Preventative Services Quick Reference  Risk Factors Identified During Encounter  Chronic Pain: refilled gabapentin, opiate which she takes sparingly. UDS updated today. No red flags.  Fall Risk-High or Moderate: discussed use of  "cane  Immunizations Discussed/Encouraged: Tdap and Shingrix    The above risks/problems have been discussed with the patient.  Pertinent information has been shared with the patient in the After Visit Summary.  An After Visit Summary and PPPS were made available to the patient.    Follow Up:   Next Medicare Wellness visit to be scheduled in 1 year.         Additional E&M Note during same encounter follows:  Patient has additional, significant, and separately identifiable condition(s)/problem(s) that require work above and beyond the Medicare Wellness Visit     Chief Complaint  Medicare Wellness-subsequent (Med refills), Annual Exam, Diabetes, and Anxiety    Subjective   Feels better than she has in a long time  Going to [facility] for diabetes care  Has pump   Provider at [facility] endo noted her sugars spike about an hour prior to granddaughter Rosa coming home from school  Taking lorazepam helps    Takes pain med sparingly. Has 2-3 pills left from last refill.  Gabapentin helps neuropathy greatly.    Declines lung cancer screening and mammogram  Stopped statin as she had muscle aches with it.      Kimberly is also being seen today for an annual adult preventative physical exam.  and Kimberly is also being seen today for additional medical problem/s.                Objective   Vital Signs:  /68   Pulse 92   Temp 96.9 °F (36.1 °C)   Ht 162.6 cm (64.02\")   Wt 88.9 kg (196 lb)   SpO2 97%   BMI 33.62 kg/m²   Physical Exam  Vitals and nursing note reviewed.   Constitutional:       General: She is not in acute distress.     Appearance: Normal appearance. She is well-developed and well-groomed. She is obese. She is not ill-appearing, toxic-appearing or diaphoretic.   HENT:      Head: Normocephalic and atraumatic.      Right Ear: Hearing normal.      Left Ear: Hearing normal.      Nose: Nose normal.      Mouth/Throat:      Mouth: Mucous membranes are moist.   Eyes:      General: Lids are normal. No " scleral icterus.     Extraocular Movements: Extraocular movements intact.   Neck:      Trachea: Phonation normal.   Cardiovascular:      Rate and Rhythm: Normal rate and regular rhythm.   Pulmonary:      Effort: Pulmonary effort is normal.      Breath sounds: Normal breath sounds.   Abdominal:      General: There is no distension.   Musculoskeletal:         General: No deformity.      Cervical back: Neck supple.   Skin:     Coloration: Skin is not jaundiced or pale.   Neurological:      General: No focal deficit present.      Mental Status: She is alert and oriented to person, place, and time.      Motor: Motor function is intact.   Psychiatric:         Attention and Perception: Attention and perception normal.         Mood and Affect: Mood and affect normal.         Speech: Speech normal.         Behavior: Behavior normal. Behavior is cooperative.         Thought Content: Thought content normal.         Cognition and Memory: Cognition and memory normal.         Judgment: Judgment normal.         The following data was reviewed by: Maria Luz Longoria MD on 07/29/2025:  Lab Results   Component Value Date    HGBA1C 7.9 (A) 07/29/2025    HGBA1C 9.2 (H) 02/25/2025    HGBA1C 8.6 (A) 11/05/2024      COMPLIANCE DRUG ANALYSIS, NO THC - (11/05/2024 13:50)        Assessment and Plan Additional age appropriate preventative wellness advice topics were discussed during today's preventative wellness exam(some topics already addressed during AWV portion of the note above):   Healthy Weight: Discussed current and goal BMI with patient. Steps to attain this goal discussed. Information shared in after visit summary.    Diagnoses and all orders for this visit:    1. Medicare annual wellness visit, subsequent (Primary)    2. Encounter for Medicare annual examination with abnormal findings    3. Type 1 diabetes mellitus with hyperglycemia  Assessment & Plan:  Follow up with endocrinology with [facility]. Improved since last  visit.     Orders:  -     POC Glycosylated Hemoglobin (Hb A1C)  -     POC Albumin/Creatinine Ratio Urine  -     TSH Rfx On Abnormal To Free T4    4. Type 1 diabetes mellitus with diabetic polyneuropathy  -     POC Glycosylated Hemoglobin (Hb A1C)  -     POC Albumin/Creatinine Ratio Urine  -     gabapentin (NEURONTIN) 800 MG tablet; Take 1 tablet by mouth 3 (Three) Times a Day. Indications: Neuropathic Pain  Dispense: 270 tablet; Refill: 1  -     lisinopril (PRINIVIL,ZESTRIL) 5 MG tablet; Take 1 tablet by mouth Every Night. For kidney protection from diabetes mellitus .  Dispense: 90 tablet; Refill: 3  -     Vitamin B12  -     Folate  -     TSH Rfx On Abnormal To Free T4    5. Anxiety  -     Urine Drug Screen - Urine, Clean Catch  -     LORazepam (ATIVAN) 1 MG tablet; Take 0.5-1 tablets by mouth 2 (Two) Times a Day As Needed (severe anxiety (use sparingly)).  Dispense: 60 tablet; Refill: 5  -     CBC (No Diff)  -     Vitamin B12  -     Folate    6. Chronic obstructive pulmonary disease, unspecified COPD type  Overview:  Complicated by continued tobacco abuse      Orders:  -     CBC (No Diff)  -     Comprehensive Metabolic Panel  -     albuterol sulfate HFA (ProAir HFA) 108 (90 Base) MCG/ACT inhaler; Inhale 2 puffs Every 6 (Six) Hours As Needed for Wheezing or Shortness of Air.  Dispense: 54 g; Refill: 3  -     albuterol (PROVENTIL) (2.5 MG/3ML) 0.083% nebulizer solution; Take 2.5 mg by nebulization Every 6 (Six) Hours As Needed for Wheezing or Shortness of Air.  Dispense: 180 each; Refill: 3  -     Fluticasone Furoate-Vilanterol (Breo Ellipta) 200-25 MCG/ACT inhaler; Inhale 1 puff Daily.  Dispense: 90 each; Refill: 3    7. Cerebral atherosclerosis  Overview:  History of stroke.    Orders:  -     clopidogrel (PLAVIX) 75 MG tablet; Take 1 tablet by mouth Daily.  Dispense: 90 tablet; Refill: 3  -     Lipoprotein A (LPA)  -     LDL cholesterol, direct    8. Chronic right shoulder pain  -     meloxicam (MOBIC) 15 MG  tablet; Take 1 tablet by mouth Daily As Needed (joint pain).  Dispense: 90 tablet; Refill: 3  -     oxyCODONE-acetaminophen (Percocet) 5-325 MG per tablet; Take 1 tablet by mouth Daily As Needed for Severe Pain. Use sparingly  Dispense: 16 tablet; Refill: 0    9. Chronic midline low back pain with sciatica, sciatica laterality unspecified  -     Urine Drug Screen - Urine, Clean Catch  -     gabapentin (NEURONTIN) 800 MG tablet; Take 1 tablet by mouth 3 (Three) Times a Day. Indications: Neuropathic Pain  Dispense: 270 tablet; Refill: 1  -     oxyCODONE-acetaminophen (Percocet) 5-325 MG per tablet; Take 1 tablet by mouth Daily As Needed for Severe Pain. Use sparingly  Dispense: 16 tablet; Refill: 0    10. Gastroesophageal reflux disease, unspecified whether esophagitis present  -     esomeprazole (nexIUM) 40 MG capsule; Take 1 capsule by mouth Every Morning Before Breakfast. As needed for heartburn  Dispense: 90 capsule; Refill: 3  -     CBC (No Diff)  -     Comprehensive Metabolic Panel  -     Vitamin B12    11. Anginal equivalent  -     nitroglycerin (Nitrostat) 0.4 MG SL tablet; Place 1 tablet under the tongue Every 5 (Five) Minutes As Needed for Chest Pain. Take no more than 3 doses in 15 minutes.  Dispense: 30 tablet; Refill: 5    12. Yeast dermatitis  -     nystatin (MYCOSTATIN) 890219 UNIT/GM powder; Apply  topically to the appropriate area as directed 2 (Two) Times a Day.  Dispense: 60 g; Refill: 5    13. Chronic fatigue  -     CBC (No Diff)  -     Comprehensive Metabolic Panel  -     Vitamin D,25-Hydroxy  -     Vitamin B12  -     Folate  -     TSH Rfx On Abnormal To Free T4    14. Environmental and seasonal allergies  -     cetirizine (zyrTEC) 10 MG tablet; Take 1 tablet by mouth Daily.  Dispense: 90 tablet; Refill: 3    15. Abnormal blood chemistry  -     CBC (No Diff)  -     Comprehensive Metabolic Panel  -     Lipoprotein A (LPA)  -     Vitamin D,25-Hydroxy  -     LDL cholesterol, direct  -     Vitamin  B12  -     Folate  -     TSH Rfx On Abnormal To Free T4    16. Medication monitoring encounter  -     Urine Drug Screen - Urine, Clean Catch  -     CBC (No Diff)  -     Comprehensive Metabolic Panel  -     Lipoprotein A (LPA)  -     Vitamin D,25-Hydroxy  -     LDL cholesterol, direct  -     Vitamin B12  -     Folate  -     TSH Rfx On Abnormal To Free T4    17. Encounter for screening for cardiovascular disorders  -     Lipoprotein A (LPA)  -     LDL cholesterol, direct    18. Body mass index (BMI) 33.0-33.9, adult  -     Vitamin D,25-Hydroxy    19. Lung cancer screening declined by patient    20. Screening mammography declined    21. Myalgia, unspecified site  Overview:  Worsened by statins      22. Statin intolerance  Assessment & Plan:  Reminded patient statins can lower risk of stroke; she's not tolerated due to muscle aches             I spent 30 minutes caring for Kimberly on this date of service. This time includes time spent by me in the following activities:preparing for the visit, reviewing tests, performing a medically appropriate examination and/or evaluation , counseling and educating the patient/family/caregiver, ordering medications, tests, or procedures, and documenting information in the medical record    I spent 20 minutes on the separately reported service of [id number]. This time is not included in the time used to support the E/M service also reported today.     Follow Up   Return in about 6 months (around 1/29/2026) for Controlled Rx Follow Up.  Patient was given instructions and counseling regarding her condition or for health maintenance advice. Please see specific information pulled into the AVS if appropriate.

## 2025-07-30 LAB
AMPHETAMINES UR QL SCN: NEGATIVE NG/ML
BARBITURATES UR QL SCN: NEGATIVE NG/ML
BENZODIAZ UR QL SCN: NEGATIVE NG/ML
BZE UR QL SCN: NEGATIVE NG/ML
CANNABINOIDS UR QL SCN: NEGATIVE NG/ML
CREAT UR-MCNC: 34.3 MG/DL (ref 20–300)
LABORATORY COMMENT REPORT: NORMAL
METHADONE UR QL SCN: NEGATIVE NG/ML
OPIATES UR QL SCN: NEGATIVE NG/ML
OXYCODONE+OXYMORPHONE UR QL SCN: NEGATIVE NG/ML
PCP UR QL: NEGATIVE NG/ML
PH UR: 5.8 [PH] (ref 4.5–8.9)
PROPOXYPH UR QL SCN: NEGATIVE NG/ML

## 2025-07-31 LAB
25(OH)D3+25(OH)D2 SERPL-MCNC: 35.5 NG/ML (ref 30–100)
ALBUMIN SERPL-MCNC: 4.2 G/DL (ref 3.5–5.2)
ALBUMIN/GLOB SERPL: 1.8 G/DL
ALP SERPL-CCNC: 99 U/L (ref 39–117)
ALT SERPL-CCNC: 11 U/L (ref 1–33)
AST SERPL-CCNC: 13 U/L (ref 1–32)
BILIRUB SERPL-MCNC: 0.2 MG/DL (ref 0–1.2)
BUN SERPL-MCNC: 10 MG/DL (ref 8–23)
BUN/CREAT SERPL: 9.7 (ref 7–25)
CALCIUM SERPL-MCNC: 9.7 MG/DL (ref 8.6–10.5)
CHLORIDE SERPL-SCNC: 100 MMOL/L (ref 98–107)
CO2 SERPL-SCNC: 23.5 MMOL/L (ref 22–29)
CREAT SERPL-MCNC: 1.03 MG/DL (ref 0.57–1)
EGFRCR SERPLBLD CKD-EPI 2021: 62 ML/MIN/1.73
ERYTHROCYTE [DISTWIDTH] IN BLOOD BY AUTOMATED COUNT: 13.5 % (ref 12.3–15.4)
FOLATE SERPL-MCNC: 11 NG/ML (ref 4.78–24.2)
GLOBULIN SER CALC-MCNC: 2.4 GM/DL
GLUCOSE SERPL-MCNC: 214 MG/DL (ref 65–99)
HCT VFR BLD AUTO: 43.5 % (ref 34–46.6)
HGB BLD-MCNC: 13.8 G/DL (ref 12–15.9)
LDLC SERPL DIRECT ASSAY-MCNC: 130 MG/DL (ref 0–99)
LPA SERPL-SCNC: 14.8 NMOL/L
MCH RBC QN AUTO: 29.1 PG (ref 26.6–33)
MCHC RBC AUTO-ENTMCNC: 31.7 G/DL (ref 31.5–35.7)
MCV RBC AUTO: 91.8 FL (ref 79–97)
PLATELET # BLD AUTO: 394 10*3/MM3 (ref 140–450)
POTASSIUM SERPL-SCNC: 5 MMOL/L (ref 3.5–5.2)
PROT SERPL-MCNC: 6.6 G/DL (ref 6–8.5)
RBC # BLD AUTO: 4.74 10*6/MM3 (ref 3.77–5.28)
SODIUM SERPL-SCNC: 137 MMOL/L (ref 136–145)
TSH SERPL DL<=0.005 MIU/L-ACNC: 1.37 UIU/ML (ref 0.27–4.2)
VIT B12 SERPL-MCNC: 525 PG/ML (ref 211–946)
WBC # BLD AUTO: 12.94 10*3/MM3 (ref 3.4–10.8)

## 2025-09-01 ENCOUNTER — APPOINTMENT (OUTPATIENT)
Dept: CT IMAGING | Facility: HOSPITAL | Age: 60
End: 2025-09-01
Payer: MEDICARE

## 2025-09-01 ENCOUNTER — HOSPITAL ENCOUNTER (EMERGENCY)
Facility: HOSPITAL | Age: 60
Discharge: HOME | End: 2025-09-01
Attending: EMERGENCY MEDICINE
Payer: MEDICARE

## 2025-09-01 VITALS
HEIGHT: 64 IN | WEIGHT: 200 LBS | RESPIRATION RATE: 18 BRPM | DIASTOLIC BLOOD PRESSURE: 84 MMHG | TEMPERATURE: 97.7 F | HEART RATE: 77 BPM | SYSTOLIC BLOOD PRESSURE: 155 MMHG | BODY MASS INDEX: 34.15 KG/M2 | OXYGEN SATURATION: 96 %

## 2025-09-01 DIAGNOSIS — S16.1XXA CERVICAL STRAIN, ACUTE, INITIAL ENCOUNTER: Primary | ICD-10-CM

## 2025-09-01 DIAGNOSIS — S39.012A LUMBAR STRAIN, INITIAL ENCOUNTER: ICD-10-CM

## 2025-09-01 DIAGNOSIS — S29.019A ACUTE THORACIC MYOFASCIAL STRAIN, INITIAL ENCOUNTER: ICD-10-CM

## 2025-09-01 PROCEDURE — 72125 CT NECK SPINE W/O DYE: CPT

## 2025-09-01 PROCEDURE — 72131 CT LUMBAR SPINE W/O DYE: CPT

## 2025-09-01 PROCEDURE — 99284 EMERGENCY DEPT VISIT MOD MDM: CPT

## 2025-09-01 PROCEDURE — 6360000002 HC RX W HCPCS: Performed by: EMERGENCY MEDICINE

## 2025-09-01 PROCEDURE — 96372 THER/PROPH/DIAG INJ SC/IM: CPT

## 2025-09-01 PROCEDURE — 72128 CT CHEST SPINE W/O DYE: CPT

## 2025-09-01 RX ORDER — KETOROLAC TROMETHAMINE 30 MG/ML
30 INJECTION, SOLUTION INTRAMUSCULAR; INTRAVENOUS ONCE
Status: COMPLETED | OUTPATIENT
Start: 2025-09-01 | End: 2025-09-01

## 2025-09-01 RX ORDER — ONDANSETRON 2 MG/ML
4 INJECTION INTRAMUSCULAR; INTRAVENOUS ONCE
Status: COMPLETED | OUTPATIENT
Start: 2025-09-01 | End: 2025-09-01

## 2025-09-01 RX ADMIN — ONDANSETRON 4 MG: 2 INJECTION, SOLUTION INTRAMUSCULAR; INTRAVENOUS at 19:55

## 2025-09-01 RX ADMIN — KETOROLAC TROMETHAMINE 30 MG: 30 INJECTION, SOLUTION INTRAMUSCULAR at 19:55

## 2025-09-01 ASSESSMENT — LIFESTYLE VARIABLES
HOW MANY STANDARD DRINKS CONTAINING ALCOHOL DO YOU HAVE ON A TYPICAL DAY: PATIENT DOES NOT DRINK
HOW OFTEN DO YOU HAVE A DRINK CONTAINING ALCOHOL: NEVER

## 2025-09-02 ASSESSMENT — ENCOUNTER SYMPTOMS
CHEST TIGHTNESS: 0
NAUSEA: 0
RHINORRHEA: 0
DIARRHEA: 0
SINUS PRESSURE: 0
SHORTNESS OF BREATH: 0
VOMITING: 0
EYE REDNESS: 0
BACK PAIN: 1
SORE THROAT: 0
PHOTOPHOBIA: 0
ABDOMINAL PAIN: 0
EYE PAIN: 0
STRIDOR: 0
WHEEZING: 0

## 2025-09-06 ENCOUNTER — OFFICE VISIT (OUTPATIENT)
Age: 60
End: 2025-09-06
Payer: MEDICARE

## 2025-09-06 VITALS
TEMPERATURE: 98.1 F | SYSTOLIC BLOOD PRESSURE: 127 MMHG | HEART RATE: 77 BPM | HEIGHT: 65 IN | WEIGHT: 199 LBS | DIASTOLIC BLOOD PRESSURE: 86 MMHG | OXYGEN SATURATION: 97 % | BODY MASS INDEX: 33.15 KG/M2

## 2025-09-06 DIAGNOSIS — S30.821A BLISTER (NONTHERMAL) OF ABDOMINAL WALL, INITIAL ENCOUNTER: Primary | ICD-10-CM

## 2025-09-06 PROCEDURE — 99203 OFFICE O/P NEW LOW 30 MIN: CPT

## 2025-09-06 RX ORDER — GABAPENTIN 800 MG/1
800 TABLET ORAL 3 TIMES DAILY
COMMUNITY
Start: 2025-07-28

## 2025-09-06 RX ORDER — ALBUTEROL SULFATE 90 UG/1
2 INHALANT RESPIRATORY (INHALATION) EVERY 6 HOURS PRN
COMMUNITY
Start: 2025-07-29

## 2025-09-06 RX ORDER — CETIRIZINE HYDROCHLORIDE 10 MG/1
10 TABLET ORAL DAILY
COMMUNITY
Start: 2025-07-29

## 2025-09-06 RX ORDER — OXYCODONE HYDROCHLORIDE AND ACETAMINOPHEN 5; 325 MG/1; MG/1
1 TABLET ORAL EVERY 6 HOURS PRN
COMMUNITY
Start: 2025-07-29

## 2025-09-06 RX ORDER — NITROGLYCERIN 0.4 MG/1
0.4 TABLET SUBLINGUAL EVERY 5 MIN PRN
COMMUNITY
Start: 2025-07-29

## 2025-09-06 RX ORDER — INSULIN LISPRO 100 [IU]/ML
INJECTION, SOLUTION INTRAVENOUS; SUBCUTANEOUS
COMMUNITY
Start: 2025-06-02

## 2025-09-06 RX ORDER — CHOLECALCIFEROL (VITAMIN D3) 25 MCG
1 TABLET ORAL DAILY
COMMUNITY
Start: 2025-07-28

## 2025-09-06 RX ORDER — FLUTICASONE FUROATE AND VILANTEROL 200; 25 UG/1; UG/1
1 POWDER RESPIRATORY (INHALATION)
COMMUNITY
Start: 2025-07-29

## 2025-09-06 RX ORDER — GLUCAGON INJECTION, SOLUTION 0.5 MG/.1ML
INJECTION, SOLUTION SUBCUTANEOUS
COMMUNITY

## 2025-09-06 RX ORDER — LORAZEPAM 1 MG/1
1 TABLET ORAL EVERY 8 HOURS PRN
COMMUNITY
Start: 2025-07-29

## 2025-09-06 RX ORDER — NYSTATIN 100000 [USP'U]/G
POWDER TOPICAL 2 TIMES DAILY
COMMUNITY
Start: 2025-07-29

## 2025-09-06 RX ORDER — LISINOPRIL 5 MG/1
5 TABLET ORAL NIGHTLY
COMMUNITY
Start: 2025-07-29

## 2025-09-06 RX ORDER — MULTIPLE VITAMINS W/ MINERALS TAB 9MG-400MCG
1 TAB ORAL DAILY
COMMUNITY

## 2025-09-06 RX ORDER — MELOXICAM 15 MG/1
15 TABLET ORAL DAILY PRN
COMMUNITY
Start: 2025-07-29

## 2025-09-06 RX ORDER — ALBUTEROL SULFATE 0.83 MG/ML
2.5 SOLUTION RESPIRATORY (INHALATION) EVERY 4 HOURS PRN
COMMUNITY
Start: 2025-07-29

## 2025-09-06 RX ORDER — ESOMEPRAZOLE MAGNESIUM 40 MG/1
40 CAPSULE, DELAYED RELEASE ORAL
COMMUNITY

## 2025-09-06 RX ORDER — MUPIROCIN 2 %
OINTMENT (GRAM) TOPICAL
Qty: 1 G | Refills: 0 | Status: SHIPPED | OUTPATIENT
Start: 2025-09-06 | End: 2025-09-13

## 2025-09-06 RX ORDER — ACYCLOVIR 400 MG/1
TABLET ORAL
COMMUNITY
Start: 2025-08-01

## 2025-09-06 ASSESSMENT — PATIENT HEALTH QUESTIONNAIRE - PHQ9: DEPRESSION UNABLE TO ASSESS: URGENT/EMERGENT SITUATION

## (undated) DEVICE — EYE CARE POST OP KIT: Brand: MEDLINE INDUSTRIES, INC.

## (undated) DEVICE — SOL IRRIG H2O 1000ML STRL

## (undated) DEVICE — Device

## (undated) DEVICE — CANN HYDRODISSECTION

## (undated) DEVICE — SYR LUERLOK 5CC

## (undated) DEVICE — GLV SURG SENSICARE W/ALOE PF LF 6.5 STRL

## (undated) DEVICE — NDL FLTR2 THN 19G 1IN

## (undated) DEVICE — GLV SURG SENSICARE W/ALOE PF LF 7.5 STRL

## (undated) DEVICE — CANN IRR/INJ AIR ANT CHAMBER 6MM BEND 27G

## (undated) DEVICE — TOWEL,OR,DSP,ST,BLUE,STD,4/PK,20PK/CS: Brand: MEDLINE

## (undated) DEVICE — CLEARCUT® HP2 SLIT KNIFE INTREPID MICRO-COAXIAL SYSTEM 2.4 DB: Brand: CLEARCUT®; INTREPID

## (undated) DEVICE — MICROSURGICAL INSTRUMENT IRR CYSTITOME 27GA FORMED-BAFFLE CUTTING: Brand: ALCON

## (undated) DEVICE — CLEARCUT® SLIT KNIFE INTREPID MICRO-COAXIAL SYSTEM 2.4 SB: Brand: CLEARCUT®; INTREPID

## (undated) DEVICE — 0.8MM CLEARPORT PARA KNIFE: Brand: SHARPOINT

## (undated) DEVICE — HP CONCL INTREPID COAX I/A CRV .3MM

## (undated) DEVICE — FRCP BIOP COLD ENDOJAW ALLGTR W/NDL 2.8X2300MM BLU

## (undated) DEVICE — 15 DEG. MICROKNIFE - 3MM: Brand: SHARPOINT

## (undated) DEVICE — YANKAUER,BULB TIP, NO VENT: Brand: ARGYLE

## (undated) DEVICE — GLV SURG NEOPRN SENSICARE SZ8

## (undated) DEVICE — SYR LUER SLPTP 50ML

## (undated) DEVICE — SYR LL 3CC

## (undated) DEVICE — 2000CC GUARDIAN II: Brand: GUARDIAN

## (undated) DEVICE — CONMED SCOPE SAVER BITE BLOCK, 20X27 MM: Brand: SCOPE SAVER

## (undated) DEVICE — JELLY,LUBE,STERILE,FLIP TOP,TUBE,2-OZ: Brand: MEDLINE